# Patient Record
Sex: FEMALE | Race: WHITE | NOT HISPANIC OR LATINO | URBAN - METROPOLITAN AREA
[De-identification: names, ages, dates, MRNs, and addresses within clinical notes are randomized per-mention and may not be internally consistent; named-entity substitution may affect disease eponyms.]

---

## 2018-05-09 ENCOUNTER — EMERGENCY (EMERGENCY)
Facility: HOSPITAL | Age: 83
LOS: 1 days | Discharge: HOME HEALTH | End: 2018-05-09
Attending: EMERGENCY MEDICINE | Admitting: HOSPITALIST
Payer: MEDICARE

## 2018-05-09 VITALS
DIASTOLIC BLOOD PRESSURE: 68 MMHG | OXYGEN SATURATION: 100 % | HEART RATE: 77 BPM | TEMPERATURE: 98 F | RESPIRATION RATE: 16 BRPM | SYSTOLIC BLOOD PRESSURE: 143 MMHG

## 2018-05-09 PROCEDURE — 99285 EMERGENCY DEPT VISIT HI MDM: CPT | Mod: 25,GC

## 2018-05-09 NOTE — ED ADULT TRIAGE NOTE - CHIEF COMPLAINT QUOTE
c/o rectal bleeding, worsening over the last few hours. dark red blood. denies any dizziness, weakness, sob. also having left flank pain for a few weeks. hx myesthenia gravis. takes aspirin.

## 2018-05-10 ENCOUNTER — TRANSCRIPTION ENCOUNTER (OUTPATIENT)
Age: 83
End: 2018-05-10

## 2018-05-10 VITALS — TEMPERATURE: 98 F | RESPIRATION RATE: 18 BRPM | OXYGEN SATURATION: 100 %

## 2018-05-10 DIAGNOSIS — G70.00 MYASTHENIA GRAVIS WITHOUT (ACUTE) EXACERBATION: ICD-10-CM

## 2018-05-10 DIAGNOSIS — K21.9 GASTRO-ESOPHAGEAL REFLUX DISEASE WITHOUT ESOPHAGITIS: ICD-10-CM

## 2018-05-10 DIAGNOSIS — K92.2 GASTROINTESTINAL HEMORRHAGE, UNSPECIFIED: ICD-10-CM

## 2018-05-10 DIAGNOSIS — G93.40 ENCEPHALOPATHY, UNSPECIFIED: ICD-10-CM

## 2018-05-10 DIAGNOSIS — Z29.9 ENCOUNTER FOR PROPHYLACTIC MEASURES, UNSPECIFIED: ICD-10-CM

## 2018-05-10 PROBLEM — Z00.00 ENCOUNTER FOR PREVENTIVE HEALTH EXAMINATION: Status: ACTIVE | Noted: 2018-05-10

## 2018-05-10 LAB
ALBUMIN SERPL ELPH-MCNC: 4 G/DL — SIGNIFICANT CHANGE UP (ref 3.3–5)
ALP SERPL-CCNC: 118 U/L — SIGNIFICANT CHANGE UP (ref 40–120)
ALT FLD-CCNC: 10 U/L — SIGNIFICANT CHANGE UP (ref 4–33)
ANTIBODY ID 1_1: SIGNIFICANT CHANGE UP
APPEARANCE UR: SIGNIFICANT CHANGE UP
APTT BLD: 28.8 SEC — SIGNIFICANT CHANGE UP (ref 27.5–37.4)
AST SERPL-CCNC: 14 U/L — SIGNIFICANT CHANGE UP (ref 4–32)
BACTERIA # UR AUTO: SIGNIFICANT CHANGE UP
BASE EXCESS BLDV CALC-SCNC: 2.7 MMOL/L — SIGNIFICANT CHANGE UP
BASOPHILS # BLD AUTO: 0.05 K/UL — SIGNIFICANT CHANGE UP (ref 0–0.2)
BASOPHILS NFR BLD AUTO: 0.5 % — SIGNIFICANT CHANGE UP (ref 0–2)
BILIRUB SERPL-MCNC: 0.3 MG/DL — SIGNIFICANT CHANGE UP (ref 0.2–1.2)
BILIRUB UR-MCNC: NEGATIVE — SIGNIFICANT CHANGE UP
BLD GP AB SCN SERPL QL: POSITIVE — SIGNIFICANT CHANGE UP
BLOOD GAS VENOUS - CREATININE: 0.85 MG/DL — SIGNIFICANT CHANGE UP (ref 0.5–1.3)
BLOOD UR QL VISUAL: HIGH
BUN SERPL-MCNC: 23 MG/DL — SIGNIFICANT CHANGE UP (ref 7–23)
BUN SERPL-MCNC: 27 MG/DL — HIGH (ref 7–23)
CALCIUM SERPL-MCNC: 8.8 MG/DL — SIGNIFICANT CHANGE UP (ref 8.4–10.5)
CALCIUM SERPL-MCNC: 8.9 MG/DL — SIGNIFICANT CHANGE UP (ref 8.4–10.5)
CHLORIDE BLDV-SCNC: 110 MMOL/L — HIGH (ref 96–108)
CHLORIDE SERPL-SCNC: 105 MMOL/L — SIGNIFICANT CHANGE UP (ref 98–107)
CHLORIDE SERPL-SCNC: 107 MMOL/L — SIGNIFICANT CHANGE UP (ref 98–107)
CO2 SERPL-SCNC: 26 MMOL/L — SIGNIFICANT CHANGE UP (ref 22–31)
CO2 SERPL-SCNC: 26 MMOL/L — SIGNIFICANT CHANGE UP (ref 22–31)
COLOR SPEC: YELLOW — SIGNIFICANT CHANGE UP
CREAT SERPL-MCNC: 0.75 MG/DL — SIGNIFICANT CHANGE UP (ref 0.5–1.3)
CREAT SERPL-MCNC: 0.85 MG/DL — SIGNIFICANT CHANGE UP (ref 0.5–1.3)
DAT C3-SP REAG RBC QL: POSITIVE — SIGNIFICANT CHANGE UP
DAT POLY-SP REAG RBC QL: POSITIVE — SIGNIFICANT CHANGE UP
DIRECT COOMBS IGG: NEGATIVE — SIGNIFICANT CHANGE UP
EOSINOPHIL # BLD AUTO: 0.18 K/UL — SIGNIFICANT CHANGE UP (ref 0–0.5)
EOSINOPHIL NFR BLD AUTO: 1.8 % — SIGNIFICANT CHANGE UP (ref 0–6)
GAS PNL BLDV: 140 MMOL/L — SIGNIFICANT CHANGE UP (ref 136–146)
GLUCOSE BLDV-MCNC: 100 — HIGH (ref 70–99)
GLUCOSE SERPL-MCNC: 90 MG/DL — SIGNIFICANT CHANGE UP (ref 70–99)
GLUCOSE SERPL-MCNC: 98 MG/DL — SIGNIFICANT CHANGE UP (ref 70–99)
GLUCOSE UR-MCNC: NEGATIVE — SIGNIFICANT CHANGE UP
HCO3 BLDV-SCNC: 25 MMOL/L — SIGNIFICANT CHANGE UP (ref 20–27)
HCT VFR BLD CALC: 39.5 % — SIGNIFICANT CHANGE UP (ref 34.5–45)
HCT VFR BLD CALC: 42.2 % — SIGNIFICANT CHANGE UP (ref 34.5–45)
HCT VFR BLDV CALC: 42.4 % — SIGNIFICANT CHANGE UP (ref 34.5–45)
HGB BLD-MCNC: 13 G/DL — SIGNIFICANT CHANGE UP (ref 11.5–15.5)
HGB BLD-MCNC: 13.4 G/DL — SIGNIFICANT CHANGE UP (ref 11.5–15.5)
HGB BLDV-MCNC: 13.8 G/DL — SIGNIFICANT CHANGE UP (ref 11.5–15.5)
IMM GRANULOCYTES # BLD AUTO: 0.03 # — SIGNIFICANT CHANGE UP
IMM GRANULOCYTES NFR BLD AUTO: 0.3 % — SIGNIFICANT CHANGE UP (ref 0–1.5)
INR BLD: 0.99 — SIGNIFICANT CHANGE UP (ref 0.88–1.17)
KETONES UR-MCNC: NEGATIVE — SIGNIFICANT CHANGE UP
LACTATE BLDV-MCNC: 1 MMOL/L — SIGNIFICANT CHANGE UP (ref 0.5–2)
LEUKOCYTE ESTERASE UR-ACNC: HIGH
LYMPHOCYTES # BLD AUTO: 0.89 K/UL — LOW (ref 1–3.3)
LYMPHOCYTES # BLD AUTO: 9.1 % — LOW (ref 13–44)
MAGNESIUM SERPL-MCNC: 2.4 MG/DL — SIGNIFICANT CHANGE UP (ref 1.6–2.6)
MCHC RBC-ENTMCNC: 31.5 PG — SIGNIFICANT CHANGE UP (ref 27–34)
MCHC RBC-ENTMCNC: 31.8 % — LOW (ref 32–36)
MCHC RBC-ENTMCNC: 31.8 PG — SIGNIFICANT CHANGE UP (ref 27–34)
MCHC RBC-ENTMCNC: 32.9 % — SIGNIFICANT CHANGE UP (ref 32–36)
MCV RBC AUTO: 96.6 FL — SIGNIFICANT CHANGE UP (ref 80–100)
MCV RBC AUTO: 99.1 FL — SIGNIFICANT CHANGE UP (ref 80–100)
MONOCYTES # BLD AUTO: 0.69 K/UL — SIGNIFICANT CHANGE UP (ref 0–0.9)
MONOCYTES NFR BLD AUTO: 7 % — SIGNIFICANT CHANGE UP (ref 2–14)
MUCOUS THREADS # UR AUTO: SIGNIFICANT CHANGE UP
NEUTROPHILS # BLD AUTO: 7.96 K/UL — HIGH (ref 1.8–7.4)
NEUTROPHILS NFR BLD AUTO: 81.3 % — HIGH (ref 43–77)
NITRITE UR-MCNC: NEGATIVE — SIGNIFICANT CHANGE UP
NRBC # FLD: 0 — SIGNIFICANT CHANGE UP
NRBC # FLD: 0 — SIGNIFICANT CHANGE UP
OB PNL STL: POSITIVE — SIGNIFICANT CHANGE UP
PCO2 BLDV: 55 MMHG — HIGH (ref 41–51)
PH BLDV: 7.33 PH — SIGNIFICANT CHANGE UP (ref 7.32–7.43)
PH UR: 6.5 — SIGNIFICANT CHANGE UP (ref 4.6–8)
PHOSPHATE SERPL-MCNC: 3.2 MG/DL — SIGNIFICANT CHANGE UP (ref 2.5–4.5)
PLATELET # BLD AUTO: 160 K/UL — SIGNIFICANT CHANGE UP (ref 150–400)
PLATELET # BLD AUTO: 168 K/UL — SIGNIFICANT CHANGE UP (ref 150–400)
PMV BLD: 10 FL — SIGNIFICANT CHANGE UP (ref 7–13)
PMV BLD: 10.1 FL — SIGNIFICANT CHANGE UP (ref 7–13)
PO2 BLDV: 31 MMHG — LOW (ref 35–40)
POTASSIUM BLDV-SCNC: 3.7 MMOL/L — SIGNIFICANT CHANGE UP (ref 3.4–4.5)
POTASSIUM SERPL-MCNC: 3.9 MMOL/L — SIGNIFICANT CHANGE UP (ref 3.5–5.3)
POTASSIUM SERPL-MCNC: 4.4 MMOL/L — SIGNIFICANT CHANGE UP (ref 3.5–5.3)
POTASSIUM SERPL-SCNC: 3.9 MMOL/L — SIGNIFICANT CHANGE UP (ref 3.5–5.3)
POTASSIUM SERPL-SCNC: 4.4 MMOL/L — SIGNIFICANT CHANGE UP (ref 3.5–5.3)
PROT SERPL-MCNC: 6.4 G/DL — SIGNIFICANT CHANGE UP (ref 6–8.3)
PROT UR-MCNC: 30 MG/DL — HIGH
PROTHROM AB SERPL-ACNC: 11.4 SEC — SIGNIFICANT CHANGE UP (ref 9.8–13.1)
RBC # BLD: 4.09 M/UL — SIGNIFICANT CHANGE UP (ref 3.8–5.2)
RBC # BLD: 4.26 M/UL — SIGNIFICANT CHANGE UP (ref 3.8–5.2)
RBC # FLD: 13 % — SIGNIFICANT CHANGE UP (ref 10.3–14.5)
RBC # FLD: 13.1 % — SIGNIFICANT CHANGE UP (ref 10.3–14.5)
RBC CASTS # UR COMP ASSIST: SIGNIFICANT CHANGE UP (ref 0–?)
RH IG SCN BLD-IMP: POSITIVE — SIGNIFICANT CHANGE UP
SAO2 % BLDV: 52.8 % — LOW (ref 60–85)
SODIUM SERPL-SCNC: 141 MMOL/L — SIGNIFICANT CHANGE UP (ref 135–145)
SODIUM SERPL-SCNC: 144 MMOL/L — SIGNIFICANT CHANGE UP (ref 135–145)
SP GR SPEC: 1.03 — SIGNIFICANT CHANGE UP (ref 1–1.04)
SQUAMOUS # UR AUTO: SIGNIFICANT CHANGE UP
UROBILINOGEN FLD QL: 4 MG/DL — HIGH
WBC # BLD: 7.72 K/UL — SIGNIFICANT CHANGE UP (ref 3.8–10.5)
WBC # BLD: 9.8 K/UL — SIGNIFICANT CHANGE UP (ref 3.8–10.5)
WBC # FLD AUTO: 7.72 K/UL — SIGNIFICANT CHANGE UP (ref 3.8–10.5)
WBC # FLD AUTO: 9.8 K/UL — SIGNIFICANT CHANGE UP (ref 3.8–10.5)
WBC UR QL: HIGH (ref 0–?)

## 2018-05-10 RX ORDER — PANTOPRAZOLE SODIUM 20 MG/1
40 TABLET, DELAYED RELEASE ORAL EVERY 12 HOURS
Qty: 0 | Refills: 0 | Status: DISCONTINUED | OUTPATIENT
Start: 2018-05-10 | End: 2018-05-10

## 2018-05-10 RX ORDER — POLYETHYLENE GLYCOL 3350 17 G/17G
17 POWDER, FOR SOLUTION ORAL DAILY
Qty: 0 | Refills: 0 | Status: DISCONTINUED | OUTPATIENT
Start: 2018-05-10 | End: 2018-05-10

## 2018-05-10 RX ORDER — SENNA PLUS 8.6 MG/1
2 TABLET ORAL DAILY
Qty: 0 | Refills: 0 | Status: DISCONTINUED | OUTPATIENT
Start: 2018-05-10 | End: 2018-05-10

## 2018-05-10 RX ORDER — POLYETHYLENE GLYCOL 3350 17 G/17G
17 POWDER, FOR SOLUTION ORAL
Qty: 0 | Refills: 0 | DISCHARGE
Start: 2018-05-10

## 2018-05-10 RX ORDER — DOCUSATE SODIUM 100 MG
100 CAPSULE ORAL DAILY
Qty: 0 | Refills: 0 | Status: DISCONTINUED | OUTPATIENT
Start: 2018-05-10 | End: 2018-05-10

## 2018-05-10 RX ORDER — SENNA PLUS 8.6 MG/1
2 TABLET ORAL
Refills: 0 | DISCHARGE
Start: 2018-05-10

## 2018-05-10 RX ORDER — DOCUSATE SODIUM 100 MG
1 CAPSULE ORAL
Qty: 0 | Refills: 0 | DISCHARGE
Start: 2018-05-10

## 2018-05-10 RX ADMIN — Medication 100 MILLIGRAM(S): at 12:36

## 2018-05-10 RX ADMIN — SENNA PLUS 2 TABLET(S): 8.6 TABLET ORAL at 12:37

## 2018-05-10 NOTE — DISCHARGE NOTE ADULT - CARE PROVIDER_API CALL
Taz Watts), Internal Medicine; Pulmonary Disease  [address] [facility] [address]  [address]  [place] [address]  Phone: [phone]  Fax: [phone]    Padmini Baxter), Gastroenterology  [address] [place] [address]  [address] [place] [address]  Phone: [phone]  Fax: [phone] Taz Watts), Internal Medicine; Pulmonary Disease  [address] [facility]  [address]  [place] [address]  Phone: [phone]  Fax: [phone]    Padmini Baxter), Gastroenterology  [address] [facility] [address]  [place] [address]  Phone: [phone]  Fax: [phone]    Shilo Wahl), Neurosurgery  [facility]  [address] [facility]  [address]  [place] [address]  Phone: [phone]  Fax: [phone]

## 2018-05-10 NOTE — H&P ADULT - PROBLEM SELECTOR PLAN 2
unclear etiology, possibly related to NPH  neuro consult in AM for possible in-pt LP given CT findings c/w NPH  (+)UA however ED concerned may have been contaminated w/stool, f/u UCx, repeat UA in AM resolved at present, A&Ox3  unclear etiology, possibly related to NPH  neuro consult in AM for possible in-pt LP given CT findings c/w NPH  (+)UA however ED concerned may have been contaminated w/stool, f/u UCx, repeat UA in AM

## 2018-05-10 NOTE — H&P ADULT - NSHPLABSRESULTS_GEN_ALL_CORE
05-10    144  |  107  |  27<H>  ----------------------------<  98  3.9   |  26  |  0.85    Ca    8.9      10 May 2018 00:40  Phos  3.2     05-10  Mg     2.4     05-10    TPro  6.4  /  Alb  4.0  /  TBili  0.3  /  DBili  x   /  AST  14  /  ALT  10  /  AlkPhos  118  05-10                        13.4   9.80  )-----------( 168      ( 10 May 2018 00:40 )             42.2     Occult Blood, Feces (05.10.18 @ 01:10)    Occult Blood: POSITIVE    LIVER FUNCTIONS - ( 10 May 2018 00:40 )  Alb: 4.0 g/dL / Pro: 6.4 g/dL / ALK PHOS: 118 u/L / ALT: 10 u/L / AST: 14 u/L / GGT: x           PT/INR - ( 10 May 2018 00:40 )   PT: 11.4 SEC;   INR: 0.99     PTT - ( 10 May 2018 00:40 )  PTT:28.8 SEC    Urinalysis Basic - ( 10 May 2018 01:50 )  Color: YELLOW / Appearance: HAZY / S.026 / pH: 6.5  Gluc: NEGATIVE / Ketone: NEGATIVE  / Bili: NEGATIVE / Urobili: 4 mg/dL   Blood: LARGE / Protein: 30 mg/dL / Nitrite: NEGATIVE   Leuk Esterase: SMALL / RBC: 25-50 / WBC 5-10   Sq Epi: OCC / Non Sq Epi: x / Bacteria: FEW    00:40 - VBG - pH: 7.33  | pCO2: 55    | pO2: 31    | Lactate: 1.0      CXR personally reviewed - rotated film, clear lungs    < from: CT Head No Cont (05.10.18 @ 02:48) >  There is no CT evidence of acute intracranial hemorrhage or extra-axial collection. There is no CT evidence of an acute demarcated territorial infarct.  There is no vasogenic edema or mass effect. There is disproportionate dilatation of the ventricles with slightly decreased callosal angle. The basal cisterns are patent. The visualized paranasal sinuses are clear. The mastoid air cells and middle ear cavities are grossly clear. The soft tissues of the scalp and face are unremarkable. The bones of the calvarium, skull base, and face are unremarkable.  IMPRESSION: No CT evidence of acute intracranial hemorrhage or mass effect. No CT evidence of acute demarcated territorial infarct. Findings suggesting normal pressure hydrocephalus.  < end of copied text >

## 2018-05-10 NOTE — H&P ADULT - HISTORY OF PRESENT ILLNESS
83-year-old female with history of myasthenia gravis (not on meds), constipation, presenting from home with rectal bleeding.  Patient also noted to be more confused, anxious, forgetful and disheveled over the past few weeks.  She lives with daughter however is home alone during the day.    In the ED VS: 97.6  66-77  143-194/58-76  16  %RA 83-year-old female with history of myasthenia gravis (not on meds), constipation, GERD, presenting from home with rectal bleeding after straining to have a BM.  Patient reports no prior history of gastrointestinal bleeding, reports never having had a colonoscopy.  She denies chest pain, shortness of breath, lightheadedness, dizziness, nausea, vomiting, abdominal pain, diarrhea, or melena.  States she was straining to have a BM yesterday and "pushed too hard."  Per ED note, patient also noted to be more confused, anxious, forgetful and disheveled over the past few weeks.  She lives with daughter however is home alone during the day.  Patient is A&Ox3 during my exam, without any complaints.  No  incontinence or report of gait abnormality, states recently tripped over her cat and fell without loss of consciousness or head trauma.  Ambulates without aid. Rare alcohol use, takes Anasin PRN for pain (aspirin).    In the ED VS: 97.6  66-77  143-194/58-76  16  %RA

## 2018-05-10 NOTE — DISCHARGE NOTE ADULT - PATIENT PORTAL LINK FT
You can access the [facility] Patient Portal, offered by [facility], by registering with the following website: [email or web address]

## 2018-05-10 NOTE — ED PROVIDER NOTE - OBJECTIVE STATEMENT
83yof w/ myesthenia gravis not on any medications p/w rectal bleeding. Has had increasing constipation for several days, today had strained to pass a hard bowel movement and then the family noticed she was dripping maroon blood from the rectum. Initially it was a few small spots and then blood was dripping on the floor. Pt endorses some abdominal bloating but no pain. No fevers, chills, nausea, vomiting. No abdominal surgeries. No melenic stool. Has occasional sharp left sided flank pains but not currently. Denies any dysuria, hematuria. Of note, family at bedside also reports an acute change in behavior over the past several weeks, getting easily confused, very anxious, forgetful, disheveled. Lives alone during the day while daughter. No recent hospitalizations

## 2018-05-10 NOTE — ED PROVIDER NOTE - ATTENDING CONTRIBUTION TO CARE
pt presenting with AMS and dark stools per family.  They have noted a rapid decline over the last few weeks and then the stool change.  Pt denies any complaints at this time    Gen: Well appearing in NAD  Head: NC/AT  Neck: trachea midline  Resp:  No distress  Abd: soft NT ND  Ext: no deformities  Neuro:  A&O appears non focal  Skin:  Warm and dry as visualized  Psych:  Normal affect and mood     Pt presenting with guaiac positive dark stools.  HgB is currently stable.  CT with signs of possible NPH but on further history taking the patient has no other symptoms.  Due to the patient living alone and concerns over rapidly progressive dementia will admit to the hospital.  Family is on board and wants to know about possible home care options as well

## 2018-05-10 NOTE — DISCHARGE NOTE ADULT - CARE PROVIDERS DIRECT ADDRESSES
,DirectAddress_Unknown,[email or web address] ,DirectAddress_Unknown,[email or web address]

## 2018-05-10 NOTE — DISCHARGE NOTE ADULT - HOSPITAL COURSE
HPI:83-year-old female with history of myasthenia gravis (not on meds), constipation, GERD, presenting from home with rectal bleeding after straining to have a BM.  Patient reports no prior history of gastrointestinal bleeding, reports never having had a colonoscopy.  She denies chest pain, shortness of breath, lightheadedness, dizziness, nausea, vomiting, abdominal pain, diarrhea, or melena.  States she was straining to have a BM yesterday and "pushed too hard."  Per ED note, patient also noted to be more confused, anxious, forgetful and disheveled over the past few weeks.  She lives with daughter however is home alone during the day.  Patient is A&Ox3 during my exam, without any complaints.  No  incontinence or report of gait abnormality, states recently tripped over her cat and fell without loss of consciousness or head trauma.  Ambulates without aid. Rare alcohol use, takes Anasin PRN for pain (aspirin).    Course: Patient was noted to have maroon colored stool in her rectal vault, FOBT+, and an external hemorrhoid.  Labs were grossly unremarkable with normal Hgb.  A CT head was done in the ED which showed some evidence of NPH. HPI:83-year-old female with history of myasthenia gravis (not on meds), constipation, GERD, presenting from home with rectal bleeding after straining to have a BM.  Patient reports no prior history of gastrointestinal bleeding, reports never having had a colonoscopy.  She denies chest pain, shortness of breath, lightheadedness, dizziness, nausea, vomiting, abdominal pain, diarrhea, or melena.  States she was straining to have a BM yesterday and "pushed too hard."  Per ED note, patient also noted to be more confused, anxious, forgetful and disheveled over the past few weeks.  She lives with daughter however is home alone during the day.  Patient is A&Ox3 during my exam, without any complaints.  No  incontinence or report of gait abnormality, states recently tripped over her cat and fell without loss of consciousness or head trauma.  Ambulates without aid. Rare alcohol use, takes Anasin PRN for pain (aspirin).    Course: Patient was noted to have maroon colored stool in her rectal vault, FOBT+, and an external hemorrhoid.  Labs were grossly unremarkable with normal Hgb.  UA was positive for blood but noted to be contaminated with bloody stool.   A CT head was done in the ED which showed some evidence of NPH.  Patient's lab work and vitals did not demonstrate any acute pathology, GI or otherwise.  Daughters reported worsening forgetfulness, but on exam there were no acute mental status changes.  Given clinical stability, lack of any evidence of acute pathology on labs and physical exam, patient was deemed stable for discharge with instructions to follow up with GI as an outpatient for her hemorrhoids and Neuro for management of her NPH. HPI:83-year-old female with history of myasthenia gravis (not on meds), constipation, GERD, presenting from home with rectal bleeding after straining to have a BM.  Patient reports no prior history of gastrointestinal bleeding, reports never having had a colonoscopy.  She denies chest pain, shortness of breath, lightheadedness, dizziness, nausea, vomiting, abdominal pain, diarrhea, or melena.  States she was straining to have a BM yesterday and "pushed too hard."  Per ED note, patient also noted to be more confused, anxious, forgetful and disheveled over the past few weeks.  She lives with daughter however is home alone during the day.  Patient is A&Ox3 during my exam, without any complaints.  No  incontinence or report of gait abnormality, states recently tripped over her cat and fell without loss of consciousness or head trauma.  Ambulates without aid. Rare alcohol use, takes Anasin PRN for pain (aspirin).    Course: Patient was noted to have maroon colored stool in her rectal vault, FOBT+, and an external hemorrhoid.  Labs were grossly unremarkable with normal Hgb.  UA was positive for blood but noted to be contaminated with bloody stool.   A CT head was done in the ED which showed some evidence of NPH.  Patient's lab work and vitals did not demonstrate any concerning acute pathology that warranted an inpatient workup, GI or otherwise.  Daughters reported worsening forgetfulness, but on exam there were no acute mental status changes.  Given clinical stability, lack of any evidence of acute pathology on labs and physical exam, patient was deemed stable for discharge with instructions to follow up with GI as an outpatient for her hemorrhoids and Neuro for management of her NPH.  She was also sent home with home health care services.

## 2018-05-10 NOTE — H&P ADULT - NSHPREVIEWOFSYSTEMS_GEN_ALL_CORE
REVIEW OF SYSTEMS:    CONSTITUTIONAL: No weakness, fevers or chills  EYES/ENT: No visual changes;  No dysphagia  NECK: No pain or stiffness  RESPIRATORY: No cough, wheezing, hemoptysis; No shortness of breath  CARDIOVASCULAR: No chest pain or palpitations; No lower extremity edema  GASTROINTESTINAL: No abdominal pain; occasional dyspepsia. No nausea, vomiting, or hematemesis; No diarrhea; (+) constipation. (+) hematochezia.  GENITOURINARY: No dysuria, frequency or hematuria  NEUROLOGICAL: No numbness or weakness  SKIN: No itching, burning, rashes, or lesions   All other review of systems is negative unless indicated above.

## 2018-05-10 NOTE — ED PROVIDER NOTE - MEDICAL DECISION MAKING DETAILS
83yof w/ painless rectal bleeding, internal hemorrhoid vs diverticular bleed, also with behavioral changes for the past several weeks. Will screen for metabolic derangements, infectious source, and CT scan head to eval for mass or bleed.

## 2018-05-10 NOTE — DISCHARGE NOTE ADULT - MEDICATION SUMMARY - MEDICATIONS TO TAKE
I will START or STAY ON the medications listed below when I get home from the hospital:    Anacin  -- Indication: For Need for prophylactic measure    Pepto-Bismol  -- Indication: For GERD (gastroesophageal reflux disease)    docusate sodium 100 mg oral capsule  -- 1 cap(s) by mouth once a day  -- Indication: For constipation    polyethylene glycol 3350 oral powder for reconstitution  -- 17 gram(s) by mouth once a day, As needed, Constipation  -- Indication: For constipation    senna oral tablet  -- 2 tab(s) by mouth once a day  -- Indication: For constipation

## 2018-05-10 NOTE — DISCHARGE NOTE ADULT - CONDITIONS AT DISCHARGE
pt alert and clinically stable. patient vss and afebrile. patient w/o sxs of acute bleeding. patient seen by PT and ambulated. stool regimen given for constipation. family at bedside. safety maintained. no sxs of acute distress noted. plan of care continue.

## 2018-05-10 NOTE — H&P ADULT - NSHPSOCIALHISTORY_GEN_ALL_CORE
Lives at home with daughter, home alone during the day Lives at home with daughter, home alone during the day  Former smoker  Rare alcohol use  Ambulates without aid

## 2018-05-10 NOTE — DISCHARGE NOTE ADULT - PROVIDER TOKENS
ADAN:'[id number]',ADAN:'[id number]' TOKEN:'[id number]',TOKEN:'[id number]',TOKEN:'[id number]'

## 2018-05-10 NOTE — H&P ADULT - NSHPPHYSICALEXAM_GEN_ALL_CORE
PHYSICAL EXAM:  GENERAL: NAD, well-developed, well-nourished  HEAD:  Atraumatic, Normocephalic  EYES: EOMI, PERRLA, conjunctiva and sclera clear  NECK: Supple, No JVD  CHEST/LUNG: Clear to auscultation bilaterally; No wheezes, rales or rhonchi  HEART: Regular rate and rhythm; No murmurs, rubs, or gallops, (+)S1, S2  ABDOMEN: Soft, Nontender; Normal Bowel sounds; (+)distension/tympanic  EXTREMITIES:  2+ Peripheral Pulses, No clubbing, cyanosis, or edema  PSYCH: normal mood and affect  NEUROLOGY: AAOx3, non-focal  SKIN: No rashes or lesions PHYSICAL EXAM:  GENERAL: NAD, well-developed, well-nourished  HEAD:  Atraumatic, Normocephalic  EYES: EOMI, PERRLA, conjunctiva and sclera clear  NECK: Supple, No JVD  CHEST/LUNG: Clear to auscultation bilaterally; No wheezes, rales or rhonchi  HEART: Regular rate and rhythm; No murmurs, rubs, or gallops, (+)S1, S2  ABDOMEN: Soft, Nontender; Normal Bowel sounds; (+)distension/tympanic [rectal exam performed in ED - maroon stool in rectal vault with small external hemorrhoid]  EXTREMITIES:  2+ Peripheral Pulses, No clubbing, cyanosis, or edema  PSYCH: normal mood and affect  NEUROLOGY: AAOx3, non-focal  SKIN: No rashes or lesions

## 2018-05-10 NOTE — PROGRESS NOTE ADULT - PROBLEM SELECTOR PLAN 1
maroon blood noted in rectal vault on ED exam with noted external hemorrhoid, FOBT (+)  bleeding likely secondary to hemorrhoidal bleed in setting of recent straining  H/H wnl - trend  On IV PPI BID  clear liquid diet, will advance  inpt vs outpt GI eval pending repeat bloodwork  will check orthostatics

## 2018-05-10 NOTE — PROGRESS NOTE ADULT - SUBJECTIVE AND OBJECTIVE BOX
Patient is a 83y old  Female who presents with a chief complaint of rectal bleeding (10 May 2018 10:37)      Nicho Chance PGY1 [facility] pager [phone]      SUBJECTIVE / OVERNIGHT EVENTS: No overnight events. Only complaint is some abdominal bloating.  Denies any nausea   On tele:    MEDICATIONS  (STANDING):  pantoprazole  Injectable 40 milliGRAM(s) IV Push every 12 hours    MEDICATIONS  (PRN):      T(C): 36.9 (05-10-18 @ 05:47), Max: 36.9 (05-10-18 @ 05:47)  HR: 63 (05-10-18 @ 05:47) (63 - 77)  BP: 149/75 (05-10-18 @ 05:47) (143/68 - 194/76)  RR: 18 (05-10-18 @ 05:47) (16 - 18)  SpO2: 99% (05-10-18 @ 05:47) (97% - 100%)    PHYSICAL EXAM  GENERAL: NAD, well-developed  NEURO: AO x3, PERRLA, EOMI, motor strength in tact in 4/4 extremities, sensation in tact  HEAD:  Atraumatic, Normocephalic  EYES: conjunctiva and sclera clear  NECK: Supple, No JVD, no lymphadenopathy, no thyromegaly  CHEST/LUNG: Clear to auscultation bilaterally; No wheezes, rales or rhonchi  HEART: Regular rate and rhythm; No murmurs, rubs, or gallops  ABDOMEN: Soft, Nontender, Nondistended; Bowel sounds present, no masses.  EXTREMITIES:  2+ Peripheral Pulses, No clubbing, cyanosis, or edema  SKIN: Warm, dry, in tact, no rashes or lesions  PSYCH: affect appropriate    LABS:                        13.0   7.72  )-----------( 160      ( 10 May 2018 09:07 )             39.5     05-10    141  |  105  |  23  ----------------------------<  90  4.4   |  26  |  0.75    Ca    8.8      10 May 2018 09:07  Phos  3.2     05-10  Mg     2.4     05-10    TPro  6.4  /  Alb  4.0  /  TBili  0.3  /  DBili  x   /  AST  14  /  ALT  10  /  AlkPhos  118  05-10    PT/INR - ( 10 May 2018 00:40 )   PT: 11.4 SEC;   INR: 0.99          PTT - ( 10 May 2018 00:40 )  PTT:28.8 SEC      Urinalysis Basic - ( 10 May 2018 01:50 )    Color: YELLOW / Appearance: HAZY / S.026 / pH: 6.5  Gluc: NEGATIVE / Ketone: NEGATIVE  / Bili: NEGATIVE / Urobili: 4 mg/dL   Blood: LARGE / Protein: 30 mg/dL / Nitrite: NEGATIVE   Leuk Esterase: SMALL / RBC: 25-50 / WBC 5-10   Sq Epi: OCC / Non Sq Epi: x / Bacteria: FEW      I&O's Summary Patient is a 83y old  Female who presents with a chief complaint of rectal bleeding (10 May 2018 10:37)      Nicho Chance PGY1 J pager [phone]      SUBJECTIVE / OVERNIGHT EVENTS: No overnight events. Only complaint is some abdominal bloating.  Denies any nausea, vomiting, hematemesis, dizziness, lightheadedness chest pain, SOB, diarrhea, melena, hematemesis.  ROS otherwise negative.        MEDICATIONS  (STANDING):  pantoprazole  Injectable 40 milliGRAM(s) IV Push every 12 hours    MEDICATIONS  (PRN):      T(C): 36.9 (05-10-18 @ 05:47), Max: 36.9 (05-10-18 @ 05:47)  HR: 63 (05-10-18 @ 05:47) (63 - 77)  BP: 149/75 (05-10-18 @ 05:47) (143/68 - 194/76)  RR: 18 (05-10-18 @ 05:47) (16 - 18)  SpO2: 99% (05-10-18 @ 05:47) (97% - 100%)    PHYSICAL EXAM  GENERAL: NAD, well-developed  NEURO: AO x3, PERRLA, EOMI, motor strength in tact in 4/4 extremities, sensation in tact  HEAD:  Atraumatic, Normocephalic  EYES: conjunctiva and sclera clear  NECK: Supple, No JVD   CHEST/LUNG: Clear to auscultation bilaterally; No wheezes, rales or rhonchi  HEART: Regular rate and rhythm; No murmurs, rubs, or gallops  ABDOMEN: Soft, Nontender, +distended; Bowel sounds present, no masses.  EXTREMITIES:  2+ Peripheral Pulses, No clubbing, cyanosis, or edema  SKIN: Warm, dry, in tact, no rashes or lesions  PSYCH: affect appropriate    LABS:                        13.0   7.72  )-----------( 160      ( 10 May 2018 09:07 )             39.5     05-10    141  |  105  |  23  ----------------------------<  90  4.4   |  26  |  0.75    Ca    8.8      10 May 2018 09:07  Phos  3.2     05-10  Mg     2.4     05-10    TPro  6.4  /  Alb  4.0  /  TBili  0.3  /  DBili  x   /  AST  14  /  ALT  10  /  AlkPhos  118  05-10    PT/INR - ( 10 May 2018 00:40 )   PT: 11.4 SEC;   INR: 0.99          PTT - ( 10 May 2018 00:40 )  PTT:28.8 SEC      Urinalysis Basic - ( 10 May 2018 01:50 )    Color: YELLOW / Appearance: HAZY / S.026 / pH: 6.5  Gluc: NEGATIVE / Ketone: NEGATIVE  / Bili: NEGATIVE / Urobili: 4 mg/dL   Blood: LARGE / Protein: 30 mg/dL / Nitrite: NEGATIVE   Leuk Esterase: SMALL / RBC: 25-50 / WBC 5-10   Sq Epi: OCC / Non Sq Epi: x / Bacteria: FEW      I&O's Summary

## 2018-05-10 NOTE — ED PROVIDER NOTE - PROGRESS NOTE DETAILS
Rohit: Labs grossly unremarkable. CTH w/ possible NPH. Discussed disposition options w/ family, ie home w/ neuro/gi follow up vs inpatient admission. Daughters express concern over pt's safety because she lives alone during the day, and would lik ept admitted to continue work up and perhaps arrange for services during the day.

## 2018-05-10 NOTE — DISCHARGE NOTE ADULT - HOME CARE AGENCY
[facility] [place] [phone]. Nurse to visit on the day following discharge. Other appropriate services to be arranged thereafter.   Please contact the home care agency at the above phone number if you have not heard from them by approximately 12 noon on the day after your hospital discharge.

## 2018-05-10 NOTE — ED PROVIDER NOTE - GASTROINTESTINAL, MLM
Abdomen soft, non-tender, no guarding. Maroon stool in vault, small external hemorrhoid (Chaperone Christianne Moscoso RN)

## 2018-05-10 NOTE — ED ADULT NURSE NOTE - OBJECTIVE STATEMENT
Break Coverage RN: The patient is an 84 y/o female a&ox4 p/w a c/c of dark red rectal bleeding that began tonight after straining to have a bowel movement.  Patient also endorsing left sided mid-axillary pain.  Patient reports being intermittent constipation x3 weeks.  Patient denying hx of GI bleeds.  Denying dizziness, SOB, CP, lethargy, N/V/D, fevers/chills, dysuria, hematuria.  VSS, patient in nad MD at bedside.

## 2018-05-10 NOTE — DISCHARGE NOTE ADULT - CARE PLAN
Principal Discharge DX:	Hemorrhoids  Goal:	treatment  Assessment and plan of treatment:	You were diagnosed with hemorrhoids, which are dilated blood vessels in your anus and rectum that can sometimes bleed.  These can sometimes result from constipation and the straining that goes along with it.  Avoid spending too much time on the toilet, please east a high fiber diet, and you can take stool softeners and laxatives to prevent further episodes of constipation.  Please schedule an appointment with a gastroenterologist within 1-2 weeks of discharge in order to have these evaluated.  IF you do not have a GI doc, a number for one has been provided for you.  If you experience worsening uncontrollable bleeding, abdominal pain, black tarry stools, vomiting of blood, dizziness, lightheadedness, loss of conscioussness seek medical attention.  Secondary Diagnosis:	Normal pressure hydrocephalus  Assessment and plan of treatment:	NPH is a condition where you have enlarged ventricles in your brain.  Depending on the severity, the symptoms from this include changes in mental status, frequent urination, and unsteady gait.  Depending on the extent of the hydrocephalus, sometimes treatment is suggested in order to drain the excess fluid in your brain.  Please follow up with your neurologist within 1-2 weeks of discharge.  A number for a neurosurgeon specializing in this condition has also been provided for you.  IF you experience any neurological changes, trouble walking, altered mental status, worsening confusion, seek medical attention.

## 2018-05-10 NOTE — PROGRESS NOTE ADULT - PROBLEM SELECTOR PLAN 2
resolved at present, A&Ox3  unclear etiology, possibly related to NPH  -outpatient workup for NPH  (+)UA but likely contaminated with stool

## 2018-05-10 NOTE — H&P ADULT - PROBLEM SELECTOR PLAN 1
maroon blood noted in rectal vault on ED exam  H/H wnl - trend  will start on PPI maroon blood noted in rectal vault on ED exam, FOBT (+)  H/H wnl - trend  will start on PPI IV BID  clear liquid diet for now  inpt vs outpt GI eval pending repeat bloodwork  will check orthostatics maroon blood noted in rectal vault on ED exam with noted external hemorrhoid, FOBT (+)  bleeding likely secondary to hemorrhoidal bleed in setting of recent straining  H/H wnl - trend  will start on PPI IV BID  clear liquid diet for now  inpt vs outpt GI eval pending repeat bloodwork  will check orthostatics

## 2018-05-10 NOTE — PROGRESS NOTE ADULT - ATTENDING COMMENTS
Pt without complaints, reports she feels at her baseline, family at bedside and agree she is baseline. They expressed concern over her becoming increasingly forgetful. Pt aox4, pleasant, neuro exam without defs, denies incontinence, has normal gait. Family would like CM to help obtain HHA on discharge.  - OP GI fu for hemorrhoidal treatment  - OP neuro fu for w/u for possible early dementia vs early NPH  - discharge time 31min

## 2018-05-10 NOTE — H&P ADULT - ASSESSMENT
83-year-old female with history of myasthenia gravis (not on meds), constipation, presenting from home with rectal bleeding.

## 2018-05-11 LAB
BACTERIA UR CULT: SIGNIFICANT CHANGE UP
SPECIMEN SOURCE: SIGNIFICANT CHANGE UP

## 2020-01-02 ENCOUNTER — INPATIENT (INPATIENT)
Facility: HOSPITAL | Age: 85
LOS: 5 days | Discharge: NURSING, REHAB OR LONG-TERM CARE | DRG: 552 | End: 2020-01-08
Attending: STUDENT IN AN ORGANIZED HEALTH CARE EDUCATION/TRAINING PROGRAM | Admitting: HOSPITALIST
Payer: MEDICARE

## 2020-01-02 VITALS
SYSTOLIC BLOOD PRESSURE: 156 MMHG | TEMPERATURE: 97 F | OXYGEN SATURATION: 99 % | DIASTOLIC BLOOD PRESSURE: 81 MMHG | HEART RATE: 63 BPM | RESPIRATION RATE: 16 BRPM

## 2020-01-02 DIAGNOSIS — R53.1 WEAKNESS: ICD-10-CM

## 2020-01-02 DIAGNOSIS — Z29.9 ENCOUNTER FOR PROPHYLACTIC MEASURES, UNSPECIFIED: ICD-10-CM

## 2020-01-02 DIAGNOSIS — Z02.9 ENCOUNTER FOR ADMINISTRATIVE EXAMINATIONS, UNSPECIFIED: ICD-10-CM

## 2020-01-02 DIAGNOSIS — G70.00 MYASTHENIA GRAVIS WITHOUT (ACUTE) EXACERBATION: ICD-10-CM

## 2020-01-02 DIAGNOSIS — R60.0 LOCALIZED EDEMA: ICD-10-CM

## 2020-01-02 DIAGNOSIS — R74.8 ABNORMAL LEVELS OF OTHER SERUM ENZYMES: ICD-10-CM

## 2020-01-02 DIAGNOSIS — F03.90 UNSPECIFIED DEMENTIA WITHOUT BEHAVIORAL DISTURBANCE: ICD-10-CM

## 2020-01-02 DIAGNOSIS — D69.6 THROMBOCYTOPENIA, UNSPECIFIED: ICD-10-CM

## 2020-01-02 LAB
ALBUMIN SERPL ELPH-MCNC: 4 G/DL — SIGNIFICANT CHANGE UP (ref 3.3–5)
ALP SERPL-CCNC: 154 U/L — HIGH (ref 40–120)
ALT FLD-CCNC: 36 U/L — SIGNIFICANT CHANGE UP (ref 10–45)
ANION GAP SERPL CALC-SCNC: 14 MMOL/L — SIGNIFICANT CHANGE UP (ref 5–17)
APPEARANCE UR: ABNORMAL
AST SERPL-CCNC: 29 U/L — SIGNIFICANT CHANGE UP (ref 10–40)
BASOPHILS # BLD AUTO: 0.02 K/UL — SIGNIFICANT CHANGE UP (ref 0–0.2)
BASOPHILS NFR BLD AUTO: 0.4 % — SIGNIFICANT CHANGE UP (ref 0–2)
BILIRUB SERPL-MCNC: 0.3 MG/DL — SIGNIFICANT CHANGE UP (ref 0.2–1.2)
BILIRUB UR-MCNC: NEGATIVE — SIGNIFICANT CHANGE UP
BUN SERPL-MCNC: 30 MG/DL — HIGH (ref 7–23)
CALCIUM SERPL-MCNC: 9.3 MG/DL — SIGNIFICANT CHANGE UP (ref 8.4–10.5)
CHLORIDE SERPL-SCNC: 103 MMOL/L — SIGNIFICANT CHANGE UP (ref 96–108)
CO2 SERPL-SCNC: 26 MMOL/L — SIGNIFICANT CHANGE UP (ref 22–31)
COLOR SPEC: YELLOW — SIGNIFICANT CHANGE UP
CREAT SERPL-MCNC: 0.82 MG/DL — SIGNIFICANT CHANGE UP (ref 0.5–1.3)
DIFF PNL FLD: ABNORMAL
EOSINOPHIL # BLD AUTO: 0.08 K/UL — SIGNIFICANT CHANGE UP (ref 0–0.5)
EOSINOPHIL NFR BLD AUTO: 1.6 % — SIGNIFICANT CHANGE UP (ref 0–6)
GLUCOSE SERPL-MCNC: 79 MG/DL — SIGNIFICANT CHANGE UP (ref 70–99)
GLUCOSE UR QL: NEGATIVE — SIGNIFICANT CHANGE UP
HCT VFR BLD CALC: 39.3 % — SIGNIFICANT CHANGE UP (ref 34.5–45)
HGB BLD-MCNC: 13.1 G/DL — SIGNIFICANT CHANGE UP (ref 11.5–15.5)
IMM GRANULOCYTES NFR BLD AUTO: 0.4 % — SIGNIFICANT CHANGE UP (ref 0–1.5)
KETONES UR-MCNC: SIGNIFICANT CHANGE UP
LEUKOCYTE ESTERASE UR-ACNC: NEGATIVE — SIGNIFICANT CHANGE UP
LYMPHOCYTES # BLD AUTO: 0.63 K/UL — LOW (ref 1–3.3)
LYMPHOCYTES # BLD AUTO: 12.2 % — LOW (ref 13–44)
MAGNESIUM SERPL-MCNC: 2.1 MG/DL — SIGNIFICANT CHANGE UP (ref 1.6–2.6)
MCHC RBC-ENTMCNC: 31.8 PG — SIGNIFICANT CHANGE UP (ref 27–34)
MCHC RBC-ENTMCNC: 33.3 GM/DL — SIGNIFICANT CHANGE UP (ref 32–36)
MCV RBC AUTO: 95.4 FL — SIGNIFICANT CHANGE UP (ref 80–100)
MONOCYTES # BLD AUTO: 0.44 K/UL — SIGNIFICANT CHANGE UP (ref 0–0.9)
MONOCYTES NFR BLD AUTO: 8.5 % — SIGNIFICANT CHANGE UP (ref 2–14)
NEUTROPHILS # BLD AUTO: 3.96 K/UL — SIGNIFICANT CHANGE UP (ref 1.8–7.4)
NEUTROPHILS NFR BLD AUTO: 76.9 % — SIGNIFICANT CHANGE UP (ref 43–77)
NITRITE UR-MCNC: NEGATIVE — SIGNIFICANT CHANGE UP
NRBC # BLD: 0 /100 WBCS — SIGNIFICANT CHANGE UP (ref 0–0)
PH UR: 6 — SIGNIFICANT CHANGE UP (ref 5–8)
PHOSPHATE SERPL-MCNC: 3.1 MG/DL — SIGNIFICANT CHANGE UP (ref 2.5–4.5)
PLATELET # BLD AUTO: 102 K/UL — LOW (ref 150–400)
POTASSIUM SERPL-MCNC: 3.9 MMOL/L — SIGNIFICANT CHANGE UP (ref 3.5–5.3)
POTASSIUM SERPL-SCNC: 3.9 MMOL/L — SIGNIFICANT CHANGE UP (ref 3.5–5.3)
PROT SERPL-MCNC: 6.7 G/DL — SIGNIFICANT CHANGE UP (ref 6–8.3)
PROT UR-MCNC: SIGNIFICANT CHANGE UP
RAPID RVP RESULT: SIGNIFICANT CHANGE UP
RBC # BLD: 4.12 M/UL — SIGNIFICANT CHANGE UP (ref 3.8–5.2)
RBC # FLD: 14.4 % — SIGNIFICANT CHANGE UP (ref 10.3–14.5)
SODIUM SERPL-SCNC: 143 MMOL/L — SIGNIFICANT CHANGE UP (ref 135–145)
SP GR SPEC: 1.03 — HIGH (ref 1.01–1.02)
UROBILINOGEN FLD QL: ABNORMAL
WBC # BLD: 5.15 K/UL — SIGNIFICANT CHANGE UP (ref 3.8–10.5)
WBC # FLD AUTO: 5.15 K/UL — SIGNIFICANT CHANGE UP (ref 3.8–10.5)

## 2020-01-02 PROCEDURE — 99223 1ST HOSP IP/OBS HIGH 75: CPT | Mod: GC

## 2020-01-02 PROCEDURE — 70450 CT HEAD/BRAIN W/O DYE: CPT | Mod: 26

## 2020-01-02 PROCEDURE — 99285 EMERGENCY DEPT VISIT HI MDM: CPT

## 2020-01-02 PROCEDURE — 71045 X-RAY EXAM CHEST 1 VIEW: CPT | Mod: 26

## 2020-01-02 PROCEDURE — 93010 ELECTROCARDIOGRAM REPORT: CPT

## 2020-01-02 PROCEDURE — 93970 EXTREMITY STUDY: CPT | Mod: 26

## 2020-01-02 RX ORDER — DONEPEZIL HYDROCHLORIDE 10 MG/1
10 TABLET, FILM COATED ORAL AT BEDTIME
Refills: 0 | Status: DISCONTINUED | OUTPATIENT
Start: 2020-01-02 | End: 2020-01-08

## 2020-01-02 RX ORDER — SODIUM CHLORIDE 9 MG/ML
1000 INJECTION, SOLUTION INTRAVENOUS
Refills: 0 | Status: DISCONTINUED | OUTPATIENT
Start: 2020-01-02 | End: 2020-01-03

## 2020-01-02 RX ORDER — ENOXAPARIN SODIUM 100 MG/ML
40 INJECTION SUBCUTANEOUS DAILY
Refills: 0 | Status: DISCONTINUED | OUTPATIENT
Start: 2020-01-02 | End: 2020-01-03

## 2020-01-02 RX ADMIN — SODIUM CHLORIDE 75 MILLILITER(S): 9 INJECTION, SOLUTION INTRAVENOUS at 17:58

## 2020-01-02 RX ADMIN — ENOXAPARIN SODIUM 40 MILLIGRAM(S): 100 INJECTION SUBCUTANEOUS at 17:34

## 2020-01-02 RX ADMIN — DONEPEZIL HYDROCHLORIDE 10 MILLIGRAM(S): 10 TABLET, FILM COATED ORAL at 21:49

## 2020-01-02 NOTE — ED PROVIDER NOTE - PHYSICAL EXAMINATION
PHYSICAL EXAM:   GEN: Age appropriate, resting comfortably in bed, no acute distress, non toxic appearing, speaking in complete sentences.   HEENT: Conjunctiva and sclera normal  PULM: Lungs CTAB, no wheezes, rales, rhonchi  CV: RRR, S1S2, no MRG  MSK: no stiffness or joint effusions  Abdominal: Soft, nontender to palpation, non-distended, +BS. No suprapubic tenderness.   Extremities: No edema or cyanosis  NEURO: AAOx3  Psych: normal affect, normal behavior  Skin: No rashes, lesions    T(C): 36.4 (01-02-20 @ 06:33), Max: 36.4 (01-02-20 @ 06:33)  HR: 63 (01-02-20 @ 06:33) (63 - 63)  BP: 147/94 (01-02-20 @ 06:33) (147/94 - 156/81)  RR: 16 (01-02-20 @ 06:33) (16 - 16)  SpO2: 97% (01-02-20 @ 06:33) (97% - 99%)

## 2020-01-02 NOTE — ED ADULT NURSE NOTE - NSIMPLEMENTINTERV_GEN_ALL_ED
Implemented All Fall Risk Interventions:  [place] to call system. Call bell, personal items and telephone within reach. Instruct patient to call for assistance. Room bathroom lighting operational. Non-slip footwear when patient is off stretcher. Physically safe environment: no spills, clutter or unnecessary equipment. Stretcher in lowest position, wheels locked, appropriate side rails in place. Provide visual cue, wrist band, yellow gown, etc. Monitor gait and stability. Monitor for mental status changes and reorient to person, place, and time. Review medications for side effects contributing to fall risk. Reinforce activity limits and safety measures with patient and family.

## 2020-01-02 NOTE — ED ADULT NURSE NOTE - OBJECTIVE STATEMENT
84 year old female presents to the ED via EMS with c/o bilat leg swelling and redness x 1 week. Pt is A&Ox3 at this time, mild edema noted to BLLE and redness to right ankle/foot.  Pt endorses inability to ambulate x 1 day due to bilat LE weakness and recent falls with no LOC and no head trauma. Family at bedside, comfort and safety measures maintained.

## 2020-01-02 NOTE — H&P ADULT - NSHPREVIEWOFSYSTEMS_GEN_ALL_CORE
REVIEW OF SYSTEMS:    CONSTITUTIONAL: + general malaise. No fevers or chills  EYES/ENT: No visual changes;  No vertigo or throat pain. Chronic decreased hearing  NECK: No pain or stiffness  RESPIRATORY: No cough, wheezing, hemoptysis; No shortness of breath  CARDIOVASCULAR: No chest pain or palpitations  GASTROINTESTINAL: No abdominal or epigastric pain. No nausea, vomiting, or hematemesis; No diarrhea or constipation. No melena or hematochezia.  GENITOURINARY: No dysuria, frequency or hematuria  NEUROLOGICAL: +numbness and tingling in lower extremities, and general weakness LE greater than upper extremities.   SKIN: No itching, burning, rashes, or lesions   All other review of systems is negative unless indicated above.

## 2020-01-02 NOTE — H&P ADULT - ATTENDING COMMENTS
Dr. Ela Baez  Division of Hospital Medicine  [facility]   Pager: [phone]    Patient seen and examined today 1/2/19 with day admit resident Dr. Hernandez.     In summary, pleasant 84 year old female with PMH of distant Myasthenia Gravis, dementia (alert and oriented x3, mild cognitive deficits), and chronic low back pain 2/2 to lumbar spinal stenosis who presents with worsening lower extremity weakness over several months with acute worsening in the last several days. Patient independent and ambulatory without any assisted devices at baseline with progressive generalized weakness with episode of acute worsening of her weakness the other night when she felt "overall weak" and had to lower herself to the floor (no fall, no trauma) and was too weak to lift herself up having to crawl the couch and call her daughter for assistance. CT head unremarkable. NIF performed in ED per ED note negative. On physical exam 5/5 strength in lower extremities b/l, +mild point tenderness in lumbar spine, 2+ pitting edema b/l R>L. Appreciate neurology consult, MG crisis/exacerbation less likely. Low suspicion for acute worsening of her known lumbar stenosis nor compression fracture based on physical exam. Will hold off on further imaging at this time. PT consult for placement.    Rest as detailed in note above.

## 2020-01-02 NOTE — CONSULT NOTE ADULT - ASSESSMENT
85 YO F with PMHx of Myasthenia Gravis (not on medication for past 9 years)  and MHx of dementia and chronic low back pain who presents with complaint of worsening LE weakness over the past 6 months.  Patient lives alone, walks without a walker, but increasing lower extremity weakness for six months, and acutely worse over past two days, and now cannot stand without assistance.   Dx previously with MG but not on any medication and now no sxs.     Impression:  Six months progressive LE weakness, acutely worse over past two days w/o hx trauma or infection.  Possible lumbar stenosis exacerbation.    - MG serum ab ach abs for binding, blocking and modulation, MUSK abs  - CT chest w/o contrast for thymus eval  - NIFS and VC  - MRI T, L spine with contrast  - Ortho or NSx spine consult  - PT / OT 85 YO F with PMHx of Myasthenia Gravis (not on medication for past 9 years)  and MHx of dementia and chronic low back pain who presents with complaint of worsening LE weakness over the past 6 months.  Patient lives alone, walks without a walker, but increasing lower extremity weakness for six months, and acutely worse over past two days, and now cannot stand without assistance.   Dx previously with MG but not on any medication and now no sxs.     Impression:  Six months progressive LE weakness, acutely worse over past two days w/o hx trauma or infection.  Possible lumbar stenosis exacerbation.    - MG serum ab ach abs for binding, blocking and modulation, MUSK abs  - CT chest w/o contrast for thymus eval  - NIFS and VC  - MRI T, L spine with contrast  - Ortho or NSx spine consult  - PT / OT    01/04/19 ADDENDUM***Spoke with Neurology attending and likely multi-factorial chronic weakness.  CT chest for thymoma evaluation as well as MRI T and L spine can be done as outpatient if necessary and PT should follow up with her neurologist.

## 2020-01-02 NOTE — H&P ADULT - NSHPPHYSICALEXAM_GEN_ALL_CORE
.  VITAL SIGNS:  T(C): 36.7 (01-02-20 @ 11:33), Max: 36.7 (01-02-20 @ 07:20)  T(F): 98 (01-02-20 @ 11:33), Max: 98 (01-02-20 @ 07:20)  HR: 65 (01-02-20 @ 11:33) (60 - 65)  BP: 147/74 (01-02-20 @ 11:33) (147/74 - 156/81)  BP(mean): --  RR: 18 (01-02-20 @ 11:33) (16 - 18)  SpO2: 98% (01-02-20 @ 11:33) (97% - 99%)  Wt(kg): --    PHYSICAL EXAM:    Constitutional: WDWN resting comfortably in bed; NAD  Head: NC/AT  Eyes: PERRL, EOMI, anicteric sclera  ENT: no nasal discharge, MMM  Neck: supple; no JVD appreciated  Respiratory: CTA B/L; no W/R/R, no increased work of breathing  Cardiac: +S1/S2; RRR; no M/R/G  Gastrointestinal: soft, NT/ND; no rebound or guarding; +BSx4  Extremities: 2+ LE edema with dry skin and long unlempt toe nails  Musculoskeletal: NROM x4; no joint swelling, tenderness or erythema  Vascular: 2+ radial, DP pulses B/L  Dermatologic: skin warm, dry and intact  Neurologic: Alert and oriented, no focal deficits appreciated. CNII-XII grossly intact.  Psychiatric: affect and characteristics of appearance, verbalizations, behaviors are appropriate .  VITAL SIGNS:  T(C): 36.7 (01-02-20 @ 11:33), Max: 36.7 (01-02-20 @ 07:20)  T(F): 98 (01-02-20 @ 11:33), Max: 98 (01-02-20 @ 07:20)  HR: 65 (01-02-20 @ 11:33) (60 - 65)  BP: 147/74 (01-02-20 @ 11:33) (147/74 - 156/81)  BP(mean): --  RR: 18 (01-02-20 @ 11:33) (16 - 18)  SpO2: 98% (01-02-20 @ 11:33) (97% - 99%)  Wt(kg): --    PHYSICAL EXAM:    Constitutional: WDWN resting comfortably in bed; NAD  Head: NC/AT  Eyes: PERRL, EOMI, anicteric sclera  ENT: no nasal discharge, MMM  Neck: supple; no JVD appreciated  Respiratory: CTA B/L; no W/R/R, no increased work of breathing  Cardiac: +S1/S2; RRR; no M/R/G  Gastrointestinal: soft, NT/ND; no rebound or guarding; +BSx4  Extremities: 2+ LE edema (R>L) with dry skin and long unkempt toe nails  Musculoskeletal: NROM x4; no joint swelling, tenderness or erythema, +kyphosis  Vascular: 2+ radial, DP pulses B/L  Dermatologic: skin warm, dry and intact  Neurologic: Alert and oriented, no focal deficits appreciated. CNII-XII grossly intact.  Psychiatric: affect and characteristics of appearance, verbalizations, behaviors are appropriate

## 2020-01-02 NOTE — ED PROVIDER NOTE - CLINICAL SUMMARY MEDICAL DECISION MAKING FREE TEXT BOX
The patient is an 84 year old female with a hx of dementia (on donepezil), Myasthenia Gravis (not on meds), chronic lower extremity swelling who presents to ED with several days of weakness. Will get UA, CXR, CTH, labs, EKG, NIF/VC. Likely admit.

## 2020-01-02 NOTE — H&P ADULT - PROBLEM SELECTOR PLAN 7
Transitions of Care Status:  1.  Name of PCP:Ricki Jaimes  2.  PCP Contacted on Admission: [ ] Y    [x ] N  Office closed  3.  PCP contacted at Discharge: [ ] Y    [ ] N    [ ] N/A  4.  Post-Discharge Appointment Date and Location:  5.  Summary of Handoff given to PCP: Transitions of Care Status:  1.  Name of PCP: Ricki Jaimes  2.  PCP Contacted on Admission: [ ] Y    [x ] N  Office closed  3.  PCP contacted at Discharge: [ ] Y    [ ] N    [ ] N/A  4.  Post-Discharge Appointment Date and Location:  5.  Summary of Handoff given to PCP:

## 2020-01-02 NOTE — H&P ADULT - PROBLEM SELECTOR PLAN 3
-unknown baseline, no evidence of bleeding and  not anemia  -f/u repeat  -may need further workup. -unknown baseline, no evidence of bleeding and  not anemia  -f/u repeat

## 2020-01-02 NOTE — ED PROVIDER NOTE - PROGRESS NOTE DETAILS
Milton Recio PGY2  NIF negative. labs, UA neg. patient's family states she can barely stand at home, cannot take care of her. admitted for medicine for placement

## 2020-01-02 NOTE — ED ADULT NURSE REASSESSMENT NOTE - NS ED NURSE REASSESS COMMENT FT1
Pt resting comfortably in bed  VSS.  NAD.  Understands care plan.  A/oX 3.  Perrl wnl, reynolds with equal strength.  No chest pain or sob.  C/o new onset of urinary incontinence.  Abdomen NT ND.  Denies fevers, chills or N/V

## 2020-01-02 NOTE — ED PROVIDER NOTE - OBJECTIVE STATEMENT
The patient is an 84 year old female with a hx of dementia (on donepezil), Myasthenia Gravis (not on meds), chronic lower extremity swelling who presents to ED with several days of weakness. The patient ambulates without a walker and lives on her own. She reports some increased urinary incontinence however denies other urinary symptoms. Rest of ROS negative as per below. She denies any recent falls. She has not been on meds for MG many years. She denies sick contacts.

## 2020-01-02 NOTE — H&P ADULT - NSHPLABSRESULTS_GEN_ALL_CORE
13.1   5.15  )-----------( 102      ( 2020 07:30 )             39.3       01-02    143  |  103  |  30<H>  ----------------------------<  79  3.9   |  26  |  0.82    Ca    9.3      2020 07:30  Phos  3.1     01-02  Mg     2.1     01-02    TPro  6.7  /  Alb  4.0  /  TBili  0.3  /  DBili  x   /  AST  29  /  ALT  36  /  AlkPhos  154<H>  01-02              Urinalysis Basic - ( 2020 08:28 )    Color: Yellow / Appearance: Slightly Turbid / S.030 / pH: x  Gluc: x / Ketone: Trace  / Bili: Negative / Urobili: 2 mg/dL   Blood: x / Protein: Trace / Nitrite: Negative   Leuk Esterase: Negative / RBC: 20 /hpf / WBC 2 /HPF   Sq Epi: x / Non Sq Epi: 9 /hpf / Bacteria: Negative    EXAM:  CT BRAIN                            PROCEDURE DATE:  2020            INTERPRETATION:  History: Generalized weakness over several days. Unable to walk.    Description: A noncontrast head CT was performed. Axial images were performed from the skull base to the vertex with coronal/sagittal reconstructions.    There is no evidence for acute infarct, acute hemorrhage, mass effect, calvarial fracture, or subdural hematoma.    Moderate dilatation of the lateral ventricles is noted. Differential considerations include central greater than cortical atrophy or normal pressure hydrocephalus in appropriate clinical context.    Mild patchy hypodensity without mass effect is noted in the periventricular white matter which most likely represents chronic microvascular ischemic changes given the patient's age.    Cerebral volume loss is present with secondary proportional prominence of the sulci and ventricles.    No lytic or blastic calvarial lesions are noted. The visualized portions of the paranasal sinuses and mastoid air cells are clear.    IMPRESSION:    No acute intracranial pathology is noted. If the patient has new and persistent symptoms, consider short interval follow-up head CT or brain MRI follow-up if there are no MRI contraindications.

## 2020-01-02 NOTE — H&P ADULT - PROBLEM SELECTOR PLAN 6
-chronic worsening LE Edema 2/2 venous insufficiency Vs DVT Vs less likely HF (no other HF sxs)  -f/u LE doppler -chronic worsening LE Edema 2/2 venous insufficiency Vs DVT Vs less likely HF (no other HF sxs). per patient, used to be on two water pills but stopped taking them because she was traveling?  -f/u LE doppler

## 2020-01-02 NOTE — CONSULT NOTE ADULT - SUBJECTIVE AND OBJECTIVE BOX
HPI:  83 YO F with PMHx of Myasthenia Gravis (not on medication for past 9 years)  and MHx of dementia and chronic low back pain who presents with complaint of worsening LE weakness over the past 6 months.  Patient lives alone, walks without a walker, but increasing lower extremity weakness for six months, and acutely worse over past two days, and now cannot stand without assistance.   Dx previously with MG but not on any medication and now no sxs.     REVIEW OF SYSTEMS  General:	  Skin/Breast:	  Ophthalmologic:  ENMT:	  Respiratory and Thorax:	  Cardiovascular:	  Gastrointestinal:	  Genitourinary:	  Musculoskeletal:	  Neurological:	  Psychiatric:	  Hematology/Lymphatics:	  Endocrine:	  Allergic/Immunologic:	    A 10-system ROS was performed and is negative except for those items noted above and/or in the HPI.    PAST MEDICAL & SURGICAL HISTORY:  Myasthenia gravis  Dementia  No significant past surgical history    FAMILY HISTORY:  No pertinent family history in first degree relatives    SOCIAL HISTORY:   T/E/D:   Occupation:   Lives with:     MEDICATIONS (HOME):  Home Medications:  Advil: as needed (2020 13:10)  Anacin: as needed (2020 13:10)  Aricept 10 mg oral tablet: 1 tab(s) orally once a day (at bedtime) (2020 13:10)    MEDICATIONS  (STANDING):  donepezil 10 milliGRAM(s) Oral at bedtime    MEDICATIONS  (PRN):    ALLERGIES/INTOLERANCES:  Allergies  sulfamethoxazole (Vomiting; Nausea)    Intolerances    VITALS & EXAMINATION:  Vital Signs Last 24 Hrs  T(C): 36.4 (2020 13:25), Max: 36.7 (2020 07:20)  T(F): 97.6 (2020 13:25), Max: 98 (2020 07:20)  HR: 56 (2020 13:25) (56 - 65)  BP: 154/82 (2020 13:25) (147/74 - 156/81)  BP(mean): --  RR: 18 (2020 13:25) (16 - 18)  SpO2: 99% (2020 13:25) (97% - 99%)    Neurological Exam    Mental Status:  alert and oriented x3, fluent speech, following commands    Cranial Nerves: PERRL, EOMI without nystagmus, visual fields intact, V 1-3 intact, no facial droop, pallate and uvula midline, no dysarthria, head turn strength normal, shoulder shrug strength normal.    Motor:   Tone:   normal                 Strength:  Upper Extremity 5/5   Lower extremity 5/5   Pronator drift: none            Sensation: intact grossly to light touch    Tremor: none appreciated at rest or in action    Deep Tendon Reflexes: UE: 2+ throughout, LE: achilles 1+ b/l, patella 0 b/l     Dysmetria: none FTN      LABORATORY:  CBC                       13.1   5.15  )-----------( 102      ( 2020 07:30 )             39.3     Chem -    143  |  103  |  30<H>  ----------------------------<  79  3.9   |  26  |  0.82    Ca    9.3      2020 07:30  Phos  3.1     -  Mg     2.1     -02    TPro  6.7  /  Alb  4.0  /  TBili  0.3  /  DBili  x   /  AST  29  /  ALT  36  /  AlkPhos  154<H>  01-02    LFTs LIVER FUNCTIONS - ( 2020 07:30 )  Alb: 4.0 g/dL / Pro: 6.7 g/dL / ALK PHOS: 154 U/L / ALT: 36 U/L / AST: 29 U/L / GGT: x           Coagulopathy   Lipid Panel   A1c   Cardiac enzymes     U/A Urinalysis Basic - ( 2020 08:28 )    Color: Yellow / Appearance: Slightly Turbid / S.030 / pH: x  Gluc: x / Ketone: Trace  / Bili: Negative / Urobili: 2 mg/dL   Blood: x / Protein: Trace / Nitrite: Negative   Leuk Esterase: Negative / RBC: 20 /hpf / WBC 2 /HPF   Sq Epi: x / Non Sq Epi: 9 /hpf / Bacteria: Negative      CSF  Immunological  Other    STUDIES & IMAGING:  Studies (EKG, EEG, EMG, etc):     Radiology (XR, CT, MR, U/S, TTE/LUIS):

## 2020-01-02 NOTE — H&P ADULT - ASSESSMENT
83 YO F with PMHx of Myasthenia Gravis (not on medication for past 9 years)  and MHx of dementia and chronic low back pain who presents with complaint of worsening LE weakness over the past 6 months.

## 2020-01-02 NOTE — CONSULT NOTE ADULT - ATTENDING COMMENTS
Pt seen and examined on rounds. As per primary team she had main complaint if dizziness. She is unable to give a good description and denies any dizziness at this time. She complains mainly of pain in her lower back when she is walking and pain currently also manageable.    On exam she is full strength, normal reflexes, no dysmetria. She has Kyphosis with a stooped posture.     Pt walked with physical therapy without issue.    Likely Pt seen and examined on rounds. As per primary team she had main complaint if dizziness. She is unable to give a good description and denies any dizziness at this time. She complains mainly of pain in her lower back when she is walking and pain currently also manageable.    On exam she is full strength, normal reflexes, no dysmetria. She has Kyphosis with a stooped posture.     Pt walked with physical therapy without issue.    Likely dizziness and weakness 2/2 multiple factors (stenosis, peripheral neuropathy, presbyastasis, MG ).   CT chest and MRI L spine can be done as outpatient.  If pt doesn't have a neurologist she can follow up at [address] [place] [address]. [phone]

## 2020-01-02 NOTE — ED PROVIDER NOTE - ATTENDING CONTRIBUTION TO CARE
MD Wood:  patient seen and evaluated personally.   I agree with the History & Physical,  Impression & Plan other than what was detailed in my note.  MD Ruthannpriya ALMODOVAR lives at home alone, genearlized weakness over the past several days, difficulty ambulating around the house. hx of mg, no difficulty breathing. no f/c, n/v, d, no ha, bv. reports slight drooling r side of mouth, fam members do not notice any facial asymmetry, no unilateral weakness, no cough, rash. afebrile vtials stable, well appearing, power 5/5x 4, no cn deficis, cerebellar function intact, abd soft nt, lungs cta b/l, chronic skin changes to lower extrem, no cellulitis. plan for labs, cxr, urine, ct head. likely admit for social issues

## 2020-01-02 NOTE — H&P ADULT - PROBLEM SELECTOR PLAN 5
c/w donepezil  -Currently A&Ox3.   -Family states pt lives at [facility] alone without help c/w donepezil  -Currently A&Ox3.   -Family states pt lives at home alone without help

## 2020-01-02 NOTE — H&P ADULT - PROBLEM SELECTOR PLAN 1
-can be 2/2 decondition Vs worsening Lumbar stenosis Vs less likely MG exacerbation  -Neuro consult  -IVF hydration with NS at 75cc/hour  -encourage PO intake  -Nutrition consult  -PT consult  -Can consider MRI -can be 2/2 decondition Vs worsening Lumbar stenosis Vs less likely MG exacerbation.  -No focal deficits on exam  -Neuro consult  -IVF hydration with LR at 75cc/hour (Dh=190)  -encourage PO intake  -Nutrition consult  -PT consult  -Can consider MRI -can be 2/2 decondition Vs worsening Lumbar stenosis Vs less likely MG exacerbation.  -No focal deficits on exam  -Neuro consult appreciated  -IVF hydration with LR at 75cc/hour (As=287)  -encourage PO intake  -Nutrition consult  -PT consult

## 2020-01-02 NOTE — ED PROVIDER NOTE - NS ED ROS FT
Constitutional: denies fevers, chills, night sweats, weight loss  HEENT: denies visual changes, cough  Cardiovascular: denies palpitations, chest pain, edema  Respiratory: denies SOB, wheezing  Gastrointestinal: denies N/V/D, abdominal pain, hematochezia, melena  : denies dysuria, urinary urgency, increased frequency. +urinary incontinence  MSK: + muscle weakness, joint pain  Skin: denies new rashes or masses  Heme: denies bleeding, bruising  Neuro: denies headache, + weakness

## 2020-01-02 NOTE — H&P ADULT - PROBLEM SELECTOR PLAN 2
-Pt has  not been on medication in 9 years, and her sxs were arm weakness and double vision.   -This does not appear to be exacerbation.  -Neuro consulted -Pt has not been on medication in 9 years, and her sxs were arm weakness and double vision.  NIF in ED negative.  -This does not appear to be exacerbation  -Neuro consulted

## 2020-01-03 PROBLEM — G70.00 MYASTHENIA GRAVIS WITHOUT (ACUTE) EXACERBATION: Chronic | Status: ACTIVE | Noted: 2018-05-10

## 2020-01-03 LAB
ALBUMIN SERPL ELPH-MCNC: 3.7 G/DL — SIGNIFICANT CHANGE UP (ref 3.3–5)
ALP SERPL-CCNC: 141 U/L — HIGH (ref 40–120)
ALT FLD-CCNC: 34 U/L — SIGNIFICANT CHANGE UP (ref 10–45)
ANION GAP SERPL CALC-SCNC: 11 MMOL/L — SIGNIFICANT CHANGE UP (ref 5–17)
AST SERPL-CCNC: 32 U/L — SIGNIFICANT CHANGE UP (ref 10–40)
BASOPHILS # BLD AUTO: 0.03 K/UL — SIGNIFICANT CHANGE UP (ref 0–0.2)
BASOPHILS NFR BLD AUTO: 0.7 % — SIGNIFICANT CHANGE UP (ref 0–2)
BILIRUB SERPL-MCNC: 0.5 MG/DL — SIGNIFICANT CHANGE UP (ref 0.2–1.2)
BUN SERPL-MCNC: 26 MG/DL — HIGH (ref 7–23)
CALCIUM SERPL-MCNC: 10 MG/DL — SIGNIFICANT CHANGE UP (ref 8.4–10.5)
CHLORIDE SERPL-SCNC: 105 MMOL/L — SIGNIFICANT CHANGE UP (ref 96–108)
CO2 SERPL-SCNC: 28 MMOL/L — SIGNIFICANT CHANGE UP (ref 22–31)
CREAT SERPL-MCNC: 0.78 MG/DL — SIGNIFICANT CHANGE UP (ref 0.5–1.3)
EOSINOPHIL # BLD AUTO: 0.08 K/UL — SIGNIFICANT CHANGE UP (ref 0–0.5)
EOSINOPHIL NFR BLD AUTO: 1.9 % — SIGNIFICANT CHANGE UP (ref 0–6)
GGT SERPL-CCNC: 11 U/L — SIGNIFICANT CHANGE UP (ref 8–40)
GLUCOSE SERPL-MCNC: 71 MG/DL — SIGNIFICANT CHANGE UP (ref 70–99)
HCT VFR BLD CALC: 38.7 % — SIGNIFICANT CHANGE UP (ref 34.5–45)
HGB BLD-MCNC: 12.5 G/DL — SIGNIFICANT CHANGE UP (ref 11.5–15.5)
IMM GRANULOCYTES NFR BLD AUTO: 0.2 % — SIGNIFICANT CHANGE UP (ref 0–1.5)
LYMPHOCYTES # BLD AUTO: 0.66 K/UL — LOW (ref 1–3.3)
LYMPHOCYTES # BLD AUTO: 15.5 % — SIGNIFICANT CHANGE UP (ref 13–44)
MAGNESIUM SERPL-MCNC: 1.9 MG/DL — SIGNIFICANT CHANGE UP (ref 1.6–2.6)
MCHC RBC-ENTMCNC: 31 PG — SIGNIFICANT CHANGE UP (ref 27–34)
MCHC RBC-ENTMCNC: 32.3 GM/DL — SIGNIFICANT CHANGE UP (ref 32–36)
MCV RBC AUTO: 96 FL — SIGNIFICANT CHANGE UP (ref 80–100)
MONOCYTES # BLD AUTO: 0.41 K/UL — SIGNIFICANT CHANGE UP (ref 0–0.9)
MONOCYTES NFR BLD AUTO: 9.6 % — SIGNIFICANT CHANGE UP (ref 2–14)
NEUTROPHILS # BLD AUTO: 3.06 K/UL — SIGNIFICANT CHANGE UP (ref 1.8–7.4)
NEUTROPHILS NFR BLD AUTO: 72.1 % — SIGNIFICANT CHANGE UP (ref 43–77)
NRBC # BLD: 0 /100 WBCS — SIGNIFICANT CHANGE UP (ref 0–0)
PHOSPHATE SERPL-MCNC: 2.9 MG/DL — SIGNIFICANT CHANGE UP (ref 2.5–4.5)
PLATELET # BLD AUTO: 96 K/UL — LOW (ref 150–400)
POTASSIUM SERPL-MCNC: 3.9 MMOL/L — SIGNIFICANT CHANGE UP (ref 3.5–5.3)
POTASSIUM SERPL-SCNC: 3.9 MMOL/L — SIGNIFICANT CHANGE UP (ref 3.5–5.3)
PROT SERPL-MCNC: 6.2 G/DL — SIGNIFICANT CHANGE UP (ref 6–8.3)
RBC # BLD: 4.03 M/UL — SIGNIFICANT CHANGE UP (ref 3.8–5.2)
RBC # FLD: 14.6 % — HIGH (ref 10.3–14.5)
SODIUM SERPL-SCNC: 144 MMOL/L — SIGNIFICANT CHANGE UP (ref 135–145)
VIT B12 SERPL-MCNC: >2000 PG/ML — HIGH (ref 232–1245)
WBC # BLD: 4.25 K/UL — SIGNIFICANT CHANGE UP (ref 3.8–10.5)
WBC # FLD AUTO: 4.25 K/UL — SIGNIFICANT CHANGE UP (ref 3.8–10.5)

## 2020-01-03 PROCEDURE — 99222 1ST HOSP IP/OBS MODERATE 55: CPT | Mod: GC

## 2020-01-03 PROCEDURE — 99232 SBSQ HOSP IP/OBS MODERATE 35: CPT | Mod: GC

## 2020-01-03 RX ORDER — HEPARIN SODIUM 5000 [USP'U]/ML
5000 INJECTION INTRAVENOUS; SUBCUTANEOUS EVERY 12 HOURS
Refills: 0 | Status: DISCONTINUED | OUTPATIENT
Start: 2020-01-03 | End: 2020-01-08

## 2020-01-03 RX ADMIN — DONEPEZIL HYDROCHLORIDE 10 MILLIGRAM(S): 10 TABLET, FILM COATED ORAL at 21:18

## 2020-01-03 RX ADMIN — HEPARIN SODIUM 5000 UNIT(S): 5000 INJECTION INTRAVENOUS; SUBCUTANEOUS at 17:24

## 2020-01-03 NOTE — DIETITIAN INITIAL EVALUATION ADULT. - FACTORS AFF FOOD INTAKE
Denies nausea/vomiting, diarrhea/constipation or other acute GI distress at this time. Reports last BM is PTA; could not further specify./difficulty swallowing/difficulty with food procurement/preparation

## 2020-01-03 NOTE — DIETITIAN INITIAL EVALUATION ADULT. - PROBLEM SELECTOR PLAN 2
-Pt has not been on medication in 9 years, and her sxs were arm weakness and double vision.  NIF in ED negative.  -This does not appear to be exacerbation  -Neuro consulted

## 2020-01-03 NOTE — PROGRESS NOTE ADULT - PROBLEM SELECTOR PLAN 7
Transitions of Care Status:  1.  Name of PCP: Silvio Jaimes  2.  PCP Contacted on Admission: [ ] Y    [x ] N  Office closed  3.  PCP contacted at Discharge: [ ] Y    [ ] N    [ ] N/A  4.  Post-Discharge Appointment Date and Location:  5.  Summary of Handoff given to PCP:

## 2020-01-03 NOTE — DIETITIAN INITIAL EVALUATION ADULT. - REASON INDICATOR FOR ASSESSMENT
Nutrition consult received for "Assessment". Information obtained from patient, EMR.     Per chart, pt is a 84 year old female with PMH of myasthenia gravis, dementia, chronic low back pain, presents with worsening lower extremity weakness for 6 months. Weakness can be 2/2 decondition Vs worsening Lumbar stenosis Vs less likely MG exacerbation.

## 2020-01-03 NOTE — PROGRESS NOTE ADULT - PROBLEM SELECTOR PLAN 6
-chronic worsening LE edema 2/2 venous insufficiency vs DVT vs less likely HF (no other HF sxs). Per patient, used to be on two water pills but stopped taking them  -euvolemic otherwise on exam with no evidence of hemodynamically significant volume overload  -LE doppler wnl

## 2020-01-03 NOTE — PROGRESS NOTE ADULT - PROBLEM SELECTOR PLAN 1
-most likely 2/2 decondition and poor PO intake vs worsening lumbar stenosis vs less likely MG exacerbation.  -No focal deficits on exam  -Neuro consult appreciated, have ordered MG labs but will hold off on imaging at this time.  -IVF hydration with LR at 75cc/hour for 24 hours  -encourage PO intake  -Nutrition consult  -PT consult -most likely 2/2 decondition and poor PO intake vs worsening lumbar stenosis vs less likely MG exacerbation.  -No focal deficits on exam  -Neuro consult appreciated, have ordered MG labs but will hold off on imaging at this time.  -s/p IVF hydration with LR ~2L  -encourage PO intake  -Nutrition consult  -PT consult

## 2020-01-03 NOTE — PROGRESS NOTE ADULT - PROBLEM SELECTOR PLAN 2
-Pt has not been on medication in 9 years, and her sxs were arm weakness and double vision.  NIF in ED negative.  -This does not appear to be exacerbation or crisis  -Neuro consulted -> MG labs sent. Will trend NIFs

## 2020-01-03 NOTE — DIETITIAN INITIAL EVALUATION ADULT. - ADD RECOMMEND
Recommend continue current diet with no therapeutic restrictions; defer texture/consistency to medical team. Recommend formal swallowing evaluation to assess appropriate texture/consistency for pt in setting of reported swallowing difficulty/medical dx of Myasthenia gravis which can result in swallowing difficulties. Provide feeding/menu ordering assistance PRN. Monitor tolerance to diet prescription, nutritional intake, weight trends, labs and skin integrity. Recommend continue current diet with no therapeutic restrictions; defer texture/consistency to medical team. RD to follow up results of swallowing evaluation. Provide feeding/menu ordering assistance PRN. Monitor tolerance to diet prescription, nutritional intake, weight trends, labs and skin integrity.

## 2020-01-03 NOTE — PROGRESS NOTE ADULT - SUBJECTIVE AND OBJECTIVE BOX
Patient is a 84y old  Female who presents with a chief complaint of weakness (2020 14:11)    SUBJECTIVE / OVERNIGHT EVENTS: Patient seen and examined at bedside. Complaining of "floaters" in her eyes bilaterally which are improving from yesterday with food and water intake, but not yet resolved. She states she it "urinating a lot" she thinks because of the fluids now with prima fit in place. Otherwise, pain in back better, no other events.    MEDICATIONS  (STANDING):  donepezil 10 milliGRAM(s) Oral at bedtime    Vital Signs Last 24 Hrs  T(C): 36.7 (2020 05:09), Max: 36.7 (2020 05:09)  T(F): 98 (2020 05:09), Max: 98 (2020 05:09)  HR: 72 (2020 05:09) (54 - 72)  BP: 163/73 (2020 05:09) (144/76 - 163/73)  RR: 18 (2020 05:09) (18 - 18)  SpO2: 95% (2020 05:09) (95% - 99%)    I&O's Summary  2020 07:01  -  2020 07:00  --------------------------------------------------------  IN: 930 mL / OUT: 0 mL / NET: 930 mL    PHYSICAL EXAM:  GENERAL: NAD, lying in bed comfortably  HEAD:  Atraumatic, Normocephalic  EYES: conjunctiva and sclera clear  ENT: Moist mucous membranes  NECK: Supple, No JVD  CHEST/LUNG: Clear to auscultation bilaterally; No rales, rhonchi, wheezing, or rubs. Unlabored respirations  HEART: Regular rate and rhythm; No murmurs, rubs, or gallops  ABDOMEN: Bowel sounds present; Soft, Nontender, Nondistended.  EXTREMITIES:  2+ Peripheral Pulses, brisk capillary refill. No clubbing, cyanosis, or edema  NERVOUS SYSTEM:  Alert & Oriented X2, speech clear. No deficits. PERRL, EOMI, no nystagmus, visual field intact, facial sensation intact, no droop, uvula midline, shoulder strength normal, normal upper and lower extremity strength in all muscle groups, sensation intact to gross touch bilaterally, no dysdiadochokinesia, heel-to-shin normal.   MSK: FROM all 4 extremities, full and equal strength, no point tenderness on back or paraspinal tenderness. Some muscular soreness in neck to palpation, no neck stiffness. Toe nails anf fignernails unkempt.  SKIN: No rashes or lesions  Mental Status:  alert and oriented x3, fluent speech, following commands    LABS:                        12.5   4.25  )-----------( 96       ( 2020 07:19 )             38.7     -    144  |  105  |  26<H>  ----------------------------<  71  3.9   |  28  |  0.78    Ca    10.0      2020 06:57  Phos  2.9     -  Mg     1.9         TPro  6.2  /  Alb  3.7  /  TBili  0.5  /  DBili  x   /  AST  32  /  ALT  34  /  AlkPhos  141<H>  -    Urinalysis Basic - ( 2020 08:28 )    Color: Yellow / Appearance: Slightly Turbid / S.030 / pH: x  Gluc: x / Ketone: Trace  / Bili: Negative / Urobili: 2 mg/dL   Blood: x / Protein: Trace / Nitrite: Negative   Leuk Esterase: Negative / RBC: 20 /hpf / WBC 2 /HPF   Sq Epi: x / Non Sq Epi: 9 /hpf / Bacteria: Negative    RADIOLOGY & ADDITIONAL TESTS:    Imaging Personally Reviewed: yes    Consultant(s) Notes Reviewed: yes    Care Discussed with Consultants/Other Providers: yes    Houston Kincaid MD, Internal Medicine Resident  [facility] Pager: [phone]; [facility] Pager: [phone]

## 2020-01-03 NOTE — CONSULT NOTE ADULT - SUBJECTIVE AND OBJECTIVE BOX
Patient is a 84y Female presenting with bilateral lower extremity. Pt fell 3 weeks ago, but reports no injuries at that time and has been ambulatory since.  Pt denies numbness, tingling, or paresthesias in saddle distribution or extremities aside from chronic numbness in toes. Denies bowel incontinence, but endorses a few episodes of bladder incontinence over the past few months. Pt is a community ambulatory at baseline.Denies fevers, dizziness, CP, SOB, N/V, weakness, calf pain. Patient and family at bedside endorses that they have no interest in spinal surgery and do not wish to pursue advanced work-up.    PMHx:  Myasthenia gravis  Dementia  Myasthenia gravis  Weakness  Alkaline phosphatase elevation  Thrombocytopenia    PSHx:  Denies    Allergies:  sulfamethoxazole (Vomiting; Nausea)    Social: Denies tobacco, EtOH, or illicit drug use.    Vitals:  T(C): 36.3 (01-03-20 @ 20:41), Max: 36.7 (01-03-20 @ 05:09)  HR: 69 (01-03-20 @ 20:41) (63 - 72)  BP: 124/71 (01-03-20 @ 20:41) (124/71 - 163/73)  RR: 18 (01-03-20 @ 20:41) (18 - 18)  SpO2: 95% (01-03-20 @ 20:41) (95% - 96%)    Physical Exam:  Gen: NAD    Spine Exam:  Skin intact  No gross deformity  No midline TTP C/T/L/S spine  No bony step offs  No paraspinal muscle TTP/Hypertonicity   No Radicular Symptoms with SLR  Negative clonus  Negative babinski  Negative jarvis  Negative ulnar drift  + rectal tone  No saddle anesthesia  No calf TTP    Motor:               C5           C6            C7               C8           T1   R         5/5          5/5            5/5             5/5          5/5  L          5/5          5/5            5/5             5/5          5/5                L2             L3             L4               L5            S1  R         5/5           5/5          5/5             5/5           5/5  L          5/5          5/5           5/5             5/5           5/5    Sensory:            C5         C6         C7      C8       T1        (0=absent, 1=impaired, 2=normal, NT=not testable)  R         2            2           2        2         2  L          2            2           2        2         2               L2          L3         L4      L5       S1         (0=absent, 1=impaired, 2=normal, NT=not testable)  R         2            2            2        2        2  L          2            2           2        2         2

## 2020-01-03 NOTE — PROGRESS NOTE ADULT - PROBLEM SELECTOR PLAN 4
Bone Vs GB. All other LFTs. wnl GGT wnl. The normal serum alkaline phosphatase level gradually increases from age 40 to 65 years, particularly in women. The normal alkaline phosphatase level for an otherwise healthy 65-year-old woman is more than 50 percent higher than that for a healthy 30-year-old woman. Will continue to trend LFTs, low suspicion for pathology at this time.

## 2020-01-03 NOTE — DIETITIAN INITIAL EVALUATION ADULT. - PROBLEM SELECTOR PLAN 1
-can be 2/2 decondition Vs worsening Lumbar stenosis Vs less likely MG exacerbation.  -No focal deficits on exam  -Neuro consult appreciated  -IVF hydration with LR at 75cc/hour (Hr=290)  -encourage PO intake  -Nutrition consult  -PT consult

## 2020-01-03 NOTE — DIETITIAN INITIAL EVALUATION ADULT. - OTHER INFO
Pt reports good appetite and intake PTA. Endorses swallowing difficulty, denies chewing difficulty. Reports typically consumes softer diet (pt notes NOT puree); usual foods include oatmeal, eggs, cottage cheese, turkey, juice. States she does not cough with thin liquids and is able to tolerate them well. Does not consume vegetables per preference. Noted reports of "hypersalivation" in H&P; pt denies this during RD assessment. Reports lives alone and her daughter brings her prepared foods that last her "a few days". NKFA. Reports PTA micronutrient supplementation includes vitamin B12.     Pt reports UBW of 105 pounds less than one year ago and endorses recent significant weight gain in setting of decreased mobility and increased snacking. Reports more recent weight of about 120 pounds. Current dosing weight noted as 149.9 pounds (1/2); which suggests a 45 pound weight gain in 1 year. Pt states "I used to be very thin". Question accuracy of reported weights, as well as in-house weights, as pt noted with history of dementia and weight changes may be related to scale discrepancy of bed weight. Pt visually appears well-nourished.     Skin per chart: dryness, scratches  Edema per chart: 1+ bilateral ankles    Ht: 63 inches, Wt: 68 kg (1/2), BMI: 26.6 kg/m2, IBW: 115 pounds (+/-10%), %IBW: 129% Pt reports good appetite and intake PTA. Endorses swallowing difficulty, denies chewing difficulty. Reports typically consumes softer diet (pt notes NOT puree); usual foods include oatmeal, eggs, cottage cheese, turkey, juice. States she does not cough with thin liquids and is able to tolerate them well. Does not consume vegetables per preference. Noted reports of "hypersalivation" in H&P; pt denies this during RD assessment. Noted pending speech/swallow evaluation at this time. Reports lives alone and her daughter brings her prepared foods that last her "a few days". NKFA. Reports PTA micronutrient supplementation includes vitamin B12.     Pt reports UBW of 105 pounds less than one year ago and endorses recent significant weight gain in setting of decreased mobility and increased snacking. Reports more recent weight of about 120 pounds. Current dosing weight noted as 149.9 pounds (1/2); which suggests a 45 pound weight gain in 1 year. Pt states "I used to be very thin". Question accuracy of reported weights, as well as in-house weights, as pt noted with history of dementia and weight changes may be related to scale discrepancy of bed weight. Pt visually appears well-nourished.     Skin per chart: dryness, scratches  Edema per chart: 1+ bilateral ankles    Ht: 63 inches, Wt: 68 kg (1/2), BMI: 26.6 kg/m2, IBW: 115 pounds (+/-10%), %IBW: 129%

## 2020-01-03 NOTE — DIETITIAN INITIAL EVALUATION ADULT. - PROBLEM SELECTOR PLAN 6
-chronic worsening LE Edema 2/2 venous insufficiency Vs DVT Vs less likely HF (no other HF sxs). per patient, used to be on two water pills but stopped taking them because she was traveling?  -f/u LE doppler

## 2020-01-03 NOTE — PHYSICAL THERAPY INITIAL EVALUATION ADULT - CRITERIA FOR SKILLED THERAPEUTIC INTERVENTIONS
predicted duration of therapy intervention/risk reduction/prevention/therapy frequency/rehab potential/functional limitations in following categories/anticipated discharge recommendation/impairments found/anticipated equipment needs at discharge

## 2020-01-03 NOTE — PHYSICAL THERAPY INITIAL EVALUATION ADULT - ADDITIONAL COMMENTS
as per pt and family PTA pt was living in a apartment ground level and was independent in all functional mobility and ADL's. no AD for gait. pt mainly house hold ambulation, daughters come by and assist with IADL's as needed

## 2020-01-03 NOTE — CONSULT NOTE ADULT - ASSESSMENT
Pt is a 84y Female with subjective BLLE weakness.  -Neuro intact  -Care per primary team  -WBAT  -PT/OT  -Pt and family do not want any spine surgery intervention and do not want advanced imaging.  -No further orthopedic intervention  -Follow up with Dr. Felix if needed,  -Orthopaedically stable for discharge  -Discussed with Dr. Isidro Fuentes M.D.  PGY-2 Orthopaedic Surgery

## 2020-01-03 NOTE — PROGRESS NOTE ADULT - ASSESSMENT
84F with a history of myasthenia gravis (not on medication for past 9 years, in remission) and mild dementia and chronic low back pain 2/2 stenosis who presents with complaint of weakness and dizziness after standing which has been ongoing for some months likely 2/2 deconditioning and a component of orthostasis 2/2 poor PO intake.

## 2020-01-04 ENCOUNTER — TRANSCRIPTION ENCOUNTER (OUTPATIENT)
Age: 85
End: 2020-01-04

## 2020-01-04 LAB
ALBUMIN SERPL ELPH-MCNC: 3.8 G/DL — SIGNIFICANT CHANGE UP (ref 3.3–5)
ALP SERPL-CCNC: 138 U/L — HIGH (ref 40–120)
ALT FLD-CCNC: 39 U/L — SIGNIFICANT CHANGE UP (ref 10–45)
ANION GAP SERPL CALC-SCNC: 13 MMOL/L — SIGNIFICANT CHANGE UP (ref 5–17)
AST SERPL-CCNC: 31 U/L — SIGNIFICANT CHANGE UP (ref 10–40)
BASOPHILS # BLD AUTO: 0.03 K/UL — SIGNIFICANT CHANGE UP (ref 0–0.2)
BASOPHILS NFR BLD AUTO: 0.4 % — SIGNIFICANT CHANGE UP (ref 0–2)
BILIRUB SERPL-MCNC: 0.5 MG/DL — SIGNIFICANT CHANGE UP (ref 0.2–1.2)
BUN SERPL-MCNC: 26 MG/DL — HIGH (ref 7–23)
CALCIUM SERPL-MCNC: 9.3 MG/DL — SIGNIFICANT CHANGE UP (ref 8.4–10.5)
CHLORIDE SERPL-SCNC: 104 MMOL/L — SIGNIFICANT CHANGE UP (ref 96–108)
CO2 SERPL-SCNC: 25 MMOL/L — SIGNIFICANT CHANGE UP (ref 22–31)
CORTIS AM PEAK SERPL-MCNC: 14.6 UG/DL — SIGNIFICANT CHANGE UP (ref 6–18.4)
CREAT SERPL-MCNC: 0.93 MG/DL — SIGNIFICANT CHANGE UP (ref 0.5–1.3)
EOSINOPHIL # BLD AUTO: 0.06 K/UL — SIGNIFICANT CHANGE UP (ref 0–0.5)
EOSINOPHIL NFR BLD AUTO: 0.9 % — SIGNIFICANT CHANGE UP (ref 0–6)
GLUCOSE SERPL-MCNC: 68 MG/DL — LOW (ref 70–99)
HCT VFR BLD CALC: 38.4 % — SIGNIFICANT CHANGE UP (ref 34.5–45)
HGB BLD-MCNC: 12.7 G/DL — SIGNIFICANT CHANGE UP (ref 11.5–15.5)
IMM GRANULOCYTES NFR BLD AUTO: 0.4 % — SIGNIFICANT CHANGE UP (ref 0–1.5)
LYMPHOCYTES # BLD AUTO: 0.65 K/UL — LOW (ref 1–3.3)
LYMPHOCYTES # BLD AUTO: 9.2 % — LOW (ref 13–44)
MAGNESIUM SERPL-MCNC: 1.9 MG/DL — SIGNIFICANT CHANGE UP (ref 1.6–2.6)
MCHC RBC-ENTMCNC: 31.9 PG — SIGNIFICANT CHANGE UP (ref 27–34)
MCHC RBC-ENTMCNC: 33.1 GM/DL — SIGNIFICANT CHANGE UP (ref 32–36)
MCV RBC AUTO: 96.5 FL — SIGNIFICANT CHANGE UP (ref 80–100)
MONOCYTES # BLD AUTO: 0.55 K/UL — SIGNIFICANT CHANGE UP (ref 0–0.9)
MONOCYTES NFR BLD AUTO: 7.8 % — SIGNIFICANT CHANGE UP (ref 2–14)
NEUTROPHILS # BLD AUTO: 5.72 K/UL — SIGNIFICANT CHANGE UP (ref 1.8–7.4)
NEUTROPHILS NFR BLD AUTO: 81.3 % — HIGH (ref 43–77)
NRBC # BLD: 0 /100 WBCS — SIGNIFICANT CHANGE UP (ref 0–0)
PHOSPHATE SERPL-MCNC: 2.9 MG/DL — SIGNIFICANT CHANGE UP (ref 2.5–4.5)
PLATELET # BLD AUTO: 104 K/UL — LOW (ref 150–400)
POTASSIUM SERPL-MCNC: 3.9 MMOL/L — SIGNIFICANT CHANGE UP (ref 3.5–5.3)
POTASSIUM SERPL-SCNC: 3.9 MMOL/L — SIGNIFICANT CHANGE UP (ref 3.5–5.3)
PROT SERPL-MCNC: 6.1 G/DL — SIGNIFICANT CHANGE UP (ref 6–8.3)
RBC # BLD: 3.98 M/UL — SIGNIFICANT CHANGE UP (ref 3.8–5.2)
RBC # FLD: 14.5 % — SIGNIFICANT CHANGE UP (ref 10.3–14.5)
SODIUM SERPL-SCNC: 142 MMOL/L — SIGNIFICANT CHANGE UP (ref 135–145)
T4 FREE SERPL-MCNC: 1.2 NG/DL — SIGNIFICANT CHANGE UP (ref 0.9–1.8)
TSH SERPL-MCNC: 2.81 UIU/ML — SIGNIFICANT CHANGE UP (ref 0.27–4.2)
WBC # BLD: 7.04 K/UL — SIGNIFICANT CHANGE UP (ref 3.8–10.5)
WBC # FLD AUTO: 7.04 K/UL — SIGNIFICANT CHANGE UP (ref 3.8–10.5)

## 2020-01-04 PROCEDURE — 72100 X-RAY EXAM L-S SPINE 2/3 VWS: CPT | Mod: 26

## 2020-01-04 PROCEDURE — 99233 SBSQ HOSP IP/OBS HIGH 50: CPT | Mod: GC

## 2020-01-04 RX ADMIN — HEPARIN SODIUM 5000 UNIT(S): 5000 INJECTION INTRAVENOUS; SUBCUTANEOUS at 18:09

## 2020-01-04 RX ADMIN — HEPARIN SODIUM 5000 UNIT(S): 5000 INJECTION INTRAVENOUS; SUBCUTANEOUS at 05:26

## 2020-01-04 RX ADMIN — DONEPEZIL HYDROCHLORIDE 10 MILLIGRAM(S): 10 TABLET, FILM COATED ORAL at 22:05

## 2020-01-04 NOTE — DISCHARGE NOTE PROVIDER - NSDCCPCAREPLAN_GEN_ALL_CORE_FT
PRINCIPAL DISCHARGE DIAGNOSIS  Diagnosis: Weakness  Assessment and Plan of Treatment: You were admitted to the hospital for weakness. We believe this may be because you were not eating much food at home and became weak. We checked a scan of your brain but we did not see any abnormalities that would explain the weakness. Orthopedics saw you and decided with you and your family that there should be no surgical intervention. You were seen by Physical Therapy and it was recommended that you go to rehab to develop your strength. If you suddenly feel weak again, or experience symptoms such as new onset numbness/weakness/muscle drooping/tingling, or any other concerning symptoms, please return to the Emergency Department. PRINCIPAL DISCHARGE DIAGNOSIS  Diagnosis: Weakness  Assessment and Plan of Treatment: You were admitted to the hospital for weakness. We believe this may be because you were not eating much food at home and became weak. We checked a scan of your brain but we did not see any abnormalities that would explain the weakness. Orthopedics saw you and decided with you and your family that there should be no surgical intervention. You were seen by Neurology and they recommended that you get a scan of your chest to see if there was a growth that could be a sign of a mass that could be contributing to your symptoms. You also had a scan of your back which showed _______. You were seen by Physical Therapy and it was recommended that you go to rehab to develop your strength. If you suddenly feel weak again, or experience symptoms such as new onset numbness/weakness/muscle drooping/tingling, or any other concerning symptoms, please return to the Emergency Department. PRINCIPAL DISCHARGE DIAGNOSIS  Diagnosis: Weakness  Assessment and Plan of Treatment: You were admitted to the hospital for weakness. We believe this may be because you were not eating much food at home and became weak. We checked a scan of your brain but we did not see any abnormalities that would explain the weakness. Orthopedics saw you and decided with you and your family that there should be no surgical intervention. You were seen by Neurology and they recommended that you get a scan of your chest to see if there was a growth that could be a sign of a mass that could be contributing to your symptoms. The CT chest was negative for thymoma. You also had a scan of your back which showed arthritis as well as severe spinal stenosis. You were seen by Physical Therapy and it was recommended that you go to rehab to develop your strength. If you suddenly feel weak again, or experience symptoms such as new onset numbness/weakness/muscle drooping/tingling, or any other concerning symptoms, please return to the Emergency Department.

## 2020-01-04 NOTE — DISCHARGE NOTE PROVIDER - NSDCMRMEDTOKEN_GEN_ALL_CORE_FT
Advil: as needed  Anacin: as needed  Anacin:   Aricept 10 mg oral tablet: 1 tab(s) orally once a day (at bedtime)  docusate sodium 100 mg oral capsule: 1 cap(s) orally once a day  Pepto-Bismol:   polyethylene glycol 3350 oral powder for reconstitution: 17 gram(s) orally once a day, As needed, Constipation  senna oral tablet: 2 tab(s) orally once a day Advil 200 mg oral tablet: 1 tab(s) orally every 6 hours, As Needed  Anacin 400 mg-32 mg oral tablet: 2 tab(s) orally every 6 hours, As Needed  Aricept 10 mg oral tablet: 1 tab(s) orally once a day (at bedtime)  docusate sodium 100 mg oral capsule: 1 cap(s) orally once a day  Pepto-Bismol 262 mg oral tablet, chewable: 2 tab(s) orally 4 times a day, As Needed  polyethylene glycol 3350 oral powder for reconstitution: 17 gram(s) orally once a day, As needed, Constipation  senna oral tablet: 2 tab(s) orally once a day

## 2020-01-04 NOTE — PROGRESS NOTE ADULT - PROBLEM SELECTOR PLAN 1
-most likely 2/2 decondition and poor PO intake vs worsening lumbar stenosis vs less likely MG exacerbation.  -No focal deficits on exam  -Neuro consult appreciated, have ordered MG labs but will hold off on imaging at this time.  -s/p IVF hydration with LR ~2L  -encourage PO intake  -Nutrition consult  -PT consult -most likely 2/2 decondition and poor PO intake vs worsening lumbar stenosis vs less likely MG exacerbation.  -No focal deficits on exam  -Neuro consult appreciated, have ordered MG labs but will hold off on imaging at this time.  -s/p IVF hydration with LR ~2L  -encourage PO intake  -Nutrition consult  -evaluated by PT

## 2020-01-04 NOTE — SWALLOW BEDSIDE ASSESSMENT ADULT - SWALLOW EVAL: RECOMMENDED FEEDING/EATING TECHNIQUES
small sips/bites/maintain upright posture during/after eating for 30 mins/crush medication (when feasible)/position upright (90 degrees)/oral hygiene

## 2020-01-04 NOTE — PROGRESS NOTE ADULT - PROBLEM SELECTOR PLAN 4
Bone Vs GB. All other LFTs. wnl GGT wnl. The normal serum alkaline phosphatase level gradually increases from age 40 to 65 years, particularly in women. The normal alkaline phosphatase level for an otherwise healthy 65-year-old woman is more than 50 percent higher than that for a healthy 30-year-old woman. Will continue to trend LFTs, low suspicion for pathology at this time. Bone Vs GB. All other LFTs. wnl GGT wnl. Will continue to trend LFTs, low suspicion for pathology at this time.

## 2020-01-04 NOTE — SWALLOW BEDSIDE ASSESSMENT ADULT - SPECIFY REASON(S)
to subjectively assess the swallow mechanism r/o dysphagia
to subjectively assess the swallow mechanism r/o dysphagia

## 2020-01-04 NOTE — SWALLOW BEDSIDE ASSESSMENT ADULT - SWALLOW EVAL: PATIENT/FAMILY GOALS STATEMENT
Per patient "sometimes I feel like it comes back up" and "It takes awhile for the food to go down". Pt reports s/s of ? esophageal dysphagia occur "once in awhile"

## 2020-01-04 NOTE — DISCHARGE NOTE PROVIDER - CARE PROVIDER_API CALL
Silvio Jaimes (DO)  Family Medicine  [address], [address]  [place] [address]  Phone: [phone]  Fax: [phone]  Follow Up Time: Silvio Jaimes (DO)  Family Medicine  [address], [address]  [place] [address]  Phone: [phone]  Fax: [phone]  Follow Up Time:     Mary Patton  Phone: [phone]  Fax: (   )    -  Follow Up Time:

## 2020-01-04 NOTE — SWALLOW BEDSIDE ASSESSMENT ADULT - COMMENTS
Continued H&P and hospital course: In the ED: BP:156/81, HR=63, Tmax=98F. Resp=12, sat =98% on RA. Labs: UA negative for UTI, but with hyaline casts. RVP negative. CTH negative for acute pathology. Medicine: weakness and dizziness after standing likely 2/2 deconditioning and a component of orthostasis 2/2 poor PO intake less likely MG exacerbation.   Neuro consult re: LE weakness: Possible lumbar stenosis exacerbation.  Ortho consult: Pt and family do not want any spine surgery intervention and do not want advanced imaging. No further orthopedic intervention  PT eval: recommend subacute rehab.   CXRAY :Impression: clear lungs
Continued H&P and hospital course: In the ED: BP:156/81, HR=63, Tmax=98F. Resp=12, sat =98% on RA. Labs: UA negative for UTI, but with hyaline casts. RVP negative. CTH negative for acute pathology. Medicine: weakness and dizziness after standing likely 2/2 deconditioning and a component of orthostasis 2/2 poor PO intake less likely MG exacerbation.   Neuro consult re: LE weakness: Possible lumbar stenosis exacerbation.  Ortho consult: Pt and family do not want any spine surgery intervention and do not want advanced imaging. No further orthopedic intervention  PT eval: recommend subacute rehab.   CXRAY :Impression: clear lungs

## 2020-01-04 NOTE — PROGRESS NOTE ADULT - PROBLEM SELECTOR PLAN 3
-unknown baseline, no evidence of bleeding and  not anemia  -f/u repeat, will use SCDs for now. -unknown baseline, no evidence of bleeding and  not anemia  -will use SCDs for now.

## 2020-01-04 NOTE — PROGRESS NOTE ADULT - SUBJECTIVE AND OBJECTIVE BOX
Whitney Lyn, PGY-1  Internal Medicine  Pager: [phone], I [phone]    PROGRESS NOTE:     Patient is a 84y old  Female who presents with a chief complaint of weakness (03 Jan 2020 19:36)      SUBJECTIVE / OVERNIGHT EVENTS:    ADDITIONAL REVIEW OF SYSTEMS:    MEDICATIONS  (STANDING):  donepezil 10 milliGRAM(s) Oral at bedtime  heparin  Injectable 5000 Unit(s) SubCutaneous every 12 hours    MEDICATIONS  (PRN):      CAPILLARY BLOOD GLUCOSE        I&O's Summary    03 Jan 2020 07:01  -  04 Jan 2020 07:00  --------------------------------------------------------  IN: 590 mL / OUT: 800 mL / NET: -210 mL        PHYSICAL EXAM:  Vital Signs Last 24 Hrs  T(C): 36.3 (04 Jan 2020 08:50), Max: 36.3 (03 Jan 2020 20:41)  T(F): 97.3 (04 Jan 2020 08:50), Max: 97.4 (04 Jan 2020 01:16)  HR: 86 (04 Jan 2020 08:50) (63 - 86)  BP: 130/66 (04 Jan 2020 08:50) (124/71 - 154/83)  BP(mean): --  RR: 18 (04 Jan 2020 08:50) (18 - 18)  SpO2: 95% (04 Jan 2020 08:50) (94% - 96%)    CONSTITUTIONAL: NAD, well-developed  RESPIRATORY: Normal respiratory effort; lungs are clear to auscultation bilaterally  CARDIOVASCULAR: Regular rate and rhythm, normal S1 and S2, no murmur/rub/gallop; No lower extremity edema; Peripheral pulses are 2+ bilaterally  ABDOMEN: Nontender to palpation, normoactive bowel sounds, no rebound/guarding; No hepatosplenomegaly  MUSCLOSKELETAL: no clubbing or cyanosis of digits; no joint swelling or tenderness to palpation  PSYCH: A+O to person, place, and time; affect appropriate    LABS:                        12.7   7.04  )-----------( 104      ( 04 Jan 2020 07:22 )             38.4     01-04    142  |  104  |  26<H>  ----------------------------<  68<L>  3.9   |  25  |  0.93    Ca    9.3      04 Jan 2020 07:20  Phos  2.9     01-04  Mg     1.9     01-04    TPro  6.1  /  Alb  3.8  /  TBili  0.5  /  DBili  x   /  AST  31  /  ALT  39  /  AlkPhos  138<H>  01-04                RADIOLOGY & ADDITIONAL TESTS:  Results Reviewed:   Imaging Personally Reviewed:  Electrocardiogram Personally Reviewed:    COORDINATION OF CARE:  Care Discussed with Consultants/Other Providers [Y/N]:  Prior or Outpatient Records Reviewed [Y/N]: Whitney Lyn, PGY-1  Internal Medicine  Pager: [phone], O [phone]    PROGRESS NOTE:     Patient is a 84y old  Female who presents with a chief complaint of weakness (03 Jan 2020 19:36)      SUBJECTIVE / OVERNIGHT EVENTS: No acute issues overnight. Patient seen and examined this AM. Patient has no concerns, however, does not remember discussion with family yesterday about her dispo. Became agitated when told she was going to LTC facility, will need family to discuss plan with patient. Otherwise denies pain, did not answer question about her weakness.     ADDITIONAL REVIEW OF SYSTEMS: neg    MEDICATIONS  (STANDING):  donepezil 10 milliGRAM(s) Oral at bedtime  heparin  Injectable 5000 Unit(s) SubCutaneous every 12 hours    MEDICATIONS  (PRN):      CAPILLARY BLOOD GLUCOSE        I&O's Summary    03 Jan 2020 07:01  -  04 Jan 2020 07:00  --------------------------------------------------------  IN: 590 mL / OUT: 800 mL / NET: -210 mL        PHYSICAL EXAM:  Vital Signs Last 24 Hrs  T(C): 36.3 (04 Jan 2020 08:50), Max: 36.3 (03 Jan 2020 20:41)  T(F): 97.3 (04 Jan 2020 08:50), Max: 97.4 (04 Jan 2020 01:16)  HR: 86 (04 Jan 2020 08:50) (63 - 86)  BP: 130/66 (04 Jan 2020 08:50) (124/71 - 154/83)  BP(mean): --  RR: 18 (04 Jan 2020 08:50) (18 - 18)  SpO2: 95% (04 Jan 2020 08:50) (94% - 96%)    GENERAL: NAD, lying in bed comfortably  EYES: conjunctiva and sclera clear  ENT: Moist mucous membranes  LUNG: Clear to auscultation bilaterally; No rales, rhonchi, wheezing, or rubs. Normal respiratory effort  HEART: Regular rate and rhythm; No murmurs, rubs, or gallops  ABDOMEN: Bowel sounds present; Soft, Nontender, Nondistended.  EXTREMITIES:  2+ Peripheral Pulses, mild b/l edema (most likely 2/2 venous stasis)  NERVOUS SYSTEM: speech clear. No deficits. PERRL, EOMI, no droop  Mental Status:  alert and oriented x3, fluent speech, following commands    LABS:                        12.7   7.04  )-----------( 104      ( 04 Jan 2020 07:22 )             38.4     01-04    142  |  104  |  26<H>  ----------------------------<  68<L>  3.9   |  25  |  0.93    Ca    9.3      04 Jan 2020 07:20  Phos  2.9     01-04  Mg     1.9     01-04    TPro  6.1  /  Alb  3.8  /  TBili  0.5  /  DBili  x   /  AST  31  /  ALT  39  /  AlkPhos  138<H>  01-04                RADIOLOGY & ADDITIONAL TESTS:  Results Reviewed:   Imaging Personally Reviewed:  Electrocardiogram Personally Reviewed:    COORDINATION OF CARE:  Care Discussed with Consultants/Other Providers [Y/N]:  Prior or Outpatient Records Reviewed [Y/N]:

## 2020-01-04 NOTE — PROGRESS NOTE ADULT - PROBLEM SELECTOR PLAN 6
-chronic worsening LE edema 2/2 venous insufficiency vs DVT vs less likely HF (no other HF sxs). Per patient, used to be on two water pills but stopped taking them  -euvolemic otherwise on exam with no evidence of hemodynamically significant volume overload  -LE doppler wnl -chronic worsening LE edema 2/2 venous insufficiency vs DVT vs less likely HF (no other HF sxs). Per patient, used to be on two water pills but stopped taking them  -euvolemic otherwise on exam with no evidence of hemodynamically significant volume overload  -LE doppler wnl  -encouraged pt to keep legs elevated

## 2020-01-04 NOTE — SWALLOW BEDSIDE ASSESSMENT ADULT - SWALLOW EVAL: DIAGNOSIS
Pt with pmhx of myasthenia gravis admitted with worsening lower extremity weakness now presents with an oral dysphagia, c/o globus sensation and s/s indicative of possible GERD. Reduced mastication of hard solid food 2/2 inadequate dentition noted. No overt, clinical s/s of laryngeal penetration/aspiration on exam.
Chart reviewed. Bedside swallow evaluation attempted. Pt refusing to participate in exam at this time. Pt appears agitated and clinician is unable to direct patient to task. D/W RN and MD Kincaid. Will reattempt this afternoon.

## 2020-01-04 NOTE — SWALLOW BEDSIDE ASSESSMENT ADULT - SLP PRECAUTIONS/LIMITATIONS: VISION
Partially impaired: cannot see medication labels or newsprint, but can see obstacles in path, and the surrounding layout; can count fingers at arm's length
impaired

## 2020-01-04 NOTE — DISCHARGE NOTE PROVIDER - HOSPITAL COURSE
83 YO F with PMHx of Myasthenia Gravis (not on medication for past 9 years)  and MHx of dementia and chronic low back pain who presents with complaint of worsening LE weakness over the past 6 months. CT Head was negative, NIF was normal, neuro exam was normal with mild point tenderness in the lumbar spine. Neurology was consulted, myasthenia gravis labs were sent. Ortho was consulted who stated that family did not wish for any surgical intervention at this time. PT evaluated patient and recommended subacute rehab. At this time, patient is hemodynamically stable and ready for discharge to [facility]. 85 YO F with PMHx of Myasthenia Gravis (not on medication for past 9 years)  and MHx of dementia and chronic low back pain who presents with complaint of worsening LE weakness over the past 6 months. CT Head was negative, NIF was normal, neuro exam was normal with mild point tenderness in the lumbar spine. Neurology was consulted, myasthenia gravis labs were sent that showed ________. Ortho was consulted who stated that family did not wish for any surgical intervention at this time. Per discussion with family and Neurology, patient had CT Chest to evaluate for thymoma which showed _______. Additionally patient had MRI of T and L spine that showed __________. PT evaluated patient and recommended subacute rehab. At this time, patient is hemodynamically stable and ready for discharge to [facility]. 83 YO F with PMHx of Myasthenia Gravis (not on medication for past 9 years)  and MHx of dementia and chronic low back pain who presents with complaint of worsening LE weakness over the past 6 months. CT Head was negative, NIF was normal, neuro exam was normal with mild point tenderness in the lumbar spine. Neurology was consulted, myasthenia gravis labs were sent. Ortho was consulted who stated that family did not wish for any surgical intervention at this time. Per discussion with family and Neurology, patient had CT Chest to evaluate for thymoma which showed no thymoma. Additionally patient had MRI of T and L spine that showed severe spinal stenosis without cord compression, also multilevel arthritis. PT evaluated patient and recommended subacute rehab. At this time, patient is hemodynamically stable and ready for discharge to [facility].

## 2020-01-04 NOTE — DISCHARGE NOTE PROVIDER - PROVIDER TOKENS
PROVIDER:[TOKEN:[[id number]]] PROVIDER:[TOKEN:[[id number]]],FREE:[LAST:[Pooja],FIRST:[Annk],PHONE:[phone]],FAX:[(   )    -]]

## 2020-01-04 NOTE — SWALLOW BEDSIDE ASSESSMENT ADULT - SLP PERTINENT HISTORY OF CURRENT PROBLEM
H&P: 83 YO F with PMHx of Myasthenia Gravis (not on medication for past 9 years)  and MHx of dementia and chronic low back pain who presents with complaint of worsening LE weakness over the past 6 months. Pt with daughter's at bedside state that pt lives alone, but for the past 6 months she has had decreased mobility and lower extremity weakness. She states the she has been feeling generally weak but now cannot even stand up without assistance. She denies any trauma or event that could have caused the progression of her weakness. Additionally she complains of bilat numbness and tingling in both feet. She denies any recent illness symptoms such as runny nose, sore throat , cough, dysuria. hematuria, diarrhea, nausea, vomiting, or abd pain. She does complain of 6 months of decreased hearing and "hypersalivation" but denies any change in vision, change in bowel habits, saddle anesthesia,  or focal deficit.
H&P: 83 YO F with PMHx of Myasthenia Gravis (not on medication for past 9 years)  and MHx of dementia and chronic low back pain who presents with complaint of worsening LE weakness over the past 6 months. Pt with daughter's at bedside state that pt lives alone, but for the past 6 months she has had decreased mobility and lower extremity weakness. She states the she has been feeling generally weak but now cannot even stand up without assistance. She denies any trauma or event that could have caused the progression of her weakness. Additionally she complains of bilat numbness and tingling in both feet. She denies any recent illness symptoms such as runny nose, sore throat , cough, dysuria. hematuria, diarrhea, nausea, vomiting, or abd pain. She does complain of 6 months of decreased hearing and "hypersalivation" but denies any change in vision, change in bowel habits, saddle anesthesia,  or focal deficit.

## 2020-01-04 NOTE — SWALLOW BEDSIDE ASSESSMENT ADULT - ORAL PHASE
Within functional limits x2 swallows to clear oral cavity/Decreased anterior-posterior movement of the bolus/Delayed oral transit time x3 swallows to clear oral cavity/Delayed oral transit time

## 2020-01-04 NOTE — DISCHARGE NOTE PROVIDER - NSDCFUADDAPPT_GEN_ALL_CORE_FT
Please follow up with your Neurologist 1-3 weeks after being discharged from the hospital. They were considering doing a CT of your chest and possibly an MRI outpatient so it is important that you follow up with your Rheumatologist. Please follow up with your Neurologist 1-3 weeks after being discharged from the hospital. They were considering doing a CT of your chest and possibly an MRI outpatient so it is important that you follow up with your Neurologist. Please follow up with your Neurologist 1-3 weeks after being discharged from the hospital.    Please follow up with your primary care provider within 1-3 days of being discharged from rehab.

## 2020-01-04 NOTE — SWALLOW BEDSIDE ASSESSMENT ADULT - ORAL PREPARATORY PHASE
Within functional limits Decreased mastication ability/per patient "I break it up between my gums in my mouth"

## 2020-01-04 NOTE — SWALLOW BEDSIDE ASSESSMENT ADULT - SLP GENERAL OBSERVATIONS
Pt found in bed, awake and alert. +Kyphosis. Pt cooperative for evaluation, following directions for evaluation purposes. Pt A&O to name, place and year.  Pt on room air.

## 2020-01-04 NOTE — SWALLOW BEDSIDE ASSESSMENT ADULT - ASR SWALLOW ASPIRATION MONITOR
change of breathing pattern/cough/fever/throat clearing/Monitor for s/s aspiration/laryngeal penetration. If noted:  D/C p.o. intake, provide non-oral nutrition/hydration/meds, and contact this service @ [phone]/patrick voice/pneumonia/upper respiratory infection

## 2020-01-05 LAB
ALBUMIN SERPL ELPH-MCNC: 3.7 G/DL — SIGNIFICANT CHANGE UP (ref 3.3–5)
ALP SERPL-CCNC: 130 U/L — HIGH (ref 40–120)
ALT FLD-CCNC: 35 U/L — SIGNIFICANT CHANGE UP (ref 10–45)
ANION GAP SERPL CALC-SCNC: 16 MMOL/L — SIGNIFICANT CHANGE UP (ref 5–17)
AST SERPL-CCNC: 27 U/L — SIGNIFICANT CHANGE UP (ref 10–40)
BILIRUB SERPL-MCNC: 0.5 MG/DL — SIGNIFICANT CHANGE UP (ref 0.2–1.2)
BUN SERPL-MCNC: 28 MG/DL — HIGH (ref 7–23)
CALCIUM SERPL-MCNC: 9.5 MG/DL — SIGNIFICANT CHANGE UP (ref 8.4–10.5)
CHLORIDE SERPL-SCNC: 107 MMOL/L — SIGNIFICANT CHANGE UP (ref 96–108)
CO2 SERPL-SCNC: 24 MMOL/L — SIGNIFICANT CHANGE UP (ref 22–31)
CREAT SERPL-MCNC: 0.88 MG/DL — SIGNIFICANT CHANGE UP (ref 0.5–1.3)
GLUCOSE SERPL-MCNC: 81 MG/DL — SIGNIFICANT CHANGE UP (ref 70–99)
HCT VFR BLD CALC: 37.9 % — SIGNIFICANT CHANGE UP (ref 34.5–45)
HGB BLD-MCNC: 12.6 G/DL — SIGNIFICANT CHANGE UP (ref 11.5–15.5)
MAGNESIUM SERPL-MCNC: 2.1 MG/DL — SIGNIFICANT CHANGE UP (ref 1.6–2.6)
MCHC RBC-ENTMCNC: 32.1 PG — SIGNIFICANT CHANGE UP (ref 27–34)
MCHC RBC-ENTMCNC: 33.2 GM/DL — SIGNIFICANT CHANGE UP (ref 32–36)
MCV RBC AUTO: 96.4 FL — SIGNIFICANT CHANGE UP (ref 80–100)
NRBC # BLD: 0 /100 WBCS — SIGNIFICANT CHANGE UP (ref 0–0)
PHOSPHATE SERPL-MCNC: 3.1 MG/DL — SIGNIFICANT CHANGE UP (ref 2.5–4.5)
PLATELET # BLD AUTO: 101 K/UL — LOW (ref 150–400)
POTASSIUM SERPL-MCNC: 3.9 MMOL/L — SIGNIFICANT CHANGE UP (ref 3.5–5.3)
POTASSIUM SERPL-SCNC: 3.9 MMOL/L — SIGNIFICANT CHANGE UP (ref 3.5–5.3)
PROT SERPL-MCNC: 6.2 G/DL — SIGNIFICANT CHANGE UP (ref 6–8.3)
RBC # BLD: 3.93 M/UL — SIGNIFICANT CHANGE UP (ref 3.8–5.2)
RBC # FLD: 14.4 % — SIGNIFICANT CHANGE UP (ref 10.3–14.5)
SODIUM SERPL-SCNC: 147 MMOL/L — HIGH (ref 135–145)
WBC # BLD: 6.46 K/UL — SIGNIFICANT CHANGE UP (ref 3.8–10.5)
WBC # FLD AUTO: 6.46 K/UL — SIGNIFICANT CHANGE UP (ref 3.8–10.5)

## 2020-01-05 PROCEDURE — 99233 SBSQ HOSP IP/OBS HIGH 50: CPT | Mod: GC

## 2020-01-05 RX ORDER — ACETAMINOPHEN 500 MG
650 TABLET ORAL ONCE
Refills: 0 | Status: COMPLETED | OUTPATIENT
Start: 2020-01-05 | End: 2020-01-05

## 2020-01-05 RX ADMIN — HEPARIN SODIUM 5000 UNIT(S): 5000 INJECTION INTRAVENOUS; SUBCUTANEOUS at 17:48

## 2020-01-05 RX ADMIN — DONEPEZIL HYDROCHLORIDE 10 MILLIGRAM(S): 10 TABLET, FILM COATED ORAL at 22:21

## 2020-01-05 RX ADMIN — HEPARIN SODIUM 5000 UNIT(S): 5000 INJECTION INTRAVENOUS; SUBCUTANEOUS at 06:05

## 2020-01-05 NOTE — PROGRESS NOTE ADULT - PROBLEM SELECTOR PLAN 1
-most likely 2/2 decondition and poor PO intake vs worsening lumbar stenosis vs less likely MG exacerbation.  -No focal deficits on exam  -Neuro consult appreciated, have ordered MG labs but will hold off on imaging at this time.  -s/p IVF hydration with LR ~2L  -encourage PO intake  -Nutrition consult  -evaluated by PT -most likely 2/2 decondition and poor PO intake vs worsening lumbar stenosis vs less likely MG exacerbation.  -No focal deficits on exam  -Neuro consult appreciated, have ordered MG labs but will hold off on imaging at this time until discussion with pt's neurologist and family tomorrow  -s/p IVF hydration with LR ~2L  -encourage PO intake  -Nutrition consult  -evaluated by PT -most likely 2/2 decondition and poor PO intake vs worsening lumbar stenosis vs less likely MG exacerbation.  -No focal deficits on exam  -Neuro consult appreciated, have ordered MG labs, ordered MRI and CT Chest per neuro recs, will need to discuss w/ family and neurologist tomorrow   -s/p IVF hydration with LR ~2L  -encourage PO intake  -Nutrition consult  -evaluated by PT

## 2020-01-05 NOTE — PROGRESS NOTE ADULT - PROBLEM SELECTOR PLAN 4
Bone Vs GB. All other LFTs. wnl GGT wnl. Will continue to trend LFTs, low suspicion for pathology at this time.

## 2020-01-05 NOTE — PROGRESS NOTE ADULT - PROBLEM SELECTOR PLAN 8
-DVT PPx: SCDs given thrombocytopenia -DVT PPx: SCDs given thrombocytopenia  Diet: Soft  Dispo: [facility] (family prefers a facility where there is [facility] and LTC both), pending placement and discussion with family regarding future testing etc

## 2020-01-05 NOTE — PROGRESS NOTE ADULT - SUBJECTIVE AND OBJECTIVE BOX
Whitney Lyn, PGY-1  Internal Medicine  Pager: [phone], J [phone]    PROGRESS NOTE:     Patient is a 84y old  Female who presents with a chief complaint of weakness (04 Jan 2020 19:30)      SUBJECTIVE / OVERNIGHT EVENTS:    ADDITIONAL REVIEW OF SYSTEMS:    MEDICATIONS  (STANDING):  donepezil 10 milliGRAM(s) Oral at bedtime  heparin  Injectable 5000 Unit(s) SubCutaneous every 12 hours    MEDICATIONS  (PRN):      CAPILLARY BLOOD GLUCOSE        I&O's Summary    04 Jan 2020 07:01  -  05 Jan 2020 07:00  --------------------------------------------------------  IN: 580 mL / OUT: 425 mL / NET: 155 mL        PHYSICAL EXAM:  Vital Signs Last 24 Hrs  T(C): 36.3 (05 Jan 2020 04:53), Max: 36.8 (04 Jan 2020 16:46)  T(F): 97.4 (05 Jan 2020 04:53), Max: 98.2 (04 Jan 2020 16:46)  HR: 61 (05 Jan 2020 04:53) (61 - 88)  BP: 124/57 (05 Jan 2020 04:53) (107/72 - 180/100)  BP(mean): --  RR: 18 (05 Jan 2020 04:53) (18 - 18)  SpO2: 94% (05 Jan 2020 04:53) (94% - 96%)    CONSTITUTIONAL: NAD, well-developed  RESPIRATORY: Normal respiratory effort; lungs are clear to auscultation bilaterally  CARDIOVASCULAR: Regular rate and rhythm, normal S1 and S2, no murmur/rub/gallop; No lower extremity edema; Peripheral pulses are 2+ bilaterally  ABDOMEN: Nontender to palpation, normoactive bowel sounds, no rebound/guarding; No hepatosplenomegaly  MUSCLOSKELETAL: no clubbing or cyanosis of digits; no joint swelling or tenderness to palpation  PSYCH: A+O to person, place, and time; affect appropriate    LABS:                        12.6   6.46  )-----------( 101      ( 05 Jan 2020 07:11 )             37.9     01-05    147<H>  |  107  |  28<H>  ----------------------------<  81  3.9   |  24  |  0.88    Ca    9.5      05 Jan 2020 07:11  Phos  3.1     01-05  Mg     2.1     01-05    TPro  6.2  /  Alb  3.7  /  TBili  0.5  /  DBili  x   /  AST  27  /  ALT  35  /  AlkPhos  130<H>  01-05                RADIOLOGY & ADDITIONAL TESTS:  Results Reviewed:   Imaging Personally Reviewed:  Electrocardiogram Personally Reviewed:    COORDINATION OF CARE:  Care Discussed with Consultants/Other Providers [Y/N]:  Prior or Outpatient Records Reviewed [Y/N]: Whitney Lyn, PGY-1  Internal Medicine  Pager: [phone], [facility] [phone]    PROGRESS NOTE:     Patient is a 84y old  Female who presents with a chief complaint of weakness (04 Jan 2020 19:30)      SUBJECTIVE / OVERNIGHT EVENTS: No acute events overnight, patient seen and examined this AM. Patient stated that she had no pain at time of exam, was OOB to chair yesterday however not today yet. No complaints. Had discussion with family, at this time they wish to get all imaging done while inpatient. Patient's neurology office called, discussed with a provider, plan is to call back tomorrow to have joint conversation between pt's neurologist, pt's daughters, and primary team regarding plan.     ADDITIONAL REVIEW OF SYSTEMS: neg    MEDICATIONS  (STANDING):  donepezil 10 milliGRAM(s) Oral at bedtime  heparin  Injectable 5000 Unit(s) SubCutaneous every 12 hours    MEDICATIONS  (PRN):      CAPILLARY BLOOD GLUCOSE        I&O's Summary    04 Jan 2020 07:01  -  05 Jan 2020 07:00  --------------------------------------------------------  IN: 580 mL / OUT: 425 mL / NET: 155 mL        PHYSICAL EXAM:  Vital Signs Last 24 Hrs  T(C): 36.3 (05 Jan 2020 04:53), Max: 36.8 (04 Jan 2020 16:46)  T(F): 97.4 (05 Jan 2020 04:53), Max: 98.2 (04 Jan 2020 16:46)  HR: 61 (05 Jan 2020 04:53) (61 - 88)  BP: 124/57 (05 Jan 2020 04:53) (107/72 - 180/100)  BP(mean): --  RR: 18 (05 Jan 2020 04:53) (18 - 18)  SpO2: 94% (05 Jan 2020 04:53) (94% - 96%)    CONSTITUTIONAL: NAD, elderly kyphotic female  RESPIRATORY: Normal respiratory effort; lungs are clear to auscultation bilaterally  CARDIOVASCULAR: Regular rate and rhythm, normal S1 and S2, no murmur/rub/gallop; chronic LE edema; Peripheral pulses are 2+ bilaterally  ABDOMEN: Nontender to palpation, normoactive bowel sounds, no rebound/guarding; No hepatosplenomegaly  MUSCLOSKELETAL: no clubbing or cyanosis of digits; no joint swelling or tenderness to palpation  PSYCH: A+O to person, place, and time; affect appropriate    LABS:                        12.6   6.46  )-----------( 101      ( 05 Jan 2020 07:11 )             37.9     01-05    147<H>  |  107  |  28<H>  ----------------------------<  81  3.9   |  24  |  0.88    Ca    9.5      05 Jan 2020 07:11  Phos  3.1     01-05  Mg     2.1     01-05    TPro  6.2  /  Alb  3.7  /  TBili  0.5  /  DBili  x   /  AST  27  /  ALT  35  /  AlkPhos  130<H>  01-05                RADIOLOGY & ADDITIONAL TESTS:  Results Reviewed:   Imaging Personally Reviewed:  Electrocardiogram Personally Reviewed:    COORDINATION OF CARE:  Care Discussed with Consultants/Other Providers [Y/N]:  Prior or Outpatient Records Reviewed [Y/N]:

## 2020-01-05 NOTE — PROGRESS NOTE ADULT - PROBLEM SELECTOR PLAN 6
-chronic worsening LE edema 2/2 venous insufficiency vs DVT vs less likely HF (no other HF sxs). Per patient, used to be on two water pills but stopped taking them  -euvolemic otherwise on exam with no evidence of hemodynamically significant volume overload  -LE doppler wnl  -encouraged pt to keep legs elevated

## 2020-01-06 LAB
CREAT SERPL-MCNC: 0.9 MG/DL — SIGNIFICANT CHANGE UP (ref 0.5–1.3)
SODIUM SERPL-SCNC: 141 MMOL/L — SIGNIFICANT CHANGE UP (ref 135–145)

## 2020-01-06 PROCEDURE — 99233 SBSQ HOSP IP/OBS HIGH 50: CPT | Mod: GC

## 2020-01-06 PROCEDURE — 72147 MRI CHEST SPINE W/DYE: CPT | Mod: 26

## 2020-01-06 PROCEDURE — 72149 MRI LUMBAR SPINE W/DYE: CPT | Mod: 26

## 2020-01-06 PROCEDURE — 71250 CT THORAX DX C-: CPT | Mod: 26

## 2020-01-06 RX ADMIN — HEPARIN SODIUM 5000 UNIT(S): 5000 INJECTION INTRAVENOUS; SUBCUTANEOUS at 05:39

## 2020-01-06 RX ADMIN — HEPARIN SODIUM 5000 UNIT(S): 5000 INJECTION INTRAVENOUS; SUBCUTANEOUS at 20:00

## 2020-01-06 RX ADMIN — DONEPEZIL HYDROCHLORIDE 10 MILLIGRAM(S): 10 TABLET, FILM COATED ORAL at 21:49

## 2020-01-06 NOTE — PROGRESS NOTE ADULT - SUBJECTIVE AND OBJECTIVE BOX
PROGRESS NOTE:   Authored by Nikki Kimura, MD, Pager [phone] [facility], [address] [facility]     Patient is a 84y old  Female who presents with a chief complaint of weakness (05 Jan 2020 09:29)      SUBJECTIVE / OVERNIGHT EVENTS:    ADDITIONAL REVIEW OF SYSTEMS:    MEDICATIONS  (STANDING):  donepezil 10 milliGRAM(s) Oral at bedtime  heparin  Injectable 5000 Unit(s) SubCutaneous every 12 hours    MEDICATIONS  (PRN):      CAPILLARY BLOOD GLUCOSE        I&O's Summary    04 Jan 2020 07:01  -  05 Jan 2020 07:00  --------------------------------------------------------  IN: 580 mL / OUT: 425 mL / NET: 155 mL        PHYSICAL EXAM:  Vital Signs Last 24 Hrs  T(C): 36.4 (06 Jan 2020 05:43), Max: 38 (05 Jan 2020 15:00)  T(F): 97.5 (06 Jan 2020 05:43), Max: 100.4 (05 Jan 2020 15:00)  HR: 66 (06 Jan 2020 05:43) (66 - 77)  BP: 139/66 (06 Jan 2020 05:43) (129/77 - 165/81)  BP(mean): --  RR: 18 (06 Jan 2020 05:43) (18 - 18)  SpO2: 96% (06 Jan 2020 05:43) (95% - 97%)    CONSTITUTIONAL: NAD, elderly kyphotic female  RESPIRATORY: Normal respiratory effort; lungs are clear to auscultation bilaterally  CARDIOVASCULAR: Regular rate and rhythm, normal S1 and S2, no murmur/rub/gallop; chronic LE edema; Peripheral pulses are 2+ bilaterally  ABDOMEN: Nontender to palpation, normoactive bowel sounds, no rebound/guarding; No hepatosplenomegaly  MUSCULOSKELETAL: no clubbing or cyanosis of digits; no joint swelling or tenderness to palpation  PSYCH: A+O to person, place, and time; affect appropriate    LABS:                        12.6   6.46  )-----------( 101      ( 05 Jan 2020 07:11 )             37.9     01-05    147<H>  |  107  |  28<H>  ----------------------------<  81  3.9   |  24  |  0.88    Ca    9.5      05 Jan 2020 07:11  Phos  3.1     01-05  Mg     2.1     01-05    TPro  6.2  /  Alb  3.7  /  TBili  0.5  /  DBili  x   /  AST  27  /  ALT  35  /  AlkPhos  130<H>  01-05                RADIOLOGY & ADDITIONAL TESTS:  Results Reviewed:   Imaging Personally Reviewed:  Electrocardiogram Personally Reviewed:    COORDINATION OF CARE:  Care Discussed with Consultants/Other Providers [Y/N]:  Prior or Outpatient Records Reviewed [Y/N]: PROGRESS NOTE:   Authored by Nikki Kimura, MD, Pager [phone] [facility], [address] [facility]     Patient is a 84y old  Female who presents with a chief complaint of weakness (05 Jan 2020 09:29)      SUBJECTIVE / OVERNIGHT EVENTS: No acute events overnight. Pt seen and examined at bedside. She is eating breakfast comfortably and conversant. States she feels well. Denies trouble breathing. Denies worsening weakness. Denies fever, chills, nausea, or vomiting.     ADDITIONAL REVIEW OF SYSTEMS:    MEDICATIONS  (STANDING):  donepezil 10 milliGRAM(s) Oral at bedtime  heparin  Injectable 5000 Unit(s) SubCutaneous every 12 hours    MEDICATIONS  (PRN):      CAPILLARY BLOOD GLUCOSE        I&O's Summary    04 Jan 2020 07:01  -  05 Jan 2020 07:00  --------------------------------------------------------  IN: 580 mL / OUT: 425 mL / NET: 155 mL        PHYSICAL EXAM:  Vital Signs Last 24 Hrs  T(C): 36.4 (06 Jan 2020 05:43), Max: 38 (05 Jan 2020 15:00)  T(F): 97.5 (06 Jan 2020 05:43), Max: 100.4 (05 Jan 2020 15:00)  HR: 66 (06 Jan 2020 05:43) (66 - 77)  BP: 139/66 (06 Jan 2020 05:43) (129/77 - 165/81)  BP(mean): --  RR: 18 (06 Jan 2020 05:43) (18 - 18)  SpO2: 96% (06 Jan 2020 05:43) (95% - 97%)    CONSTITUTIONAL: NAD, elderly kyphotic female  RESPIRATORY: Normal respiratory effort; lungs are clear to auscultation bilaterally  CARDIOVASCULAR: Regular rate and rhythm, normal S1 and S2, no murmur/rub/gallop; chronic LE edema; Peripheral pulses are 2+ bilaterally  ABDOMEN: Nontender to palpation, normoactive bowel sounds, no rebound/guarding; No hepatosplenomegaly  MUSCULOSKELETAL: no clubbing or cyanosis of digits; no joint swelling or tenderness to palpation  PSYCH: A+O to person, place, and time; affect appropriate    LABS:                        12.6   6.46  )-----------( 101      ( 05 Jan 2020 07:11 )             37.9     01-05    147<H>  |  107  |  28<H>  ----------------------------<  81  3.9   |  24  |  0.88    Ca    9.5      05 Jan 2020 07:11  Phos  3.1     01-05  Mg     2.1     01-05    TPro  6.2  /  Alb  3.7  /  TBili  0.5  /  DBili  x   /  AST  27  /  ALT  35  /  AlkPhos  130<H>  01-05                RADIOLOGY & ADDITIONAL TESTS:  Results Reviewed:   Imaging Personally Reviewed:  Electrocardiogram Personally Reviewed:    COORDINATION OF CARE:  Care Discussed with Consultants/Other Providers [Y/N]:  Prior or Outpatient Records Reviewed [Y/N]:

## 2020-01-06 NOTE — PROGRESS NOTE ADULT - PROBLEM SELECTOR PLAN 1
-most likely 2/2 decondition and poor PO intake vs worsening lumbar stenosis vs less likely MG exacerbation.  -No focal deficits on exam  -Neuro consult appreciated, have ordered MG labs, ordered MRI and CT Chest per neuro recs, will need to discuss w/ family and neurologist tomorrow   -s/p IVF hydration with LR ~2L  -encourage PO intake  -Nutrition consult  -evaluated by PT -most likely 2/2 decondition and poor PO intake vs worsening lumbar stenosis vs less likely MG exacerbation.  -No focal deficits on exam  -Neuro consult appreciated, have ordered MG labs, ordered MRI and CT Chest per neuro recs, will need to discuss w/ family and neurologist Dr. Patton.   -s/p IVF hydration with LR ~2L  -encourage PO intake  -Nutrition consulted-- on soft diet, tolerating well   -evaluated by PT-- recommended [facility]

## 2020-01-06 NOTE — PROGRESS NOTE ADULT - PROBLEM SELECTOR PLAN 8
-DVT PPx: SCDs given thrombocytopenia  Diet: Soft  Dispo: [facility] (family prefers a facility where there is [facility] and LTC both), pending placement and discussion with family regarding future testing etc

## 2020-01-06 NOTE — PROVIDER CONTACT NOTE (OTHER) - ASSESSMENT
pt had no complaints
Pt looks comfortable denies headache or blurred vision.
pt states she feels fine and is always cold at home

## 2020-01-06 NOTE — PROVIDER CONTACT NOTE (OTHER) - ACTION/TREATMENT ORDERED:
no change in treatment
hypothermia blanket ordered and vital signs changed to every 4 hours. will continue to monitor. no other changes at this time.

## 2020-01-06 NOTE — PROVIDER CONTACT NOTE (OTHER) - SITUATION
pts temp at 1410 93.4 degrees, repeat 95 degree, hyperthermia blanket placed, repeat temp 97.2
Pt with blood pressure 180 systolic
pt with temperature of 93 F., all other vital signs are stable.

## 2020-01-07 LAB — ACHR BLOCK AB SER-ACNC: <15 — SIGNIFICANT CHANGE UP

## 2020-01-07 PROCEDURE — 99233 SBSQ HOSP IP/OBS HIGH 50: CPT | Mod: GC

## 2020-01-07 RX ADMIN — HEPARIN SODIUM 5000 UNIT(S): 5000 INJECTION INTRAVENOUS; SUBCUTANEOUS at 05:09

## 2020-01-07 RX ADMIN — HEPARIN SODIUM 5000 UNIT(S): 5000 INJECTION INTRAVENOUS; SUBCUTANEOUS at 18:18

## 2020-01-07 RX ADMIN — DONEPEZIL HYDROCHLORIDE 10 MILLIGRAM(S): 10 TABLET, FILM COATED ORAL at 21:40

## 2020-01-07 NOTE — PROGRESS NOTE ADULT - SUBJECTIVE AND OBJECTIVE BOX
PROGRESS NOTE:   Authored by Nikki Kimura, MD, Pager [phone] [facility], [address] [facility]     Patient is a 84y old  Female who presents with a chief complaint of weakness (06 Jan 2020 06:30)      SUBJECTIVE / OVERNIGHT EVENTS:    ADDITIONAL REVIEW OF SYSTEMS:    MEDICATIONS  (STANDING):  donepezil 10 milliGRAM(s) Oral at bedtime  heparin  Injectable 5000 Unit(s) SubCutaneous every 12 hours    MEDICATIONS  (PRN):      CAPILLARY BLOOD GLUCOSE        I&O's Summary    06 Jan 2020 07:01  -  07 Jan 2020 06:33  --------------------------------------------------------  IN: 360 mL / OUT: 900 mL / NET: -540 mL        PHYSICAL EXAM:  Vital Signs Last 24 Hrs  T(C): 36.3 (07 Jan 2020 04:51), Max: 36.3 (07 Jan 2020 04:51)  T(F): 97.4 (07 Jan 2020 04:51), Max: 97.4 (07 Jan 2020 04:51)  HR: 76 (07 Jan 2020 04:51) (54 - 76)  BP: 158/71 (07 Jan 2020 04:51) (142/64 - 165/83)  BP(mean): --  RR: 18 (07 Jan 2020 04:51) (18 - 18)  SpO2: 94% (07 Jan 2020 04:51) (93% - 98%)    CONSTITUTIONAL: NAD, elderly kyphotic female  RESPIRATORY: Normal respiratory effort; lungs are clear to auscultation bilaterally  CARDIOVASCULAR: Regular rate and rhythm, normal S1 and S2, no murmur/rub/gallop; chronic LE edema; Peripheral pulses are 2+ bilaterally  ABDOMEN: Nontender to palpation, normoactive bowel sounds, no rebound/guarding; No hepatosplenomegaly  MUSCULOSKELETAL: no clubbing or cyanosis of digits; no joint swelling or tenderness to palpation  PSYCH: A+O to person, place, and time; affect appropriate    LABS:                        12.6   6.46  )-----------( 101      ( 05 Jan 2020 07:11 )             37.9     01-06    141  |  x   |  x   ----------------------------<  x   x    |  x   |  0.90    Ca    9.5      05 Jan 2020 07:11  Phos  3.1     01-05  Mg     2.1     01-05    TPro  6.2  /  Alb  3.7  /  TBili  0.5  /  DBili  x   /  AST  27  /  ALT  35  /  AlkPhos  130<H>  01-05                RADIOLOGY & ADDITIONAL TESTS:  Results Reviewed:   Imaging Personally Reviewed:  Electrocardiogram Personally Reviewed:    COORDINATION OF CARE:  Care Discussed with Consultants/Other Providers [Y/N]:  Prior or Outpatient Records Reviewed [Y/N]: PROGRESS NOTE:   Authored by Nikki Kimura, MD, Pager [phone] [facility], [address] [facility]     Patient is a 84y old  Female who presents with a chief complaint of weakness (06 Jan 2020 06:30)      SUBJECTIVE / OVERNIGHT EVENTS: No acute events overnight. Pt seen and examined at bedside. She is pleasant and conversant, states she still feels lightheaded and not able to walk around. Denies fever, chills, nausea, vomiting. Denies trouble breathing or swallowing.     ADDITIONAL REVIEW OF SYSTEMS:    MEDICATIONS  (STANDING):  donepezil 10 milliGRAM(s) Oral at bedtime  heparin  Injectable 5000 Unit(s) SubCutaneous every 12 hours    MEDICATIONS  (PRN):      CAPILLARY BLOOD GLUCOSE        I&O's Summary    06 Jan 2020 07:01  -  07 Jan 2020 06:33  --------------------------------------------------------  IN: 360 mL / OUT: 900 mL / NET: -540 mL        PHYSICAL EXAM:  Vital Signs Last 24 Hrs  T(C): 36.3 (07 Jan 2020 04:51), Max: 36.3 (07 Jan 2020 04:51)  T(F): 97.4 (07 Jan 2020 04:51), Max: 97.4 (07 Jan 2020 04:51)  HR: 76 (07 Jan 2020 04:51) (54 - 76)  BP: 158/71 (07 Jan 2020 04:51) (142/64 - 165/83)  BP(mean): --  RR: 18 (07 Jan 2020 04:51) (18 - 18)  SpO2: 94% (07 Jan 2020 04:51) (93% - 98%)    CONSTITUTIONAL: NAD, elderly kyphotic female  RESPIRATORY: Normal respiratory effort; lungs are clear to auscultation bilaterally  CARDIOVASCULAR: Regular rate and rhythm, normal S1 and S2, no murmur/rub/gallop; chronic LE edema; Peripheral pulses are 2+ bilaterally  ABDOMEN: Nontender to palpation, normoactive bowel sounds, no rebound/guarding; No hepatosplenomegaly  MUSCULOSKELETAL: no clubbing or cyanosis of digits; no joint swelling or tenderness to palpation  PSYCH: A+O to person, place, and time; affect appropriate    LABS:                        12.6   6.46  )-----------( 101      ( 05 Jan 2020 07:11 )             37.9     01-06    141  |  x   |  x   ----------------------------<  x   x    |  x   |  0.90    Ca    9.5      05 Jan 2020 07:11  Phos  3.1     01-05  Mg     2.1     01-05    TPro  6.2  /  Alb  3.7  /  TBili  0.5  /  DBili  x   /  AST  27  /  ALT  35  /  AlkPhos  130<H>  01-05                RADIOLOGY & ADDITIONAL TESTS:  Results Reviewed:   Imaging Personally Reviewed:  Electrocardiogram Personally Reviewed:    COORDINATION OF CARE:  Care Discussed with Consultants/Other Providers [Y/N]:  Prior or Outpatient Records Reviewed [Y/N]:

## 2020-01-07 NOTE — PROGRESS NOTE ADULT - PROBLEM SELECTOR PLAN 4
Bone Vs GB. All other LFTs. wnl GGT wnl. Will continue to trend LFTs, low suspicion for pathology at this time. Bone Vs GB. All other LFTs. wnl GGT wnl. low suspicion for pathology at this time.

## 2020-01-07 NOTE — PROGRESS NOTE ADULT - PROBLEM SELECTOR PLAN 1
-most likely 2/2 decondition and poor PO intake vs worsening lumbar stenosis vs less likely MG exacerbation.  -No focal deficits on exam  -Neuro consult appreciated, have ordered MG labs, ordered MRI and CT Chest per neuro recs, will need to discuss w/ family and neurologist Dr. Patton.   -s/p IVF hydration with LR ~2L  -encourage PO intake  -Nutrition consulted-- on soft diet, tolerating well   -evaluated by PT-- recommended [facility] -most likely 2/2 deconditioning and poor PO intake vs worsening lumbar stenosis vs less likely MG exacerbation.  -No focal deficits on exam  -Neuro consult appreciated, have ordered MG labs, ordered MRI and CT Chest per neuro recs, will need to discuss w/ family and neurologist Dr. Patton.   -s/p IVF hydration with LR ~2L  -encourage PO intake  -Nutrition consulted-- on soft diet, tolerating well   -evaluated by PT-- recommended [facility]  -CT Chest negative for thymoma. MRI 1/6 -most likely 2/2 deconditioning and poor PO intake vs worsening lumbar stenosis vs less likely MG exacerbation.  -No focal deficits on exam  -Neuro consult appreciated, have ordered MG labs, ordered MRI and CT Chest per neuro recs, will need to discuss w/ family and neurologist Dr. Patton.   -s/p IVF hydration with LR ~2L  -encourage PO intake  -Nutrition consulted-- on soft diet, tolerating well   -evaluated by PT-- recommended [facility]  -CT Chest negative for thymoma. MRI 1/6 showing severe stenosis at L4-L5 and disc herniation, no cord compression or SC abnormality.

## 2020-01-07 NOTE — PROGRESS NOTE ADULT - PROBLEM SELECTOR PLAN 2
-Pt has not been on medication in 9 years, and her sxs were arm weakness and double vision.  NIF in ED negative.  -This does not appear to be exacerbation or crisis  -Neuro consulted -> MG labs sent. Will trend NIFs -Pt has not been on medication in 9 years, and her sxs were arm weakness and double vision. NIF in ED negative.  -This does not appear to be exacerbation or crisis  -Neuro consulted -> MG labs sent. Will trend NIFs

## 2020-01-07 NOTE — PROGRESS NOTE ADULT - ASSESSMENT
84F with a history of myasthenia gravis (not on medication for past 9 years, in remission) and mild dementia and chronic low back pain 2/2 stenosis who presents with complaint of weakness and dizziness after standing which has been ongoing for some months likely 2/2 deconditioning and a component of orthostasis 2/2 poor PO intake. 84F with a history of myasthenia gravis (not on medication for past 9 years, in remission) and mild dementia and chronic low back pain 2/2 stenosis who presents with complaint of weakness and dizziness after standing which has been ongoing for some months likely 2/2 deconditioning and a component of orthostasis 2/2 poor PO intake. CT chest without thymoma, MRI showing severe lumbar stenosis with disc herniation, no cord compression.

## 2020-01-08 ENCOUNTER — TRANSCRIPTION ENCOUNTER (OUTPATIENT)
Age: 85
End: 2020-01-08

## 2020-01-08 VITALS
TEMPERATURE: 98 F | RESPIRATION RATE: 18 BRPM | HEART RATE: 61 BPM | SYSTOLIC BLOOD PRESSURE: 149 MMHG | OXYGEN SATURATION: 95 % | DIASTOLIC BLOOD PRESSURE: 68 MMHG

## 2020-01-08 LAB — ACHR BIND AB SER-ACNC: 1.48 NMOL/L — HIGH

## 2020-01-08 PROCEDURE — 80053 COMPREHEN METABOLIC PANEL: CPT

## 2020-01-08 PROCEDURE — 84443 ASSAY THYROID STIM HORMONE: CPT

## 2020-01-08 PROCEDURE — 82533 TOTAL CORTISOL: CPT

## 2020-01-08 PROCEDURE — 84100 ASSAY OF PHOSPHORUS: CPT

## 2020-01-08 PROCEDURE — 70450 CT HEAD/BRAIN W/O DYE: CPT

## 2020-01-08 PROCEDURE — 97110 THERAPEUTIC EXERCISES: CPT

## 2020-01-08 PROCEDURE — 86366 MUSCLE-SPECIFIC KINASE ANTB: CPT

## 2020-01-08 PROCEDURE — 99285 EMERGENCY DEPT VISIT HI MDM: CPT

## 2020-01-08 PROCEDURE — 84295 ASSAY OF SERUM SODIUM: CPT

## 2020-01-08 PROCEDURE — 87581 M.PNEUMON DNA AMP PROBE: CPT

## 2020-01-08 PROCEDURE — A9585: CPT

## 2020-01-08 PROCEDURE — 86041 ACETYLCHOLN RCPTR BNDNG ANTB: CPT

## 2020-01-08 PROCEDURE — 82977 ASSAY OF GGT: CPT

## 2020-01-08 PROCEDURE — 97530 THERAPEUTIC ACTIVITIES: CPT

## 2020-01-08 PROCEDURE — 83735 ASSAY OF MAGNESIUM: CPT

## 2020-01-08 PROCEDURE — 93970 EXTREMITY STUDY: CPT

## 2020-01-08 PROCEDURE — 86042 ACETYLCHOLN RCPTR BLCKG ANTB: CPT

## 2020-01-08 PROCEDURE — 71045 X-RAY EXAM CHEST 1 VIEW: CPT

## 2020-01-08 PROCEDURE — 84439 ASSAY OF FREE THYROXINE: CPT

## 2020-01-08 PROCEDURE — 84238 ASSAY NONENDOCRINE RECEPTOR: CPT

## 2020-01-08 PROCEDURE — 87633 RESP VIRUS 12-25 TARGETS: CPT

## 2020-01-08 PROCEDURE — 85027 COMPLETE CBC AUTOMATED: CPT

## 2020-01-08 PROCEDURE — 82565 ASSAY OF CREATININE: CPT

## 2020-01-08 PROCEDURE — 72147 MRI CHEST SPINE W/DYE: CPT

## 2020-01-08 PROCEDURE — 97161 PT EVAL LOW COMPLEX 20 MIN: CPT

## 2020-01-08 PROCEDURE — 94150 VITAL CAPACITY TEST: CPT

## 2020-01-08 PROCEDURE — 87486 CHLMYD PNEUM DNA AMP PROBE: CPT

## 2020-01-08 PROCEDURE — 92610 EVALUATE SWALLOWING FUNCTION: CPT

## 2020-01-08 PROCEDURE — 82607 VITAMIN B-12: CPT

## 2020-01-08 PROCEDURE — 72100 X-RAY EXAM L-S SPINE 2/3 VWS: CPT

## 2020-01-08 PROCEDURE — 86043 ACETYLCHOLN RCPTR MODLG ANTB: CPT

## 2020-01-08 PROCEDURE — 99239 HOSP IP/OBS DSCHRG MGMT >30: CPT | Mod: GC

## 2020-01-08 PROCEDURE — 81001 URINALYSIS AUTO W/SCOPE: CPT

## 2020-01-08 PROCEDURE — 71250 CT THORAX DX C-: CPT

## 2020-01-08 PROCEDURE — 87798 DETECT AGENT NOS DNA AMP: CPT

## 2020-01-08 PROCEDURE — 72149 MRI LUMBAR SPINE W/DYE: CPT

## 2020-01-08 PROCEDURE — 93005 ELECTROCARDIOGRAM TRACING: CPT

## 2020-01-08 RX ORDER — ASPIRIN/CAFFEINE 400MG-32MG
0 TABLET ORAL
Qty: 0 | Refills: 0 | DISCHARGE

## 2020-01-08 RX ORDER — IBUPROFEN 200 MG
0 TABLET ORAL
Qty: 0 | Refills: 0 | DISCHARGE

## 2020-01-08 RX ADMIN — HEPARIN SODIUM 5000 UNIT(S): 5000 INJECTION INTRAVENOUS; SUBCUTANEOUS at 05:46

## 2020-01-08 NOTE — PROGRESS NOTE ADULT - ATTENDING COMMENTS
Pt seen and examined. Agree with above    84F with MG pw worsening weakness likely 2/2 deconditioning. MRI spines negative for signs of MG flare. CT chest negative for thymoma. Findings discussed with Dr. Medrano (neurology) who pt will follow up as outpatient. Pt is stable for discharge to [facility]    42 minutes spent on discharge process    Michelle Aguirre MD  Division of Hospital Medicine  Cell: [phone]  Pager: [phone]  Office: [phone] .
unclear to me what is acute or flared  5/5 LE strength  no sensory loss  chronic severe spinal deformity and DJD  PT eval  doubt she is a surgical candidate in absence of acute neurological findings or myelopathy  will d/w consultants whether further spine images warranted    Guilherme Harris MD, MHA, FACP, [facility]  Pager: [phone]  If no response or off-hours, page [phone]
Pt seen and examined. Agree with above  84F with weakness likely 2/2 deconditioning, low suspicion for MG flare.   CT chest without thymoma. pending MRI brain  appreciate neurology recs  PT recommends [facility] and family in agreement  dc planning    Michelle Aguirre MD  Division of Hospital Medicine  Cell: [phone]  Pager: [phone]  Office: [phone]
f/up MRI spine and CT chest  supportive care  hypothermia protocol
await [facility] bed  no inpatient procedures planned  stable clinical status
Pt seen and examined. Agree with above    84F with MG pw worsening weakness likely 2/2 deconditioning. MRI spines negative for signs of MG flare. CT chest negative for thymoma. Findings discussed with Dr. Medrano (neurology) who pt will follow up as outpatient. Pt is stable for discharge to [facility] awaiting authorization    Michelle Aguirre MD  Division of Hospital Medicine  Cell: [phone]  Pager: [phone]  Office: [phone]

## 2020-01-08 NOTE — PROGRESS NOTE ADULT - PROBLEM SELECTOR PLAN 8
-DVT PPx: SCDs given thrombocytopenia  Diet: Soft  Dispo: [facility] (family prefers a facility where there is [facility] and LTC both).

## 2020-01-08 NOTE — PROGRESS NOTE ADULT - SUBJECTIVE AND OBJECTIVE BOX
Podiatry pager #: [phone]    Patient is a 84y old  Female who presents with a chief complaint of weakness (08 Jan 2020 06:43) podiatry consulted for painful toenails      HPI:  85 YO F with PMHx of Myasthenia Gravis (not on medication for past 9 years)  and MHx of dementia and chronic low back pain who presents with complaint of worsening LE weakness over the past 6 months. Pt with daughter's at bedside state that pt lives alone, but for the past 6 months she has had decreased mobility and lower extremity weakness. She states the she has been feeling generally weak but now cannot even stand up without assistance. She denies any trauma or event that could have caused the progression of her weakness. Additionally she complains of bilat numbness and tingling in both feet. She denies any recent illness symptoms such as runny nose, sore throat , cough, dysuria. hematuria, diarrhea, nausea, vomiting, or abd pain. She does complain of 6 months of decreased hearing and "hypersalivation" but denies any change in vision, change in bowel habits, saddle anesthesia,  or focal deficit.   Of note, pt states her Myasthenia Gravis sxs in the past were Upper extremity weakness and double vision.    In the ED: BP:156/81, HR=63, Tmax=98F. Resp=12, sat =98% on RA. Labs: UA negative for UTI, but with hyaline casts. RVP negative. CTH negative for acute pathology. (02 Jan 2020 12:50)      PAST MEDICAL & SURGICAL HISTORY:  Myasthenia gravis  Dementia  Myasthenia gravis  No significant past surgical history  No significant past surgical history      MEDICATIONS  (STANDING):    MEDICATIONS  (PRN):      Allergies    sulfamethoxazole (Vomiting; Nausea)    Intolerances        VITALS:    Vital Signs Last 24 Hrs  T(C): 36.4 (08 Jan 2020 13:41), Max: 36.4 (08 Jan 2020 13:41)  T(F): 97.5 (08 Jan 2020 13:41), Max: 97.5 (08 Jan 2020 13:41)  HR: 61 (08 Jan 2020 13:41) (61 - 61)  BP: 149/68 (08 Jan 2020 13:41) (149/68 - 149/68)  BP(mean): --  RR: 18 (08 Jan 2020 13:41) (18 - 18)  SpO2: 95% (08 Jan 2020 13:41) (95% - 95%)    LABS:                CAPILLARY BLOOD GLUCOSE              LOWER EXTREMITY PHYSICAL EXAM:    Vasular: DP/PT 1/4, B/L, CFT <2_ seconds B/L, Temperature gradient wnl, B/L.   Neuro: Epicritic sensation intact to the level of toes, B/L.  Skin: Positive dystrophic, hypertrophic, brittle toenails with subungual debris x10. No ulcerations or clinical signs of infection

## 2020-01-08 NOTE — DISCHARGE NOTE NURSING/CASE MANAGEMENT/SOCIAL WORK - PATIENT PORTAL LINK FT
You can access the FollowMyHealth Patient Portal offered by [facility] by registering at the following website: [email or web address]. By joining [facility]’s FollowMyHealth portal, you will also be able to view your health information using other applications (apps) compatible with our system.

## 2020-01-08 NOTE — DISCHARGE NOTE NURSING/CASE MANAGEMENT/SOCIAL WORK - NSDCFUADDAPPT_GEN_ALL_CORE_FT
Please follow up with your Neurologist 1-3 weeks after being discharged from the hospital.    Please follow up with your primary care provider within 1-3 days of being discharged from rehab.

## 2020-01-08 NOTE — PROGRESS NOTE ADULT - PROBLEM SELECTOR PLAN 2
-Pt has not been on medication in 9 years, and her sxs were arm weakness and double vision. NIF in ED negative. This does not appear to be exacerbation or crisis  -Neuro consulted -> MG labs sent. trending NIFs  -spoke with outpatient neurologist Dr. Patton and he is in agreement with discharge to [facility]

## 2020-01-08 NOTE — PROGRESS NOTE ADULT - ASSESSMENT
84F with a history of myasthenia gravis (not on medication for past 9 years, in remission) and mild dementia and chronic low back pain 2/2 stenosis who presents with complaint of weakness and dizziness after standing which has been ongoing for some months likely 2/2 deconditioning and a component of orthostasis 2/2 poor PO intake. CT chest without thymoma, MRI showing severe lumbar stenosis with disc herniation, no cord compression.

## 2020-01-08 NOTE — PROGRESS NOTE ADULT - PROBLEM SELECTOR PLAN 1
-most likely 2/2 deconditioning and poor PO intake vs worsening lumbar stenosis vs less likely MG exacerbation.  -No focal deficits on exam  -s/p IVF hydration with LR ~2L, encouraging PO intake  -Nutrition consulted- on soft diet, tolerating well   -evaluated by PT-- recommended [facility]  -CT Chest negative for thymoma. MRI 1/6 showing severe stenosis at L4-L5 and disc herniation, no cord compression or SC abnormality.  -Neuro recs appreciated. MG labs showing positive acetylcholine binding antibodies, negative acetylcholine blocking antibody.

## 2020-01-08 NOTE — PROGRESS NOTE ADULT - ASSESSMENT
Assessment/Plan:    --Onychomycosis secondary to dermatophytes    --aseptic debridemont of mycotic nails with betadine applied x10   --recommend continued mycotic nail care as outpatient  --thank you for consultation

## 2020-01-08 NOTE — PROGRESS NOTE ADULT - SUBJECTIVE AND OBJECTIVE BOX
PROGRESS NOTE:   Authored by Nikki Kimura, MD, Pager [phone] [facility], [address] [facility]     Patient is a 84y old  Female who presents with a chief complaint of weakness (07 Jan 2020 06:33)      SUBJECTIVE / OVERNIGHT EVENTS:    ADDITIONAL REVIEW OF SYSTEMS:    MEDICATIONS  (STANDING):  donepezil 10 milliGRAM(s) Oral at bedtime  heparin  Injectable 5000 Unit(s) SubCutaneous every 12 hours    MEDICATIONS  (PRN):      CAPILLARY BLOOD GLUCOSE        I&O's Summary    06 Jan 2020 07:01  -  07 Jan 2020 07:00  --------------------------------------------------------  IN: 360 mL / OUT: 900 mL / NET: -540 mL    07 Jan 2020 07:01  -  08 Jan 2020 06:43  --------------------------------------------------------  IN: 30 mL / OUT: 700 mL / NET: -670 mL        PHYSICAL EXAM:  Vital Signs Last 24 Hrs  T(C): 36.6 (08 Jan 2020 04:36), Max: 36.6 (08 Jan 2020 04:36)  T(F): 97.9 (08 Jan 2020 04:36), Max: 97.9 (08 Jan 2020 04:36)  HR: 64 (08 Jan 2020 04:36) (64 - 67)  BP: 158/76 (08 Jan 2020 04:36) (133/69 - 158/76)  BP(mean): --  RR: 18 (08 Jan 2020 04:36) (18 - 18)  SpO2: 96% (08 Jan 2020 04:36) (96% - 97%)    CONSTITUTIONAL: NAD, elderly kyphotic female  RESPIRATORY: Normal respiratory effort; lungs are clear to auscultation bilaterally  CARDIOVASCULAR: Regular rate and rhythm, normal S1 and S2, no murmur/rub/gallop; chronic LE edema; Peripheral pulses are 2+ bilaterally  ABDOMEN: Nontender to palpation, normoactive bowel sounds, no rebound/guarding; No hepatosplenomegaly  MUSCULOSKELETAL: no clubbing or cyanosis of digits; no joint swelling or tenderness to palpation  PSYCH: A+O to person, place, and time; affect appropriate    LABS:    01-06    141  |  x   |  x   ----------------------------<  x   x    |  x   |  0.90                    RADIOLOGY & ADDITIONAL TESTS:  Results Reviewed:   Imaging Personally Reviewed:  Electrocardiogram Personally Reviewed:    COORDINATION OF CARE:  Care Discussed with Consultants/Other Providers [Y/N]:  Prior or Outpatient Records Reviewed [Y/N]: PROGRESS NOTE:   Authored by Nikki Kimura, MD, Pager [phone] [facility], [address] [facility]     Patient is a 84y old  Female who presents with a chief complaint of weakness (07 Jan 2020 06:33)      SUBJECTIVE / OVERNIGHT EVENTS: No acute events overnight. Pt seen and examined at bedside. She states she feels a little tired. Denies fever, chills, nausea, vomiting. Denies weakness, trouble swallowing or breathing.     ADDITIONAL REVIEW OF SYSTEMS:    MEDICATIONS  (STANDING):  donepezil 10 milliGRAM(s) Oral at bedtime  heparin  Injectable 5000 Unit(s) SubCutaneous every 12 hours    MEDICATIONS  (PRN):      CAPILLARY BLOOD GLUCOSE        I&O's Summary    06 Jan 2020 07:01  -  07 Jan 2020 07:00  --------------------------------------------------------  IN: 360 mL / OUT: 900 mL / NET: -540 mL    07 Jan 2020 07:01  -  08 Jan 2020 06:43  --------------------------------------------------------  IN: 30 mL / OUT: 700 mL / NET: -670 mL        PHYSICAL EXAM:  Vital Signs Last 24 Hrs  T(C): 36.6 (08 Jan 2020 04:36), Max: 36.6 (08 Jan 2020 04:36)  T(F): 97.9 (08 Jan 2020 04:36), Max: 97.9 (08 Jan 2020 04:36)  HR: 64 (08 Jan 2020 04:36) (64 - 67)  BP: 158/76 (08 Jan 2020 04:36) (133/69 - 158/76)  BP(mean): --  RR: 18 (08 Jan 2020 04:36) (18 - 18)  SpO2: 96% (08 Jan 2020 04:36) (96% - 97%)    CONSTITUTIONAL: NAD, elderly kyphotic female  RESPIRATORY: Normal respiratory effort; lungs are clear to auscultation bilaterally  CARDIOVASCULAR: Regular rate and rhythm, normal S1 and S2, no murmur/rub/gallop; chronic LE edema; Peripheral pulses are 2+ bilaterally  ABDOMEN: Nontender to palpation, normoactive bowel sounds, no rebound/guarding; No hepatosplenomegaly  MUSCULOSKELETAL: no clubbing or cyanosis of digits; no joint swelling or tenderness to palpation  PSYCH: A+O to person, place, and time; affect appropriate    LABS:    01-06    141  |  x   |  x   ----------------------------<  x   x    |  x   |  0.90                    RADIOLOGY & ADDITIONAL TESTS:  Results Reviewed:   Imaging Personally Reviewed:  Electrocardiogram Personally Reviewed:    COORDINATION OF CARE:  Care Discussed with Consultants/Other Providers [Y/N]:  Prior or Outpatient Records Reviewed [Y/N]:

## 2020-01-09 LAB — ACHR MOD AB SER-ACNC: 39 — HIGH

## 2020-01-13 LAB
ACRM MODULATING ANTIBODY: 1.48 NMOL/L — HIGH
MUSK IGG SER IA-MCNC: 0 NMOL/L — SIGNIFICANT CHANGE UP (ref 0–0.02)

## 2020-02-24 NOTE — PATIENT PROFILE ADULT. - PAIN SCALE PREFERRED, PROFILE
Pt sent to CDU for stroke eval given headache ?ataxic gait, symptoms currently resolved, CTA head/neck and MRI w/o stroke or other acute findings. Pt cleared by neuro and will f/u with her neurologist as outpt.
numerical 0-10

## 2020-10-13 NOTE — PROGRESS NOTE ADULT - I HAVE PERSONALLY SEEN, EXAMINED, AND PARTICIPATED IN THE CARE OF THIS PATIENT.  I HAVE REVIEWED ALL PERTINENT CLINICAL INFORMATION, INCLUDING HISTORY, PHYSICAL EXAM, PLAN AND THE MEDICAL/PA/NP STUDENT’S NOTE AND AGREE EXCEPT AS NOTED.
Patient discharged by Josefa Beach MD. Patient provided with discharge instructions Rx and instructions on follow up care. Patient out of ED via wheelchair accompanied by daughter.
Statement Selected

## 2022-07-18 NOTE — ED PROVIDER NOTE - NO PERTINENT FAMILY HISTORY
[facility] at [facility] [address]  [facility], [address]  Ph [phone]  Fx [phone]    Physical Therapy Progress Note  Patient name: Patrick Gillespie Start of Care: 2022   Referral source: Kian Whalen NP : [id number]               Medical Diagnosis: Other abnormalities of gait and mobility [R26.89]    Onset Date:22               Treatment Diagnosis: gait instability                                             ICD-10: Other abnormalities of gait and mobility [R26.89]   Prior Hospitalization: see medical history Provider#: [id number]   Medications: Verified on Patient summary List    Comorbidities: s/p bilateral knee replacements (), s/p right ankle fusion 2021, OA multiple joints needing bilateral hip replacements and left ankle fusion , chronic kidney disease though no dialysis, chronic iron-deficiency anemia (her lifetime per patient), heart cardioversion secondary to A-fib (2022), , RA, cataracts, hypertension, gout, \"sensitive stomach\", eczema, morbid obesity  Substance Use: []?? tobacco use, []?? alcohol use, []?? other:   Prior Level of Function:  Prior to past 6 months: no AD, could ambulate shorter distances, step-to pattern on steps for years, decreased balance, pain of multiple joints limiting function  []? ? Unrestricted with functional activities and ADLs  []? ? No assistive device  []? ? active lifestyle, []?? moderately active lifestyle, [x]? ? sedentary lifestyle  Patients Goals:  \"to get my balance back without having to use a cane or walker\"      Visits from Start of Care: 7    Missed Visits: 0    ASSESSMENT/Changes in Function:  Patient responded well to today's treatment without any adverse reactions. Patient has participated in 7 visits of skilled PT over the past 30 days and is making slow, but gradual progress towards goals. She has met her FOTO goal, suggesting improving function and patient satisfaction.   She has also made some mild improvements in the strength of bilateral LE's, though does remain significantly weak overall which continues to contribute towards increaseing her risk of falls and decreasing functional capabilities. Patient would continue to benefit from skilled PT services to further improve bilateral LE strength, core stabilization, gait, balance, and improve overall function, while reducing her risk of falls. Patient will continue to benefit from skilled PT services to further modify and progress therapeutic interventions, address functional mobility deficits, address ROM deficits, address strength deficits, analyze and address soft tissue restrictions, analyze and cue movement patterns, analyze and modify body mechanics/ergonomics, assess and modify postural abnormalities, and instruct in home and community integration to attain remaining goals. Progress Towards Goals/Updated Goals:    Short Term Goals:    to be accomplished within 4 weeks:     Patient will report compliance with prescribed HEP(s) at least 1x/day in order to assist in maximizing therapeutic gains, reduce symptoms, and to progress towards overall prior function. Eval: HEP to be issued and reviewed with patient within 1-2 visits  Current: Patient reports daily compliance with her HEP.   7/15/22, met     Patient will be able to perform TUG test in less than or equal to 12 seconds with WBQC in order to demonstrate improved stability during ambulation, thus reducing the patient's risk of falls. Eval: TUG Test: 17 secs with rollator, 17 secs with [facility] AND [place]  Current: TUG Test: 17 secs without AD   7/18/22, in progress     Patient will be able to perform at least 10 reps of sit <> stands with minimal use of UE's to push from chair during 30\" STS test to demonstrate improved LE strength and stability during this functional activity, thus reducing the patients risk of falls.   Eval: 30\" STS: 7 reps with use of moderate use of UE's to push from table  Current: 30\" STS at chair height of 18\" 4 reps with moderate use of UE's to push from chair  7/6/22    Current: 30\" STS at chair height of 20\":   9 reps without use of UE's   7/18/22, in progress     Long Term Goals:     to be accomplished within 8 weeks:     Patient will score at least 54 points on FOTO in order to maximize function and promote patient satisfaction with overall outcome. ([place] is an established functional score where 100 = no disability)  Eval: 46  Current: 64   7/13/22, met     Patient will report at least a 25% reduction in pain/symptoms when performing ADLs/functional activities in order to improve overall tolerance to functional movements. Eval: pain ranges from 6-8/10 - bilateral hips, bilateral knees, and bilateral ankles  Current: pain continues to range from 5-8/10 in bilateral hips/knees/ankles    7/18/22, in progress     Patient will demonstrate at least 4/5 strength of bilateral LE's in order to be able to safely perform functional activities and have a decreased risk of falls. Eval: Bilateral LE's grossly 4-/5 except for right knee extension/flexion 4/5, left hip abd 2-/5, right hip abduction 3-/5  Current: Bilateral LE's grossly 4/5 except for right knee extension 4+/5, right knee flexion 4/5, bilateral hip abduction 3-/5    7/18/22, in progress     Patient will be able to perform TUG test in less than or equal to 12 seconds without AD in order to demonstrate improved stability during ambulation, thus reducing the patient's risk of falls. Eval: TUG Test: 17 secs with rollator, 17 secs with [facility] AND [place]  Current: TUG Test: 17 secs without AD   7/18/22, in progress     Patient will be able to perform at least 10 reps of sit <> stands without use of UE's during 30\" STS test to demonstrate improved LE strength and stability during this functional activity, thus reducing the patients risk of falls.   Eval: 30\" STS: 7 reps with use of moderate use of UE's to push from table  Current: 30\" STS at chair height of 18\" 4 reps with moderate use of UE's to push from chair  7/6/22   Current: 30\" STS at chair height of 20\":   9 reps without use of UE's   7/18/22, in progress      ASSESSMENT/RECOMMENDATIONS:  [x]Continue therapy per initial plan/protocol at a frequency of  3 x per week for 4 weeks  []Continue therapy with the following recommended changes:_____________________ _____________________________ ________________________________________  []Discontinue therapy progressing towards or have reached established goals  []Discontinue therapy due to lack of appreciable progress towards goals  []Discontinue therapy due to lack of attendance or compliance  []Await Physician's recommendations/decisions regarding therapy  []Other:________________________________________________________________    Thank you for this referral.   Kelsie Chavis, PT 7/18/2022 2:40 PM <<----- Click to add NO pertinent Family History

## 2023-09-07 NOTE — ED PROVIDER NOTE - CPE EDP GASTRO NORM
[Dear  ___] : Dear  [unfilled], [Courtesy Letter:] : I had the pleasure of seeing your patient, [unfilled], in my office today. [Please see my note below.] : Please see my note below. [Consult Closing:] : Thank you very much for allowing me to participate in the care of this patient.  If you have any questions, please do not hesitate to contact me. [Sincerely,] : Sincerely, [FreeTextEntry2] : Dr Aman Perea  [FreeTextEntry3] : \par  Laurent Busby MD, FACS\par  \par  Otolaryngology-Head and Neck Surgery\par  [employer] [facility]\par   normal...

## 2024-02-16 RX ORDER — CIPROFLOXACIN LACTATE 400MG/40ML
1 VIAL (ML) INTRAVENOUS
Refills: 0 | DISCHARGE
Start: 2024-02-16

## 2024-02-19 ENCOUNTER — INPATIENT (INPATIENT)
Facility: HOSPITAL | Age: 89
LOS: 17 days | Discharge: NURSING, REHAB OR LONG-TERM CARE | DRG: 871 | End: 2024-03-08
Attending: HOSPITALIST | Admitting: HOSPITALIST
Payer: MEDICARE

## 2024-02-19 VITALS
DIASTOLIC BLOOD PRESSURE: 69 MMHG | RESPIRATION RATE: 18 BRPM | HEIGHT: 65 IN | WEIGHT: 119.93 LBS | SYSTOLIC BLOOD PRESSURE: 145 MMHG | HEART RATE: 47 BPM | OXYGEN SATURATION: 95 %

## 2024-02-19 DIAGNOSIS — R41.82 ALTERED MENTAL STATUS, UNSPECIFIED: ICD-10-CM

## 2024-02-19 PROBLEM — G70.00 MYASTHENIA GRAVIS WITHOUT (ACUTE) EXACERBATION: Chronic | Status: ACTIVE | Noted: 2020-01-02

## 2024-02-19 PROBLEM — F03.90 UNSPECIFIED DEMENTIA WITHOUT BEHAVIORAL DISTURBANCE: Chronic | Status: ACTIVE | Noted: 2020-01-02

## 2024-02-19 LAB
ALBUMIN SERPL ELPH-MCNC: 3.9 G/DL — SIGNIFICANT CHANGE UP (ref 3.3–5)
ALP SERPL-CCNC: 220 U/L — HIGH (ref 40–120)
ALT FLD-CCNC: 271 U/L — HIGH (ref 10–45)
ANION GAP SERPL CALC-SCNC: 8 MMOL/L — SIGNIFICANT CHANGE UP (ref 5–17)
APPEARANCE UR: CLEAR — SIGNIFICANT CHANGE UP
APTT BLD: 53.9 SEC — HIGH (ref 24.5–35.6)
AST SERPL-CCNC: 141 U/L — HIGH (ref 10–40)
BASE EXCESS BLDV CALC-SCNC: 0.7 MMOL/L — SIGNIFICANT CHANGE UP (ref -2–3)
BASOPHILS # BLD AUTO: 0.01 K/UL — SIGNIFICANT CHANGE UP (ref 0–0.2)
BASOPHILS NFR BLD AUTO: 0.3 % — SIGNIFICANT CHANGE UP (ref 0–2)
BILIRUB SERPL-MCNC: 0.6 MG/DL — SIGNIFICANT CHANGE UP (ref 0.2–1.2)
BILIRUB UR-MCNC: NEGATIVE — SIGNIFICANT CHANGE UP
BUN SERPL-MCNC: 50 MG/DL — HIGH (ref 7–23)
CA-I SERPL-SCNC: 1.35 MMOL/L — HIGH (ref 1.15–1.33)
CALCIUM SERPL-MCNC: 10.3 MG/DL — SIGNIFICANT CHANGE UP (ref 8.4–10.5)
CHLORIDE BLDV-SCNC: 111 MMOL/L — HIGH (ref 96–108)
CHLORIDE SERPL-SCNC: 111 MMOL/L — HIGH (ref 96–108)
CO2 BLDV-SCNC: 31 MMOL/L — HIGH (ref 22–26)
CO2 SERPL-SCNC: 26 MMOL/L — SIGNIFICANT CHANGE UP (ref 22–31)
COLOR SPEC: YELLOW — SIGNIFICANT CHANGE UP
CREAT SERPL-MCNC: 0.96 MG/DL — SIGNIFICANT CHANGE UP (ref 0.5–1.3)
DIFF PNL FLD: NEGATIVE — SIGNIFICANT CHANGE UP
EGFR: 57 ML/MIN/1.73M2 — LOW
EOSINOPHIL # BLD AUTO: 0.05 K/UL — SIGNIFICANT CHANGE UP (ref 0–0.5)
EOSINOPHIL NFR BLD AUTO: 1.3 % — SIGNIFICANT CHANGE UP (ref 0–6)
FLUAV AG NPH QL: SIGNIFICANT CHANGE UP
FLUBV AG NPH QL: SIGNIFICANT CHANGE UP
GAS PNL BLDV: 143 MMOL/L — SIGNIFICANT CHANGE UP (ref 136–145)
GAS PNL BLDV: SIGNIFICANT CHANGE UP
GAS PNL BLDV: SIGNIFICANT CHANGE UP
GLUCOSE BLDV-MCNC: 110 MG/DL — HIGH (ref 70–99)
GLUCOSE SERPL-MCNC: 117 MG/DL — HIGH (ref 70–99)
GLUCOSE UR QL: NEGATIVE MG/DL — SIGNIFICANT CHANGE UP
HCO3 BLDV-SCNC: 29 MMOL/L — SIGNIFICANT CHANGE UP (ref 22–29)
HCT VFR BLD CALC: 44.8 % — SIGNIFICANT CHANGE UP (ref 34.5–45)
HCT VFR BLDA CALC: 43 % — SIGNIFICANT CHANGE UP (ref 34.5–46.5)
HGB BLD CALC-MCNC: 14.4 G/DL — SIGNIFICANT CHANGE UP (ref 11.7–16.1)
HGB BLD-MCNC: 14.3 G/DL — SIGNIFICANT CHANGE UP (ref 11.5–15.5)
IMM GRANULOCYTES NFR BLD AUTO: 0.8 % — SIGNIFICANT CHANGE UP (ref 0–0.9)
INR BLD: 1.06 RATIO — SIGNIFICANT CHANGE UP (ref 0.85–1.18)
KETONES UR-MCNC: ABNORMAL MG/DL
LACTATE BLDV-MCNC: 1.5 MMOL/L — SIGNIFICANT CHANGE UP (ref 0.5–2)
LEUKOCYTE ESTERASE UR-ACNC: NEGATIVE — SIGNIFICANT CHANGE UP
LYMPHOCYTES # BLD AUTO: 0.63 K/UL — LOW (ref 1–3.3)
LYMPHOCYTES # BLD AUTO: 16.2 % — SIGNIFICANT CHANGE UP (ref 13–44)
MCHC RBC-ENTMCNC: 30.6 PG — SIGNIFICANT CHANGE UP (ref 27–34)
MCHC RBC-ENTMCNC: 31.9 GM/DL — LOW (ref 32–36)
MCV RBC AUTO: 95.7 FL — SIGNIFICANT CHANGE UP (ref 80–100)
MONOCYTES # BLD AUTO: 0.23 K/UL — SIGNIFICANT CHANGE UP (ref 0–0.9)
MONOCYTES NFR BLD AUTO: 5.9 % — SIGNIFICANT CHANGE UP (ref 2–14)
NEUTROPHILS # BLD AUTO: 2.93 K/UL — SIGNIFICANT CHANGE UP (ref 1.8–7.4)
NEUTROPHILS NFR BLD AUTO: 75.5 % — SIGNIFICANT CHANGE UP (ref 43–77)
NITRITE UR-MCNC: NEGATIVE — SIGNIFICANT CHANGE UP
NRBC # BLD: 0 /100 WBCS — SIGNIFICANT CHANGE UP (ref 0–0)
PCO2 BLDV: 62 MMHG — HIGH (ref 39–42)
PH BLDV: 7.28 — LOW (ref 7.32–7.43)
PH UR: 5 — SIGNIFICANT CHANGE UP (ref 5–8)
PLATELET # BLD AUTO: 56 K/UL — LOW (ref 150–400)
PO2 BLDV: 59 MMHG — HIGH (ref 25–45)
POTASSIUM BLDV-SCNC: 5.1 MMOL/L — SIGNIFICANT CHANGE UP (ref 3.5–5.1)
POTASSIUM SERPL-MCNC: 5 MMOL/L — SIGNIFICANT CHANGE UP (ref 3.5–5.3)
POTASSIUM SERPL-SCNC: 5 MMOL/L — SIGNIFICANT CHANGE UP (ref 3.5–5.3)
PROT SERPL-MCNC: 6.8 G/DL — SIGNIFICANT CHANGE UP (ref 6–8.3)
PROT UR-MCNC: SIGNIFICANT CHANGE UP MG/DL
PROTHROM AB SERPL-ACNC: 11.6 SEC — SIGNIFICANT CHANGE UP (ref 9.5–13)
RBC # BLD: 4.68 M/UL — SIGNIFICANT CHANGE UP (ref 3.8–5.2)
RBC # FLD: 14.4 % — SIGNIFICANT CHANGE UP (ref 10.3–14.5)
RSV RNA NPH QL NAA+NON-PROBE: SIGNIFICANT CHANGE UP
SAO2 % BLDV: 87.3 % — SIGNIFICANT CHANGE UP (ref 67–88)
SARS-COV-2 RNA SPEC QL NAA+PROBE: SIGNIFICANT CHANGE UP
SODIUM SERPL-SCNC: 145 MMOL/L — SIGNIFICANT CHANGE UP (ref 135–145)
SP GR SPEC: 1.03 — SIGNIFICANT CHANGE UP (ref 1–1.03)
TROPONIN T, HIGH SENSITIVITY RESULT: 29 NG/L — SIGNIFICANT CHANGE UP (ref 0–51)
TROPONIN T, HIGH SENSITIVITY RESULT: 29 NG/L — SIGNIFICANT CHANGE UP (ref 0–51)
UROBILINOGEN FLD QL: 1 MG/DL — SIGNIFICANT CHANGE UP (ref 0.2–1)
WBC # BLD: 3.88 K/UL — SIGNIFICANT CHANGE UP (ref 3.8–10.5)
WBC # FLD AUTO: 3.88 K/UL — SIGNIFICANT CHANGE UP (ref 3.8–10.5)

## 2024-02-19 PROCEDURE — 70450 CT HEAD/BRAIN W/O DYE: CPT | Mod: 26,MA,XU

## 2024-02-19 PROCEDURE — 71045 X-RAY EXAM CHEST 1 VIEW: CPT | Mod: 26

## 2024-02-19 PROCEDURE — 70496 CT ANGIOGRAPHY HEAD: CPT | Mod: 26,MA

## 2024-02-19 PROCEDURE — 99285 EMERGENCY DEPT VISIT HI MDM: CPT

## 2024-02-19 PROCEDURE — 70498 CT ANGIOGRAPHY NECK: CPT | Mod: 26,MA

## 2024-02-19 RX ORDER — ENOXAPARIN SODIUM 100 MG/ML
40 INJECTION SUBCUTANEOUS EVERY 24 HOURS
Refills: 0 | Status: DISCONTINUED | OUTPATIENT
Start: 2024-02-19 | End: 2024-02-21

## 2024-02-19 RX ORDER — CEFTRIAXONE 500 MG/1
1000 INJECTION, POWDER, FOR SOLUTION INTRAMUSCULAR; INTRAVENOUS EVERY 24 HOURS
Refills: 0 | Status: DISCONTINUED | OUTPATIENT
Start: 2024-02-19 | End: 2024-02-20

## 2024-02-19 RX ORDER — ASPIRIN/CAFFEINE 400MG-32MG
2 TABLET ORAL
Qty: 0 | Refills: 0 | DISCHARGE

## 2024-02-19 RX ORDER — CEFTRIAXONE 500 MG/1
1000 INJECTION, POWDER, FOR SOLUTION INTRAMUSCULAR; INTRAVENOUS ONCE
Refills: 0 | Status: COMPLETED | OUTPATIENT
Start: 2024-02-19 | End: 2024-02-19

## 2024-02-19 RX ORDER — IBUPROFEN 200 MG
1 TABLET ORAL
Qty: 0 | Refills: 0 | DISCHARGE

## 2024-02-19 RX ORDER — SODIUM CHLORIDE 9 MG/ML
1000 INJECTION INTRAMUSCULAR; INTRAVENOUS; SUBCUTANEOUS ONCE
Refills: 0 | Status: COMPLETED | OUTPATIENT
Start: 2024-02-19 | End: 2024-02-19

## 2024-02-19 RX ORDER — DONEPEZIL HYDROCHLORIDE 10 MG/1
10 TABLET, FILM COATED ORAL AT BEDTIME
Refills: 0 | Status: DISCONTINUED | OUTPATIENT
Start: 2024-02-19 | End: 2024-02-20

## 2024-02-19 RX ADMIN — CEFTRIAXONE 100 MILLIGRAM(S): 500 INJECTION, POWDER, FOR SOLUTION INTRAMUSCULAR; INTRAVENOUS at 14:15

## 2024-02-19 RX ADMIN — SODIUM CHLORIDE 1000 MILLILITER(S): 9 INJECTION INTRAMUSCULAR; INTRAVENOUS; SUBCUTANEOUS at 15:38

## 2024-02-19 NOTE — ED ADULT NURSE NOTE - GASTROINTESTINAL ASSESSMENT
Contusión en adultos   LO QUE NECESITA SABER:   Linda Ora contusión es un moretón que aparece en la piel después de rosemary Lynne Kipper  Un moretón se forma cuando se rompen los vasos sanguíneos pequeños, silvestre no se rompe la piel  Cuando los vasos sanguíneos se desgarran, la haider se filtra a los tejidos cercanos, jacquie los tejidos blandos o los músculos  INSTRUCCIONES SOBRE [facility]ITAL[facility]:   Regrese a la velvet de emergencias si:   · Le surge dificultad para  el área lesionada  · Usted tiene hormigueo o entumecimiento en el área lesionada o cerca de [place]  · El área de dixon mano o pie que se encuentra debajo del moretón se pone fría o pálida  Pregúntele a dixon Alexi Obey vitaminas y minerales son adecuados para usted  · Usted encuentra un bulto nuevo en el área lesionada  · Trina síntomas no mejoran después de 4 ó 5 días de [place]  · Usted tiene preguntas o inquietudes acerca de dixon condición o cuidado  Medicamentos:  Es posible que usted necesite alguno de los siguientes:  · AINEs (Analgésicos antiinflamatorios no esteroides)  pueden disminuir la inflamación y el dolor o la fiebre  Arlyn medicamento esta disponible con o sin rosemary receta médica  Los AINEs pueden causar sangrado estomacal o problemas renales en ciertas personas  Si usted tony un medicamento anticoagulante, siempre pregúntele a dixon médico si los ULYSSES son seguros para usted  Siempre jamie la etiqueta de arlyn medicamento y [place] instrucciones  · Un medicamento con receta para el dolor  podrían ser Mark Self  No espere a que el dolor sea muy intenso para bruno el medicamento  · [place] trina medicamentos jacquie se le haya indicado  Consulte con dixon médico si usted zofia que dixon medicamento no le está ayudando o si presenta efectos secundarios  Infórmele si es alérgico a algún medicamento  Mantenga rosemary lista actualizada de los [place], las vitaminas y los productos herbales que tony   Incluya los siguientes datos de los medicamentos: cantidad, frecuencia y motivo de administración  Traiga con usted la lista o los envases de la píldoras a francisco citas de seguimiento  Lleve la lista de los medicamentos con usted en blaise de rosemary emergencia  Acuda a francisco consultas de control con dixon médico según le indicaron  Es posible que deba regresar dentro de rosemary semana para que le vuelvan a revisar la lesión  Anote francisco preguntas para que se acuerde de hacerlas shanelle francisco visitas  Ayude a que dixon contusión sane:   · [address] [place] [address] menos que de [facility]  Si a usted le salieron moretones en dixon pierna o pie, es posible que deba usar muletas o un bastón para caminar  [place] le ayudará a no apoyarse en la parte lesionada del cuerpo  · Aplique hielo  para bajar la inflamación y calmar el dolor  El hielo también puede contribuir a evitar el daño de los tejidos  Use un paquete de hielo o ponga hielo molido dentro de [employer]  Cubra el hielo con rosemary toalla y colóquelo sobre el moretón shanelle 15 a 20 minutos cada hora o según le indiquen  · Use compresión  para apoyar [employer] Francisco Javier y disminuir la hinchazón  Coloque un vendaje elástico alrededor de la chandana sobre el músculo lesionado  Asegúrese de que el vendaje no quede demasiado apretado  Se debería poder meter 1 dedo entre el vendaje y la piel  · Eleve la parte lesionada de dixon cuerpo  por encima del nivel de dixon corazón para ayudar a aliviar el dolor y la inflamación  Use almohadas, cobijas o toallas enrolladas para elevar el área tan a menudo jacquie sea posible  · No consuma alcohol  según las indicaciones  El alcohol puede retardar la curación  · No estire los músculos lesionados  inmediatamente después de la lesión  Pregunte a dixon médico cuándo y cómo se puede estirar con precaución después de dixon lesión  Los estiramientos suaves pueden ayudar a aumentar la flexibilidad  · No masajee el área ni utilice almohadillas térmicas  en la contusión inmediatamente después de la lesión   El calor y los masajes podrían retrasar la sanación  Encinas médico podría indicarle que aplique calor después de varios días  En lana momento, el calor comenzará a servir para sanar la lesión  Evite otra contusión:   · Estírese y [place] en calor antes de practicar deportes o hacer ejercicio  · Use equipo de protección cuando practique deportes  Karlyjose g Jansen son Kasey Cheatham y las prendas [address] [facility]  · Si comienza a hacer rosemary nueva actividad física, empiece poco a poco para darle tiempo al cuerpo de acostumbrarse  © 2017 [address] Information is for End User's use only and may not be sold, redistributed or otherwise used for commercial purposes  All illustrations and images included in CareNotes® are the copyrighted property of A D A M , Inc  or Juan Segura  Esta información es sólo para uso en educación  Encnias intención no es darle un consejo médico sobre enfermedades o tratamientos  Colsulte con encinas Flakita Poncho farmacéutico antes de seguir cualquier régimen médico para saber si es seguro y efectivo para usted  - - -

## 2024-02-19 NOTE — H&P ADULT - NSHPPHYSICALEXAM_GEN_ALL_CORE
PHYSICAL EXAM    Constitutional: NAD, well-groomed, well-developed  HEENT: PERRLA, EOMI, Normal Hearing, MMM  Neck: No LAD, No JVD  Back: Normal spine flexure, No CVA tenderness  Respiratory: CTAB/L   Cardiovascular: S1 and S2, RRR, no M/G/R  Gastrointestinal: BS+, soft, NT/ND  Extremities: No peripheral edema  Vascular: 2+ peripheral pulses  Neurological: A/O x 3, no focal deficits  Skin: No rashes PHYSICAL EXAM    Constitutional: NAD, well-groomed, well-developed  HEENT: PERRLA, EOMI, Normal Hearing, MMM  Neck: No LAD, No JVD  Back: Normal spine flexure, No CVA tenderness  Respiratory: CTAB/L   Cardiovascular: S1 and S2, RRR, no M/G/R  Gastrointestinal: BS+, soft, NT/ND  Extremities: No peripheral edema  Vascular: 2+ peripheral pulses  Neurological: A/O x 0 confused and hallucinating  Skin: No rashes

## 2024-02-19 NOTE — ED ADULT NURSE REASSESSMENT NOTE - NS ED NURSE REASSESS COMMENT FT1
straight cath performed with 2 RN's present while performing sterile technique as per MD order, urine drained 200cc clear yellow urine.

## 2024-02-19 NOTE — H&P ADULT - TIME BILLING
Discussed with ER attending regarding AMS , Discussed with family regarding advanced care planning  for at least 30 minutes. Reviewed MOLST form and decided to keep it as  full code.

## 2024-02-19 NOTE — H&P ADULT - HISTORY OF PRESENT ILLNESS
88 YO F with PMHx of Myasthenia Gravis (not on medication for past 9 years) and MHx of dementia and chronic low back pain who presents with complaint of worsening LE weakness over the past 6 months. CT Head was negative, NIF was normal, neuro exam was normal with mild point tenderness in the lumbar spine. Neurology was consulted, myasthenia gravis labs were sent. Ortho was consulted who stated that family did not wish for any surgical intervention at this time. Per discussion with family and Neurology, patient had CT Chest to evaluate for thymoma which showed no thymoma. Additionally patient had MRI of T and L spine that showed severe spinal stenosis without cord compression, also multilevel arthritis. PT evaluated patient and recommended subacute rehab. 88 YO F with PMHx of Myasthenia Gravis (not on medication for past 9 years) and Hx of dementia .     Patient was confused for a few days . Urine culture was checked  and showed positive with Klebsiella and E.coli both sensitive to Cipro. Treated with Cipro for 3 days but confusion got worse. She has slurred  speech today so transferred to hospital for neurological evaluation and CT head.

## 2024-02-19 NOTE — CONSULT NOTE ADULT - SUBJECTIVE AND OBJECTIVE BOX
Neurology - Consult Note    -  Spectra: [phone] ([facility]), [phone] ([facility])  -    HPI: Patient DEISI UGALDE is a 89y (1935) with PMH of dementia on Aricept 10 mg daily presenting for altered mental status in the morning of the day of admission. Patient was noted to have a UTI and was started on ciprofloxacin for the past 3 days. She did not significantly improve and today in the morning, she was noted to have altered mental status. It was difficult to clarify timeline of history and clear description if symptoms were episodic or not but patient was sent in from the nursing home. Her baseline is AAOx 1 due to dementia.    Review of Systems:    NEUROLOGICAL: +As stated in HPI above  SKIN: No itching, burning, rashes, or lesions   All other review of systems is negative unless indicated above.    Allergies:  sulfamethoxazole (Vomiting; Nausea)    PMHx/PSHx/Family Hx: As above, otherwise see below   Myasthenia gravis    Dementia    Myasthenia gravis    Social Hx:  No current use of tobacco, alcohol, or illicit drugs  Medications:  MEDICATIONS  (STANDING):    MEDICATIONS  (PRN):      Vitals:  T(C): 37 (02-19-24 @ 12:56), Max: 37 (02-19-24 @ 12:56)  HR: 50 (02-19-24 @ 12:56) (47 - 50)  BP: 137/89 (02-19-24 @ 12:56) (137/89 - 145/69)  RR: 20 (02-19-24 @ 12:56) (18 - 20)  SpO2: 100% (02-19-24 @ 12:56) (95% - 100%)    Physical Examination:   General - NAD    Neurologic Exam:  patient not well cooperative on examination   Mental status - Awake, Alert, but disoriented to person, place, and time. Answers questions with yes or no. Does not follow all commands of examiner.    Cranial nerves - VFF to threat, EOMI, face sensation (V1-V3) intact b/l, facial strength intact without asymmetry b/l  Motor - Normal bulk and tone throughout. No pronator drift.  Strength testing  able to move all 4 limbs against gravity and can deliver 5/5 in on elbow flexion in bilateral upper extremities.    Sensation - Light touch intact throughout    DTR's -             Biceps      Triceps     Brachioradialis          Patellar    Ankle    Toes/plantar response  R             1+             1+                  1+                       0+           0+                 Down  L              1+             1+                 1+                        0+           0+                 Down    Coordination - PRIYANKA    Gait and station - PRIYANKA    Labs:                        14.3   3.88  )-----------( 56       ( 19 Feb 2024 12:55 )             44.8     02-19    145  |  111<H>  |  50<H>  ----------------------------<  117<H>  5.0   |  26  |  0.96    Ca    10.3      19 Feb 2024 12:55    TPro  6.8  /  Alb  3.9  /  TBili  0.6  /  DBili  x   /  AST  141<H>  /  ALT  271<H>  /  AlkPhos  220<H>  02-19    CAPILLARY BLOOD GLUCOSE      POCT Blood Glucose.: 103 mg/dL (19 Feb 2024 13:00)    LIVER FUNCTIONS - ( 19 Feb 2024 12:55 )  Alb: 3.9 g/dL / Pro: 6.8 g/dL / ALK PHOS: 220 U/L / ALT: 271 U/L / AST: 141 U/L / GGT: x             PT/INR - ( 19 Feb 2024 12:55 )   PT: 11.6 sec;   INR: 1.06 ratio         PTT - ( 19 Feb 2024 12:55 )  PTT:53.9 sec  CSF:                  Radiology:    imaging still pending but largely normal CT brain and no LVO.     Neurology - Consult Note    -  Spectra: [phone] ([facility]), [phone] ([facility])  -    HPI: Patient DEISI UGALDE is a 89y (1935) with PMH of dementia, myasthenia gravis? untreated on Aricept 10 mg daily for dementia presenting for altered mental status in the morning of the day of admission. Patient was noted to have a UTI and was started on ciprofloxacin for the past 3 days. She did not significantly improve and today in the morning, she was noted to have altered mental status. It was difficult to clarify timeline of history and clear description if symptoms were episodic or not but patient was sent in from the nursing home. Her baseline is AAOx 1 due to dementia.    Review of Systems:    NEUROLOGICAL: +As stated in HPI above  SKIN: No itching, burning, rashes, or lesions   All other review of systems is negative unless indicated above.    Allergies:  sulfamethoxazole (Vomiting; Nausea)    PMHx/PSHx/Family Hx: As above, otherwise see below   Myasthenia gravis    Dementia    Myasthenia gravis    Social Hx:  No current use of tobacco, alcohol, or illicit drugs  Medications:  MEDICATIONS  (STANDING):    MEDICATIONS  (PRN):      Vitals:  T(C): 37 (02-19-24 @ 12:56), Max: 37 (02-19-24 @ 12:56)  HR: 50 (02-19-24 @ 12:56) (47 - 50)  BP: 137/89 (02-19-24 @ 12:56) (137/89 - 145/69)  RR: 20 (02-19-24 @ 12:56) (18 - 20)  SpO2: 100% (02-19-24 @ 12:56) (95% - 100%)    Physical Examination:   General - NAD    Neurologic Exam:  patient not well cooperative on examination   Mental status - Awake, Alert, but disoriented to person, place, and time. Answers questions with yes or no. Does not follow all commands of examiner.    Cranial nerves - VFF to threat, EOMI, face sensation (V1-V3) intact b/l, facial strength intact without asymmetry b/l  Motor - Normal bulk and tone throughout. No pronator drift.  Strength testing  able to move all 4 limbs against gravity and can deliver 5/5 in on elbow flexion in bilateral upper extremities.    Sensation - Light touch intact throughout    DTR's -             Biceps      Triceps     Brachioradialis          Patellar    Ankle    Toes/plantar response  R             1+             1+                  1+                       0+           0+                 Down  L              1+             1+                 1+                        0+           0+                 Down    Coordination - PRIYANKA    Gait and station - PRIYANKA    Labs:                        14.3   3.88  )-----------( 56       ( 19 Feb 2024 12:55 )             44.8     02-19    145  |  111<H>  |  50<H>  ----------------------------<  117<H>  5.0   |  26  |  0.96    Ca    10.3      19 Feb 2024 12:55    TPro  6.8  /  Alb  3.9  /  TBili  0.6  /  DBili  x   /  AST  141<H>  /  ALT  271<H>  /  AlkPhos  220<H>  02-19    CAPILLARY BLOOD GLUCOSE      POCT Blood Glucose.: 103 mg/dL (19 Feb 2024 13:00)    LIVER FUNCTIONS - ( 19 Feb 2024 12:55 )  Alb: 3.9 g/dL / Pro: 6.8 g/dL / ALK PHOS: 220 U/L / ALT: 271 U/L / AST: 141 U/L / GGT: x             PT/INR - ( 19 Feb 2024 12:55 )   PT: 11.6 sec;   INR: 1.06 ratio         PTT - ( 19 Feb 2024 12:55 )  PTT:53.9 sec  CSF:                  Radiology:    < from: CT Angio Neck w/ IV Cont (02.19.24 @ 16:39) >  CT HEAD:  Moderate ventriculomegaly, with findings suggestive of normal pressure   hydrocephalus.  No intracranial hemorrhage, mass effect or midline shift.    CTA HEAD:  Patent intracranial circulation without flow limiting stenosis.  Mild atherosclerotic calcification of bilateral petrous, cavernous, and   supraclinoid internal carotid arteries.    CTA NECK:  No evidence of significant stenosis or occlusion.    --- End of Report ---      < end of copied text >

## 2024-02-19 NOTE — ED PROVIDER NOTE - PHYSICAL EXAMINATION
General: Alert and Orientated x 3. No apparent distress.  Head: Normocephalic and atraumatic.  Eyes: PERRLA with EOMI.   ENT: MMM, Oropharynx clear  Neck: Supple. Trachea midline.   Cardiac: Normal S1 and S2 w/ RRR. No murmurs appreciated.   Pulmonary: CTA bilaterally. No increased WOB.   Abdominal: Soft, non-tender, non-distended  Neurologic: Patient with cranial nerves intact, equal strength bilaterally, sensation equal bilaterally, no facial droop, clear and coherent speech, intact finger to nose, negative pronator drift.  Musculoskeletal: No limited ROM.  Skin: Color appropriate for race. Intact, warm, and well-perfused.  Psychiatric: Appropriate mood and affect. No apparent risk to self or others. General: AOx1, lethargic   Head: Normocephalic and atraumatic.  Eyes: PERRLA with EOMI.   ENT: MMM  Cardiac: No murmurs appreciated.   Pulmonary: CTA bilaterally. No increased WOB.   Abdominal: Soft, non-tender, non-distended  Neurologic: AOx1, opens eyes to voice, moves extremities spontaneously   Musculoskeletal: No limited ROM.  Skin: Color appropriate for race. Intact, warm, and well-perfused.

## 2024-02-19 NOTE — CONSULT NOTE ADULT - ASSESSMENT
89y (1935) with PMH of dementia on Aricept 10 mg daily presenting for altered mental status in the morning of the day of admission. Patient was noted to have a UTI and was started on ciprofloxacin for the past 3 days. She did not significantly improve and today in the morning, she was noted to have altered mental status. It was difficult to clarify timeline of history and clear description if symptoms were episodic or not but patient was sent in from the nursing home. Her baseline is AAOx 1 due to dementia.    impression: AMS in the setting of dementia and UTI? likely in the setting of toxic metabolic encephalopathy    Plan:  [] manage primary metabolic etiology  [] check TSH, T3/T4, vitamin B1, B6, B12, folate, homocysteine, methylmalonic acid, lactate, creatnine kinase, ammonia, ESR, CRP, vitamin D and E  [] if symptoms were transient can obtain routine EEG  [] rest of care per primary team    case to be discussed with general consult attending Dr. Hernandez 89y (1935) with PMH of dementia,  myasthenia gravis? untreated on Aricept 10 mg daily for dementia presenting for altered mental status in the morning of the day of admission. Patient was noted to have a UTI and was started on ciprofloxacin for the past 3 days. She did not significantly improve and today in the morning, she was noted to have altered mental status. It was difficult to clarify timeline of history and clear description if symptoms were episodic or not but patient was sent in from the nursing home. Her baseline is AAOx 1 due to dementia.    impression: AMS in the setting of dementia and UTI? likely in the setting of toxic metabolic encephalopathy    Plan:  [] manage primary metabolic etiology  [] check TSH, T3/T4, vitamin B1, B6, B12, folate, homocysteine, methylmalonic acid, lactate, creatnine kinase, ammonia, ESR, CRP, vitamin D and E  [] if symptoms were transient can obtain routine EEG  [] rest of care per primary team    case to be discussed with general consult attending Dr. Hernandez 89y (1935) with PMH of dementia,  myasthenia gravis? untreated on Aricept 10 mg daily for dementia presenting for altered mental status in the morning of the day of admission. Patient was noted to have a UTI and was started on ciprofloxacin for the past 3 days. She did not significantly improve and today in the morning, she was noted to have altered mental status. It was difficult to clarify timeline of history and clear description if symptoms were episodic or not but patient was sent in from the nursing home. Her baseline is AAOx 1 due to dementia.    impression: AMS in the setting of dementia and UTI? likely in the setting of toxic metabolic encephalopathy, ventriculomegaly reported on CT brain can be addressed after metabolic etiology treated as NPH will less likely present with acute deterioration.    Plan:  [] manage primary metabolic etiology  [] check TSH, T3/T4, vitamin B1, B6, B12, folate, homocysteine, methylmalonic acid, lactate, creatnine kinase, ammonia, ESR, CRP, vitamin D and E  [] if symptoms were transient can obtain routine EEG  [] rest of care per primary team    case to be discussed with general consult attending Dr. Hernandez

## 2024-02-19 NOTE — ED PROVIDER NOTE - CLINICAL SUMMARY MEDICAL DECISION MAKING FREE TEXT BOX
89 y/o female hx myasthenia gravis, dementia (AOx1 baseline) presents from nursing home due to being less interactive than normal since this AM. She is currently being treated for UTI. She is hemodynamically stable, afebrile, on exam she is AOx1, does not follow commands. She is able to hold all extremities up against gravity. Lungs are clear, abdomen soft, no murmurs noted. Will get labs, CT head, reassess.

## 2024-02-19 NOTE — ED ADULT NURSE NOTE - NSFALLHARMRISKINTERV_ED_ALL_ED
Assistance OOB with selected safe patient handling equipment if applicable/Assistance with ambulation/Communicate risk of Fall with Harm to all staff, patient, and family/Monitor gait and stability/Monitor for mental status changes and reorient to person, place, and time, as needed/Provide visual cue: red socks, yellow wristband, yellow gown, etc/Reinforce activity limits and safety measures with patient and family/Toileting schedule using arm’s reach rule for commode and bathroom/Use of alarms - bed, stretcher, chair and/or video monitoring/Bed in lowest position, wheels locked, appropriate side rails in place/Call bell, personal items and telephone in reach/Instruct patient to call for assistance before getting out of bed/chair/stretcher/Non-slip footwear applied when patient is off stretcher/[place] to call system/Physically safe environment - no spills, clutter or unnecessary equipment/Purposeful Proactive Rounding/Room/bathroom lighting operational, light cord in reach

## 2024-02-19 NOTE — ED ADULT NURSE NOTE - OBJECTIVE STATEMENT
89 year old female presented to ED via [facility] from [facility]isted Living with c/o of increased AMS as per facility requesting neuro exam and CT. hx dementia and frequent UTI's, recent diagnosis of UTI on Friday currently on Cipro. pt shows no sign of CP, SOB, nausea/vomiting, numbness/tingling, fever, cough, chills, dizziness, headache, blurred vision, neuro intact. pt a&ox1 (baseline), lung sounds clear, heart rate lorena ~50, on cardiac monitor, EKG performed handed to MD, abdomen soft nontender nondistended to palp. skin intact. IV in RAC 20G and patent. Call bell within reach, bed in lowest position, side rails up for safety. Will continue to monitor and assess while offering support and reassurance.

## 2024-02-19 NOTE — CONSULT NOTE ADULT - ATTENDING COMMENTS
I agree with the findings and plan as documented in the Resident's note except below.  Ms. Da Silva is a 89 year unknown handed woman with PMH of dementia on Aricept 10 mg daily who was brought to hospital due to the altered mental status. Neurology consulted because of altered mental status. Etiology of altered mental status is uncertain but most likely the metabolic encephalopathy clinically less suspicious for vascular versus seizure. Patient requires further workup to rule out treatable etiologies.  Patient would benefit from EEG  Will hold of AEDs for now.   if pt mental status does not resolve to baseline after continued support treatment, will consider MRI brain to rule out treatable etiologies.   Continue medical management, neuro- check and fall precaution.  GI and DVT prophylaxis.    Patient is at high risk for complications and morbidity or mortality. There is high probability of imminent or life threatening deterioration in the patient's condition.  Patient is unable or incompetent to participate in giving a history and/or making decisions and discussion is necessary for determining treatment decisions.  My cumulative time taking care of this critically ill patient is 80 minutes  If you have any further questions, please do not hesitate to contact our team.  Thank you for allowing us to participate in this patient care.

## 2024-02-19 NOTE — H&P ADULT - ASSESSMENT
88 YO F with PMHx of Myasthenia Gravis (not on medication for past 9 years) and MHx of dementia and chronic low back pain who presents with complaint of worsening LE weakness over the past 6 months. CT Head was negative, NIF was normal, neuro exam was normal with mild point tenderness in the lumbar spine. Neurology was consulted, myasthenia gravis labs were sent. Ortho was consulted who stated that family did not wish for any surgical intervention at this time. Per discussion with family and Neurology, patient had CT Chest to evaluate for thymoma which showed no thymoma. Additionally patient had MRI of T and L spine that showed severe spinal stenosis without cord compression, also multilevel arthritis. PT evaluated patient and recommended subacute rehab.      Assessment/Plan:  UTI with E.coli and Klebsiella- treat with Cipro Q 12 for 7 days.  weakness-completed PT/OT. Continue FAP.  leg arthritis- Acetaminophen PRN.  eye conjunctivitis - apply Alcaftadin eyedrop and artificial tears  b/l cataract- F/u ophthalmology.  S/ p mechanical fall - xrays negative for fracture or dislocation. Bruise on R lower back and chest wall pain, resolved.  vitamin D deficiency- continue Vitamin D supplement.  risk of malnutrition -stable, continue supplement diet. F/u dietitian. Monitor weight.  constipation- senna at bedtime.  COVID 19 vaccination- Patient received [employer] COVID vaccine 0.3 ml IM .  LBP 2/2 severe spinal stenosis with out cord pressure - no surgical intervention recommended neither accepted by family, continue PT OT as needed.  Dementia - Stable. Continue Aricept .  Dysphagia - Mechanical soft diet, aspiration precautions.  Myasthenia Gravis - stable , off of medication. Monitor CBC and BMP every 2 months. 88 YO F with PMHx of Myasthenia Gravis (not on medication for past 9 years) and MHx of dementia     Assessment/Plan:  AMS- most likely due to metabolic/toxic encephalopathy  due to UTI. Continue IV abx. Consult neurology and f/u CT results  elevated liver  fn test- hold Tylenol and Statin. Will  check RUQ ultrasound and Hepatitis panel.   NPH- incidental finding. F/u neurologist   UTI with E.coli and Klebsiella as  outpatient culture. Treated with Cipro for 3 days. Continue IV Ceftriaxone for 7 days.   DVT ppx- Lovenox 40 mg daily SCD  weakness-completed PT/OT. Continue FAP.  leg arthritis- Acetaminophen PRN.  eye conjunctivitis - apply Alcaftadin eyedrop and artificial tears  b/l cataract- F/u ophthalmology.  S/ p mechanical fall - xrays negative for fracture or dislocation. Bruise on R lower back and chest wall pain, resolved.  vitamin D deficiency- continue Vitamin D supplement.  risk of malnutrition -stable, continue supplement diet. F/u dietitian. Monitor weight.  constipation- senna at bedtime.  COVID 19 vaccination- Patient received [employer] COVID vaccine 0.3 ml IM .  LBP 2/2 severe spinal stenosis with out cord pressure - no surgical intervention recommended neither accepted by family, continue PT OT as needed.  Dementia - Stable. Continue Aricept .  Dysphagia - Mechanical soft diet, aspiration precautions.  Myasthenia Gravis - stable , off of medication. Monitor CBC and BMP every 2 months. 88 YO F with PMHx of Myasthenia Gravis (not on medication for past 9 years) and MHx of dementia     Assessment/Plan:  AMS- most likely due to metabolic/toxic encephalopathy  due to UTI. Continue IV abx. Consult neurology and f/u CT results  elevated LFT- Will  check RUQ ultrasound and Hepatitis panel.   NPH- incidental finding. F/u neurologist   UTI with E.coli and Klebsiella as  outpatient culture. Treated with Cipro for 3 days. Continue IV Ceftriaxone for 7 days.   DVT ppx- Lovenox 40 mg daily SCD  leg arthritis- Acetaminophen PRN.  b/l cataract- F/u ophthalmolog  vitamin D deficiency- continue Vitamin D supplement.  risk of malnutrition -stable, continue supplement diet. F/u dietitian. Monitor weight.  constipation- senna at bedtime.  LBP 2/2 severe spinal stenosis with out cord pressure - no surgical intervention recommended neither accepted by family, continue PT OT as needed.  Dementia - Stable. Continue Aricept .  Dysphagia - Mechanical soft diet, aspiration precautions.  Myasthenia Gravis - stable , off of medication. Monitor CBC and BMP every 2 months.

## 2024-02-19 NOTE — ED PROVIDER NOTE - OBJECTIVE STATEMENT
89 y/o female hx 89 y/o female hx myasthenia gravis, dementia (AOx1 baseline) presents with AMS. Per nursing home, she was less interactive than normal upon waking up this AM. She was dx with UTI on 2/16, is on ciprofloxacin 500 BID. Patient AOx1, unable to obtain history due to AMS.

## 2024-02-20 LAB
ALBUMIN SERPL ELPH-MCNC: 3 G/DL — LOW (ref 3.3–5)
ALBUMIN SERPL ELPH-MCNC: 3.5 G/DL — SIGNIFICANT CHANGE UP (ref 3.3–5)
ALP SERPL-CCNC: 182 U/L — HIGH (ref 40–120)
ALP SERPL-CCNC: 197 U/L — HIGH (ref 40–120)
ALT FLD-CCNC: 204 U/L — HIGH (ref 10–45)
ALT FLD-CCNC: 231 U/L — HIGH (ref 10–45)
AMMONIA BLD-MCNC: 24 UMOL/L — SIGNIFICANT CHANGE UP (ref 11–55)
ANION GAP SERPL CALC-SCNC: 10 MMOL/L — SIGNIFICANT CHANGE UP (ref 5–17)
ANION GAP SERPL CALC-SCNC: 11 MMOL/L — SIGNIFICANT CHANGE UP (ref 5–17)
APTT BLD: 55.5 SEC — HIGH (ref 24.5–35.6)
AST SERPL-CCNC: 101 U/L — HIGH (ref 10–40)
AST SERPL-CCNC: 116 U/L — HIGH (ref 10–40)
BASE EXCESS BLDA CALC-SCNC: 0.3 MMOL/L — SIGNIFICANT CHANGE UP (ref -2–3)
BASOPHILS # BLD AUTO: 0.01 K/UL — SIGNIFICANT CHANGE UP (ref 0–0.2)
BASOPHILS NFR BLD AUTO: 0.3 % — SIGNIFICANT CHANGE UP (ref 0–2)
BILIRUB SERPL-MCNC: 0.4 MG/DL — SIGNIFICANT CHANGE UP (ref 0.2–1.2)
BILIRUB SERPL-MCNC: 0.4 MG/DL — SIGNIFICANT CHANGE UP (ref 0.2–1.2)
BUN SERPL-MCNC: 44 MG/DL — HIGH (ref 7–23)
BUN SERPL-MCNC: 44 MG/DL — HIGH (ref 7–23)
CALCIUM SERPL-MCNC: 9.1 MG/DL — SIGNIFICANT CHANGE UP (ref 8.4–10.5)
CALCIUM SERPL-MCNC: 9.6 MG/DL — SIGNIFICANT CHANGE UP (ref 8.4–10.5)
CHLORIDE SERPL-SCNC: 111 MMOL/L — HIGH (ref 96–108)
CHLORIDE SERPL-SCNC: 112 MMOL/L — HIGH (ref 96–108)
CO2 BLDA-SCNC: 28 MMOL/L — HIGH (ref 19–24)
CO2 SERPL-SCNC: 24 MMOL/L — SIGNIFICANT CHANGE UP (ref 22–31)
CO2 SERPL-SCNC: 25 MMOL/L — SIGNIFICANT CHANGE UP (ref 22–31)
CREAT SERPL-MCNC: 0.96 MG/DL — SIGNIFICANT CHANGE UP (ref 0.5–1.3)
CREAT SERPL-MCNC: 1.13 MG/DL — SIGNIFICANT CHANGE UP (ref 0.5–1.3)
CULTURE RESULTS: SIGNIFICANT CHANGE UP
EGFR: 47 ML/MIN/1.73M2 — LOW
EGFR: 57 ML/MIN/1.73M2 — LOW
EOSINOPHIL # BLD AUTO: 0.02 K/UL — SIGNIFICANT CHANGE UP (ref 0–0.5)
EOSINOPHIL NFR BLD AUTO: 0.5 % — SIGNIFICANT CHANGE UP (ref 0–6)
GAS PNL BLDA: SIGNIFICANT CHANGE UP
GLUCOSE BLDC GLUCOMTR-MCNC: 100 MG/DL — HIGH (ref 70–99)
GLUCOSE BLDC GLUCOMTR-MCNC: 113 MG/DL — HIGH (ref 70–99)
GLUCOSE BLDC GLUCOMTR-MCNC: 114 MG/DL — HIGH (ref 70–99)
GLUCOSE BLDC GLUCOMTR-MCNC: 219 MG/DL — HIGH (ref 70–99)
GLUCOSE BLDC GLUCOMTR-MCNC: 63 MG/DL — LOW (ref 70–99)
GLUCOSE BLDC GLUCOMTR-MCNC: 67 MG/DL — LOW (ref 70–99)
GLUCOSE SERPL-MCNC: 280 MG/DL — HIGH (ref 70–99)
GLUCOSE SERPL-MCNC: 68 MG/DL — LOW (ref 70–99)
HCO3 BLDA-SCNC: 26 MMOL/L — SIGNIFICANT CHANGE UP (ref 21–28)
HCT VFR BLD CALC: 40.4 % — SIGNIFICANT CHANGE UP (ref 34.5–45)
HGB BLD-MCNC: 13.3 G/DL — SIGNIFICANT CHANGE UP (ref 11.5–15.5)
IMM GRANULOCYTES NFR BLD AUTO: 0.5 % — SIGNIFICANT CHANGE UP (ref 0–0.9)
INR BLD: 1.2 RATIO — HIGH (ref 0.85–1.18)
LACTATE SERPL-SCNC: 0.9 MMOL/L — SIGNIFICANT CHANGE UP (ref 0.5–2)
LACTATE SERPL-SCNC: 1.1 MMOL/L — SIGNIFICANT CHANGE UP (ref 0.5–2)
LYMPHOCYTES # BLD AUTO: 0.37 K/UL — LOW (ref 1–3.3)
LYMPHOCYTES # BLD AUTO: 9.3 % — LOW (ref 13–44)
MAGNESIUM SERPL-MCNC: 2.2 MG/DL — SIGNIFICANT CHANGE UP (ref 1.6–2.6)
MCHC RBC-ENTMCNC: 31.7 PG — SIGNIFICANT CHANGE UP (ref 27–34)
MCHC RBC-ENTMCNC: 32.9 GM/DL — SIGNIFICANT CHANGE UP (ref 32–36)
MCV RBC AUTO: 96.2 FL — SIGNIFICANT CHANGE UP (ref 80–100)
MONOCYTES # BLD AUTO: 0.11 K/UL — SIGNIFICANT CHANGE UP (ref 0–0.9)
MONOCYTES NFR BLD AUTO: 2.8 % — SIGNIFICANT CHANGE UP (ref 2–14)
NEUTROPHILS # BLD AUTO: 3.45 K/UL — SIGNIFICANT CHANGE UP (ref 1.8–7.4)
NEUTROPHILS NFR BLD AUTO: 86.6 % — HIGH (ref 43–77)
NRBC # BLD: 0 /100 WBCS — SIGNIFICANT CHANGE UP (ref 0–0)
PCO2 BLDA: 48 MMHG — HIGH (ref 32–45)
PH BLDA: 7.35 — SIGNIFICANT CHANGE UP (ref 7.35–7.45)
PHOSPHATE SERPL-MCNC: 3.3 MG/DL — SIGNIFICANT CHANGE UP (ref 2.5–4.5)
PLATELET # BLD AUTO: 58 K/UL — LOW (ref 150–400)
PO2 BLDA: 115 MMHG — HIGH (ref 83–108)
POTASSIUM SERPL-MCNC: 5 MMOL/L — SIGNIFICANT CHANGE UP (ref 3.5–5.3)
POTASSIUM SERPL-MCNC: 5.1 MMOL/L — SIGNIFICANT CHANGE UP (ref 3.5–5.3)
POTASSIUM SERPL-SCNC: 5 MMOL/L — SIGNIFICANT CHANGE UP (ref 3.5–5.3)
POTASSIUM SERPL-SCNC: 5.1 MMOL/L — SIGNIFICANT CHANGE UP (ref 3.5–5.3)
PROT SERPL-MCNC: 5.5 G/DL — LOW (ref 6–8.3)
PROT SERPL-MCNC: 6.1 G/DL — SIGNIFICANT CHANGE UP (ref 6–8.3)
PROTHROM AB SERPL-ACNC: 12.5 SEC — SIGNIFICANT CHANGE UP (ref 9.5–13)
RBC # BLD: 4.2 M/UL — SIGNIFICANT CHANGE UP (ref 3.8–5.2)
RBC # FLD: 14.7 % — HIGH (ref 10.3–14.5)
SAO2 % BLDA: 97.9 % — SIGNIFICANT CHANGE UP (ref 94–98)
SODIUM SERPL-SCNC: 146 MMOL/L — HIGH (ref 135–145)
SODIUM SERPL-SCNC: 147 MMOL/L — HIGH (ref 135–145)
SPECIMEN SOURCE: SIGNIFICANT CHANGE UP
T PALLIDUM AB TITR SER: NEGATIVE — SIGNIFICANT CHANGE UP
TSH SERPL-MCNC: 2.45 UIU/ML — SIGNIFICANT CHANGE UP (ref 0.27–4.2)
VIT B12 SERPL-MCNC: 1749 PG/ML — HIGH (ref 232–1245)
WBC # BLD: 3.98 K/UL — SIGNIFICANT CHANGE UP (ref 3.8–10.5)
WBC # FLD AUTO: 3.98 K/UL — SIGNIFICANT CHANGE UP (ref 3.8–10.5)

## 2024-02-20 PROCEDURE — 95720 EEG PHY/QHP EA INCR W/VEEG: CPT

## 2024-02-20 PROCEDURE — 99223 1ST HOSP IP/OBS HIGH 75: CPT

## 2024-02-20 PROCEDURE — 99291 CRITICAL CARE FIRST HOUR: CPT | Mod: GC

## 2024-02-20 RX ORDER — PIPERACILLIN AND TAZOBACTAM 4; .5 G/20ML; G/20ML
3.38 INJECTION, POWDER, LYOPHILIZED, FOR SOLUTION INTRAVENOUS ONCE
Refills: 0 | Status: COMPLETED | OUTPATIENT
Start: 2024-02-20 | End: 2024-02-20

## 2024-02-20 RX ORDER — SODIUM CHLORIDE 9 MG/ML
500 INJECTION INTRAMUSCULAR; INTRAVENOUS; SUBCUTANEOUS ONCE
Refills: 0 | Status: DISCONTINUED | OUTPATIENT
Start: 2024-02-20 | End: 2024-02-20

## 2024-02-20 RX ORDER — LEVETIRACETAM 250 MG/1
1000 TABLET, FILM COATED ORAL ONCE
Refills: 0 | Status: DISCONTINUED | OUTPATIENT
Start: 2024-02-20 | End: 2024-02-20

## 2024-02-20 RX ORDER — SODIUM CHLORIDE 9 MG/ML
1000 INJECTION, SOLUTION INTRAVENOUS
Refills: 0 | Status: DISCONTINUED | OUTPATIENT
Start: 2024-02-20 | End: 2024-02-26

## 2024-02-20 RX ORDER — PIPERACILLIN AND TAZOBACTAM 4; .5 G/20ML; G/20ML
3.38 INJECTION, POWDER, LYOPHILIZED, FOR SOLUTION INTRAVENOUS EVERY 8 HOURS
Refills: 0 | Status: DISCONTINUED | OUTPATIENT
Start: 2024-02-20 | End: 2024-02-21

## 2024-02-20 RX ORDER — LEVETIRACETAM 250 MG/1
1000 TABLET, FILM COATED ORAL ONCE
Refills: 0 | Status: COMPLETED | OUTPATIENT
Start: 2024-02-20 | End: 2024-02-20

## 2024-02-20 RX ORDER — VANCOMYCIN HCL 1 G
500 VIAL (EA) INTRAVENOUS ONCE
Refills: 0 | Status: COMPLETED | OUTPATIENT
Start: 2024-02-20 | End: 2024-02-20

## 2024-02-20 RX ADMIN — PIPERACILLIN AND TAZOBACTAM 25 GRAM(S): 4; .5 INJECTION, POWDER, LYOPHILIZED, FOR SOLUTION INTRAVENOUS at 21:23

## 2024-02-20 RX ADMIN — SODIUM CHLORIDE 75 MILLILITER(S): 9 INJECTION, SOLUTION INTRAVENOUS at 17:27

## 2024-02-20 RX ADMIN — Medication 100 MILLIGRAM(S): at 09:10

## 2024-02-20 RX ADMIN — PIPERACILLIN AND TAZOBACTAM 200 GRAM(S): 4; .5 INJECTION, POWDER, LYOPHILIZED, FOR SOLUTION INTRAVENOUS at 09:10

## 2024-02-20 RX ADMIN — LEVETIRACETAM 400 MILLIGRAM(S): 250 TABLET, FILM COATED ORAL at 09:09

## 2024-02-20 RX ADMIN — ENOXAPARIN SODIUM 40 MILLIGRAM(S): 100 INJECTION SUBCUTANEOUS at 07:02

## 2024-02-20 RX ADMIN — PIPERACILLIN AND TAZOBACTAM 25 GRAM(S): 4; .5 INJECTION, POWDER, LYOPHILIZED, FOR SOLUTION INTRAVENOUS at 13:23

## 2024-02-20 NOTE — CONSULT NOTE ADULT - SUBJECTIVE AND OBJECTIVE BOX
CHIEF COMPLAINT:    HPI:    PAST MEDICAL & SURGICAL HISTORY:  Myasthenia gravis      Dementia      Myasthenia gravis      No significant past surgical history      No significant past surgical history          FAMILY HISTORY:  No pertinent family history in first degree relatives    No pertinent family history in first degree relatives        SOCIAL HISTORY:  Smoking: __ packs x ___ years  EtOH Use:  Marital Status:  Occupation:  Recent Travel:  Country of Birth:  Advance Directives:    Allergies    sulfamethoxazole (Vomiting; Nausea)    Intolerances        HOME MEDICATIONS:    REVIEW OF SYSTEMS:  Constitutional:   Eyes:  ENT:  CV:  Resp:  GI:  :  MSK:  Integumentary:  Neurological:  Psychiatric:  Endocrine:  Hematologic/Lymphatic:  Allergic/Immunologic:  [ ] All other systems negative  [ ] Unable to assess ROS because ________    OBJECTIVE:  ICU Vital Signs Last 24 Hrs  T(C): 33.6 (20 Feb 2024 06:19), Max: 33.6 (20 Feb 2024 06:19)  T(F): 92.4 (20 Feb 2024 06:19), Max: 92.4 (20 Feb 2024 06:19)  HR: 62 (20 Feb 2024 06:19) (53 - 63)  BP: 108/66 (20 Feb 2024 06:19) (108/66 - 163/78)  BP(mean): --  ABP: --  ABP(mean): --  RR: 16 (20 Feb 2024 06:19) (16 - 20)  SpO2: 95% (20 Feb 2024 06:19) (94% - 99%)    O2 Parameters below as of 20 Feb 2024 06:19  Patient On (Oxygen Delivery Method): room air              CAPILLARY BLOOD GLUCOSE      POCT Blood Glucose.: 114 mg/dL (20 Feb 2024 12:19)      PHYSICAL EXAM:  General:   HEENT:   Lymph Nodes:  Neck:   Respiratory:   Cardiovascular:   Abdomen:   Extremities:   Skin:   Neurological:  Psychiatry:    HOSPITAL MEDICATIONS:  MEDICATIONS  (STANDING):  dextrose 5% + sodium chloride 0.45%. 1000 milliLiter(s) (75 mL/Hr) IV Continuous <Continuous>  donepezil 10 milliGRAM(s) Oral at bedtime  enoxaparin Injectable 40 milliGRAM(s) SubCutaneous every 24 hours  piperacillin/tazobactam IVPB.. 3.375 Gram(s) IV Intermittent every 8 hours    MEDICATIONS  (PRN):      LABS:                        13.3   3.98  )-----------( 58       ( 20 Feb 2024 11:30 )             40.4     02-20    146<H>  |  111<H>  |  44<H>  ----------------------------<  280<H>  5.1   |  24  |  1.13    Ca    9.1      20 Feb 2024 11:53  Phos  3.3     02-20  Mg     2.2     02-20    TPro  5.5<L>  /  Alb  3.0<L>  /  TBili  0.4  /  DBili  x   /  AST  101<H>  /  ALT  204<H>  /  AlkPhos  182<H>  02-20    PT/INR - ( 20 Feb 2024 11:30 )   PT: 12.5 sec;   INR: 1.20 ratio         PTT - ( 20 Feb 2024 11:30 )  PTT:55.5 sec  Urinalysis Basic - ( 20 Feb 2024 11:53 )    Color: x / Appearance: x / SG: x / pH: x  Gluc: 280 mg/dL / Ketone: x  / Bili: x / Urobili: x   Blood: x / Protein: x / Nitrite: x   Leuk Esterase: x / RBC: x / WBC x   Sq Epi: x / Non Sq Epi: x / Bacteria: x      Arterial Blood Gas:  02-20 @ 08:20  7.35/48/115/26/97.9/0.3  ABG lactate: --    Venous Blood Gas:  02-19 @ 12:55  7.28/62/59/29/87.3  VBG Lactate: 1.5      MICROBIOLOGY:     RADIOLOGY:  [ ] Reviewed and interpreted by me    EKG: CHIEF COMPLAINT: AMS    HPI:  89 y/o M with PMHx of Myasthenia Gravis (not on medication) and dementia (A&Ox1, conversive at baseline) who presents from her nursing home with worsening lethargy and confusion. UA done as outpatient grossly positive and urine culture growing E. coli and Klebsiella. Patient was started on ciprofloxacin 3 days prior to presentation but has not improved.     In the ED, patient found to be hypothermic (89 F). On admission, labs significant for     PAST MEDICAL & SURGICAL HISTORY:  Myasthenia gravis      Dementia      Myasthenia gravis      No significant past surgical history      No significant past surgical history          FAMILY HISTORY:  No pertinent family history in first degree relatives    No pertinent family history in first degree relatives        SOCIAL HISTORY:  Smoking: __ packs x ___ years  EtOH Use:  Marital Status:  Occupation:  Recent Travel:  Country of Birth:  Advance Directives:    Allergies    sulfamethoxazole (Vomiting; Nausea)    Intolerances        HOME MEDICATIONS:    REVIEW OF SYSTEMS:  Constitutional:   Eyes:  ENT:  CV:  Resp:  GI:  :  MSK:  Integumentary:  Neurological:  Psychiatric:  Endocrine:  Hematologic/Lymphatic:  Allergic/Immunologic:  [ ] All other systems negative  [ ] Unable to assess ROS because ________    OBJECTIVE:  ICU Vital Signs Last 24 Hrs  T(C): 33.6 (20 Feb 2024 06:19), Max: 33.6 (20 Feb 2024 06:19)  T(F): 92.4 (20 Feb 2024 06:19), Max: 92.4 (20 Feb 2024 06:19)  HR: 62 (20 Feb 2024 06:19) (53 - 63)  BP: 108/66 (20 Feb 2024 06:19) (108/66 - 163/78)  BP(mean): --  ABP: --  ABP(mean): --  RR: 16 (20 Feb 2024 06:19) (16 - 20)  SpO2: 95% (20 Feb 2024 06:19) (94% - 99%)    O2 Parameters below as of 20 Feb 2024 06:19  Patient On (Oxygen Delivery Method): room air              CAPILLARY BLOOD GLUCOSE      POCT Blood Glucose.: 114 mg/dL (20 Feb 2024 12:19)      PHYSICAL EXAM:  General:   HEENT:   Lymph Nodes:  Neck:   Respiratory:   Cardiovascular:   Abdomen:   Extremities:   Skin:   Neurological:  Psychiatry:    HOSPITAL MEDICATIONS:  MEDICATIONS  (STANDING):  dextrose 5% + sodium chloride 0.45%. 1000 milliLiter(s) (75 mL/Hr) IV Continuous <Continuous>  donepezil 10 milliGRAM(s) Oral at bedtime  enoxaparin Injectable 40 milliGRAM(s) SubCutaneous every 24 hours  piperacillin/tazobactam IVPB.. 3.375 Gram(s) IV Intermittent every 8 hours    MEDICATIONS  (PRN):      LABS:                        13.3   3.98  )-----------( 58       ( 20 Feb 2024 11:30 )             40.4     02-20    146<H>  |  111<H>  |  44<H>  ----------------------------<  280<H>  5.1   |  24  |  1.13    Ca    9.1      20 Feb 2024 11:53  Phos  3.3     02-20  Mg     2.2     02-20    TPro  5.5<L>  /  Alb  3.0<L>  /  TBili  0.4  /  DBili  x   /  AST  101<H>  /  ALT  204<H>  /  AlkPhos  182<H>  02-20    PT/INR - ( 20 Feb 2024 11:30 )   PT: 12.5 sec;   INR: 1.20 ratio         PTT - ( 20 Feb 2024 11:30 )  PTT:55.5 sec  Urinalysis Basic - ( 20 Feb 2024 11:53 )    Color: x / Appearance: x / SG: x / pH: x  Gluc: 280 mg/dL / Ketone: x  / Bili: x / Urobili: x   Blood: x / Protein: x / Nitrite: x   Leuk Esterase: x / RBC: x / WBC x   Sq Epi: x / Non Sq Epi: x / Bacteria: x      Arterial Blood Gas:  02-20 @ 08:20  7.35/48/115/26/97.9/0.3  ABG lactate: --    Venous Blood Gas:  02-19 @ 12:55  7.28/62/59/29/87.3  VBG Lactate: 1.5      MICROBIOLOGY:     RADIOLOGY:  [ ] Reviewed and interpreted by me    EKG: CHIEF COMPLAINT: AMS    HPI:  88 y/o M with PMHx of Myasthenia Gravis (not on medication) and dementia (A&Ox1, conversive at baseline) who presents from her nursing home with worsening lethargy and confusion. UA done as outpatient grossly positive and urine culture growing E. coli and Klebsiella. Patient was started on ciprofloxacin 3 days prior to presentation but has not improved.     In the ED, patient found to be hypothermic (89 F). On admission, labs significant for WBC 3.98, Plt 58, Alk Phos 220, , , pH 7.28, CO2 , lactate 1.5    PAST MEDICAL & SURGICAL HISTORY:  Myasthenia gravis      Dementia      Myasthenia gravis      No significant past surgical history      No significant past surgical history          FAMILY HISTORY:  No pertinent family history in first degree relatives    No pertinent family history in first degree relatives        SOCIAL HISTORY:  Smoking: __ packs x ___ years  EtOH Use:  Marital Status:  Occupation:  Recent Travel:  Country of Birth:  Advance Directives:    Allergies    sulfamethoxazole (Vomiting; Nausea)    Intolerances        HOME MEDICATIONS:    REVIEW OF SYSTEMS:  Constitutional:   Eyes:  ENT:  CV:  Resp:  GI:  :  MSK:  Integumentary:  Neurological:  Psychiatric:  Endocrine:  Hematologic/Lymphatic:  Allergic/Immunologic:  [ ] All other systems negative  [ ] Unable to assess ROS because ________    OBJECTIVE:  ICU Vital Signs Last 24 Hrs  T(C): 33.6 (20 Feb 2024 06:19), Max: 33.6 (20 Feb 2024 06:19)  T(F): 92.4 (20 Feb 2024 06:19), Max: 92.4 (20 Feb 2024 06:19)  HR: 62 (20 Feb 2024 06:19) (53 - 63)  BP: 108/66 (20 Feb 2024 06:19) (108/66 - 163/78)  BP(mean): --  ABP: --  ABP(mean): --  RR: 16 (20 Feb 2024 06:19) (16 - 20)  SpO2: 95% (20 Feb 2024 06:19) (94% - 99%)    O2 Parameters below as of 20 Feb 2024 06:19  Patient On (Oxygen Delivery Method): room air              CAPILLARY BLOOD GLUCOSE      POCT Blood Glucose.: 114 mg/dL (20 Feb 2024 12:19)      PHYSICAL EXAM:  General:   HEENT:   Lymph Nodes:  Neck:   Respiratory:   Cardiovascular:   Abdomen:   Extremities:   Skin:   Neurological:  Psychiatry:    HOSPITAL MEDICATIONS:  MEDICATIONS  (STANDING):  dextrose 5% + sodium chloride 0.45%. 1000 milliLiter(s) (75 mL/Hr) IV Continuous <Continuous>  donepezil 10 milliGRAM(s) Oral at bedtime  enoxaparin Injectable 40 milliGRAM(s) SubCutaneous every 24 hours  piperacillin/tazobactam IVPB.. 3.375 Gram(s) IV Intermittent every 8 hours    MEDICATIONS  (PRN):      LABS:                        13.3   3.98  )-----------( 58       ( 20 Feb 2024 11:30 )             40.4     02-20    146<H>  |  111<H>  |  44<H>  ----------------------------<  280<H>  5.1   |  24  |  1.13    Ca    9.1      20 Feb 2024 11:53  Phos  3.3     02-20  Mg     2.2     02-20    TPro  5.5<L>  /  Alb  3.0<L>  /  TBili  0.4  /  DBili  x   /  AST  101<H>  /  ALT  204<H>  /  AlkPhos  182<H>  02-20    PT/INR - ( 20 Feb 2024 11:30 )   PT: 12.5 sec;   INR: 1.20 ratio         PTT - ( 20 Feb 2024 11:30 )  PTT:55.5 sec  Urinalysis Basic - ( 20 Feb 2024 11:53 )    Color: x / Appearance: x / SG: x / pH: x  Gluc: 280 mg/dL / Ketone: x  / Bili: x / Urobili: x   Blood: x / Protein: x / Nitrite: x   Leuk Esterase: x / RBC: x / WBC x   Sq Epi: x / Non Sq Epi: x / Bacteria: x      Arterial Blood Gas:  02-20 @ 08:20  7.35/48/115/26/97.9/0.3  ABG lactate: --    Venous Blood Gas:  02-19 @ 12:55  7.28/62/59/29/87.3  VBG Lactate: 1.5      MICROBIOLOGY:     RADIOLOGY:  [ ] Reviewed and interpreted by me    EKG: CHIEF COMPLAINT: AMS    HPI:  89 y/o M with PMHx of Myasthenia Gravis (not on medication) and dementia (A&Ox1, conversive at baseline) who presents from her nursing home with worsening lethargy and confusion. UA done as outpatient grossly positive and urine culture growing E. coli and Klebsiella. Patient was started on ciprofloxacin 3 days prior to presentation but has not improved.     In the ED, patient found to be hypothermic (89 F). On admission, labs significant for WBC 3.98, Plt 58, Alk Phos 220, , , pH 7.28, CO2 , lactate 1.5, TSH, B12 normal. RPR negative. CTH with moderate ventriculomegaly with findings suggestive of normal pressure hydrocephalus. CTA head and neck unremarkable. CXR clear. UA negative for bacteria, LE, and nitrite, WBC 2.  Patient given CTX for UTI. Outpatient UA from 2/13 with small LE, moderate bacteria, and WBC 84. Urine culture 2/13 positive for E. coli and Klebsiella,     PAST MEDICAL & SURGICAL HISTORY:  Myasthenia gravis      Dementia      Myasthenia gravis      No significant past surgical history      No significant past surgical history          FAMILY HISTORY:  No pertinent family history in first degree relatives          SOCIAL HISTORY:  Smoking: __ packs x ___ years  EtOH Use:  Marital Status:  Occupation:  Recent Travel:  Country of Birth:  Advance Directives:    Allergies    sulfamethoxazole (Vomiting; Nausea)    Intolerances        HOME MEDICATIONS:    REVIEW OF SYSTEMS:  Constitutional:   Eyes:  ENT:  CV:  Resp:  GI:  :  MSK:  Integumentary:  Neurological:  Psychiatric:  Endocrine:  Hematologic/Lymphatic:  Allergic/Immunologic:  [ ] All other systems negative  [x] Unable to assess ROS because A&Ox0    OBJECTIVE:  ICU Vital Signs Last 24 Hrs  T(C): 33.6 (20 Feb 2024 06:19), Max: 33.6 (20 Feb 2024 06:19)  T(F): 92.4 (20 Feb 2024 06:19), Max: 92.4 (20 Feb 2024 06:19)  HR: 62 (20 Feb 2024 06:19) (53 - 63)  BP: 108/66 (20 Feb 2024 06:19) (108/66 - 163/78)  BP(mean): --  ABP: --  ABP(mean): --  RR: 16 (20 Feb 2024 06:19) (16 - 20)  SpO2: 95% (20 Feb 2024 06:19) (94% - 99%)    O2 Parameters below as of 20 Feb 2024 06:19  Patient On (Oxygen Delivery Method): room air              CAPILLARY BLOOD GLUCOSE      POCT Blood Glucose.: 114 mg/dL (20 Feb 2024 12:19)      PHYSICAL EXAM:  Constitutional: NAD  HEENT: NCAT  Chest: normal WOB at rest, CTAB  Heart: RRR, physiologic S1 and S2, no murmurs, no rubs, no gallops, 2+ radial pulses  Abd: nondistended, normoactive bowel sounds, soft, nontender, no rebound, no involuntary guarding  Extremities: no clubbing, warm hands and feet, no edema  Neuro: lethargic, withdraws to pain, A&Ox0  MSK: spontaneous movement of all 4 extremities, full and equal strength, no joint swelling  Skin: no rashes    HOSPITAL MEDICATIONS:  MEDICATIONS  (STANDING):  dextrose 5% + sodium chloride 0.45%. 1000 milliLiter(s) (75 mL/Hr) IV Continuous <Continuous>  donepezil 10 milliGRAM(s) Oral at bedtime  enoxaparin Injectable 40 milliGRAM(s) SubCutaneous every 24 hours  piperacillin/tazobactam IVPB.. 3.375 Gram(s) IV Intermittent every 8 hours    MEDICATIONS  (PRN):      LABS:                        13.3   3.98  )-----------( 58       ( 20 Feb 2024 11:30 )             40.4     02-20    146<H>  |  111<H>  |  44<H>  ----------------------------<  280<H>  5.1   |  24  |  1.13    Ca    9.1      20 Feb 2024 11:53  Phos  3.3     02-20  Mg     2.2     02-20    TPro  5.5<L>  /  Alb  3.0<L>  /  TBili  0.4  /  DBili  x   /  AST  101<H>  /  ALT  204<H>  /  AlkPhos  182<H>  02-20    PT/INR - ( 20 Feb 2024 11:30 )   PT: 12.5 sec;   INR: 1.20 ratio         PTT - ( 20 Feb 2024 11:30 )  PTT:55.5 sec  Urinalysis Basic - ( 20 Feb 2024 11:53 )    Color: x / Appearance: x / SG: x / pH: x  Gluc: 280 mg/dL / Ketone: x  / Bili: x / Urobili: x   Blood: x / Protein: x / Nitrite: x   Leuk Esterase: x / RBC: x / WBC x   Sq Epi: x / Non Sq Epi: x / Bacteria: x      Arterial Blood Gas:  02-20 @ 08:20  7.35/48/115/26/97.9/0.3  ABG lactate: --    Venous Blood Gas:  02-19 @ 12:55  7.28/62/59/29/87.3  VBG Lactate: 1.5      MICROBIOLOGY:     RADIOLOGY:  [ ] Reviewed and interpreted by me    EXAM:  CT ANGIO BRAIN (W)AW IC   ORDERED BY:  ADIN PÉREZ     ACC: [id number] EXAM:  CT ANGIO NECK (W)AW IC   ORDERED BY:  ADIN PÉREZ     ACC: [id number] EXAM:  CT BRAIN   ORDERED BY:  ADIN PÉREZ     PROCEDURE DATE:  02/19/2024          INTERPRETATION:  CLINICAL INFORMATION: Altered mental status.    COMPARISON: CT head 1/2/2020    CONTRAST:  IV Contrast: NONE (accession [id number]), IV contrast documented in   unlinked concurrent exam (accession [id number]), Omnipaque 300 (accession   [id number])  70 cc administered  30 cc discarded  Complications: None reported at time of study completion    TECHNIQUE:  CT BRAIN: Serial axial images were obtained from the skull base to the   vertex without the use of contrast.    CTA NECK/HEAD: After the intravenous power injection of non-ionic   contrast material, serial thin sections were obtained through the   cervical and intracranial circulation on a multislice CT scanner. Axial,   Coronal and Sagittal MIP reformatted images were obtained. 3D   reconstruction was also performed.    FINDINGS:    CT BRAIN:    VENTRICLES AND SULCI: Moderate ventriculomegaly, most prominent in the   occipital horn. The callosal angle measures approximately 60 degrees   (601-40).  INTRA-AXIAL: No intracranial mass, acute hemorrhage, or midline shift.    Mild are periventricular and bihemispheric white matter hypodensities,   consistent with microvascular type changes.  EXTRA-AXIAL: No mass or fluid collection. Basal cisterns are normal in   appearance.    VISUALIZED SINUSES:  Clear.  TYMPANOMASTOID CAVITIES:  Clear.  VISUALIZED ORBITS: Normal.  CALVARIUM: Intact.    MISCELLANEOUS: None.      CTA NECK:    AORTIC ARCH AND VISUALIZED GREAT VESSELS: Normal three-vessel arch. Mild   atherosclerotic calcification of the aortic arch.    RIGHT:  COMMON CAROTID ARTERY: Trace atherosclerotic calcification at the carotid   bifurcation. Normal in caliber to the carotid bifurcation. The common   carotid artery courses leftward and returns to the right the level of the   arytenoid cartilage.  INTERNAL CAROTID ARTERY: No significant stenosis based on NASCET   criteria. Normal course and caliber to the intracranial circulation.  VERTEBRAL ARTERY: Originates from the right subclavian artery. Normal in   course and caliber to the intracranial circulation.    LEFT:  COMMON CAROTID ARTERY: Trace atherosclerotic calcification at the carotid   bifurcation. Normal in course and caliber to the carotid bifurcation.  INTERNAL CAROTID ARTERY: No significant stenosis based on NASCET   criteria. Normal course and caliber to the intracranial circulation.  VERTEBRAL ARTERY: Originates from the left subclavian artery. Normal in   course and caliber to the intracranial circulation.    VISUALIZED LUNGS: Mild biapical lung scarring.    MISCELLANEOUS: None.    CAROTID STENOSIS REFERENCE: (NASCET = 100x1-(N/D)). N=greatest narrowing.   D=normal distal diameter - MILD = <50% stenosis. - MODERATE = 50-69%   stenosis. - SEVERE = 70-89% stenosis. - HAIRLINE/CRITICAL = 90-99%   stenosis. - OCCLUDED = 100% stenosis.      CTA HEAD:    INTERNAL CAROTID ARTERIES: Mild atherosclerotic calcification of the   bilateral petrous, cavernous, and supraclinoid segments. Otherwise,   patent without stenosis.    [place]: No aneurysm identified. Tiny aneurysms can be beyond   the resolution of CTA technique.    ANTERIOR CEREBRAL ARTERIES: No significant stenosis or occlusion.  MIDDLE CEREBRAL ARTERIES: No significant stenosis or occlusion.  POSTERIOR CEREBRAL ARTERIES: Fetal origin of left PCA. No significant   stenosis or occlusion.    DISTAL VERTEBRAL / BASILAR ARTERIES: No significant stenosis or occlusion.    VENOUS STRUCTURES: Visualized superficial and deep venous structures   appear patent.    MISCELLANEOUS: No other vascular abnormality is seen.      IMPRESSION:    CT HEAD:  Moderate ventriculomegaly, with findings suggestive of normal pressure   hydrocephalus.  No intracranial hemorrhage, mass effect or midline shift.    CTA HEAD:  Patent intracranial circulation without flow limiting stenosis.  Mild atherosclerotic calcification of bilateral petrous, cavernous, and   supraclinoid internal carotid arteries.    CTA NECK:  No evidence of significant stenosis or occlusion.    --- End of Report ---      EXAM:  XR CHEST PORTABLE URGENT 1V   ORDERED BY:  ADIN PÉREZ     PROCEDURE DATE:  02/19/2024          INTERPRETATION:  Sepsis.    AP chest. Prior 1/2/2020    Cardiac silhouette exaggerated by AP film shallow inspiration, unchanged.   Grossly clear lungs. No consolidation or effusion. Biapical pleural   thickening.    IMPRESSION: Grossly clear lungs       CHIEF COMPLAINT: AMS    HPI:  89 y/o M with PMHx of Myasthenia Gravis (not on medication) and dementia (A&Ox1, conversive at baseline) who presents from her nursing home with worsening lethargy and confusion. UA done as outpatient grossly positive and urine culture growing E. coli and Klebsiella. Patient was started on ciprofloxacin 3 days prior to presentation but has not improved.     In the ED, patient found to be hypothermic (89 F). On admission, labs significant for WBC 3.98, Plt 58, Alk Phos 220, , , pH 7.28, CO2 , lactate 1.5, TSH, B12 normal. RPR negative. CTH with moderate ventriculomegaly with findings suggestive of normal pressure hydrocephalus. CTA head and neck unremarkable. CXR clear. UA negative for bacteria, LE, and nitrite, WBC 2.  Patient given CTX for UTI. Outpatient UA from 2/13 with small LE, moderate bacteria, and WBC 84. Urine culture 2/13 positive for E. coli and Klebsiella,     PAST MEDICAL & SURGICAL HISTORY:  Myasthenia gravis      Dementia      Myasthenia gravis      No significant past surgical history      No significant past surgical history          FAMILY HISTORY:  No pertinent family history in first degree relatives          SOCIAL HISTORY:  Smoking: __ packs x ___ years  EtOH Use:  Marital Status:  Occupation:  Recent Travel:  Country of Birth:  Advance Directives:    Allergies    sulfamethoxazole (Vomiting; Nausea)    Intolerances        HOME MEDICATIONS:    REVIEW OF SYSTEMS:  Constitutional:   Eyes:  ENT:  CV:  Resp:  GI:  :  MSK:  Integumentary:  Neurological:  Psychiatric:  Endocrine:  Hematologic/Lymphatic:  Allergic/Immunologic:  [ ] All other systems negative  [x] Unable to assess ROS because A&Ox0    OBJECTIVE:  ICU Vital Signs Last 24 Hrs  T(C): 33.6 (20 Feb 2024 06:19), Max: 33.6 (20 Feb 2024 06:19)  T(F): 92.4 (20 Feb 2024 06:19), Max: 92.4 (20 Feb 2024 06:19)  HR: 62 (20 Feb 2024 06:19) (53 - 63)  BP: 108/66 (20 Feb 2024 06:19) (108/66 - 163/78)  BP(mean): --  ABP: --  ABP(mean): --  RR: 16 (20 Feb 2024 06:19) (16 - 20)  SpO2: 95% (20 Feb 2024 06:19) (94% - 99%)    O2 Parameters below as of 20 Feb 2024 06:19  Patient On (Oxygen Delivery Method): room air              CAPILLARY BLOOD GLUCOSE      POCT Blood Glucose.: 114 mg/dL (20 Feb 2024 12:19)      PHYSICAL EXAM:  Constitutional: NAD  HEENT: NCAT, no ptosis, resists eye opening  Chest: normal WOB at rest, CTAB  Heart: RRR, physiologic S1 and S2, no murmurs, no rubs, no gallops, 2+ radial pulses  Abd: nondistended, normoactive bowel sounds, soft, nontender, no rebound, no involuntary guarding  Extremities: no clubbing, warm hands and feet, no edema  Neuro: lethargic, withdraws to pain, A&Ox0  MSK: spontaneous movement of all 4 extremities, full and equal strength, no joint swelling  Skin: no rashes    HOSPITAL MEDICATIONS:  MEDICATIONS  (STANDING):  dextrose 5% + sodium chloride 0.45%. 1000 milliLiter(s) (75 mL/Hr) IV Continuous <Continuous>  donepezil 10 milliGRAM(s) Oral at bedtime  enoxaparin Injectable 40 milliGRAM(s) SubCutaneous every 24 hours  piperacillin/tazobactam IVPB.. 3.375 Gram(s) IV Intermittent every 8 hours    MEDICATIONS  (PRN):      LABS:                        13.3   3.98  )-----------( 58       ( 20 Feb 2024 11:30 )             40.4     02-20    146<H>  |  111<H>  |  44<H>  ----------------------------<  280<H>  5.1   |  24  |  1.13    Ca    9.1      20 Feb 2024 11:53  Phos  3.3     02-20  Mg     2.2     02-20    TPro  5.5<L>  /  Alb  3.0<L>  /  TBili  0.4  /  DBili  x   /  AST  101<H>  /  ALT  204<H>  /  AlkPhos  182<H>  02-20    PT/INR - ( 20 Feb 2024 11:30 )   PT: 12.5 sec;   INR: 1.20 ratio         PTT - ( 20 Feb 2024 11:30 )  PTT:55.5 sec  Urinalysis Basic - ( 20 Feb 2024 11:53 )    Color: x / Appearance: x / SG: x / pH: x  Gluc: 280 mg/dL / Ketone: x  / Bili: x / Urobili: x   Blood: x / Protein: x / Nitrite: x   Leuk Esterase: x / RBC: x / WBC x   Sq Epi: x / Non Sq Epi: x / Bacteria: x      Arterial Blood Gas:  02-20 @ 08:20  7.35/48/115/26/97.9/0.3  ABG lactate: --    Venous Blood Gas:  02-19 @ 12:55  7.28/62/59/29/87.3  VBG Lactate: 1.5      MICROBIOLOGY:     RADIOLOGY:  [ ] Reviewed and interpreted by me    EXAM:  CT ANGIO BRAIN (W)AW IC   ORDERED BY:  ADIN PÉREZ     ACC: [id number] EXAM:  CT ANGIO NECK (W)AW IC   ORDERED BY:  ADIN PÉREZ     ACC: [id number] EXAM:  CT BRAIN   ORDERED BY:  ADIN PÉREZ     PROCEDURE DATE:  02/19/2024          INTERPRETATION:  CLINICAL INFORMATION: Altered mental status.    COMPARISON: CT head 1/2/2020    CONTRAST:  IV Contrast: NONE (accession [id number]), IV contrast documented in   unlinked concurrent exam (accession [id number]), Omnipaque 300 (accession   [id number])  70 cc administered  30 cc discarded  Complications: None reported at time of study completion    TECHNIQUE:  CT BRAIN: Serial axial images were obtained from the skull base to the   vertex without the use of contrast.    CTA NECK/HEAD: After the intravenous power injection of non-ionic   contrast material, serial thin sections were obtained through the   cervical and intracranial circulation on a multislice CT scanner. Axial,   Coronal and Sagittal MIP reformatted images were obtained. 3D   reconstruction was also performed.    FINDINGS:    CT BRAIN:    VENTRICLES AND SULCI: Moderate ventriculomegaly, most prominent in the   occipital horn. The callosal angle measures approximately 60 degrees   (601-40).  INTRA-AXIAL: No intracranial mass, acute hemorrhage, or midline shift.    Mild are periventricular and bihemispheric white matter hypodensities,   consistent with microvascular type changes.  EXTRA-AXIAL: No mass or fluid collection. Basal cisterns are normal in   appearance.    VISUALIZED SINUSES:  Clear.  TYMPANOMASTOID CAVITIES:  Clear.  VISUALIZED ORBITS: Normal.  CALVARIUM: Intact.    MISCELLANEOUS: None.      CTA NECK:    AORTIC ARCH AND VISUALIZED GREAT VESSELS: Normal three-vessel arch. Mild   atherosclerotic calcification of the aortic arch.    RIGHT:  COMMON CAROTID ARTERY: Trace atherosclerotic calcification at the carotid   bifurcation. Normal in caliber to the carotid bifurcation. The common   carotid artery courses leftward and returns to the right the level of the   arytenoid cartilage.  INTERNAL CAROTID ARTERY: No significant stenosis based on NASCET   criteria. Normal course and caliber to the intracranial circulation.  VERTEBRAL ARTERY: Originates from the right subclavian artery. Normal in   course and caliber to the intracranial circulation.    LEFT:  COMMON CAROTID ARTERY: Trace atherosclerotic calcification at the carotid   bifurcation. Normal in course and caliber to the carotid bifurcation.  INTERNAL CAROTID ARTERY: No significant stenosis based on NASCET   criteria. Normal course and caliber to the intracranial circulation.  VERTEBRAL ARTERY: Originates from the left subclavian artery. Normal in   course and caliber to the intracranial circulation.    VISUALIZED LUNGS: Mild biapical lung scarring.    MISCELLANEOUS: None.    CAROTID STENOSIS REFERENCE: (NASCET = 100x1-(N/D)). N=greatest narrowing.   D=normal distal diameter - MILD = <50% stenosis. - MODERATE = 50-69%   stenosis. - SEVERE = 70-89% stenosis. - HAIRLINE/CRITICAL = 90-99%   stenosis. - OCCLUDED = 100% stenosis.      CTA HEAD:    INTERNAL CAROTID ARTERIES: Mild atherosclerotic calcification of the   bilateral petrous, cavernous, and supraclinoid segments. Otherwise,   patent without stenosis.    [place]: No aneurysm identified. Tiny aneurysms can be beyond   the resolution of CTA technique.    ANTERIOR CEREBRAL ARTERIES: No significant stenosis or occlusion.  MIDDLE CEREBRAL ARTERIES: No significant stenosis or occlusion.  POSTERIOR CEREBRAL ARTERIES: Fetal origin of left PCA. No significant   stenosis or occlusion.    DISTAL VERTEBRAL / BASILAR ARTERIES: No significant stenosis or occlusion.    VENOUS STRUCTURES: Visualized superficial and deep venous structures   appear patent.    MISCELLANEOUS: No other vascular abnormality is seen.      IMPRESSION:    CT HEAD:  Moderate ventriculomegaly, with findings suggestive of normal pressure   hydrocephalus.  No intracranial hemorrhage, mass effect or midline shift.    CTA HEAD:  Patent intracranial circulation without flow limiting stenosis.  Mild atherosclerotic calcification of bilateral petrous, cavernous, and   supraclinoid internal carotid arteries.    CTA NECK:  No evidence of significant stenosis or occlusion.    --- End of Report ---      EXAM:  XR CHEST PORTABLE URGENT 1V   ORDERED BY:  ADIN PÉREZ     PROCEDURE DATE:  02/19/2024          INTERPRETATION:  Sepsis.    AP chest. Prior 1/2/2020    Cardiac silhouette exaggerated by AP film shallow inspiration, unchanged.   Grossly clear lungs. No consolidation or effusion. Biapical pleural   thickening.    IMPRESSION: Grossly clear lungs       CHIEF COMPLAINT: AMS    HPI:  89 y/o F with PMHx of Myasthenia Gravis (not on medication) and dementia (A&Ox1, conversive at baseline) who presents from her nursing home with worsening lethargy and confusion. UA done as outpatient grossly positive and urine culture growing E. coli and Klebsiella. Patient was started on ciprofloxacin 3 days prior to presentation but has not improved.     In the ED, patient found to be hypothermic (89 F). On admission, labs significant for WBC 3.98, Plt 58, Alk Phos 220, , , pH 7.28, CO2 , lactate 1.5, TSH, B12 normal. RPR negative. CTH with moderate ventriculomegaly with findings suggestive of normal pressure hydrocephalus. CTA head and neck unremarkable. CXR clear. UA negative for bacteria, LE, and nitrite, WBC 2.  Patient given CTX for UTI. Outpatient UA from 2/13 with small LE, moderate bacteria, and WBC 84. Urine culture 2/13 positive for E. coli and Klebsiella,     PAST MEDICAL & SURGICAL HISTORY:  Myasthenia gravis      Dementia      Myasthenia gravis      No significant past surgical history      No significant past surgical history          FAMILY HISTORY:  No pertinent family history in first degree relatives          SOCIAL HISTORY:  Smoking: __ packs x ___ years  EtOH Use:  Marital Status:  Occupation:  Recent Travel:  Country of Birth:  Advance Directives:    Allergies    sulfamethoxazole (Vomiting; Nausea)    Intolerances        HOME MEDICATIONS:    REVIEW OF SYSTEMS:  Constitutional:   Eyes:  ENT:  CV:  Resp:  GI:  :  MSK:  Integumentary:  Neurological:  Psychiatric:  Endocrine:  Hematologic/Lymphatic:  Allergic/Immunologic:  [ ] All other systems negative  [x] Unable to assess ROS because A&Ox0    OBJECTIVE:  ICU Vital Signs Last 24 Hrs  T(C): 33.6 (20 Feb 2024 06:19), Max: 33.6 (20 Feb 2024 06:19)  T(F): 92.4 (20 Feb 2024 06:19), Max: 92.4 (20 Feb 2024 06:19)  HR: 62 (20 Feb 2024 06:19) (53 - 63)  BP: 108/66 (20 Feb 2024 06:19) (108/66 - 163/78)  BP(mean): --  ABP: --  ABP(mean): --  RR: 16 (20 Feb 2024 06:19) (16 - 20)  SpO2: 95% (20 Feb 2024 06:19) (94% - 99%)    O2 Parameters below as of 20 Feb 2024 06:19  Patient On (Oxygen Delivery Method): room air              CAPILLARY BLOOD GLUCOSE      POCT Blood Glucose.: 114 mg/dL (20 Feb 2024 12:19)      PHYSICAL EXAM:  Constitutional: NAD  HEENT: NCAT, no ptosis, resists eye opening  Chest: normal WOB at rest, CTAB  Heart: RRR, physiologic S1 and S2, no murmurs, no rubs, no gallops, 2+ radial pulses  Abd: nondistended, normoactive bowel sounds, soft, nontender, no rebound, no involuntary guarding  Extremities: no clubbing, warm hands and feet, no edema  Neuro: lethargic, withdraws to pain, A&Ox0  MSK: spontaneous movement of all 4 extremities, full and equal strength, no joint swelling  Skin: no rashes    HOSPITAL MEDICATIONS:  MEDICATIONS  (STANDING):  dextrose 5% + sodium chloride 0.45%. 1000 milliLiter(s) (75 mL/Hr) IV Continuous <Continuous>  donepezil 10 milliGRAM(s) Oral at bedtime  enoxaparin Injectable 40 milliGRAM(s) SubCutaneous every 24 hours  piperacillin/tazobactam IVPB.. 3.375 Gram(s) IV Intermittent every 8 hours    MEDICATIONS  (PRN):      LABS:                        13.3   3.98  )-----------( 58       ( 20 Feb 2024 11:30 )             40.4     02-20    146<H>  |  111<H>  |  44<H>  ----------------------------<  280<H>  5.1   |  24  |  1.13    Ca    9.1      20 Feb 2024 11:53  Phos  3.3     02-20  Mg     2.2     02-20    TPro  5.5<L>  /  Alb  3.0<L>  /  TBili  0.4  /  DBili  x   /  AST  101<H>  /  ALT  204<H>  /  AlkPhos  182<H>  02-20    PT/INR - ( 20 Feb 2024 11:30 )   PT: 12.5 sec;   INR: 1.20 ratio         PTT - ( 20 Feb 2024 11:30 )  PTT:55.5 sec  Urinalysis Basic - ( 20 Feb 2024 11:53 )    Color: x / Appearance: x / SG: x / pH: x  Gluc: 280 mg/dL / Ketone: x  / Bili: x / Urobili: x   Blood: x / Protein: x / Nitrite: x   Leuk Esterase: x / RBC: x / WBC x   Sq Epi: x / Non Sq Epi: x / Bacteria: x      Arterial Blood Gas:  02-20 @ 08:20  7.35/48/115/26/97.9/0.3  ABG lactate: --    Venous Blood Gas:  02-19 @ 12:55  7.28/62/59/29/87.3  VBG Lactate: 1.5      MICROBIOLOGY:     RADIOLOGY:  [ ] Reviewed and interpreted by me    EXAM:  CT ANGIO BRAIN (W)AW IC   ORDERED BY:  ADIN PÉREZ     ACC: [id number] EXAM:  CT ANGIO NECK (W)AW IC   ORDERED BY:  ADIN PÉREZ     ACC: [id number] EXAM:  CT BRAIN   ORDERED BY:  ADIN PÉREZ     PROCEDURE DATE:  02/19/2024          INTERPRETATION:  CLINICAL INFORMATION: Altered mental status.    COMPARISON: CT head 1/2/2020    CONTRAST:  IV Contrast: NONE (accession [id number]), IV contrast documented in   unlinked concurrent exam (accession [id number]), Omnipaque 300 (accession   [id number])  70 cc administered  30 cc discarded  Complications: None reported at time of study completion    TECHNIQUE:  CT BRAIN: Serial axial images were obtained from the skull base to the   vertex without the use of contrast.    CTA NECK/HEAD: After the intravenous power injection of non-ionic   contrast material, serial thin sections were obtained through the   cervical and intracranial circulation on a multislice CT scanner. Axial,   Coronal and Sagittal MIP reformatted images were obtained. 3D   reconstruction was also performed.    FINDINGS:    CT BRAIN:    VENTRICLES AND SULCI: Moderate ventriculomegaly, most prominent in the   occipital horn. The callosal angle measures approximately 60 degrees   (601-40).  INTRA-AXIAL: No intracranial mass, acute hemorrhage, or midline shift.    Mild are periventricular and bihemispheric white matter hypodensities,   consistent with microvascular type changes.  EXTRA-AXIAL: No mass or fluid collection. Basal cisterns are normal in   appearance.    VISUALIZED SINUSES:  Clear.  TYMPANOMASTOID CAVITIES:  Clear.  VISUALIZED ORBITS: Normal.  CALVARIUM: Intact.    MISCELLANEOUS: None.      CTA NECK:    AORTIC ARCH AND VISUALIZED GREAT VESSELS: Normal three-vessel arch. Mild   atherosclerotic calcification of the aortic arch.    RIGHT:  COMMON CAROTID ARTERY: Trace atherosclerotic calcification at the carotid   bifurcation. Normal in caliber to the carotid bifurcation. The common   carotid artery courses leftward and returns to the right the level of the   arytenoid cartilage.  INTERNAL CAROTID ARTERY: No significant stenosis based on NASCET   criteria. Normal course and caliber to the intracranial circulation.  VERTEBRAL ARTERY: Originates from the right subclavian artery. Normal in   course and caliber to the intracranial circulation.    LEFT:  COMMON CAROTID ARTERY: Trace atherosclerotic calcification at the carotid   bifurcation. Normal in course and caliber to the carotid bifurcation.  INTERNAL CAROTID ARTERY: No significant stenosis based on NASCET   criteria. Normal course and caliber to the intracranial circulation.  VERTEBRAL ARTERY: Originates from the left subclavian artery. Normal in   course and caliber to the intracranial circulation.    VISUALIZED LUNGS: Mild biapical lung scarring.    MISCELLANEOUS: None.    CAROTID STENOSIS REFERENCE: (NASCET = 100x1-(N/D)). N=greatest narrowing.   D=normal distal diameter - MILD = <50% stenosis. - MODERATE = 50-69%   stenosis. - SEVERE = 70-89% stenosis. - HAIRLINE/CRITICAL = 90-99%   stenosis. - OCCLUDED = 100% stenosis.      CTA HEAD:    INTERNAL CAROTID ARTERIES: Mild atherosclerotic calcification of the   bilateral petrous, cavernous, and supraclinoid segments. Otherwise,   patent without stenosis.    [place]: No aneurysm identified. Tiny aneurysms can be beyond   the resolution of CTA technique.    ANTERIOR CEREBRAL ARTERIES: No significant stenosis or occlusion.  MIDDLE CEREBRAL ARTERIES: No significant stenosis or occlusion.  POSTERIOR CEREBRAL ARTERIES: Fetal origin of left PCA. No significant   stenosis or occlusion.    DISTAL VERTEBRAL / BASILAR ARTERIES: No significant stenosis or occlusion.    VENOUS STRUCTURES: Visualized superficial and deep venous structures   appear patent.    MISCELLANEOUS: No other vascular abnormality is seen.      IMPRESSION:    CT HEAD:  Moderate ventriculomegaly, with findings suggestive of normal pressure   hydrocephalus.  No intracranial hemorrhage, mass effect or midline shift.    CTA HEAD:  Patent intracranial circulation without flow limiting stenosis.  Mild atherosclerotic calcification of bilateral petrous, cavernous, and   supraclinoid internal carotid arteries.    CTA NECK:  No evidence of significant stenosis or occlusion.    --- End of Report ---      EXAM:  XR CHEST PORTABLE URGENT 1V   ORDERED BY:  ADIN PÉREZ     PROCEDURE DATE:  02/19/2024          INTERPRETATION:  Sepsis.    AP chest. Prior 1/2/2020    Cardiac silhouette exaggerated by AP film shallow inspiration, unchanged.   Grossly clear lungs. No consolidation or effusion. Biapical pleural   thickening.    IMPRESSION: Grossly clear lungs

## 2024-02-20 NOTE — PATIENT PROFILE ADULT - FUNCTIONAL ASSESSMENT - BASIC MOBILITY 6.
2-calculated by average/Not able to assess (calculate score using [facility] averaging method) 1 = Total assistance

## 2024-02-20 NOTE — PROGRESS NOTE ADULT - SUBJECTIVE AND OBJECTIVE BOX
Patient is a 89y old  Female who presents with a chief complaint of AMS (20 Feb 2024 16:09)      INTERVAL HPI/OVERNIGHT EVENTS: Patient is very obtunded, had 2 rapid response today due to being unresponsive and seizure activity. Evaluated by MICU, not candidate to be intubated and sent to ICU yet. Patient started on Keppra after possible seizure. Given dose of Keppra. Seen by neurologist, we are sending her for MRI brain. Continue IV abx with Vanco and Meropenem. Patient is completely obtunded. Consult ID.     Pain Location & Control:     MEDICATIONS  (STANDING):  dextrose 5% + sodium chloride 0.45%. 1000 milliLiter(s) (75 mL/Hr) IV Continuous <Continuous>  donepezil 10 milliGRAM(s) Oral at bedtime  enoxaparin Injectable 40 milliGRAM(s) SubCutaneous every 24 hours  piperacillin/tazobactam IVPB.. 3.375 Gram(s) IV Intermittent every 8 hours    MEDICATIONS  (PRN):      Allergies    sulfamethoxazole (Vomiting; Nausea)    Intolerances        REVIEW OF SYSTEMS:  UTO obtunded     Vital Signs Last 24 Hrs  T(C): 36.3 (20 Feb 2024 16:25), Max: 36.3 (20 Feb 2024 16:25)  T(F): 97.4 (20 Feb 2024 16:25), Max: 97.4 (20 Feb 2024 16:25)  HR: 85 (20 Feb 2024 16:25) (53 - 85)  BP: 90/47 (20 Feb 2024 16:25) (90/47 - 163/78)  BP(mean): --  RR: 18 (20 Feb 2024 16:25) (16 - 20)  SpO2: 93% (20 Feb 2024 16:25) (93% - 99%)    Parameters below as of 20 Feb 2024 16:25  Patient On (Oxygen Delivery Method): room air        PHYSICAL EXAM:  GENERAL: NAD, well-groomed, well-developed  HEAD:  Atraumatic, Normocephalic  EYES: EOMI, PERRLA, conjunctiva and sclera clear  ENMT: No tonsillar erythema, exudates, or enlargement; Moist mucous membranes, Good dentition, No lesions  NECK: Supple, No JVD, Normal thyroid  NERVOUS SYSTEM:  Alert & Oriented X obtunded, not following commands   CHEST/LUNG: Clear to auscultation bilaterally; No rales, rhonchi, wheezing, or rubs  HEART: Regular rate and rhythm; No murmurs, rubs, or gallops  ABDOMEN: Soft, Nontender, Nondistended; Bowel sounds present  EXTREMITIES:  2+ Peripheral Pulses, No clubbing or cyanosis  LYMPH: No lymphadenopathy noted  SKIN: No rashes or lesions      LABS:                        13.3   3.98  )-----------( 58       ( 20 Feb 2024 11:30 )             40.4     20 Feb 2024 11:53    146    |  111    |  44     ----------------------------<  280    5.1     |  24     |  1.13     Ca    9.1        20 Feb 2024 11:53  Phos  3.3       20 Feb 2024 11:53  Mg     2.2       20 Feb 2024 11:53    TPro  5.5    /  Alb  3.0    /  TBili  0.4    /  DBili  x      /  AST  101    /  ALT  204    /  AlkPhos  182    20 Feb 2024 11:53    PT/INR - ( 20 Feb 2024 11:30 )   PT: 12.5 sec;   INR: 1.20 ratio         PTT - ( 20 Feb 2024 11:30 )  PTT:55.5 sec  Urinalysis Basic - ( 20 Feb 2024 11:53 )    Color: x / Appearance: x / SG: x / pH: x  Gluc: 280 mg/dL / Ketone: x  / Bili: x / Urobili: x   Blood: x / Protein: x / Nitrite: x   Leuk Esterase: x / RBC: x / WBC x   Sq Epi: x / Non Sq Epi: x / Bacteria: x      CAPILLARY BLOOD GLUCOSE      POCT Blood Glucose.: 114 mg/dL (20 Feb 2024 12:19)  POCT Blood Glucose.: 67 mg/dL (20 Feb 2024 11:31)  POCT Blood Glucose.: 219 mg/dL (20 Feb 2024 08:24)  POCT Blood Glucose.: 63 mg/dL (20 Feb 2024 08:02)        Cultures  Culture Results:   No growth at 24 hours (02-19-24 @ 12:56)  Culture Results:   <10,000 CFU/mL Normal Urogenital Vidya (02-19-24 @ 12:56)  Culture Results:   No growth at 24 hours (02-19-24 @ 12:55)    Lactate, Blood: 1.1 mmol/L (02-20-24 @ 11:30)  Lactate, Blood: 0.9 mmol/L (02-20-24 @ 07:27)    RADIOLOGY & ADDITIONAL TESTS:    Imaging Personally Reviewed:  [ X] YES  [ ] NO    Consultant(s) Notes Reviewed:  [ X] YES  [ ] NO    Care Discussed with Consultants/Other Providers [X ] YES  [ ] NO

## 2024-02-20 NOTE — CONSULT NOTE ADULT - ATTENDING COMMENTS
89 y.o. female with Myasthenia gravis and dementia admitted for encephalopathy and severe sepsis due to UTI. MICU consulted for RRT with hypotension and worsening mental status. Hypotension now resolved after fluid bolus but patient's remains encephalopathic. The patient's AMS could be due to sepsis vs worsening of her dementia vs seizure.     Plan   - c/w Broad spectrum abx for UTI  - Per [facility] UCx from 2/13 growing E. Coli and K. pneumonia   - Agree with EEG per neurology   - Appreciate Neurology recs  - Given the patient's advance dementia and overall poor functional state aggressive measures such as CPR, intubation and vasopressors would cause more harm than benefit.   - Palliative consult to address GOC with the patient's family 89 y.o. female with Myasthenia gravis and dementia admitted for encephalopathy and severe sepsis due to UTI. MICU consulted for RRT with hypotension and worsening mental status. Hypotension now resolved after fluid bolus but patient's remains encephalopathic. The patient's AMS could be due to sepsis vs worsening of her dementia vs seizure.     Plan   - c/w Broad spectrum abx for UTI  - Per [facility] UCx from 2/13 growing E. Coli and K. pneumonia   - Agree with EEG per neurology   - Appreciate Neurology recs  - Given the patient's advance dementia and overall poor functional state aggressive measures such as CPR, intubation and vasopressors would cause more harm than benefit.   - Palliative consult to address GOC with the patient's family    Patient is Not a MICU candidate at this time

## 2024-02-20 NOTE — CHART NOTE - NSCHARTNOTEFT_GEN_A_CORE
89F with PMH of Myasthenia Gravis (not on medication for past 9 years) and dementia (AOx1, conversive at baseline) admitted for AMS (AAOx0) in the setting of UTI. RRT was called earlier this AMx2 for seizure like activity s/p neuro c/s, keppra, zosyn/vanc, and now on vEEG. Initially RRT called for hypoxia and Night admit resident ran the rapid. Hypoxia resolved w/ changing probe w/o other intervention and rapid ended. Afterwards, MAR examined pt w/ primary team and found that pt had new tongue protrusion and swelling that was not seen in the AM. Unsure if pt bite her tongue during seizure-like activity. Able to elicit gag reflex. Advised ENT consult for scope and examine the necessity for intubation if there are airway concerns.    Alban Dia MD PhD  PGY3 Internal Medicine

## 2024-02-20 NOTE — PROVIDER CONTACT NOTE (OTHER) - ACTION/TREATMENT ORDERED:
Provider notified, bear hugger to be placed on pt. Assess temperature q1hr. Provider notified, provider at bedside. Octavio nicker to be placed on pt. Assess temperature q1hr.

## 2024-02-20 NOTE — CONSULT NOTE ADULT - ASSESSMENT
88 y/o F with PMHx of Myasthenia Gravis (not on medication) and dementia (A&Ox1, conversive at baseline) who presents from her nursing home with worsening lethargy and confusion. UA done as outpatient on 2/13 grossly positive and urine culture growing E. coli and Klebsiella, pansensitive. Found to be hypothermic in ED. Admitted for sepsis and metabolic encephalopathy in setting of UTI. Multiple RRTs called today. First RRT called for seizure-like activity. Patient loaded with Keppra and placed on EEG. Second RRT called for hypotension. Patient was given 4 L IVF with resolution of hypotension.      A/P    #R/O sepsis  unclear what caused acute decompensation. Pt was treated for UTI but urine negative here. Pt with remote h/o Myasthenia gravis and mild dementia.  Pt with possible seizure today  pt started on empiric zosyn  If no improvement, switch to meropenem and consider LP. note that pt is fully flexing neck  would also suggest MRI of head to r/o stroke  would avoid certain abs- such as cipro with the myasthenia graavis- suggest neuro import to address   pt with clear cxr and no diarrhea and neg cxr  can consider ct of chest / abd /pelvis if no improvement      #elevate LFTs  check RUQ ultrasound  check hepatitis screen  avoid hepatotoxci meds       Yeni Gabriel M.D. ,   please reach via teams   If no answer, or after 5PM/ weekends,  then please call  [phone]    Assessment and plan discussed with the primary team .

## 2024-02-20 NOTE — CHART NOTE - NSCHARTNOTEFT_GEN_A_CORE
HPI:  89 YO F with PMHx of Myasthenia Gravis (not on medication for past 9 years) and Hx of dementia .     Patient was confused for a few days . Urine culture was checked  and showed positive with Klebsiella and E.coli both sensitive to Cipro. Treated with Cipro for 3 days but confusion got worse. She has slurred  speech today so transferred to hospital for neurological evaluation and CT head.  (19 Feb 2024 18:09)    S: RRT called for involuntary body movements around face and arms, patient nonverbal and eyes closed  Vital Signs Last 24 Hrs  T(C): 33.6 (20 Feb 2024 06:19), Max: 37 (19 Feb 2024 12:56)  T(F): 92.4 (20 Feb 2024 06:19), Max: 98.6 (19 Feb 2024 12:56)  HR: 62 (20 Feb 2024 06:19) (47 - 63)  BP: 108/66 (20 Feb 2024 06:19) (108/66 - 163/78)  BP(mean): --  RR: 16 (20 Feb 2024 06:19) (16 - 20)  SpO2: 95% (20 Feb 2024 06:19) (94% - 100%)    Parameters below as of 20 Feb 2024 06:19  Patient On (Oxygen Delivery Method): room air    At the time of RRT , ptn's fingerstick was 60, temp 94 rectally . Patient noted with twitching mainly in upper body /  Spoke with daughter Katrin Herrera and stated her mother has no history of seizures.   Throughout rapid response continuous twitching noted , Labs obtained and neurology was called ; see RRT note for details  a/p 89 yrs old female with history of dementia p/w ams presently treated for uti now RRT called for unresponsiveness and abnormal jerky body movements ? seizure  Follow up blood cxs  Check TSH and cortisol level   s/p D50 for hypoglycemia   S/P ct head show NPH , neuro follow up   EEG ordered  start iv fluids, and c/w hypothermic blanket  Dr Woo was called and left message .   Dtr Katrin aware of above events  place patient on tele

## 2024-02-20 NOTE — CONSULT NOTE ADULT - SUBJECTIVE AND OBJECTIVE BOX
Patient is a 89y old  Female who presents with a chief complaint of AMS (20 Feb 2024 13:54)      HPI:  89 YO F with PMHx of Myasthenia Gravis (not on medication for past 9 years) and Hx of dementia .     Patient was confused for a few days . Urine culture was checked  and showed positive with Klebsiella and E.coli both sensitive to Cipro. Treated with Cipro for 3 days but confusion got worse. She has slurred  speech today so transferred to hospital for neurological evaluation and CT head.  (19 Feb 2024 18:09)      PAST MEDICAL & SURGICAL HISTORY:  Myasthenia gravis      Dementia      Myasthenia gravis      No significant past surgical history      No significant past surgical history          Social history:   Social History:    Marital Status:   Occupation:   Lives with:     Substance Use :  Tobacco Usage:  (   ) never smoked   (   ) former smoker   (   ) current smoker  (     ) pack year  (        ) last tobacco use date  Alcohol Usage:  Travel:  Pets:          FAMILY HISTORY:  No pertinent family history in first degree relatives    No pertinent family history in first degree relatives        REVIEW OF SYSTEMS  General:	Denies any malaise fatigue or chills. Fevers absent    Skin:No rash  	  Ophthalmologic:Denies any visual complaints,discharge redness or photophobia  	  ENMT:No nasal discharge,headache,sinus congestion or throat pain.No dental complaints    Respiratory and Thorax:No cough,sputum or chest pain.Denies shortness of breath  	  Cardiovascular:	No chest pain,palpitaions or dizziness    Gastrointestinal:	NO nausea,abdominal pain or diarrhea.    Genitourinary:	No dysuria,frequency. No flank pain    Musculoskeletal:	No joint swelling or pain.No weakness    Neurological:No confusion,diziness.No extremity weakness.No bladder or bowel incontinence	    Psychiatric:No delusions or hallucinations	    Hematology/Lymphatics:	No LN swelling.No gum bleeding     Endocrine:	No recent weight gain or loss.No abnormal heat/cold intolerance    Allergic/Immunologic:	No hives or rash     Allergies    sulfamethoxazole (Vomiting; Nausea)    Intolerances        Antimicrobials:       MEDICATIONS  (prior antimicrobials ):    cefTRIAXone   IVPB   100 mL/Hr IV Intermittent (02-19-24 @ 14:15)    piperacillin/tazobactam IVPB.   200 mL/Hr IV Intermittent (02-20-24 @ 09:10)    piperacillin/tazobactam IVPB.-   25 mL/Hr IV Intermittent (02-20-24 @ 13:23)    vancomycin  IVPB   100 mL/Hr IV Intermittent (02-20-24 @ 09:10)             piperacillin/tazobactam IVPB.. 3.375 Gram(s) IV Intermittent every 8 hours      MEDICATIONS  (STANDING):  dextrose 5% + sodium chloride 0.45%. 1000 milliLiter(s) (75 mL/Hr) IV Continuous <Continuous>  donepezil 10 milliGRAM(s) Oral at bedtime  enoxaparin Injectable 40 milliGRAM(s) SubCutaneous every 24 hours  piperacillin/tazobactam IVPB.. 3.375 Gram(s) IV Intermittent every 8 hours    MEDICATIONS  (PRN):        Vital Signs Last 24 Hrs  T(C): 33.6 (20 Feb 2024 06:19), Max: 33.6 (20 Feb 2024 06:19)  T(F): 92.4 (20 Feb 2024 06:19), Max: 92.4 (20 Feb 2024 06:19)  HR: 62 (20 Feb 2024 06:19) (53 - 63)  BP: 108/66 (20 Feb 2024 06:19) (108/66 - 163/78)  BP(mean): --  RR: 16 (20 Feb 2024 06:19) (16 - 20)  SpO2: 95% (20 Feb 2024 06:19) (94% - 99%)    Parameters below as of 20 Feb 2024 06:19  Patient On (Oxygen Delivery Method): room air        PHYSICAL EXAM:Pleasant patient in no acute distress.      Constitutional:Comfortable.Awake and alert  No cachexia     Eyes:PERRL EOMI.NO discharge or conjunctival injection    ENMT:No sinus tenderness.No thrush.No pharyngeal exudate or erythema.Fair dental hygiene    Neck:Supple,No LN,no JVD      Respiratory:Good air entry bilaterally,CTA    Cardiovascular:S1 S2 wnl, No murmurs,rub or gallops    Gastrointestinal:Soft BS(+) no tenderness no masses ,No rebound or guarding    Genitourinary:No CVA tendereness     Rectal:    Extremities:No cyanosis,clubbing or edema.    Vascular:peripheral pulses felt    Neurological:AAO X 3,No grossly focal deficits    Skin:No rash     Lymph Nodes:No palpable LNs    Musculoskeletal:No joint swelling or LOM    Psychiatric:Affect normal.                                13.3   3.98  )-----------( 58       ( 20 Feb 2024 11:30 )             40.4     LIVER FUNCTIONS - ( 20 Feb 2024 11:53 )  Alb: 3.0 g/dL / Pro: 5.5 g/dL / ALK PHOS: 182 U/L / ALT: 204 U/L / AST: 101 U/L / GGT: x             02-20    146<H>  |  111<H>  |  44<H>  ----------------------------<  280<H>  5.1   |  24  |  1.13    Ca    9.1      20 Feb 2024 11:53  Phos  3.3     02-20  Mg     2.2     02-20    TPro  5.5<L>  /  Alb  3.0<L>  /  TBili  0.4  /  DBili  x   /  AST  101<H>  /  ALT  204<H>  /  AlkPhos  182<H>  02-20      Urinalysis (02.19.24 @ 12:56)    Blood, Urine: Negative   pH Urine: 5.0   Glucose Qualitative, Urine: Negative mg/dL   Color: Yellow   Urine Appearance: Clear   Bilirubin: Negative   Ketone - Urine: Trace mg/dL   Specific Gravity: 1.026   Protein, Urine: Trace mg/dL   Urobilinogen: 1.0 mg/dL   Nitrite: Negative   Leukocyte Esterase Concentration: Negative        Culture - Urine (02.19.24 @ 12:56)    Specimen Source: Clean Catch Clean Catch (Midstream)   Culture Results:   <10,000 CFU/mL Normal Urogenital Vidya            Radiology:    < from: CT Angio Neck w/ IV Cont (02.19.24 @ 16:39) >  MPRESSION:    CT HEAD:  Moderate ventriculomegaly, with findings suggestive of normal pressure   hydrocephalus.  No intracranial hemorrhage, mass effect or midline shift.    CTA HEAD:  Patent intracranial circulation without flow limiting stenosis.  Mild atherosclerotic calcification of bilateral petrous, cavernous, and   supraclinoid internal carotid arteries.    CTA NECK:  No evidence of significant stenosis or occlusion.    --- End of Report ---          NATASHA SOLORIO MD; Resident Radiologist  This document has been electronically signed.  ANDI SELLERS MD; Attending Radiologist  This document has been electronically signed. Feb 19 2024  5:22PM    < end of copied text >      < from: MR Lumbar Spine w/ IV Cont (01.06.20 @ 21:29) >  IMPRESSION:    No spinal cord abnormality or thecal sac compression within the thoracic spine.    Multilevel spondylosis of the lumbar spine with L4-L5 severe spinal stenosis, mild anterolisthesis as well as large right parasagittal extruded disc herniation      < end of copied text >       Patient is a 89y old  Female who presents with a chief complaint of AMS (2024 13:54)      HPI:  89 YO F with PMHx of Myasthenia Gravis (not on medication for past 9 years) and Hx of dementia .     Patient was confused for a few days . Urine culture was checked  and showed positive with Klebsiella and E.coli both sensitive to Cipro. Treated with Cipro for 3 days but confusion got worse. She has slurred  speech today so transferred to hospital for neurological evaluation and CT head.  (2024 18:09)      PAST MEDICAL & SURGICAL HISTORY:  Myasthenia gravis      Dementia      Myasthenia gravis      No significant past surgical history      No significant past surgical history          Social history:   Marital Status:   Lives with: in a facilty    Substance Use : denies  Tobacco Usage:  (   ) never smoked   (  x ) former smoker- in her youth   (   ) current smoker  (     ) pack year  (        ) last tobacco use date  Alcohol Usage:denies  Travel: none            FAMILY HISTORY:  mother lived to 109  father  at  63 of an aneurysm         REVIEW OF SYSTEMS  pt unable to provide history  baseline could walk with walker and mild dementia  acute worsening over the last few days  pt was hypothermic earlier today        Allergies    sulfamethoxazole (Vomiting; Nausea)    Intolerances        Antimicrobials:       MEDICATIONS  (prior antimicrobials ):    cefTRIAXone   IVPB   100 mL/Hr IV Intermittent (24 @ 14:15)    piperacillin/tazobactam IVPB.   200 mL/Hr IV Intermittent (24 @ 09:10)    piperacillin/tazobactam IVPB.-   25 mL/Hr IV Intermittent (24 @ 13:23)    vancomycin  IVPB   100 mL/Hr IV Intermittent (24 @ 09:10)             piperacillin/tazobactam IVPB.. 3.375 Gram(s) IV Intermittent every 8 hours      MEDICATIONS  (STANDING):  dextrose 5% + sodium chloride 0.45%. 1000 milliLiter(s) (75 mL/Hr) IV Continuous <Continuous>  donepezil 10 milliGRAM(s) Oral at bedtime  enoxaparin Injectable 40 milliGRAM(s) SubCutaneous every 24 hours  piperacillin/tazobactam IVPB.. 3.375 Gram(s) IV Intermittent every 8 hours    MEDICATIONS  (PRN):        Vital Signs Last 24 Hrs  T(C): 33.6 (2024 06:19), Max: 33.6 (2024 06:19)  T(F): 92.4 (2024 06:19), Max: 92.4 (2024 06:19)  HR: 62 (2024 06:19) (53 - 63)  BP: 108/66 (2024 06:19) (108/66 - 163/78)  BP(mean): --  RR: 16 (2024 06:19) (16 - 20)  SpO2: 95% (2024 06:19) (94% - 99%)    Parameters below as of 2024 06:19  Patient On (Oxygen Delivery Method): room air        PHYSICAL EXAM: nonverbale      Constitutional: pt noncommunicative  neck flexed   face tent    Respiratory:bilateral BS    Cardiovascular:S1 S2     Gastrointestinal:Soft BS(+) no tenderness       Extremities:No cyanosis,clubbing or edema.    Skin:No rash     Lymph Nodes:No palpable LNs    Musculoskeletal:No joint swelling or LOM                                  13.3   3.98  )-----------( 58       ( 2024 11:30 )             40.4     LIVER FUNCTIONS - ( 2024 11:53 )  Alb: 3.0 g/dL / Pro: 5.5 g/dL / ALK PHOS: 182 U/L / ALT: 204 U/L / AST: 101 U/L / GGT: x                 146<H>  |  111<H>  |  44<H>  ----------------------------<  280<H>  5.1   |  24  |  1.13    Ca    9.1      2024 11:53  Phos  3.3     02-20  Mg     2.2     -20    TPro  5.5<L>  /  Alb  3.0<L>  /  TBili  0.4  /  DBili  x   /  AST  101<H>  /  ALT  204<H>  /  AlkPhos  182<H>  -20      Urinalysis (24 @ 12:56)    Blood, Urine: Negative   pH Urine: 5.0   Glucose Qualitative, Urine: Negative mg/dL   Color: Yellow   Urine Appearance: Clear   Bilirubin: Negative   Ketone - Urine: Trace mg/dL   Specific Gravity: 1.026   Protein, Urine: Trace mg/dL   Urobilinogen: 1.0 mg/dL   Nitrite: Negative   Leukocyte Esterase Concentration: Negative        Culture - Urine (24 @ 12:56)    Specimen Source: Clean Catch Clean Catch (Midstream)   Culture Results:   <10,000 CFU/mL Normal Urogenital Vidya            Radiology:    < from: CT Angio Neck w/ IV Cont (24 @ 16:39) >  MPRESSION:    CT HEAD:  Moderate ventriculomegaly, with findings suggestive of normal pressure   hydrocephalus.  No intracranial hemorrhage, mass effect or midline shift.    CTA HEAD:  Patent intracranial circulation without flow limiting stenosis.  Mild atherosclerotic calcification of bilateral petrous, cavernous, and   supraclinoid internal carotid arteries.    CTA NECK:  No evidence of significant stenosis or occlusion.    --- End of Report ---          NATASHA SOLORIO MD; Resident Radiologist  This document has been electronically signed.  ANDI SELLERS MD; Attending Radiologist  This document has been electronically signed. 2024  5:22PM    < end of copied text >      < from: MR Lumbar Spine w/ IV Cont (20 @ 21:29) >  IMPRESSION:    No spinal cord abnormality or thecal sac compression within the thoracic spine.    Multilevel spondylosis of the lumbar spine with L4-L5 severe spinal stenosis, mild anterolisthesis as well as large right parasagittal extruded disc herniation      < end of copied text >      < from: Xray Chest 1 View- PORTABLE-Urgent (24 @ 13:47) >    ACC: [id number] EXAM:  XR CHEST PORTABLE URGENT 1V   ORDERED BY:  ADIN PÉREZ     PROCEDURE DATE:  2024          INTERPRETATION:  Sepsis.    AP chest. Prior 2020    Cardiac silhouette exaggerated by AP film shallow inspiration, unchanged.   Grossly clear lungs. No consolidation or effusion. Biapical pleural   thickening.    IMPRESSION: Grossly clear lungs    --- End of Report ---            TOOTIE GANDHI MD; Attending Radiologist  This document has been electronically signed. 2024  1:52PM    < end of copied text >

## 2024-02-20 NOTE — PROGRESS NOTE ADULT - SUBJECTIVE AND OBJECTIVE BOX
CHIEF COMPLAINT:    HPI:    PAST MEDICAL & SURGICAL HISTORY:  Myasthenia gravis      Dementia      Myasthenia gravis      No significant past surgical history      No significant past surgical history          FAMILY HISTORY:  No pertinent family history in first degree relatives    No pertinent family history in first degree relatives        SOCIAL HISTORY:  Smoking: __ packs x ___ years  EtOH Use:  Marital Status:  Occupation:  Recent Travel:  Country of Birth:  Advance Directives:    Allergies    sulfamethoxazole (Vomiting; Nausea)    Intolerances        HOME MEDICATIONS:    REVIEW OF SYSTEMS:  Constitutional:   Eyes:  ENT:  CV:  Resp:  GI:  :  MSK:  Integumentary:  Neurological:  Psychiatric:  Endocrine:  Hematologic/Lymphatic:  Allergic/Immunologic:  [ ] All other systems negative  [ ] Unable to assess ROS because ________    OBJECTIVE:  ICU Vital Signs Last 24 Hrs  T(C): 33.6 (20 Feb 2024 06:19), Max: 33.6 (20 Feb 2024 06:19)  T(F): 92.4 (20 Feb 2024 06:19), Max: 92.4 (20 Feb 2024 06:19)  HR: 62 (20 Feb 2024 06:19) (53 - 63)  BP: 108/66 (20 Feb 2024 06:19) (108/66 - 163/78)  BP(mean): --  ABP: --  ABP(mean): --  RR: 16 (20 Feb 2024 06:19) (16 - 20)  SpO2: 95% (20 Feb 2024 06:19) (94% - 99%)    O2 Parameters below as of 20 Feb 2024 06:19  Patient On (Oxygen Delivery Method): room air              CAPILLARY BLOOD GLUCOSE      POCT Blood Glucose.: 114 mg/dL (20 Feb 2024 12:19)      PHYSICAL EXAM:  General:   HEENT:   Lymph Nodes:  Neck:   Respiratory:   Cardiovascular:   Abdomen:   Extremities:   Skin:   Neurological:  Psychiatry:    HOSPITAL MEDICATIONS:  MEDICATIONS  (STANDING):  dextrose 5% + sodium chloride 0.45%. 1000 milliLiter(s) (75 mL/Hr) IV Continuous <Continuous>  donepezil 10 milliGRAM(s) Oral at bedtime  enoxaparin Injectable 40 milliGRAM(s) SubCutaneous every 24 hours  piperacillin/tazobactam IVPB.. 3.375 Gram(s) IV Intermittent every 8 hours    MEDICATIONS  (PRN):      LABS:                        13.3   3.98  )-----------( 58       ( 20 Feb 2024 11:30 )             40.4     02-20    146<H>  |  111<H>  |  44<H>  ----------------------------<  280<H>  5.1   |  24  |  1.13    Ca    9.1      20 Feb 2024 11:53  Phos  3.3     02-20  Mg     2.2     02-20    TPro  5.5<L>  /  Alb  3.0<L>  /  TBili  0.4  /  DBili  x   /  AST  101<H>  /  ALT  204<H>  /  AlkPhos  182<H>  02-20    PT/INR - ( 20 Feb 2024 11:30 )   PT: 12.5 sec;   INR: 1.20 ratio         PTT - ( 20 Feb 2024 11:30 )  PTT:55.5 sec  Urinalysis Basic - ( 20 Feb 2024 11:53 )    Color: x / Appearance: x / SG: x / pH: x  Gluc: 280 mg/dL / Ketone: x  / Bili: x / Urobili: x   Blood: x / Protein: x / Nitrite: x   Leuk Esterase: x / RBC: x / WBC x   Sq Epi: x / Non Sq Epi: x / Bacteria: x      Arterial Blood Gas:  02-20 @ 08:20  7.35/48/115/26/97.9/0.3  ABG lactate: --    Venous Blood Gas:  02-19 @ 12:55  7.28/62/59/29/87.3  VBG Lactate: 1.5      MICROBIOLOGY:     RADIOLOGY:  [ ] Reviewed and interpreted by me    EKG:       PTT - ( 20 Feb 2024 11:30 )  PTT:55.5 sec  Urinalysis Basic - ( 20 Feb 2024 11:53 )    Color: x / Appearance: x / SG: x / pH: x  Gluc: 280 mg/dL / Ketone: x  / Bili: x / Urobili: x   Blood: x / Protein: x / Nitrite: x   Leuk Esterase: x / RBC: x / WBC x   Sq Epi: x / Non Sq Epi: x / Bacteria: x        RADIOLOGY & ADDITIONAL TESTS: Reviewed.

## 2024-02-20 NOTE — CONSULT NOTE ADULT - ASSESSMENT
88 y/o M with PMHx of Myasthenia Gravis (not on medication) and dementia (A&Ox1, conversive at baseline) who presents from her nursing home with worsening lethargy and confusion. UA done as outpatient on 2/13 grossly positive and urine culture growing E. coli and Klebsiella, pansensitive. Found to be hypothermic in ED. Admitted for sepsis and metabolic encephalopathy in setting of UTI. Multiple RRTs called today. First RRT called for seizure-like activity. Patient loaded with Keppra and placed on EEG. Second RRT called for hypotension. Patient was given 4 L IVF with resolution of hypotension.  MICU consulted for hypotension      Recommendations:  -hypotension likely in setting of sepsis   -continue Zosyn  -f/u blood cultures  -avoid fluoroquinolones due to history myasthenia gravis  -palliative care consult for GOC. Patient is a poor candidate for intubation given age and comorbidiites  -if no improvement in mental status with abx, consider LP    Plan discussed with attending. Patient is not a MICU candidate at this time. Feel free to call with any questions    Katie Schulz MD  Internal Medicine PGY3 89 y/o M with PMHx of Myasthenia Gravis (not on medication) and dementia (A&Ox1, conversive at baseline) who presents from her nursing home with worsening lethargy and confusion. UA done as outpatient on 2/13 grossly positive and urine culture growing E. coli and Klebsiella, pansensitive. Found to be hypothermic in ED. Admitted for sepsis and metabolic encephalopathy in setting of UTI. Multiple RRTs called today. First RRT called for seizure-like activity. Patient loaded with Keppra and placed on EEG. Second RRT called for hypotension. Patient was given 4 L IVF with resolution of hypotension.  MICU consulted for hypotension      Recommendations:  -hypotension likely in setting of sepsis  -continue Zosyn  -f/u blood cultures  -avoid fluoroquinolones due to history myasthenia gravis  -palliative care consult for GOC. Patient is a poor candidate for intubation given age and comorbidiites  -if no improvement in mental status with abx, consider LP    Plan discussed with attending. Patient is not a MICU candidate at this time. Feel free to call with any questions    Katie Schulz MD  Internal Medicine PGY3 88 y/o F with PMHx of Myasthenia Gravis (not on medication) and dementia (A&Ox1, conversive at baseline) who presents from her nursing home with worsening lethargy and confusion. UA done as outpatient on 2/13 grossly positive and urine culture growing E. coli and Klebsiella, pansensitive. Found to be hypothermic in ED. Admitted for sepsis and metabolic encephalopathy in setting of UTI. Multiple RRTs called today. First RRT called for seizure-like activity. Patient loaded with Keppra and placed on EEG. Second RRT called for hypotension. Patient was given 4 L IVF with resolution of hypotension.  MICU consulted for hypotension      Recommendations:  -hypotension likely in setting of sepsis  -continue Zosyn  -f/u blood cultures  -avoid fluoroquinolones due to history myasthenia gravis  -palliative care consult for GOC. Patient is a poor candidate for intubation given age and comorbidiites  -if no improvement in mental status with abx, consider LP    Plan discussed with attending. Patient is not a MICU candidate at this time. Feel free to call with any questions    Katie Schulz MD  Internal Medicine PGY3

## 2024-02-20 NOTE — CHART NOTE - NSCHARTNOTEFT_GEN_A_CORE
Alerted by RN that patient temp not reading. Patient seen and asessed at beside, A&Ox0, mentating at baseline, resting comfortably and in NAD. Pt able to open eyes and track. Patient responsive to verbal and tactile stimuli.     Vital Signs Last 24 Hrs  T(C): 31.7 (20 Feb 2024 02:56), Max: 37 (19 Feb 2024 12:56) axillary  T(F): 89 (20 Feb 2024 02:56), Max: 98.6 (19 Feb 2024 12:56) Axillary  HR: 60 (20 Feb 2024 00:38) (47 - 60)  BP: 139/94 (20 Feb 2024 00:38) (137/89 - 163/78)  BP(mean): --  RR: 18 (20 Feb 2024 00:38) (18 - 20)  SpO2: 94% (20 Feb 2024 00:38) (94% - 100%)    Physical Exam:  General: WN/WD NAD  Skin: Skin is dry, cool to touch.   Neurology: A&Ox0 per baseline  Head:  Normocephalic, atraumatic  Respiratory: CTA B/L  CV: RRR, S1S2, no murmur               14.3   3.88  )-----------( 56       ( 19 Feb 2024 12:55 )             44.8     02-19    145  |  111<H>  |  50<H>  ----------------------------<  117<H>  5.0   |  26  |  0.96    Ca    10.3      19 Feb 2024 12:55    TPro  6.8  /  Alb  3.9  /  TBili  0.6  /  DBili  x   /  AST  141<H>  /  ALT  271<H>  /  AlkPhos  220<H>  02-19    Radiology:    HPI:  88 YO F with PMHx of Myasthenia Gravis (not on medication for past 9 years) and Hx of dementia .     Patient was confused for a few days . Urine culture was checked  and showed positive with Klebsiella and E.coli both sensitive to Cipro. Treated with Cipro for 3 days but confusion got worse. She has slurred  speech today so transferred to hospital for neurological evaluation and CT head.  (19 Feb 2024 18:09)    Patient now with low temperature upon checking vital signs    Assessment & Plan:  Suspected hypothermia 2/2 UTI   > Octavio neal ordered. C/w warming blankets, hot packs  >Temperature reading as 89F orally and axillary however, questionable as all other VSS and patient awake, alert. Will recheck rectal temperature and will recheck temperature every hour.  >C/w Ceftriaxone  >Will endorse primary team     Meagan Lake PA-C  Department of Medicine

## 2024-02-20 NOTE — PROGRESS NOTE ADULT - ASSESSMENT
88 YO F with PMHx of Myasthenia Gravis (not on medication for past 9 years) and MHx of dementia     Assessment/Plan:  AMS(most likely due to metabolic/toxic encephalopathy  due to UTI) - worse. Patient is completely unresponsive with ? seizure activity. F/u neurologist  who will do MRI and doing EEG now.  Continue IV abx.   elevated LFT- Will  check RUQ ultrasound and Hepatitis panel.   NPH- incidental finding. F/u neurologist   UTI with E.coli and Klebsiella as  outpatient culture. - IV abx upgraded to Vanco and Meropenem  DVT ppx- Lovenox 40 mg daily SCD  leg arthritis- Acetaminophen PRN.  b/l cataract- F/u ophthalmolog  vitamin D deficiency- continue Vitamin D supplement.  risk of malnutrition -stable, continue supplement diet. F/u dietitian. Monitor weight.  constipation- senna at bedtime.  LBP 2/2 severe spinal stenosis with out cord pressure - no surgical intervention recommended neither accepted by family, continue PT OT as needed.  Dementia - Stable. Continue Aricept .  Dysphagia - Mechanical soft diet, aspiration precautions.  Myasthenia Gravis - stable , off of medication. Monitor CBC and BMP every 2 months.

## 2024-02-20 NOTE — PATIENT PROFILE ADULT - FALL HARM RISK - HARM RISK INTERVENTIONS
Assistance with ambulation/Assistance OOB with selected safe patient handling equipment/Communicate Risk of Fall with Harm to all staff/Discuss with provider need for PT consult/Monitor gait and stability/Reinforce activity limits and safety measures with patient and family/Tailored Fall Risk Interventions/Visual Cue: Yellow wristband and red socks/Bed in lowest position, wheels locked, appropriate side rails in place/Call bell, personal items and telephone in reach/Instruct patient to call for assistance before getting out of bed or chair/Non-slip footwear when patient is out of bed/[place] to call system/Physically safe environment - no spills, clutter or unnecessary equipment/Purposeful Proactive Rounding/Room/bathroom lighting operational, light cord in reach

## 2024-02-21 DIAGNOSIS — R41.82 ALTERED MENTAL STATUS, UNSPECIFIED: ICD-10-CM

## 2024-02-21 DIAGNOSIS — R79.89 OTHER SPECIFIED ABNORMAL FINDINGS OF BLOOD CHEMISTRY: ICD-10-CM

## 2024-02-21 DIAGNOSIS — J38.01 PARALYSIS OF VOCAL CORDS AND LARYNX, UNILATERAL: ICD-10-CM

## 2024-02-21 DIAGNOSIS — R22.0 LOCALIZED SWELLING, MASS AND LUMP, HEAD: ICD-10-CM

## 2024-02-21 DIAGNOSIS — D68.9 COAGULATION DEFECT, UNSPECIFIED: ICD-10-CM

## 2024-02-21 DIAGNOSIS — A41.9 SEPSIS, UNSPECIFIED ORGANISM: ICD-10-CM

## 2024-02-21 DIAGNOSIS — D69.6 THROMBOCYTOPENIA, UNSPECIFIED: ICD-10-CM

## 2024-02-21 DIAGNOSIS — K21.9 GASTRO-ESOPHAGEAL REFLUX DISEASE WITHOUT ESOPHAGITIS: ICD-10-CM

## 2024-02-21 DIAGNOSIS — F03.90 UNSPECIFIED DEMENTIA WITHOUT BEHAVIORAL DISTURBANCE: ICD-10-CM

## 2024-02-21 LAB
ALBUMIN SERPL ELPH-MCNC: 0.8 G/DL — LOW (ref 3.3–5)
ALP SERPL-CCNC: 44 U/L — SIGNIFICANT CHANGE UP (ref 40–120)
ALT FLD-CCNC: 44 U/L — SIGNIFICANT CHANGE UP (ref 10–45)
ANION GAP SERPL CALC-SCNC: 20 MMOL/L — HIGH (ref 5–17)
ANION GAP SERPL CALC-SCNC: 7 MMOL/L — SIGNIFICANT CHANGE UP (ref 5–17)
APTT BLD: 52.5 SEC — HIGH (ref 24.5–35.6)
AST SERPL-CCNC: 22 U/L — SIGNIFICANT CHANGE UP (ref 10–40)
BASE EXCESS BLDA CALC-SCNC: -3.1 MMOL/L — LOW (ref -2–3)
BASE EXCESS BLDV CALC-SCNC: -1.7 MMOL/L — SIGNIFICANT CHANGE UP (ref -2–3)
BASE EXCESS BLDV CALC-SCNC: -18.4 MMOL/L — LOW (ref -2–3)
BASOPHILS # BLD AUTO: 0 K/UL — SIGNIFICANT CHANGE UP (ref 0–0.2)
BASOPHILS NFR BLD AUTO: 0 % — SIGNIFICANT CHANGE UP (ref 0–2)
BILIRUB SERPL-MCNC: 0.2 MG/DL — SIGNIFICANT CHANGE UP (ref 0.2–1.2)
BLD GP AB SCN SERPL QL: POSITIVE — SIGNIFICANT CHANGE UP
BUN SERPL-MCNC: 13 MG/DL — SIGNIFICANT CHANGE UP (ref 7–23)
BUN SERPL-MCNC: 34 MG/DL — HIGH (ref 7–23)
CA-I SERPL-SCNC: 1.07 MMOL/L — LOW (ref 1.15–1.33)
CA-I SERPL-SCNC: 1.2 MMOL/L — SIGNIFICANT CHANGE UP (ref 1.15–1.33)
CALCIUM SERPL-MCNC: 6.3 MG/DL — CRITICAL LOW (ref 8.4–10.5)
CALCIUM SERPL-MCNC: 8.1 MG/DL — LOW (ref 8.4–10.5)
CHLORIDE BLDV-SCNC: 103 MMOL/L — SIGNIFICANT CHANGE UP (ref 96–108)
CHLORIDE BLDV-SCNC: 118 MMOL/L — HIGH (ref 96–108)
CHLORIDE SERPL-SCNC: 106 MMOL/L — SIGNIFICANT CHANGE UP (ref 96–108)
CHLORIDE SERPL-SCNC: 119 MMOL/L — HIGH (ref 96–108)
CO2 BLDA-SCNC: 25 MMOL/L — HIGH (ref 19–24)
CO2 BLDV-SCNC: 24 MMOL/L — SIGNIFICANT CHANGE UP (ref 22–26)
CO2 BLDV-SCNC: 8 MMOL/L — LOW (ref 22–26)
CO2 SERPL-SCNC: 23 MMOL/L — SIGNIFICANT CHANGE UP (ref 22–31)
CO2 SERPL-SCNC: <10 MMOL/L — CRITICAL LOW (ref 22–31)
CREAT SERPL-MCNC: 0.38 MG/DL — LOW (ref 0.5–1.3)
CREAT SERPL-MCNC: 1.14 MG/DL — SIGNIFICANT CHANGE UP (ref 0.5–1.3)
D DIMER BLD IA.RAPID-MCNC: 527 NG/ML DDU — HIGH
DAT C3-SP REAG RBC QL: POSITIVE — SIGNIFICANT CHANGE UP
EGFR: 46 ML/MIN/1.73M2 — LOW
EGFR: 96 ML/MIN/1.73M2 — SIGNIFICANT CHANGE UP
EOSINOPHIL # BLD AUTO: 0.03 K/UL — SIGNIFICANT CHANGE UP (ref 0–0.5)
EOSINOPHIL NFR BLD AUTO: 0.9 % — SIGNIFICANT CHANGE UP (ref 0–6)
GAS PNL BLDV: 129 MMOL/L — LOW (ref 136–145)
GAS PNL BLDV: 144 MMOL/L — SIGNIFICANT CHANGE UP (ref 136–145)
GAS PNL BLDV: SIGNIFICANT CHANGE UP
GAS PNL BLDV: SIGNIFICANT CHANGE UP
GLUCOSE BLDC GLUCOMTR-MCNC: 106 MG/DL — HIGH (ref 70–99)
GLUCOSE BLDC GLUCOMTR-MCNC: 111 MG/DL — HIGH (ref 70–99)
GLUCOSE BLDC GLUCOMTR-MCNC: 113 MG/DL — HIGH (ref 70–99)
GLUCOSE BLDC GLUCOMTR-MCNC: 124 MG/DL — HIGH (ref 70–99)
GLUCOSE BLDC GLUCOMTR-MCNC: 84 MG/DL — SIGNIFICANT CHANGE UP (ref 70–99)
GLUCOSE BLDV-MCNC: 103 MG/DL — HIGH (ref 70–99)
GLUCOSE BLDV-MCNC: 24 MG/DL — CRITICAL LOW (ref 70–99)
GLUCOSE SERPL-MCNC: 40 MG/DL — CRITICAL LOW (ref 70–99)
GLUCOSE SERPL-MCNC: 88 MG/DL — SIGNIFICANT CHANGE UP (ref 70–99)
HAV IGM SER-ACNC: SIGNIFICANT CHANGE UP
HBV CORE IGM SER-ACNC: SIGNIFICANT CHANGE UP
HBV SURFACE AG SER-ACNC: SIGNIFICANT CHANGE UP
HCO3 BLDA-SCNC: 23 MMOL/L — SIGNIFICANT CHANGE UP (ref 21–28)
HCO3 BLDV-SCNC: 23 MMOL/L — SIGNIFICANT CHANGE UP (ref 22–29)
HCO3 BLDV-SCNC: 7 MMOL/L — CRITICAL LOW (ref 22–29)
HCT VFR BLD CALC: 18.2 % — CRITICAL LOW (ref 34.5–45)
HCT VFR BLD CALC: 35.2 % — SIGNIFICANT CHANGE UP (ref 34.5–45)
HCT VFR BLDA CALC: 11 % — CRITICAL LOW (ref 34.5–46.5)
HCT VFR BLDA CALC: 34 % — LOW (ref 34.5–46.5)
HCV AB S/CO SERPL IA: 0.04 S/CO — SIGNIFICANT CHANGE UP (ref 0–0.99)
HCV AB SERPL-IMP: SIGNIFICANT CHANGE UP
HEPARIN-PF4 AB RESULT: <0.6 U/ML — SIGNIFICANT CHANGE UP (ref 0–0.9)
HGB BLD CALC-MCNC: 11.2 G/DL — LOW (ref 11.7–16.1)
HGB BLD CALC-MCNC: <4 G/DL — CRITICAL LOW (ref 11.7–16.1)
HGB BLD-MCNC: 11.1 G/DL — LOW (ref 11.5–15.5)
HGB BLD-MCNC: 5.5 G/DL — CRITICAL LOW (ref 11.5–15.5)
IMM GRANULOCYTES NFR BLD AUTO: 0.3 % — SIGNIFICANT CHANGE UP (ref 0–0.9)
INR BLD: 2.28 RATIO — HIGH (ref 0.85–1.18)
LACTATE BLDV-MCNC: 0.9 MMOL/L — SIGNIFICANT CHANGE UP (ref 0.5–2)
LACTATE BLDV-MCNC: 1.1 MMOL/L — SIGNIFICANT CHANGE UP (ref 0.5–2)
LACTATE BLDV-MCNC: >16 MMOL/L — CRITICAL HIGH (ref 0.5–2)
LACTATE SERPL-SCNC: >17.6 MMOL/L — CRITICAL HIGH (ref 0.5–2)
LYMPHOCYTES # BLD AUTO: 0.18 K/UL — LOW (ref 1–3.3)
LYMPHOCYTES # BLD AUTO: 5.6 % — LOW (ref 13–44)
MAGNESIUM SERPL-MCNC: <.6 MG/DL — CRITICAL LOW (ref 1.6–2.6)
MCHC RBC-ENTMCNC: 30.2 GM/DL — LOW (ref 32–36)
MCHC RBC-ENTMCNC: 30.7 PG — SIGNIFICANT CHANGE UP (ref 27–34)
MCHC RBC-ENTMCNC: 31.5 GM/DL — LOW (ref 32–36)
MCHC RBC-ENTMCNC: 31.6 PG — SIGNIFICANT CHANGE UP (ref 27–34)
MCV RBC AUTO: 104.6 FL — HIGH (ref 80–100)
MCV RBC AUTO: 97.2 FL — SIGNIFICANT CHANGE UP (ref 80–100)
MONOCYTES # BLD AUTO: 0.25 K/UL — SIGNIFICANT CHANGE UP (ref 0–0.9)
MONOCYTES NFR BLD AUTO: 7.8 % — SIGNIFICANT CHANGE UP (ref 2–14)
NEUTROPHILS # BLD AUTO: 2.72 K/UL — SIGNIFICANT CHANGE UP (ref 1.8–7.4)
NEUTROPHILS NFR BLD AUTO: 85.4 % — HIGH (ref 43–77)
NRBC # BLD: 0 /100 WBCS — SIGNIFICANT CHANGE UP (ref 0–0)
NRBC # BLD: 0 /100 WBCS — SIGNIFICANT CHANGE UP (ref 0–0)
PCO2 BLDA: 45 MMHG — SIGNIFICANT CHANGE UP (ref 32–45)
PCO2 BLDV: 38 MMHG — LOW (ref 39–42)
PCO2 BLDV: <19 MMHG — LOW (ref 39–42)
PF4 HEPARIN CMPLX AB SER-ACNC: NEGATIVE — SIGNIFICANT CHANGE UP
PH BLDA: 7.32 — LOW (ref 7.35–7.45)
PH BLDV: 7.26 — LOW (ref 7.32–7.43)
PH BLDV: 7.39 — SIGNIFICANT CHANGE UP (ref 7.32–7.43)
PHOSPHATE SERPL-MCNC: 0.7 MG/DL — CRITICAL LOW (ref 2.5–4.5)
PLATELET # BLD AUTO: 25 K/UL — LOW (ref 150–400)
PLATELET # BLD AUTO: 50 K/UL — LOW (ref 150–400)
PO2 BLDA: 106 MMHG — SIGNIFICANT CHANGE UP (ref 83–108)
PO2 BLDV: 126 MMHG — HIGH (ref 25–45)
PO2 BLDV: 32 MMHG — SIGNIFICANT CHANGE UP (ref 25–45)
POTASSIUM BLDV-SCNC: 3.9 MMOL/L — SIGNIFICANT CHANGE UP (ref 3.5–5.1)
POTASSIUM BLDV-SCNC: 4.2 MMOL/L — SIGNIFICANT CHANGE UP (ref 3.5–5.1)
POTASSIUM SERPL-MCNC: 4.1 MMOL/L — SIGNIFICANT CHANGE UP (ref 3.5–5.3)
POTASSIUM SERPL-MCNC: 4.1 MMOL/L — SIGNIFICANT CHANGE UP (ref 3.5–5.3)
POTASSIUM SERPL-SCNC: 4.1 MMOL/L — SIGNIFICANT CHANGE UP (ref 3.5–5.3)
POTASSIUM SERPL-SCNC: 4.1 MMOL/L — SIGNIFICANT CHANGE UP (ref 3.5–5.3)
PROLACTIN SERPL-MCNC: 10.8 NG/ML — SIGNIFICANT CHANGE UP (ref 3.4–24.1)
PROT SERPL-MCNC: 1.5 G/DL — LOW (ref 6–8.3)
PROTHROM AB SERPL-ACNC: 23.4 SEC — HIGH (ref 9.5–13)
RBC # BLD: 1.74 M/UL — LOW (ref 3.8–5.2)
RBC # BLD: 3.62 M/UL — LOW (ref 3.8–5.2)
RBC # FLD: 14.9 % — HIGH (ref 10.3–14.5)
RBC # FLD: 15.2 % — HIGH (ref 10.3–14.5)
RH IG SCN BLD-IMP: POSITIVE — SIGNIFICANT CHANGE UP
SAO2 % BLDA: 97.4 % — SIGNIFICANT CHANGE UP (ref 94–98)
SAO2 % BLDV: 96.9 % — HIGH (ref 67–88)
SAO2 % BLDV: 98.2 % — HIGH (ref 67–88)
SODIUM SERPL-SCNC: 135 MMOL/L — SIGNIFICANT CHANGE UP (ref 135–145)
SODIUM SERPL-SCNC: 149 MMOL/L — HIGH (ref 135–145)
T4 AB SER-ACNC: 7.3 UG/DL — SIGNIFICANT CHANGE UP (ref 4.6–12)
TSH SERPL-MCNC: 2.97 UIU/ML — SIGNIFICANT CHANGE UP (ref 0.27–4.2)
WBC # BLD: 3.19 K/UL — LOW (ref 3.8–10.5)
WBC # BLD: 6.18 K/UL — SIGNIFICANT CHANGE UP (ref 3.8–10.5)
WBC # FLD AUTO: 3.19 K/UL — LOW (ref 3.8–10.5)
WBC # FLD AUTO: 6.18 K/UL — SIGNIFICANT CHANGE UP (ref 3.8–10.5)

## 2024-02-21 PROCEDURE — 31505 DIAGNOSTIC LARYNGOSCOPY: CPT

## 2024-02-21 PROCEDURE — 99233 SBSQ HOSP IP/OBS HIGH 50: CPT | Mod: GC

## 2024-02-21 PROCEDURE — 99222 1ST HOSP IP/OBS MODERATE 55: CPT | Mod: GC

## 2024-02-21 PROCEDURE — 99232 SBSQ HOSP IP/OBS MODERATE 35: CPT

## 2024-02-21 PROCEDURE — 86077 PHYS BLOOD BANK SERV XMATCH: CPT

## 2024-02-21 PROCEDURE — 95720 EEG PHY/QHP EA INCR W/VEEG: CPT

## 2024-02-21 RX ORDER — MEROPENEM 1 G/30ML
1000 INJECTION INTRAVENOUS EVERY 12 HOURS
Refills: 0 | Status: COMPLETED | OUTPATIENT
Start: 2024-02-21 | End: 2024-02-27

## 2024-02-21 RX ORDER — LEVETIRACETAM 250 MG/1
1000 TABLET, FILM COATED ORAL ONCE
Refills: 0 | Status: DISCONTINUED | OUTPATIENT
Start: 2024-02-21 | End: 2024-02-21

## 2024-02-21 RX ORDER — SODIUM CHLORIDE 0.65 %
1 AEROSOL, SPRAY (ML) NASAL
Refills: 0 | Status: COMPLETED | OUTPATIENT
Start: 2024-02-21 | End: 2024-02-27

## 2024-02-21 RX ORDER — SODIUM CHLORIDE 9 MG/ML
500 INJECTION INTRAMUSCULAR; INTRAVENOUS; SUBCUTANEOUS ONCE
Refills: 0 | Status: DISCONTINUED | OUTPATIENT
Start: 2024-02-21 | End: 2024-02-26

## 2024-02-21 RX ORDER — CHLORHEXIDINE GLUCONATE 213 G/1000ML
1 SOLUTION TOPICAL DAILY
Refills: 0 | Status: DISCONTINUED | OUTPATIENT
Start: 2024-02-21 | End: 2024-03-08

## 2024-02-21 RX ORDER — AMPICILLIN TRIHYDRATE 250 MG
CAPSULE ORAL
Refills: 0 | Status: DISCONTINUED | OUTPATIENT
Start: 2024-02-21 | End: 2024-02-21

## 2024-02-21 RX ORDER — LEVETIRACETAM 250 MG/1
1000 TABLET, FILM COATED ORAL ONCE
Refills: 0 | Status: COMPLETED | OUTPATIENT
Start: 2024-02-21 | End: 2024-02-21

## 2024-02-21 RX ORDER — AMPICILLIN TRIHYDRATE 250 MG
2 CAPSULE ORAL ONCE
Refills: 0 | Status: COMPLETED | OUTPATIENT
Start: 2024-02-21 | End: 2024-02-21

## 2024-02-21 RX ORDER — LEVETIRACETAM 250 MG/1
500 TABLET, FILM COATED ORAL EVERY 12 HOURS
Refills: 0 | Status: DISCONTINUED | OUTPATIENT
Start: 2024-02-21 | End: 2024-02-21

## 2024-02-21 RX ORDER — AMPICILLIN TRIHYDRATE 250 MG
250 CAPSULE ORAL ONCE
Refills: 0 | Status: DISCONTINUED | OUTPATIENT
Start: 2024-02-21 | End: 2024-02-21

## 2024-02-21 RX ORDER — DEXAMETHASONE 0.5 MG/5ML
6 ELIXIR ORAL EVERY 8 HOURS
Refills: 0 | Status: COMPLETED | OUTPATIENT
Start: 2024-02-21 | End: 2024-02-21

## 2024-02-21 RX ORDER — AMPICILLIN TRIHYDRATE 250 MG
2 CAPSULE ORAL EVERY 8 HOURS
Refills: 0 | Status: DISCONTINUED | OUTPATIENT
Start: 2024-02-21 | End: 2024-02-21

## 2024-02-21 RX ORDER — AMPICILLIN TRIHYDRATE 250 MG
250 CAPSULE ORAL EVERY 6 HOURS
Refills: 0 | Status: DISCONTINUED | OUTPATIENT
Start: 2024-02-21 | End: 2024-02-21

## 2024-02-21 RX ORDER — VANCOMYCIN HCL 1 G
1000 VIAL (EA) INTRAVENOUS ONCE
Refills: 0 | Status: COMPLETED | OUTPATIENT
Start: 2024-02-21 | End: 2024-02-21

## 2024-02-21 RX ADMIN — ENOXAPARIN SODIUM 40 MILLIGRAM(S): 100 INJECTION SUBCUTANEOUS at 07:46

## 2024-02-21 RX ADMIN — Medication 1 SPRAY(S): at 17:01

## 2024-02-21 RX ADMIN — MEROPENEM 100 MILLIGRAM(S): 1 INJECTION INTRAVENOUS at 16:05

## 2024-02-21 RX ADMIN — Medication 200 GRAM(S): at 05:50

## 2024-02-21 RX ADMIN — MEROPENEM 100 MILLIGRAM(S): 1 INJECTION INTRAVENOUS at 04:44

## 2024-02-21 RX ADMIN — LEVETIRACETAM 400 MILLIGRAM(S): 250 TABLET, FILM COATED ORAL at 04:38

## 2024-02-21 RX ADMIN — Medication 6 MILLIGRAM(S): at 21:53

## 2024-02-21 RX ADMIN — CHLORHEXIDINE GLUCONATE 1 APPLICATION(S): 213 SOLUTION TOPICAL at 11:24

## 2024-02-21 RX ADMIN — Medication 200 GRAM(S): at 13:17

## 2024-02-21 RX ADMIN — Medication 250 MILLIGRAM(S): at 07:08

## 2024-02-21 RX ADMIN — Medication 6 MILLIGRAM(S): at 13:13

## 2024-02-21 RX ADMIN — Medication 1 SPRAY(S): at 05:48

## 2024-02-21 RX ADMIN — Medication 6 MILLIGRAM(S): at 05:49

## 2024-02-21 NOTE — CONSULT NOTE ADULT - SUBJECTIVE AND OBJECTIVE BOX
CHIEF COMPLAINT:    HPI:    88 y/o F with PMHx of Myasthenia Gravis (not on medication) and dementia (A&Ox1, conversive at baseline) who presents from her nursing home with worsening lethargy and confusion. UA done as outpatient grossly positive and urine culture growing E. coli and Klebsiella. Patient was started on ciprofloxacin 3 days prior to presentation but has not improved.     In the ED, patient found to be hypothermic (89 F). On admission, labs significant for WBC 3.98, Plt 58, Alk Phos 220, , , pH 7.28, CO2 , lactate 1.5, TSH, B12 normal. RPR negative. CTH with moderate ventriculomegaly with findings suggestive of normal pressure hydrocephalus. CTA head and neck unremarkable. CXR clear. UA negative for bacteria, LE, and nitrite, WBC 2.  Patient given CTX for UTI. Outpatient UA from 2/13 with small LE, moderate bacteria, and WBC 84. Urine culture 2/13 positive for E. coli and Klebsiella, started on zosyn on 2/20 and broadened to meropenem on 2/21. Vanc and cefepime added on 2/21. Pt started on EEG on 2/20 for concern for seizure like activity.     PAST MEDICAL & SURGICAL HISTORY:  Myasthenia gravis      Dementia      Myasthenia gravis      No significant past surgical history      No significant past surgical history          FAMILY HISTORY:  No pertinent family history in first degree relatives    No pertinent family history in first degree relatives        SOCIAL HISTORY:  Smoking: [ ] Never Smoked [ ] Former Smoker (__ packs x ___ years) [ ] Current Smoker  (__ packs x ___ years)  Substance Use: [ ] Never Used [ ] Used ____  EtOH Use:  Marital Status: [ ] Single [ ]  [ ]  [ ]   Sexual History:   Occupation:  Recent Travel:  Country of Birth:  Advance Directives:    Allergies    sulfamethoxazole (Vomiting; Nausea)    Intolerances        HOME MEDICATIONS:  Home Medications:  acetaminophen 325 mg oral tablet: 2 tab(s) orally every 6 hours as needed (19 Feb 2024 19:12)  Aricept 10 mg oral tablet: 1 tab(s) orally once a day (at bedtime) (19 Feb 2024 19:06)  Artificial Tears ophthalmic solution: 1 drop(s) in each eye 2 times a day as needed for  dry eyes (19 Feb 2024 19:12)  cholecalciferol 1250 mcg (50,000 intl units) oral capsule: 1 cap(s) orally once a week Monday (19 Feb 2024 19:12)  ciprofloxacin 250 mg oral tablet: 1 tab(s) orally every 12 hours for 5 days (19 Feb 2024 19:12)  Multiple Vitamins oral tablet: 1 tab(s) orally once a day (19 Feb 2024 19:12)  PreserVision AREDS 2 oral capsule: 2 cap(s) orally once a day (19 Feb 2024 19:12)  senna oral tablet: 2 tab(s) orally once a day (at bedtime) (19 Feb 2024 19:06)      REVIEW OF SYSTEMS:  Constitutional: [ ] negative [ ] fevers [ ] chills [ ] weight loss [ ] weight gain  HEENT: [ ] negative [ ] dry eyes [ ] eye irritation [ ] postnasal drip [ ] nasal congestion  CV: [ ] negative  [ ] chest pain [ ] orthopnea [ ] palpitations [ ] murmur  Resp: [ ] negative [ ] cough [ ] shortness of breath [ ] dyspnea [ ] wheezing [ ] sputum [ ] hemoptysis  GI: [ ] negative [ ] nausea [ ] vomiting [ ] diarrhea [ ] constipation [ ] abd pain [ ] dysphagia   : [ ] negative [ ] dysuria [ ] nocturia [ ] hematuria [ ] increased urinary frequency  Musculoskeletal: [ ] negative [ ] back pain [ ] myalgias [ ] arthralgias [ ] fracture  Skin: [ ] negative [ ] rash [ ] itch  Neurological: [ ] negative [ ] headache [ ] dizziness [ ] syncope [ ] weakness [ ] numbness  Psychiatric: [ ] negative [ ] anxiety [ ] depression  Endocrine: [ ] negative [ ] diabetes [ ] thyroid problem  Hematologic/Lymphatic: [ ] negative [ ] anemia [ ] bleeding problem  Allergic/Immunologic: [ ] negative [ ] itchy eyes [ ] nasal discharge [ ] hives [ ] angioedema  [ ] All other systems negative  [ ] Unable to assess ROS because ________    OBJECTIVE:  ICU Vital Signs Last 24 Hrs  T(C): 35.8 (21 Feb 2024 03:44), Max: 36.3 (20 Feb 2024 16:25)  T(F): 96.4 (21 Feb 2024 03:44), Max: 97.4 (20 Feb 2024 16:25)  HR: 62 (21 Feb 2024 03:44) (54 - 85)  BP: 87/54 (21 Feb 2024 03:44) (87/54 - 109/63)  BP(mean): --  ABP: --  ABP(mean): --  RR: 19 (21 Feb 2024 03:44) (16 - 20)  SpO2: 96% (21 Feb 2024 03:44) (93% - 99%)    O2 Parameters below as of 21 Feb 2024 03:44  Patient On (Oxygen Delivery Method): nasal cannula  O2 Flow (L/min): 4            02-20 @ 07:01  -  02-21 @ 05:52  --------------------------------------------------------  IN: 0 mL / OUT: 250 mL / NET: -250 mL      CAPILLARY BLOOD GLUCOSE      POCT Blood Glucose.: 84 mg/dL (21 Feb 2024 05:12)      PHYSICAL EXAM:  General:   HEENT:   Lymph Nodes:  Neck:   Respiratory:   Cardiovascular:   Abdomen:   Extremities:   Skin:   Neurological:  Psychiatry:    LINES:     HOSPITAL MEDICATIONS:  Standing Meds:  ampicillin  IVPB      ampicillin  IVPB 2 Gram(s) IV Intermittent every 8 hours  dexAMETHasone  Injectable 6 milliGRAM(s) IV Push every 8 hours  dextrose 5% + sodium chloride 0.45%. 1000 milliLiter(s) IV Continuous <Continuous>  enoxaparin Injectable 40 milliGRAM(s) SubCutaneous every 24 hours  meropenem  IVPB 1000 milliGRAM(s) IV Intermittent every 12 hours  sodium chloride 0.65% Nasal 1 Spray(s) Both Nostrils two times a day  sodium chloride 0.9% Bolus 500 milliLiter(s) IV Bolus once  vancomycin  IVPB 1000 milliGRAM(s) IV Intermittent once      PRN Meds:      LABS:                        5.5    3.19  )-----------( 25       ( 21 Feb 2024 04:45 )             18.2     Hgb Trend: 5.5<--, 13.3<--, 14.3<--  02-21    135  |  106  |  13  ----------------------------<  40<LL>  4.1   |  <10<LL>  |  0.38<L>    Ca    6.3<LL>      21 Feb 2024 04:36  Phos  0.7     02-21  Mg     <.6     02-21    TPro  1.5<L>  /  Alb  0.8<L>  /  TBili  0.2  /  DBili  x   /  AST  22  /  ALT  44  /  AlkPhos  44  02-21    Creatinine Trend: 0.38<--, 1.13<--, 0.96<--, 0.96<--  PT/INR - ( 21 Feb 2024 04:36 )   PT: 23.4 sec;   INR: 2.28 ratio         PTT - ( 21 Feb 2024 04:36 )  PTT:52.5 sec  Urinalysis Basic - ( 21 Feb 2024 04:36 )    Color: x / Appearance: x / SG: x / pH: x  Gluc: 40 mg/dL / Ketone: x  / Bili: x / Urobili: x   Blood: x / Protein: x / Nitrite: x   Leuk Esterase: x / RBC: x / WBC x   Sq Epi: x / Non Sq Epi: x / Bacteria: x      Arterial Blood Gas:  02-21 @ 01:44  7.32/45/106/23/97.4/-3.1  ABG lactate: --  Arterial Blood Gas:  02-20 @ 08:20  7.35/48/115/26/97.9/0.3  ABG lactate: --    Venous Blood Gas:  02-21 @ 04:28  7.26/<19/32/7/96.9  VBG Lactate: >16.0  Venous Blood Gas:  02-19 @ 12:55  7.28/62/59/29/87.3  VBG Lactate: 1.5      MICROBIOLOGY:     Culture - Urine (collected 19 Feb 2024 12:56)  Source: Clean Catch Clean Catch (Midstream)  Final Report (20 Feb 2024 15:00):    <10,000 CFU/mL Normal Urogenital Vidya    Culture - Blood (collected 19 Feb 2024 12:56)  Source: .Blood Blood-Peripheral  Preliminary Report (20 Feb 2024 18:02):    No growth at 24 hours    Culture - Blood (collected 19 Feb 2024 12:55)  Source: .Blood Blood-Peripheral  Preliminary Report (20 Feb 2024 18:02):    No growth at 24 hours        RADIOLOGY:  [ ] Reviewed and interpreted by me    EKG: CHIEF COMPLAINT: AMS    HPI:    89 y/o F with PMHx of Myasthenia Gravis (not on medication) and dementia (A&Ox1, conversive at baseline) who presents from her nursing home with worsening lethargy and confusion. UA done as outpatient grossly positive and urine culture growing E. coli and Klebsiella. Patient was started on ciprofloxacin 3 days prior to presentation but has not improved.     In the ED, patient found to be hypothermic (89 F). On admission, labs significant for WBC 3.98, Plt 58, Alk Phos 220, , , pH 7.28, CO2 , lactate 1.5, TSH, B12 normal. RPR negative. CTH with moderate ventriculomegaly with findings suggestive of normal pressure hydrocephalus. CTA head and neck unremarkable. CXR clear. UA negative for bacteria, LE, and nitrite, WBC 2.  Patient given CTX for UTI. Outpatient UA from 2/13 with small LE, moderate bacteria, and WBC 84. Urine culture 2/13 positive for E. coli and Klebsiella, started on zosyn on 2/20 and broadened to meropenem on 2/21. Vanc and cefepime added on 2/21. Pt started on EEG on 2/20 for concern for seizure like activity.     PAST MEDICAL & SURGICAL HISTORY:  Myasthenia gravis      Dementia      Myasthenia gravis      No significant past surgical history      No significant past surgical history          FAMILY HISTORY:  No pertinent family history in first degree relatives    No pertinent family history in first degree relatives        SOCIAL HISTORY:  Smoking: [ ] Never Smoked [ ] Former Smoker (__ packs x ___ years) [ ] Current Smoker  (__ packs x ___ years)  Substance Use: [ ] Never Used [ ] Used ____  EtOH Use:  Marital Status: [ ] Single [ ]  [ ]  [ ]   Sexual History:   Occupation:  Recent Travel:  Country of Birth:  Advance Directives:    Allergies    sulfamethoxazole (Vomiting; Nausea)    Intolerances        HOME MEDICATIONS:  Home Medications:  acetaminophen 325 mg oral tablet: 2 tab(s) orally every 6 hours as needed (19 Feb 2024 19:12)  Aricept 10 mg oral tablet: 1 tab(s) orally once a day (at bedtime) (19 Feb 2024 19:06)  Artificial Tears ophthalmic solution: 1 drop(s) in each eye 2 times a day as needed for  dry eyes (19 Feb 2024 19:12)  cholecalciferol 1250 mcg (50,000 intl units) oral capsule: 1 cap(s) orally once a week Monday (19 Feb 2024 19:12)  ciprofloxacin 250 mg oral tablet: 1 tab(s) orally every 12 hours for 5 days (19 Feb 2024 19:12)  Multiple Vitamins oral tablet: 1 tab(s) orally once a day (19 Feb 2024 19:12)  PreserVision AREDS 2 oral capsule: 2 cap(s) orally once a day (19 Feb 2024 19:12)  senna oral tablet: 2 tab(s) orally once a day (at bedtime) (19 Feb 2024 19:06)      REVIEW OF SYSTEMS:    [x ] Unable to assess ROS because ________    OBJECTIVE:  ICU Vital Signs Last 24 Hrs  T(C): 35.8 (21 Feb 2024 03:44), Max: 36.3 (20 Feb 2024 16:25)  T(F): 96.4 (21 Feb 2024 03:44), Max: 97.4 (20 Feb 2024 16:25)  HR: 62 (21 Feb 2024 03:44) (54 - 85)  BP: 87/54 (21 Feb 2024 03:44) (87/54 - 109/63)  BP(mean): --  ABP: --  ABP(mean): --  RR: 19 (21 Feb 2024 03:44) (16 - 20)  SpO2: 96% (21 Feb 2024 03:44) (93% - 99%)    O2 Parameters below as of 21 Feb 2024 03:44  Patient On (Oxygen Delivery Method): nasal cannula  O2 Flow (L/min): 4            02-20 @ 07:01  -  02-21 @ 05:52  --------------------------------------------------------  IN: 0 mL / OUT: 250 mL / NET: -250 mL      CAPILLARY BLOOD GLUCOSE      POCT Blood Glucose.: 84 mg/dL (21 Feb 2024 05:12)      PHYSICAL EXAM:  Constitutional: NAD  HEENT: NCAT, no ptosis, resists eye opening  Chest: normal WOB at rest, CTAB  Heart: RRR, physiologic S1 and S2, no murmurs, no rubs, no gallops, 2+ radial pulses  Abd: nondistended, normoactive bowel sounds, soft, nontender, no rebound, no involuntary guarding  Extremities: no clubbing, warm hands and feet, no edema  Neuro: lethargic, withdraws to pain, A&Ox0  MSK: spontaneous movement of all 4 extremities, full and equal strength, no joint swelling  Skin: no rashes    LINES:     HOSPITAL MEDICATIONS:  Standing Meds:  ampicillin  IVPB      ampicillin  IVPB 2 Gram(s) IV Intermittent every 8 hours  dexAMETHasone  Injectable 6 milliGRAM(s) IV Push every 8 hours  dextrose 5% + sodium chloride 0.45%. 1000 milliLiter(s) IV Continuous <Continuous>  enoxaparin Injectable 40 milliGRAM(s) SubCutaneous every 24 hours  meropenem  IVPB 1000 milliGRAM(s) IV Intermittent every 12 hours  sodium chloride 0.65% Nasal 1 Spray(s) Both Nostrils two times a day  sodium chloride 0.9% Bolus 500 milliLiter(s) IV Bolus once  vancomycin  IVPB 1000 milliGRAM(s) IV Intermittent once      PRN Meds:      LABS:                        5.5    3.19  )-----------( 25       ( 21 Feb 2024 04:45 )             18.2     Hgb Trend: 5.5<--, 13.3<--, 14.3<--  02-21    135  |  106  |  13  ----------------------------<  40<LL>  4.1   |  <10<LL>  |  0.38<L>    Ca    6.3<LL>      21 Feb 2024 04:36  Phos  0.7     02-21  Mg     <.6     02-21    TPro  1.5<L>  /  Alb  0.8<L>  /  TBili  0.2  /  DBili  x   /  AST  22  /  ALT  44  /  AlkPhos  44  02-21    Creatinine Trend: 0.38<--, 1.13<--, 0.96<--, 0.96<--  PT/INR - ( 21 Feb 2024 04:36 )   PT: 23.4 sec;   INR: 2.28 ratio         PTT - ( 21 Feb 2024 04:36 )  PTT:52.5 sec  Urinalysis Basic - ( 21 Feb 2024 04:36 )    Color: x / Appearance: x / SG: x / pH: x  Gluc: 40 mg/dL / Ketone: x  / Bili: x / Urobili: x   Blood: x / Protein: x / Nitrite: x   Leuk Esterase: x / RBC: x / WBC x   Sq Epi: x / Non Sq Epi: x / Bacteria: x      Arterial Blood Gas:  02-21 @ 01:44  7.32/45/106/23/97.4/-3.1  ABG lactate: --  Arterial Blood Gas:  02-20 @ 08:20  7.35/48/115/26/97.9/0.3  ABG lactate: --    Venous Blood Gas:  02-21 @ 04:28  7.26/<19/32/7/96.9  VBG Lactate: >16.0  Venous Blood Gas:  02-19 @ 12:55  7.28/62/59/29/87.3  VBG Lactate: 1.5      MICROBIOLOGY:     Culture - Urine (collected 19 Feb 2024 12:56)  Source: Clean Catch Clean Catch (Midstream)  Final Report (20 Feb 2024 15:00):    <10,000 CFU/mL Normal Urogenital Vidya    Culture - Blood (collected 19 Feb 2024 12:56)  Source: .Blood Blood-Peripheral  Preliminary Report (20 Feb 2024 18:02):    No growth at 24 hours    Culture - Blood (collected 19 Feb 2024 12:55)  Source: .Blood Blood-Peripheral  Preliminary Report (20 Feb 2024 18:02):    No growth at 24 hours        RADIOLOGY:  [ ] Reviewed and interpreted by me    EKG:

## 2024-02-21 NOTE — PROGRESS NOTE ADULT - ATTENDING COMMENTS
Patient seen and examined at bedside. During my evaluation, patient was lethargic and non verbal.     Detailed neuro exam:  Please note, the patient did not cooperate for detailed neuro exams in the setting of altered mental status.  General: Patient is comfortably lying on the bed without acute distress.  Mental and Psychological Status: The patient is lethargic but easily arousable and nonverbal. Unable to follow simple commands or any complex commands.   Cranial Nerve Exam: Unable to assess visual threat and extraocular movements . Pupils are round, equal, and reactive.  Grossly, there is no facial weakness or asymmetry. Further cranial nerve exams are limited.  Motor Exam: On noxious stimuli strength she withdrew at all 4 extremities symmetrically. There are no resting tremors. The tone is increased throughout.   Sensory Examination: Sensation is intact to pinprick throughout.  Posture, Station and Gait: Patient did not participate in the setting of lethargy.    Coordination: Patient did not participate in the setting of lethargy.      I agree with the findings and plan as documented in the Resident's note except below.  A/P:  Ms. Da Silva is a 89 year unknown handed woman with PMH of dementia on Aricept 10 mg daily who was brought to hospital due to the altered mental status. Neurology consulted because of altered mental status. Etiology of altered mental status is uncertain but most likely the metabolic encephalopathy clinically less suspicious for vascular versus seizure. EEG did not showed any seizure but consistent with triphasic wave.     D.C VEEG.   No Antiepileptic medication.   MRI brain to rule out treatable etiologies.   Continue medical management, neuro- check and fall precaution.  GI and DVT prophylaxis.    Patient is at high risk for complications and morbidity or mortality. There is high probability of imminent or life threatening deterioration in the patient's condition.  Patient is unable or incompetent to participate in giving a history and/or making decisions and discussion is necessary for determining treatment decisions.  My cumulative time taking care of this critically ill patient is 55 minutes  If you have any further questions, please do not hesitate to contact our team.  Thank you for allowing us to participate in this patient care.

## 2024-02-21 NOTE — PROGRESS NOTE ADULT - SUBJECTIVE AND OBJECTIVE BOX
Patient is a 89y old  Female who presents with a chief complaint of AMS (21 Feb 2024 11:25)    Being followed by ID for        Interval history:  No other acute events      ROS:  No cough,SOB,CP  No N/V/D  No abd pain  No urinary complaints  No HA  No joint or limb pain  No other complaints    PAST MEDICAL & SURGICAL HISTORY:  Myasthenia gravis      Dementia      Myasthenia gravis      No significant past surgical history      No significant past surgical history        Allergies    sulfamethoxazole (Vomiting; Nausea)    Intolerances      Antimicrobials:    ampicillin  IVPB      ampicillin  IVPB 2 Gram(s) IV Intermittent every 8 hours  meropenem  IVPB 1000 milliGRAM(s) IV Intermittent every 12 hours    MEDICATIONS  (STANDING):  ampicillin  IVPB      ampicillin  IVPB 2 Gram(s) IV Intermittent every 8 hours  chlorhexidine 2% Cloths 1 Application(s) Topical daily  dexAMETHasone  Injectable 6 milliGRAM(s) IV Push every 8 hours  dextrose 5% + sodium chloride 0.45%. 1000 milliLiter(s) (75 mL/Hr) IV Continuous <Continuous>  meropenem  IVPB 1000 milliGRAM(s) IV Intermittent every 12 hours  sodium chloride 0.65% Nasal 1 Spray(s) Both Nostrils two times a day  sodium chloride 0.9% Bolus 500 milliLiter(s) IV Bolus once      Vital Signs Last 24 Hrs  T(C): 36.5 (02-21-24 @ 12:07), Max: 37.5 (02-21-24 @ 06:58)  T(F): 97.7 (02-21-24 @ 12:07), Max: 99.5 (02-21-24 @ 06:58)  HR: 99 (02-21-24 @ 12:07) (54 - 99)  BP: 131/81 (02-21-24 @ 12:07) (87/54 - 131/81)  BP(mean): --  RR: 18 (02-21-24 @ 12:07) (18 - 20)  SpO2: 94% (02-21-24 @ 12:07) (93% - 99%)    Physical Exam:    Constitutional well preserved,comfortable,pleasant    HEENT PERRLA EOMI,No pallor or icterus    No oral exudate or erythema    Neck supple no JVD or LN    Chest Good AE,CTA    CVS RRR S1 S2 WNl No murmur or rub or gallop    Abd soft BS normal No tenderness no masses    Ext No cyanosis clubbing or edema    IV site no erythema tenderness or discharge    Joints no swelling or LOM    CNS AAO X 3 no focal    Lab Data:                          11.1   6.18  )-----------( 50       ( 21 Feb 2024 05:51 )             35.2       02-21    149<H>  |  119<H>  |  34<H>  ----------------------------<  88  4.1   |  23  |  1.14    Ca    8.1<L>      21 Feb 2024 05:54  Phos  0.7     02-21  Mg     <.6     02-21    TPro  1.5<L>  /  Alb  0.8<L>  /  TBili  0.2  /  DBili  x   /  AST  22  /  ALT  44  /  AlkPhos  44  02-21      Urinalysis Basic - ( 21 Feb 2024 05:54 )    Color: x / Appearance: x / SG: x / pH: x  Gluc: 88 mg/dL / Ketone: x  / Bili: x / Urobili: x   Blood: x / Protein: x / Nitrite: x   Leuk Esterase: x / RBC: x / WBC x   Sq Epi: x / Non Sq Epi: x / Bacteria: x        .Blood Blood-Peripheral  02-20-24   No growth at 24 hours  --  --      .Blood Blood-Peripheral  02-19-24   No growth at 24 hours  --  --      .Blood Blood-Peripheral  02-19-24   No growth at 24 hours  --  --                    WBC Count: 6.18 (02-21-24 @ 05:51)  WBC Count: 3.19 (02-21-24 @ 04:45)  WBC Count: 3.98 (02-20-24 @ 11:30)  WBC Count: 3.88 (02-19-24 @ 12:55)       Bilirubin Total: 0.2 mg/dL (02-21-24 @ 04:36)  Aspartate Aminotransferase (AST/SGOT): 22 U/L (02-21-24 @ 04:36)  Alanine Aminotransferase (ALT/SGPT): 44 U/L (02-21-24 @ 04:36)  Alkaline Phosphatase: 44 U/L (02-21-24 @ 04:36)  Bilirubin Total: 0.4 mg/dL (02-20-24 @ 11:53)  Aspartate Aminotransferase (AST/SGOT): 101 U/L (02-20-24 @ 11:53)  Alanine Aminotransferase (ALT/SGPT): 204 U/L (02-20-24 @ 11:53)  Alkaline Phosphatase: 182 U/L (02-20-24 @ 11:53)  Bilirubin Total: 0.4 mg/dL (02-20-24 @ 07:24)  Aspartate Aminotransferase (AST/SGOT): 116 U/L (02-20-24 @ 07:24)  Alanine Aminotransferase (ALT/SGPT): 231 U/L (02-20-24 @ 07:24)  Alkaline Phosphatase: 197 U/L (02-20-24 @ 07:24)  Bilirubin Total: 0.6 mg/dL (02-19-24 @ 12:55)  Aspartate Aminotransferase (AST/SGOT): 141 U/L (02-19-24 @ 12:55)  Alanine Aminotransferase (ALT/SGPT): 271 U/L (02-19-24 @ 12:55)  Alkaline Phosphatase: 220 U/L (02-19-24 @ 12:55)         Patient is a 89y old  Female who presents with a chief complaint of AMS (21 Feb 2024 11:25)    Being followed by ID for AMS        Interval history:  Pt remains unresponsive   EEG in place   episodes of hypotension  No other acute events      PAST MEDICAL & SURGICAL HISTORY:  Myasthenia gravis      Dementia      Myasthenia gravis      No significant past surgical history      No significant past surgical history        Allergies    sulfamethoxazole (Vomiting; Nausea)    Intolerances      Antimicrobials:    ampicillin  IVPB      ampicillin  IVPB 2 Gram(s) IV Intermittent every 8 hours  meropenem  IVPB 1000 milliGRAM(s) IV Intermittent every 12 hours    MEDICATIONS  (STANDING):  ampicillin  IVPB      ampicillin  IVPB 2 Gram(s) IV Intermittent every 8 hours  chlorhexidine 2% Cloths 1 Application(s) Topical daily  dexAMETHasone  Injectable 6 milliGRAM(s) IV Push every 8 hours  dextrose 5% + sodium chloride 0.45%. 1000 milliLiter(s) (75 mL/Hr) IV Continuous <Continuous>  meropenem  IVPB 1000 milliGRAM(s) IV Intermittent every 12 hours  sodium chloride 0.65% Nasal 1 Spray(s) Both Nostrils two times a day  sodium chloride 0.9% Bolus 500 milliLiter(s) IV Bolus once      Vital Signs Last 24 Hrs  T(C): 36.5 (02-21-24 @ 12:07), Max: 37.5 (02-21-24 @ 06:58)  T(F): 97.7 (02-21-24 @ 12:07), Max: 99.5 (02-21-24 @ 06:58)  HR: 99 (02-21-24 @ 12:07) (54 - 99)  BP: 131/81 (02-21-24 @ 12:07) (87/54 - 131/81)  BP(mean): --  RR: 18 (02-21-24 @ 12:07) (18 - 20)  SpO2: 94% (02-21-24 @ 12:07) (93% - 99%)    Physical Exam:    Constitutional unresponsive    HEENT pupils are small bilaterally    No oral exudate or erythema    Neck flexed  , pt with drool     Chest Good AE,CTA    CVS  S1 S2     Abd soft BS normal No tenderness     Ext No cyanosis clubbing or edema    IV site no erythema tenderness or discharge    Joints no swelling or LOM        Lab Data:                          11.1   6.18  )-----------( 50       ( 21 Feb 2024 05:51 )             35.2   Complete Blood Count + Automated Diff (02.21.24 @ 04:45)    WBC Count: 3.19 K/uL   RBC Count: 1.74 M/uL   Hemoglobin: 5.5 g/dL   Hematocrit: 18.2 %   Mean Cell Volume: 104.6 fl   Mean Cell Hemoglobin: 31.6 pg   Mean Cell Hemoglobin Conc: 30.2 gm/dL   Red Cell Distrib Width: 15.2 %   Platelet Count - Automated: 25 K/uL   Auto Neutrophil #: 2.72 K/uL   Auto Lymphocyte #: 0.18 K/uL   Auto Monocyte #: 0.25 K/uL   Auto Eosinophil #: 0.03 K/uL   Auto Basophil #: 0.00 K/uL   Auto Neutrophil %: 85.4: Differential percentages must be correlated with absolute numbers for  clinical significance. %   Auto Lymphocyte %: 5.6 %   Auto Monocyte %: 7.8 %   Auto Eosinophil %: 0.9 %   Auto Basophil %: 0.0 %   Auto Immature Granulocyte %: 0.3: (Includes meta, myelo and promyelocytes). Mild elevations in immature  granulocytes may be seen with many inflammatory processes and pregnancy;  clinical correlation suggested. %   Nucleated RBC: 0 /100 WBCs        02-21    149<H>  |  119<H>  |  34<H>  ----------------------------<  88  4.1   |  23  |  1.14    Ca    8.1<L>      21 Feb 2024 05:54  Phos  0.7     02-21  Mg     <.6     02-21    TPro  1.5<L>  /  Alb  0.8<L>  /  TBili  0.2  /  DBili  x   /  AST  22  /  ALT  44  /  AlkPhos  44  02-21    Urinalysis (02.19.24 @ 12:56)    Blood, Urine: Negative   pH Urine: 5.0   Glucose Qualitative, Urine: Negative mg/dL   Color: Yellow   Urine Appearance: Clear   Bilirubin: Negative   Ketone - Urine: Trace mg/dL   Specific Gravity: 1.026   Protein, Urine: Trace mg/dL   Urobilinogen: 1.0 mg/dL   Nitrite: Negative   Leukocyte Esterase Concentration: Negative    Blood Gas Venous - Lactate (02.21.24 @ 07:40)    Blood Gas Venous - Lactate: 0.9 mmol/L    Lactate, Blood (02.21.24 @ 04:36)    Lactate, Blood: >17.6 mmol/L          .Blood Blood-Peripheral  02-20-24   No growth at 24 hours  --  --      .Blood Blood-Peripheral  02-19-24   No growth at 24 hours  --  --      .Blood Blood-Peripheral  02-19-24   No growth at 24 hours  --  --      < from: CT Angio Neck w/ IV Cont (02.19.24 @ 16:39) >  CT HEAD:  Moderate ventriculomegaly, with findings suggestive of normal pressure   hydrocephalus.  No intracranial hemorrhage, mass effect or midline shift.    CTA HEAD:  Patent intracranial circulation without flow limiting stenosis.  Mild atherosclerotic calcification of bilateral petrous, cavernous, and   supraclinoid internal carotid arteries.    CTA NECK:  No evidence of significant stenosis or occlusion.    --- End of Report ---    < end of copied text >        EEG Impression / Clinical Correlate:  Abnormal prolonged EEG study due to   1-	Moderate to severe diffuse nonspecific cerebral dysfunction  2-	Generalized periodic discharges with triphasic morphology at a rate of 1-1.5 Hz typically seen in toxic/metabolic encephalopathy  No epileptic discharges recorded.  No seizures recorded.  •	        WBC Count: 6.18 (02-21-24 @ 05:51)  WBC Count: 3.19 (02-21-24 @ 04:45)  WBC Count: 3.98 (02-20-24 @ 11:30)  WBC Count: 3.88 (02-19-24 @ 12:55)       Bilirubin Total: 0.2 mg/dL (02-21-24 @ 04:36)  Aspartate Aminotransferase (AST/SGOT): 22 U/L (02-21-24 @ 04:36)  Alanine Aminotransferase (ALT/SGPT): 44 U/L (02-21-24 @ 04:36)  Alkaline Phosphatase: 44 U/L (02-21-24 @ 04:36)  Bilirubin Total: 0.4 mg/dL (02-20-24 @ 11:53)  Aspartate Aminotransferase (AST/SGOT): 101 U/L (02-20-24 @ 11:53)  Alanine Aminotransferase (ALT/SGPT): 204 U/L (02-20-24 @ 11:53)  Alkaline Phosphatase: 182 U/L (02-20-24 @ 11:53)  Bilirubin Total: 0.4 mg/dL (02-20-24 @ 07:24)  Aspartate Aminotransferase (AST/SGOT): 116 U/L (02-20-24 @ 07:24)  Alanine Aminotransferase (ALT/SGPT): 231 U/L (02-20-24 @ 07:24)  Alkaline Phosphatase: 197 U/L (02-20-24 @ 07:24)  Bilirubin Total: 0.6 mg/dL (02-19-24 @ 12:55)  Aspartate Aminotransferase (AST/SGOT): 141 U/L (02-19-24 @ 12:55)  Alanine Aminotransferase (ALT/SGPT): 271 U/L (02-19-24 @ 12:55)  Alkaline Phosphatase: 220 U/L (02-19-24 @ 12:55)

## 2024-02-21 NOTE — PROGRESS NOTE ADULT - SUBJECTIVE AND OBJECTIVE BOX
Neurology Progress Note    SUBJECTIVE/OBJECTIVE/INTERVAL EVENTS: Patient seen and examined at bedside w/ neuro attending .     MEDICATIONS  (STANDING):  ampicillin  IVPB      ampicillin  IVPB 2 Gram(s) IV Intermittent every 8 hours  chlorhexidine 2% Cloths 1 Application(s) Topical daily  dexAMETHasone  Injectable 6 milliGRAM(s) IV Push every 8 hours  dextrose 5% + sodium chloride 0.45%. 1000 milliLiter(s) (75 mL/Hr) IV Continuous <Continuous>  enoxaparin Injectable 40 milliGRAM(s) SubCutaneous every 24 hours  meropenem  IVPB 1000 milliGRAM(s) IV Intermittent every 12 hours  sodium chloride 0.65% Nasal 1 Spray(s) Both Nostrils two times a day  sodium chloride 0.9% Bolus 500 milliLiter(s) IV Bolus once    MEDICATIONS  (PRN):      VITALS & EXAMINATION:  Vital Signs Last 24 Hrs  T(C): 36.8 (21 Feb 2024 09:05), Max: 37.5 (21 Feb 2024 06:58)  T(F): 98.3 (21 Feb 2024 09:05), Max: 99.5 (21 Feb 2024 06:58)  HR: 97 (21 Feb 2024 09:05) (54 - 97)  BP: 109/76 (21 Feb 2024 09:05) (87/54 - 109/76)  BP(mean): --  RR: 20 (21 Feb 2024 09:05) (18 - 20)  SpO2: 96% (21 Feb 2024 09:05) (93% - 99%)    Parameters below as of 21 Feb 2024 09:05  Patient On (Oxygen Delivery Method): nasal cannula  O2 Flow (L/min): 4    Neurologic Exam:  Mental status - Opens eyes, not attending to examiner, not following commands, no meaningful verbal output  Motor - Increased tone throughout, withdraws extremities to pain x 4  Sensation - withdraws extremities to pain x 4  DTR's - 0 throughout  Coordination - PRIYANKA  Gait and station - PRIYANKA    LABORATORY:  CBC                       11.1   6.18  )-----------( 50       ( 21 Feb 2024 05:51 )             35.2     Chem 02-21    149<H>  |  119<H>  |  34<H>  ----------------------------<  88  4.1   |  23  |  1.14    Ca    8.1<L>      21 Feb 2024 05:54  Phos  0.7     02-21  Mg     <.6     02-21    TPro  1.5<L>  /  Alb  0.8<L>  /  TBili  0.2  /  DBili  x   /  AST  22  /  ALT  44  /  AlkPhos  44  02-21    LFTs LIVER FUNCTIONS - ( 21 Feb 2024 04:36 )  Alb: 0.8 g/dL / Pro: 1.5 g/dL / ALK PHOS: 44 U/L / ALT: 44 U/L / AST: 22 U/L / GGT: x           Coagulopathy PT/INR - ( 21 Feb 2024 04:36 )   PT: 23.4 sec;   INR: 2.28 ratio         PTT - ( 21 Feb 2024 04:36 )  PTT:52.5 sec  Lipid Panel   A1c   Cardiac enzymes     U/A Urinalysis Basic - ( 21 Feb 2024 05:54 )    Color: x / Appearance: x / SG: x / pH: x  Gluc: 88 mg/dL / Ketone: x  / Bili: x / Urobili: x   Blood: x / Protein: x / Nitrite: x   Leuk Esterase: x / RBC: x / WBC x   Sq Epi: x / Non Sq Epi: x / Bacteria: x      CSF  Immunological  Other    STUDIES & IMAGING: (EEG, CT, MR, U/S, TTE/LUIS): Neurology Progress Note    SUBJECTIVE/OBJECTIVE/INTERVAL EVENTS: Patient seen and examined at bedside w/ neuro attending. RRT called overnight for hypotension, hypothermia, altered mental status. EEG showing GPDs with triphasic morphology, no epileptic discharges.     MEDICATIONS  (STANDING):  ampicillin  IVPB      ampicillin  IVPB 2 Gram(s) IV Intermittent every 8 hours  chlorhexidine 2% Cloths 1 Application(s) Topical daily  dexAMETHasone  Injectable 6 milliGRAM(s) IV Push every 8 hours  dextrose 5% + sodium chloride 0.45%. 1000 milliLiter(s) (75 mL/Hr) IV Continuous <Continuous>  enoxaparin Injectable 40 milliGRAM(s) SubCutaneous every 24 hours  meropenem  IVPB 1000 milliGRAM(s) IV Intermittent every 12 hours  sodium chloride 0.65% Nasal 1 Spray(s) Both Nostrils two times a day  sodium chloride 0.9% Bolus 500 milliLiter(s) IV Bolus once    MEDICATIONS  (PRN):      VITALS & EXAMINATION:  Vital Signs Last 24 Hrs  T(C): 36.8 (21 Feb 2024 09:05), Max: 37.5 (21 Feb 2024 06:58)  T(F): 98.3 (21 Feb 2024 09:05), Max: 99.5 (21 Feb 2024 06:58)  HR: 97 (21 Feb 2024 09:05) (54 - 97)  BP: 109/76 (21 Feb 2024 09:05) (87/54 - 109/76)  BP(mean): --  RR: 20 (21 Feb 2024 09:05) (18 - 20)  SpO2: 96% (21 Feb 2024 09:05) (93% - 99%)    Parameters below as of 21 Feb 2024 09:05  Patient On (Oxygen Delivery Method): nasal cannula  O2 Flow (L/min): 4    Neurologic Exam:  Mental status - Opens eyes, not attending to examiner, not following commands, no meaningful verbal output  Motor - Increased tone throughout, withdraws extremities to pain x 4  Sensation - withdraws extremities to pain x 4  DTR's - 0 throughout  Coordination - PRIYANKA  Gait and station - PRIYANKA    LABORATORY:  CBC                       11.1   6.18  )-----------( 50       ( 21 Feb 2024 05:51 )             35.2     Chem 02-21    149<H>  |  119<H>  |  34<H>  ----------------------------<  88  4.1   |  23  |  1.14    Ca    8.1<L>      21 Feb 2024 05:54  Phos  0.7     02-21  Mg     <.6     02-21    TPro  1.5<L>  /  Alb  0.8<L>  /  TBili  0.2  /  DBili  x   /  AST  22  /  ALT  44  /  AlkPhos  44  02-21    LFTs LIVER FUNCTIONS - ( 21 Feb 2024 04:36 )  Alb: 0.8 g/dL / Pro: 1.5 g/dL / ALK PHOS: 44 U/L / ALT: 44 U/L / AST: 22 U/L / GGT: x           Coagulopathy PT/INR - ( 21 Feb 2024 04:36 )   PT: 23.4 sec;   INR: 2.28 ratio         PTT - ( 21 Feb 2024 04:36 )  PTT:52.5 sec  Lipid Panel   A1c   Cardiac enzymes     U/A Urinalysis Basic - ( 21 Feb 2024 05:54 )    Color: x / Appearance: x / SG: x / pH: x  Gluc: 88 mg/dL / Ketone: x  / Bili: x / Urobili: x   Blood: x / Protein: x / Nitrite: x   Leuk Esterase: x / RBC: x / WBC x   Sq Epi: x / Non Sq Epi: x / Bacteria: x      CSF  Immunological  Other    STUDIES & IMAGING: (EEG, CT, MR, U/S, TTE/LUIS):

## 2024-02-21 NOTE — CONSULT NOTE ADULT - ASSESSMENT
89 y/o F with PMHx of Myasthenia Gravis (not on medication) and dementia (A&Ox1, conversive at baseline) who presents from her nursing home with worsening lethargy and confusion. UA done as outpatient on 2/13 grossly positive and urine culture growing E. coli and Klebsiella, pansensitive. Found to be hypothermic in ED. Admitted for sepsis and metabolic encephalopathy in setting of UTI. Multiple RRTs called today. First RRT called for seizure-like activity. Patient loaded with Keppra and placed on EEG. Second RRT called for hypotension. Patient was given 4 L IVF with resolution of hypotension.  MICU consulted for acidotic blood gas.      #Urosepsis  #Seizure like activity  #Lactic acidosis  -c/w broad spectrum abx  -f/u EEG report today  -f/u neurology regarding EEG, LP and encephalopathy  -repeat full set of labs w/ active T&S, likely inaccurate given other abnormal blood values compared to prior  -avoid fluoroquinolones due to history of myasthenia gravis  -palliative care consult for [facility]. Patient is a poor candidate for intubation given age and comorbidities    Lauren Viera, PGY-2  Internal Medicine     89 y/o F with PMHx of Myasthenia Gravis (not on medication) and dementia (A&Ox1, conversive at baseline) who presents from her nursing home with worsening lethargy and confusion. UA done as outpatient on 2/13 grossly positive and urine culture growing E. coli and Klebsiella, pansensitive. Found to be hypothermic in ED. Admitted for sepsis and metabolic encephalopathy in setting of UTI. Multiple RRTs called today. First RRT called for seizure-like activity. Patient loaded with Keppra and placed on EEG. Second RRT called for hypotension. Patient was given 4 L IVF with resolution of hypotension.  MICU consulted for acidotic blood gas.      #Urosepsis  #Seizure like activity  #Lactic acidosis  -reviewed RRT note, called for hypotension, concern for seizures based on labs  -c/w broad spectrum abx for urosepsis and concern for meningitis  -f/u EEG report today  -f/u neurology regarding EEG, LP and encephalopathy  -repeat full set of labs w/ active T&S, likely inaccurate as other repeated lab values have returned as comparable to prior  -avoid fluoroquinolones due to history of myasthenia gravis  -palliative care consult for [facility]. Patient is a poor candidate for intubation given age and comorbidities    Lauren Viera, PGY-2  Internal Medicine     88 y/o F with PMHx of Myasthenia Gravis (not on medication) and dementia (A&Ox1, conversive at baseline) who presents from her nursing home with worsening lethargy and confusion. UA done as outpatient on 2/13 grossly positive and urine culture growing E. coli and Klebsiella, pansensitive. Found to be hypothermic in ED. Admitted for sepsis and metabolic encephalopathy in setting of UTI. Multiple RRTs called today. First RRT called for seizure-like activity. Patient loaded with Keppra and placed on EEG. Second RRT called for hypotension. Patient was given 4 L IVF with resolution of hypotension.  MICU consulted for abnormal blood gas.      #Urosepsis  #Seizure like activity  #Abnormal blood gas values  -reviewed RRT note, called for hypotension, concern for seizures based on labs  -c/w broad spectrum abx for urosepsis and concern for meningitis  -f/u EEG report today  -f/u neurology regarding EEG, LP and encephalopathy  -recommended repeat full set of labs w/ active T&S  -repeat labs including lactate corrected w/o intervention  -palliative care consult for [facility]. Patient is a poor candidate for intubation given age and comorbidities  -no need for MICU at this time; please re-consult if needed    Lauren Viera, PGY-2  Internal Medicine     89 y/o F with PMHx of Myasthenia Gravis (not on medication) and dementia (A&Ox1, conversive at baseline) who presents from her nursing home with worsening lethargy and confusion. UA done as outpatient on 2/13 grossly positive and urine culture growing E. coli and Klebsiella, pansensitive. Found to be hypothermic in ED. Admitted for sepsis and metabolic encephalopathy in setting of UTI. Multiple RRTs called today. First RRT called for seizure-like activity. Patient loaded with Keppra and placed on EEG. Second RRT called for hypotension. Patient was given 4 L IVF with resolution of hypotension.  MICU consulted for abnormal blood gas.      #Urosepsis  #Seizure like activity  #Encephalopathy  #Abnormal blood gas values  -reviewed RRT note, called for hypotension, concern for seizures based on labs  -c/w broad spectrum abx for urosepsis and concern for meningitis  -f/u EEG report today  -f/u neurology regarding EEG, LP and encephalopathy  -recommended repeat full set of labs w/ active T&S  -repeat labs including lactate corrected w/o intervention  -consider addition of free water flushes through NGT given hypernatremia, may contribute to encephalopathy  -palliative care consult for [facility]. Patient is a poor candidate for intubation given age and comorbidities  -no need for MICU at this time; please re-consult if needed    Lauren Viera, PGY-2  Internal Medicine

## 2024-02-21 NOTE — PROGRESS NOTE ADULT - ASSESSMENT
88 YO F with PMHx of Myasthenia Gravis (not on medication for past 9 years) and MHx of dementia     Assessment/Plan:  AMS(most likely due to metabolic/toxic encephalopathy  due to UTI) - move forward will LP and MRI tomorrow. Change abx to cover Meningitis/ Herpes Encephalitis with IV Vancomycin and Meropenem and Acyclovir worse. Patient is completely unresponsive with ? seizure activity. F/u neurologist.   hypotension- continue IV steroid and IV fluid as needed   thrombocytopenia- PLT down to 50,000. D/ C Lovenox. F/u hematology.   elevated LFT- Will  check RUQ ultrasound and Hepatitis panel.   NPH- incidental finding. F/u neurologist   UTI with E.coli and Klebsiella as  outpatient culture. - IV abx upgraded to Vanco and Meropenem  DVT ppx- Lovenox discontinued due to thrombocytopenia   leg arthritis- Acetaminophen PRN.  b/l cataract- F/u ophthalmolog  vitamin D deficiency- continue Vitamin D supplement.  risk of malnutrition -stable, continue supplement diet. F/u dietitian. Monitor weight.  constipation- senna at bedtime.  LBP 2/2 severe spinal stenosis with out cord pressure - no surgical intervention recommended neither accepted by family, continue PT OT as needed.  Dementia - Stable. Continue Aricept .  Dysphagia - Mechanical soft diet, aspiration precautions.  Myasthenia Gravis - stable , off of medication. Monitor CBC and BMP every 2 months.

## 2024-02-21 NOTE — CONSULT NOTE ADULT - SUBJECTIVE AND OBJECTIVE BOX
CC: rule out angioedema     HPI: 89F with PMH of Myasthenia Gravis (not on medication for past 9 years) and dementia (AOx1, conversive at baseline) admitted for AMS (AAOx0) in the setting of UTI. RRT was called twice over the past 24 hours, respectively for acute hypoxia and seizure like activity. ENT consulted for new findings of tongue protrusion and lip swelling. Pt is on EEG monitoring, A&O 0 and not responsive to verbal stimuli during encounter. unable to obtain further modifying factor due to current mental status.     PAST MEDICAL & SURGICAL HISTORY:  Myasthenia gravis    Dementia    Myasthenia gravis    No significant past surgical history    No significant past surgical history    Allergies    sulfamethoxazole (Vomiting; Nausea)    Intolerances    MEDICATIONS  (STANDING):  dextrose 5% + sodium chloride 0.45%. 1000 milliLiter(s) (75 mL/Hr) IV Continuous <Continuous>  enoxaparin Injectable 40 milliGRAM(s) SubCutaneous every 24 hours  piperacillin/tazobactam IVPB.. 3.375 Gram(s) IV Intermittent every 8 hours    MEDICATIONS  (PRN):    Social History: unable to obtain due to current mental status     Family history: unable to obtain due to current mental status     ROS: unable to obtain due to current mental status     Vital Signs Last 24 Hrs  T(C): 36.3 (20 Feb 2024 16:25), Max: 36.3 (20 Feb 2024 16:25)  T(F): 97.4 (20 Feb 2024 16:25), Max: 97.4 (20 Feb 2024 16:25)  HR: 63 (21 Feb 2024 00:00) (59 - 85)  BP: 103/56 (21 Feb 2024 00:00) (90/47 - 139/94)  BP(mean): --  RR: 20 (21 Feb 2024 00:00) (16 - 20)  SpO2: 96% (21 Feb 2024 00:00) (93% - 99%)    Parameters below as of 21 Feb 2024 00:00  Patient On (Oxygen Delivery Method): nasal cannula  O2 Flow (L/min): 4                        13.3   3.98  )-----------( 58       ( 20 Feb 2024 11:30 )             40.4    02-20    146<H>  |  111<H>  |  44<H>  ----------------------------<  280<H>  5.1   |  24  |  1.13    Ca    9.1      20 Feb 2024 11:53  Phos  3.3     02-20  Mg     2.2     02-20    TPro  5.5<L>  /  Alb  3.0<L>  /  TBili  0.4  /  DBili  x   /  AST  101<H>  /  ALT  204<H>  /  AlkPhos  182<H>  02-20   PT/INR - ( 20 Feb 2024 11:30 )   PT: 12.5 sec;   INR: 1.20 ratio      PTT - ( 20 Feb 2024 11:30 )  PTT:55.5 sec    PHYSICAL EXAM:  Gen: eyes close, active EEG monitoring   Skin: No rashes, bruises, or lesions  Head: Normocephalic, Atraumatic  Face: no edema, erythema, or fluctuance. Parotid glands soft without mass  Nose: left nose occluded with crusting, s/p removal. Nares bilaterally patent, no discharge  Mouth: no tongue elevation or significant tongue swelling. No drooling, no pooling of secretions. Mucosa moist, tongue/uvula midline, oropharynx clear  Neck: Flat, supple, no lymphadenopathy, trachea midline, no masses  Lymphatic: No lymphadenopathy  Resp: on nasal cannular, no stridor  CV: no peripheral edema/cyanosis  GI: nondistended   Peripheral vascular: no JVD or edema  Neuro: A&Ox0 not responsive to verbal stimuli     Fiberoptic Indirect Laryngoscopy (Ambu scope used):    Reason for Laryngoscopy: rule out angioedema     Patient was unable to cooperate with mirror.  Nasopharynx, oropharynx, and hypopharynx clear, no bleeding. posterior pharyngeal wall, vallecula, epiglottis, and subglottis appear normal. No erythema, edema, pooling of secretions, masses or lesions. Airway patent, no foreign body visualized. No glottic/supraglottic edema. arytenoids, vestibular folds, ventricles, pyriform sinuses, and aryepiglottic folds appear normal bilaterally. airway patent. noted pachyderma. Right vocal cord paresis and Left vocal cord paralysis in paramedian position, resulting in a large glottic gap.        CC: rule out angioedema     HPI: 89F with PMH of Myasthenia Gravis (not on medication for past 9 years) and dementia (AOx1, conversive at baseline) admitted for AMS (AAOx0) in the setting of UTI. RRT was called twice over the past 24 hours, respectively for acute hypoxia and seizure like activity. ENT consulted for new findings of tongue protrusion and lip swelling. Pt is on EEG monitoring, A&O 0 and not responsive to verbal stimuli during encounter. unable to obtain further modifying factor due to current mental status.     PAST MEDICAL & SURGICAL HISTORY:  Myasthenia gravis    Dementia    Myasthenia gravis    No significant past surgical history    No significant past surgical history    Allergies    sulfamethoxazole (Vomiting; Nausea)    Intolerances    MEDICATIONS  (STANDING):  dextrose 5% + sodium chloride 0.45%. 1000 milliLiter(s) (75 mL/Hr) IV Continuous <Continuous>  enoxaparin Injectable 40 milliGRAM(s) SubCutaneous every 24 hours  piperacillin/tazobactam IVPB.. 3.375 Gram(s) IV Intermittent every 8 hours    MEDICATIONS  (PRN):    Social History: unable to obtain due to current mental status     Family history: unable to obtain due to current mental status     ROS: unable to obtain due to current mental status     Vital Signs Last 24 Hrs  T(C): 36.3 (20 Feb 2024 16:25), Max: 36.3 (20 Feb 2024 16:25)  T(F): 97.4 (20 Feb 2024 16:25), Max: 97.4 (20 Feb 2024 16:25)  HR: 63 (21 Feb 2024 00:00) (59 - 85)  BP: 103/56 (21 Feb 2024 00:00) (90/47 - 139/94)  BP(mean): --  RR: 20 (21 Feb 2024 00:00) (16 - 20)  SpO2: 96% (21 Feb 2024 00:00) (93% - 99%)    Parameters below as of 21 Feb 2024 00:00  Patient On (Oxygen Delivery Method): nasal cannula  O2 Flow (L/min): 4                        13.3   3.98  )-----------( 58       ( 20 Feb 2024 11:30 )             40.4    02-20    146<H>  |  111<H>  |  44<H>  ----------------------------<  280<H>  5.1   |  24  |  1.13    Ca    9.1      20 Feb 2024 11:53  Phos  3.3     02-20  Mg     2.2     02-20    TPro  5.5<L>  /  Alb  3.0<L>  /  TBili  0.4  /  DBili  x   /  AST  101<H>  /  ALT  204<H>  /  AlkPhos  182<H>  02-20   PT/INR - ( 20 Feb 2024 11:30 )   PT: 12.5 sec;   INR: 1.20 ratio      PTT - ( 20 Feb 2024 11:30 )  PTT:55.5 sec    PHYSICAL EXAM:  Gen: eyes close, active EEG monitoring   Skin: No rashes, bruises, or lesions  Head: Normocephalic, Atraumatic  Face: no edema, erythema, or fluctuance. Parotid glands soft without mass  Nose: left nose occluded with crusting, s/p removal. Nares bilaterally patent, no discharge  Mouth: no tongue elevation or significant tongue swelling. No drooling, no pooling of secretions. Mucosa moist, tongue/uvula midline, oropharynx clear  Neck: Flat, supple, no lymphadenopathy, trachea midline, no masses  Lymphatic: No lymphadenopathy  Resp: on nasal cannular, no stridor  CV: no peripheral edema/cyanosis  GI: nondistended   Peripheral vascular: no JVD or edema  Neuro: A&Ox0 not responsive to verbal stimuli     Fiberoptic Indirect Laryngoscopy (Ambu scope used):    Reason for Laryngoscopy: rule out angioedema     Patient was unable to cooperate with mirror.  Nasopharynx, oropharynx, and hypopharynx clear, no bleeding. posterior pharyngeal wall, vallecula, epiglottis, and subglottis appear normal. No erythema, edema, pooling of secretions, masses or lesions. Airway patent, no foreign body visualized. No glottic/supraglottic edema. arytenoids, vestibular folds, ventricles, pyriform sinuses, and aryepiglottic folds appear normal bilaterally. airway patent. noted pachyderma. Right vocal cord paresis and Left vocal cord paralysis in paramedian position, resulting in a large glottic gap.

## 2024-02-21 NOTE — EEG REPORT - NS EEG TEXT BOX
REPORT OF CONTINUOUS VIDEO EEG      [facility]: [address], [place] [address], Phone: [phone]  [facility]: [id number], [place] [address], Phone: [phone]  [facility]: [address] [place] [address], Phone: [phone]    Patient Name: Slime Da Silva    Age: 89 year, : 1935  MRN #: -, Farmer: [id number]  Referring Physician: -  EEG #: [id number]    Study Date: 2024   Start Time: 10:15      End Date: 2024          End Time: 08:00     Study Duration: 21 h 14 m    Study Information:    EEG Recording Technique:  The patient underwent continuous Video-EEG monitoring, using Telemetry System hardware on the XLTek Digital System. EEG and video data were stored on a computer hard drive with important events saved in digital archive files. The material was reviewed by a physician (electroencephalographer / epileptologist) on a daily basis. Gilson and seizure detection algorithms were utilized and reviewed. An EEG Technician attended to the patient, and was available throughout daytime work hours.  The epilepsy center neurologist was available in person or on call 24-hours per day.    EEG Placement and Labeling of Electrodes:  The EEG was performed utilizing 20 channel referential EEG connections (coronal over temporal over parasagittal montage) using all standard 10-20 electrode placements with EKG, with additional electrodes placed in the inferior temporal region using the modified 10-10 montage electrode placements for elective admissions, or if deemed necessary. Recording was at a sampling rate of 256 samples per second per channel. Time synchronized digital video recording was done simultaneously with EEG recording. A low light infrared camera was used for low light recording.     History:  89 year old female with history of altered mental status is here to rule out seizures      Medication  no Pertinent medication      Interpretation:    [[[Abbreviation Key:  PDR=alpha rhythm/posterior dominant rhythm. A-P=anterior posterior.  Amplitude: ‘very low’:<20; ‘low’:20-49; ‘medium’:; ‘high’:>150uV.  Persistence for periodic/rhythmic patterns (% of epoch) ‘rare’:<1%; ‘occasional’:1-10%; ‘frequent’:10-50%; ‘abundant’:50-90%; ‘continuous’:>90%.  Persistence for sporadic discharges: ‘rare’:<1/hr; ‘occasional’:1/min-1/hr; ‘frequent’:>1/min; ‘abundant’:>1/10 sec.  RPP=rhythmic and periodic patterns; GRDA=generalized rhythmic delta activity; FIRDA=frontal intermittent GRDA; LRDA=lateralized rhythmic delta activity; TIRDA=temporal intermittent rhythmic delta activity;  LPD=PLED=lateralized periodic discharges; GPD=generalized periodic discharges; BIPDs =bilateral independent periodic discharges; Mf=multifocal; SIRPDs=stimulus induced rhythmic, periodic, or ictal appearing discharges; BIRDs=brief potentially ictal rhythmic discharges >4 Hz, lasting .5-10s; PFA (paroxysmal bursts >13 Hz or =8 Hz <10s).  Modifiers: +F=with fast component; +S=with spike component; +R=with rhythmic component.  S-B=burst suppression pattern.  Max=maximal. N1-drowsy; N2-stage II sleep; N3-slow wave sleep. SSS/BETS=small sharp spikes/benign epileptiform transients of sleep. HV=hyperventilation; PS=photic stimulation]]]      Daily EEG Visual Analysis    FINDINGS:      Background:  Symmetry: symmetric  Continuous: continuous  PDR: absent  Reactivity: present  Voltage: normal, [defined typically between 20-150uV]  Anterior Posterior Gradient: absent  Breach: absent    Background Slowing:  Generalized slowing: present. Diffuse delta theta irregular activity up to 5 Hz  Focal slowing: none was present.    State Changes:   -absent    Sporadic Epileptiform Discharges:   None    Rhythmic and Periodic Patterns (RPPs):  Nearly continuous generalized periodic discharges with triphasic morphology at a rate of 1-1.5     Electrographic and Electroclinical seizures:  None    Other Clinical Events:  None    Activation Procedures:   -Hyperventilation was not performed.    -Photic stimulation was not performed.    Artifacts:  Intermittent myogenic and movement artifacts were noted.    ECG:  The heart rate on single channel ECG was regular.    EEG Summary / Classification:  Abnormal EEG.  •	Background slowing  •	Triphasic waves    EEG Impression / Clinical Correlate:  Abnormal prolonged EEG study due to   1-	Moderate to severe diffuse nonspecific cerebral dysfunction  2-	Generalized periodic discharges with triphasic morphology at a rate of 1-1.5 Hz typically seen in toxic/metabolic encephalopathy  No epileptic discharges recorded.  No seizures recorded.  •	    ________________________________________    SINTIA Zuniga    Attending Physician, [facility] [place]      ------------------------------------  EEG Reading Room: [phone]  On Call Service After Hours: [phone]

## 2024-02-21 NOTE — PROGRESS NOTE ADULT - ASSESSMENT
Impression: Altered Mental Status, nonfocal neuro exam, likely toxic metabolic infectious encephalopathy.    Case seen with neuro attending during AM rounds. Recommendations not finalized until attested by attending.

## 2024-02-21 NOTE — CONSULT NOTE ADULT - PROBLEM SELECTOR RECOMMENDATION 9
- trial of IV PPI (pt currently NPO and unresponsive to verbal stimuli). can switch to PO PPI when awake and can tolerate diet.   - avoid spicy/fatty/acidic foods (fried foods, tomato, citrus juices, chocolate, mints, coffee, tea, angel, alcohol)   - avoid eating or drinking 2-3 hours before bedtime and lying down   - avoid bending over after eating.   - Elevation of HOB or use an extra pillow for sleep  - outpatient follow up at [facility] office, located at [address] [place] [address]. Please call ([phone]) to make appointment.
continue to be supportive  neurology f/u

## 2024-02-21 NOTE — CONSULT NOTE ADULT - PROBLEM SELECTOR RECOMMENDATION 2
- large glottic gap due to Left vocal cord paralysis in paramedian position and right vocal cord paresis noted during inhalation.   - however, laryngoscopy was performed when pt is unresponsive. pt will benefit from re-scoping when pt is awake and able to phonate.   - when pt's medical and mental status improves, if pt appears to have hoarseness or breathy voice. Can consult ENT for re-evaluation. - large glottic gap due to Left vocal cord paralysis in paramedian position and right vocal cord paresis, noted during inhalation.   - however, laryngoscopy was performed when pt is unresponsive. pt will benefit from re-scoping when pt is awake and able to phonate.   - when pt's medical and mental status improves, if pt appears to have hoarseness or breathy voice. Can consult ENT for re-evaluation. - large glottic gap due to Left vocal cord paralysis in paramedian position and right vocal cord paresis, noted during breathing   - however, laryngoscopy was performed when pt is unresponsive. pt will benefit from re-scoping when pt is awake and able to phonate.   - when pt's medical and mental status improves, if pt appears to have hoarseness or breathy voice. Can consult ENT for re-evaluation.

## 2024-02-21 NOTE — RAPID RESPONSE TEAM SUMMARY - NSSITUATIONBACKGROUNDRRT_GEN_ALL_CORE
88 YO F with PMHx of Myasthenia Gravis (not on medication for past 9 years) and MHx of dementia admitted for AMS in the setting of UTI. RRT called for seizure like activity. Patient hypothermic but HDS. Neuro called to bedside, recommended keppra. Labs drawn. Abx escalated to vanco and zosyn. CTH was done at admission last night, which showed incidental NPH without any acute findings. Patient remained HDS throughout RRT. Possible seizure in the setting of hypothermia. Discussed with primary team to follow up neuro recs, continue with diogo neal, and follow up on cultures. Can also check random cortisol and TSH to work up hypothermia if persistent.   
89F with PMH of Myasthenia Gravis (not on medication for past 9 years) and dementia (AOx1, conversive at baseline) admitted for AMS (AAOx0) in the setting of UTI.     RRT called for hypotension to the 80s systolic and hypothermia to 95F. On chart review, patient is being managed for Urosepsis with Zosyn.     Of note, patient is also on continuous EEG monitoring for seizure-like activity.     Of note, patient is AOx0 which they have been all day and has contracted neck with concern for meningoencephalitis. pending lumbar puncture by Neurology. Also on decadron by ENT for concern for airway edema.     Given all the above, antibiotics deemed to be insufficient. With concern for urosepsis (possibly to resistant organism) and possible meningoencephalitis, antibiotics were broadened to Vancomycin, Meropenem, and Ampicillin.    In addition,  bolus was given with multiple repeat blood pressures with systolic in the 100s.     EEG tech was contacted given concern for twitching but didn't respond. We paged to have called back MAR. Given concern for seizure like activity during the daytime reported by primary, patient was started on keppra. 
89F with PMH of Myasthenia Gravis (not on medication for past 9 years) and dementia (AOx1, conversive at baseline) admitted for AMS (AAOx0) in the setting of UTI. RRT was called earlier this AM for seizure like activity s/p neuro c/s, keppra, zosyn/vanc, and now on vEEG- see prior RRT note for full details.     RRT now called for hypotension. On arrival, VS: rectal temp initially 94 > 97F on repeat, BP 84/44, HR 75, RR 24, SpO4 94% on 2L NC. FS 67. On exam, pt somnolent, AAOx0, rigoring not responding to verbal commands, does withdraw to pain, b/l pinpoint pupils, reactive to light, lungs grossly clear, protecting airway. Primary team ACP at bedside, pt already s/p 2L IVF, pt was admitted overnight, did not received any IVF prior to this. No known cardiac/renal disease, no TTE on file. SBP ranged 80s-90s with MAPs of 50s-low 60s throughout RRT, MICU consulted. BP subsequently improved >140 on repeat x2 after 2L of IVF. Pt already received broadspectrum abx just prior to RRT. Labs including blood cultures, lactate already drawn from prior RRT, still pending in lab. Pt with shaking of b/l upper and lower extremities through RRT, spoke w/ neuro who reviewed vEEG- w/o evidence of seizure activity. Primary team to f/u labs and MICU recs.     - GOC addressed by primary ACP with HCP over the phone during RRT - would like to pursue pressors, chest compressions, and intubation if necessary- FULL CODE.

## 2024-02-21 NOTE — CONSULT NOTE ADULT - PROBLEM SELECTOR RECOMMENDATION 4
1) check folate  2) FSP & d-dimer  3) HIT antobody 1) check folate  2) FSP & d-dimer  3) HIT antibody

## 2024-02-21 NOTE — CHART NOTE - NSCHARTNOTEFT_GEN_A_CORE
HPI:  88 YO F with PMHx of Myasthenia Gravis (not on medication for past 9 years) and Hx of dementia .   Patient was confused for a few days . Urine culture was checked  and showed positive with Klebsiella and E.coli both sensitive to Cipro. Treated with Cipro for 3 days but confusion got worse. She has slurred  speech today so transferred to hospital for neurological evaluation and CT head.  (19 Feb 2024 18:09)     Patient's hospital course complicated by  UTI and urosepsis  Patient is s/p RRT for hypotension. Pt started on meropenem, ampicillin, vancomycin. Keprra started due to concern for seizure like activity during rapid. See RRT note for full details.   Post RRT, patient remained on floor.   F/u with CBC, CMP, mag, phos, PT/PTT/INR, cardiac enzymes, VBG with lytes and lactate, and BCx x 2 drawn.   Discussed with RN  Will endorse to primary attending  Continue to monitor   Will endorse to AM team    [place] PA-C  Department of medicine

## 2024-02-21 NOTE — CONSULT NOTE ADULT - SUBJECTIVE AND OBJECTIVE BOX
HPI:  most history obtained from chart &daughter  89 YO F with PMHx of Myasthenia Gravis (not on medication for past 9 years) and Hx of dementia .     Patient was confused for a few days . Urine culture was checked  and showed positive with Klebsiella and E.coli both sensitive to Cipro. Treated with Cipro for 3 days but confusion got worse. She has slurred  speech today so transferred to hospital for neurological evaluation and CT head.  (19 Feb 2024 18:09)    REVIEW OF SYSTEMS:    CONSTITUTIONAL: No weakness, fevers or chills, weight loss  EYES/ENT: No visual changes, no throat pain   RESPIRATORY: No cough, wheezing, hemoptysis; No shortness of breath  CARDIOVASCULAR: No chest pain or palpitations  GASTROINTESTINAL: No abdominal, nausea, vomiting, or hematemesis; No diarrhea or constipation. No melena or hematochezia.  GENITOURINARY: No dysuria, frequency or hematuria    Allergies:  	sulfamethoxazole: Drug, Vomiting, Nausea, Sulfa drugs    Home Medications:   * Patient Currently Takes Medications as of 08-Jan-2020 13:14 documented in Structured Notes  · 	senna oral tablet: 2 tab(s) orally once a day  · 	polyethylene glycol 3350 oral powder for reconstitution: 17 gram(s) orally once a day, As needed, Constipation  · 	docusate sodium 100 mg oral capsule: 1 cap(s) orally once a day  · 	Advil 200 mg oral tablet: 1 tab(s) orally every 6 hours, As Needed  · 	Pepto-Bismol 262 mg oral tablet, chewable: 2 tab(s) orally 4 times a day, As Needed  · 	Anacin 400 mg-32 mg oral tablet: 2 tab(s) orally every 6 hours, As Needed  · 	Aricept 10 mg oral tablet: 1 tab(s) orally once a day (at bedtime)    PAST MEDICAL HISTORY:  Dementia     Myasthenia gravis     Myasthenia gravis.     PAST SURGICAL HISTORY:  No significant past surgical history     No significant past surgical history.     FAMILY HISTORY:  No pertinent family history in first degree relatives  No pertinent family history in first degree relatives. No pertinent family history of: non-contributory.     Social History:  · Substance use	No  · Social History (marital status, living situation, occupation, and sexual history)	Alochol: Denied  Smoking: Nonsmoker  Drug Use: Denied     Tobacco Screening:  · Core Measure Site	No  · Has the patient used tobacco in the past 30 days?	No    Risk Assessment:    Present on Admission:  Deep Venous Thrombosis	no  Pulmonary Embolus	no  Urinary Catheter	no  Central Venous Catheter/PICC Line	no  Surgical Site Incision	no  Pressure Ulcer(s)	no     HIV Screening:  · In accordance with [place] State law, we offer every patient who comes to our ED an HIV test. Would you like to be tested today?	Opt out    VITAL SIGNS:        PHYSICAL EXAM:     GENERAL: no acute distress  HEENT: NC/AT, EOMI, neck supple, MMM  RESPIRATORY: LCTAB/L, no rhonchi, rales, or wheezing  CARDIOVASCULAR: RRR, no murmurs, gallops, rubs  ABDOMINAL: soft, non-tender, non-distended, positive bowel sounds   EXTREMITIES: no clubbing, cyanosis, or edema  NEUROLOGICAL: alert and oriented x 3, non-focal  LYMPHATIC: lymphatic: cervical, supraclavicular, axilla, inguinal  SKIN: no rashes or lesions   MUSCULOSKELETAL: no gross joint deformity                          11.1   6.18  )-----------( 50       ( 21 Feb 2024 05:51 )             35.2     02-21    149<H>  |  119<H>  |  34<H>  ----------------------------<  88  4.1   |  23  |  1.14    Ca    8.1<L>      21 Feb 2024 05:54  Phos  0.7     02-21  Mg     <.6     02-21    TPro  1.5<L>  /  Alb  0.8<L>  /  TBili  0.2  /  DBili  x   /  AST  22  /  ALT  44  /  AlkPhos  44  02-21      MEDICATIONS  (STANDING):  chlorhexidine 2% Cloths 1 Application(s) Topical daily  dexAMETHasone  Injectable 6 milliGRAM(s) IV Push every 8 hours  dextrose 5% + sodium chloride 0.45%. 1000 milliLiter(s) (75 mL/Hr) IV Continuous <Continuous>  meropenem  IVPB 1000 milliGRAM(s) IV Intermittent every 12 hours  sodium chloride 0.65% Nasal 1 Spray(s) Both Nostrils two times a day  sodium chloride 0.9% Bolus 500 milliLiter(s) IV Bolus once       HPI:  most history obtained from chart &daughter  88 YO F with PMHx of Myasthenia Gravis (not on medication for past 9 years) and Hx of dementia .     Patient was confused for a few days . Urine culture was checked  and showed positive with Klebsiella and E.coli both sensitive to Cipro. Treated with Cipro for 3 days but confusion got worse. She has slurred  speech today so transferred to hospital for neurological evaluation and CT head.  (19 Feb 2024 18:09)  Admission CBCD showed thrombocytopenia  Received Covid -vaccination 3-4 months ago.    REVIEW OF SYSTEMS:    CONSTITUTIONAL: No weakness, fevers or chills, weight loss  EYES/ENT: No visual changes, no throat pain   RESPIRATORY: No cough, wheezing, hemoptysis; No shortness of breath  CARDIOVASCULAR: No chest pain or palpitations  GASTROINTESTINAL: No abdominal, nausea, vomiting, or hematemesis; No diarrhea or constipation. No melena or hematochezia.  GENITOURINARY: No dysuria, frequency or hematuria    Allergies:  	sulfamethoxazole: Drug, Vomiting, Nausea, Sulfa drugs    Home Medications:   * Patient Currently Takes Medications as of 08-Jan-2020 13:14 documented in Structured Notes  · 	senna oral tablet: 2 tab(s) orally once a day  · 	polyethylene glycol 3350 oral powder for reconstitution: 17 gram(s) orally once a day, As needed, Constipation  · 	docusate sodium 100 mg oral capsule: 1 cap(s) orally once a day  · 	Advil 200 mg oral tablet: 1 tab(s) orally every 6 hours, As Needed  · 	Pepto-Bismol 262 mg oral tablet, chewable: 2 tab(s) orally 4 times a day, As Needed  · 	Anacin 400 mg-32 mg oral tablet: 2 tab(s) orally every 6 hours, As Needed  · 	Aricept 10 mg oral tablet: 1 tab(s) orally once a day (at bedtime)    PAST MEDICAL HISTORY:  Dementia     Myasthenia gravis     Myasthenia gravis.     PAST SURGICAL HISTORY:  No significant past surgical history     No significant past surgical history.     FAMILY HISTORY:  No pertinent family history in first degree relatives  No pertinent family history in first degree relatives. No pertinent family history of: non-contributory.     Social History:  · Substance use	No  · Social History (marital status, living situation, occupation, and sexual history); retired retail at [employer]	Alochol: Denied  Smoking: Nonsmoker  Drug Use: Denied     Tobacco Screening:  · Core Measure Site	No  · Has the patient used tobacco in the past 30 days?	No    Risk Assessment:    Present on Admission:  Deep Venous Thrombosis	no  Pulmonary Embolus	no  Urinary Catheter	no  Central Venous Catheter/PICC Line	no  Surgical Site Incision	no  Pressure Ulcer(s)	no     HIV Screening:  · In accordance with [place] State law, we offer every patient who comes to our ED an HIV test. Would you like to be tested today?	Opt out    VITAL SIGNS:        PHYSICAL EXAM:     GENERAL: no acute distress  HEENT: NC/AT, EOMI, neck supple, MMM  RESPIRATORY: LCTAB/L, no rhonchi, rales, or wheezing  CARDIOVASCULAR: RRR, no murmurs, gallops, rubs  ABDOMINAL: soft, non-tender, non-distended, positive bowel sounds   EXTREMITIES: no clubbing, cyanosis, or edema  NEUROLOGICAL: alert and oriented x 3, non-focal  LYMPHATIC: lymphatic: cervical, supraclavicular, axilla, inguinal  SKIN: no rashes or lesions   MUSCULOSKELETAL: no gross joint deformity                          11.1   6.18  )-----------( 50       ( 21 Feb 2024 05:51 )             35.2     02-21    149<H>  |  119<H>  |  34<H>  ----------------------------<  88  4.1   |  23  |  1.14    Ca    8.1<L>      21 Feb 2024 05:54  Phos  0.7     02-21  Mg     <.6     02-21    TPro  1.5<L>  /  Alb  0.8<L>  /  TBili  0.2  /  DBili  x   /  AST  22  /  ALT  44  /  AlkPhos  44  02-21      MEDICATIONS  (STANDING):  chlorhexidine 2% Cloths 1 Application(s) Topical daily  dexAMETHasone  Injectable 6 milliGRAM(s) IV Push every 8 hours  dextrose 5% + sodium chloride 0.45%. 1000 milliLiter(s) (75 mL/Hr) IV Continuous <Continuous>  meropenem  IVPB 1000 milliGRAM(s) IV Intermittent every 12 hours  sodium chloride 0.65% Nasal 1 Spray(s) Both Nostrils two times a day  sodium chloride 0.9% Bolus 500 milliLiter(s) IV Bolus once

## 2024-02-21 NOTE — CONSULT NOTE ADULT - ASSESSMENT
89F with PMH of Myasthenia Gravis (not on medication for past 9 years) and dementia (AOx1, conversive at baseline) admitted for AMS (AAOx0) in the setting of UTI. RRT was called twice over the past 24 hours, respectively for acute hypoxia and seizure like activity. ENT consulted to rule out angioedema. On exam, pt is A&Ox0 and not responsive to verbal stimuli. Tongue is protruding with bilateral lip swelling. But no significant tongue swelling, no tongue elevation, no drooling or pooling of secretions. On fiberoptic flexible laryngoscopy no laryngeal edema, no pooling of secretions in larynx, no aspiration. Patent airway. 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 also noted large glottic gap from right vocal cord paresis and left vocal cord paralysis in paramedian position.  89F with PMH of Myasthenia Gravis (not on medication for past 9 years) and dementia (AOx1, conversive at baseline) admitted for AMS (AAOx0) in the setting of UTI. RRT was called twice over the past 24 hours, respectively for acute hypoxia and seizure like activity. ENT consulted to rule out angioedema. On exam, pt is A&Ox0 and not responsive to verbal stimuli. Tongue is protruding with bilateral lip swelling. But no significant tongue swelling, no tongue elevation, no drooling or pooling of secretions. On fiberoptic flexible laryngoscopy no laryngeal edema, no blood or active bleeding, no pooling of secretions in larynx, no aspiration. Patent airway. 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 also noted large glottic gap from right vocal cord paresis and left vocal cord paralysis in paramedian position during inhalations.  89F with PMH of Myasthenia Gravis (not on medication for past 9 years) and dementia (AOx1, conversive at baseline) admitted for AMS (AAOx0) in the setting of UTI. RRT was called twice over the past 24 hours, respectively for acute hypoxia and seizure like activity. ENT consulted to rule out angioedema. On exam, pt is A&Ox0 and not responsive to verbal stimuli. Tongue is protruding with bilateral lip swelling. But no significant tongue swelling, no tongue elevation, no drooling or pooling of secretions. On fiberoptic flexible laryngoscopy no laryngeal edema, no blood or active bleeding, no pooling of secretions in larynx, no aspiration. Patent airway. 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 also noted large glottic gap from right vocal cord paresis and left vocal cord paralysis in paramedian position during inhalations and exhalations.

## 2024-02-21 NOTE — CHART NOTE - NSCHARTNOTEFT_GEN_A_CORE
Noted patient with swollen lips and tongue during routine rounding. Pt seen and assessed at bedside, currently AXOX0 as noted previously today. Noted to have lip swelling with protrusion of the tongue, however no rashes or swelling noted at other sites. Patient seen with MAR at bedside, able to elicit gag reflex. Of note patient had 2 RRT called earlier today for AMS, hypoxia, and seizure like activity.     Vital Signs Last 24 Hrs  T(C): 36.3 (20 Feb 2024 16:25), Max: 36.3 (20 Feb 2024 16:25)  T(F): 97.4 (20 Feb 2024 16:25), Max: 97.4 (20 Feb 2024 16:25)  HR: 63 (21 Feb 2024 00:00) (59 - 85)  BP: 103/56 (21 Feb 2024 00:00) (90/47 - 139/94)  BP(mean): --  RR: 20 (21 Feb 2024 00:00) (16 - 20)  SpO2: 96% (21 Feb 2024 00:00) (93% - 99%)    Parameters below as of 21 Feb 2024 00:00  Patient On (Oxygen Delivery Method): nasal cannula  O2 Flow (L/min): 4    Physical Exam:  General: WN/WD NAD.  Neurology: A&Ox0, responsive to painful stimuli. vEEG in place.   Head:  Normocephalic, atraumatic  Respiratory: CTA B/L  Abdominal: Soft, NT, ND no palpable mass  Skin: Warm to touch, swelling noted at the lips with tongue protrusion.                           13.3   3.98  )-----------( 58       ( 20 Feb 2024 11:30 )             40.4     02-20    146<H>  |  111<H>  |  44<H>  ----------------------------<  280<H>  5.1   |  24  |  1.13    Ca    9.1      20 Feb 2024 11:53  Phos  3.3     02-20  Mg     2.2     02-20      ABG - ( 20 Feb 2024 08:20 )  pH, Arterial: 7.35  pH, Blood: x     /  pCO2: 48    /  pO2: 115   / HCO3: 26    / Base Excess: 0.3   /  SaO2: 97.9        HPI:  88 YO F with PMHx of Myasthenia Gravis (not on medication for past 9 years) and Hx of dementia .     Patient was confused for a few days . Urine culture was checked  and showed positive with Klebsiella and E.coli both sensitive to Cipro. Treated with Cipro for 3 days but confusion got worse. She has slurred  speech today so transferred to hospital for neurological evaluation and CT head.  (19 Feb 2024 18:09)    Assessment & Plan:  #Edema of the mouth and tongue   >ENT c/s called. Pending recs  >VSS currently. Will continue to monitor VS Q4  >Will endorse primary team in the AM    Meagan Lake PA-C  Department of Medicine Noted patient with swollen lips and tongue during routine rounding. Pt seen and assessed at bedside, currently AXOX0 as noted previously today. Noted to have lip swelling with protrusion of the tongue, however no rashes or swelling noted at other sites. Patient seen with MAR at bedside, able to elicit gag reflex. Of note patient had 2 RRT called earlier today for AMS, hypoxia, and seizure like activity.     Vital Signs Last 24 Hrs  T(C): 36.3 (20 Feb 2024 16:25), Max: 36.3 (20 Feb 2024 16:25)  T(F): 97.4 (20 Feb 2024 16:25), Max: 97.4 (20 Feb 2024 16:25)  HR: 63 (21 Feb 2024 00:00) (59 - 85)  BP: 103/56 (21 Feb 2024 00:00) (90/47 - 139/94)  BP(mean): --  RR: 20 (21 Feb 2024 00:00) (16 - 20)  SpO2: 96% (21 Feb 2024 00:00) (93% - 99%)    Parameters below as of 21 Feb 2024 00:00  Patient On (Oxygen Delivery Method): nasal cannula  O2 Flow (L/min): 4    Physical Exam:  General: WN/WD NAD.  Neurology: A&Ox0, responsive to painful stimuli. vEEG in place.   Head:  Normocephalic, atraumatic  Respiratory: CTA B/L  Abdominal: Soft, NT, ND no palpable mass  Skin: Warm to touch, swelling noted at the lips with tongue protrusion.                           13.3   3.98  )-----------( 58       ( 20 Feb 2024 11:30 )             40.4     02-20    146<H>  |  111<H>  |  44<H>  ----------------------------<  280<H>  5.1   |  24  |  1.13    Ca    9.1      20 Feb 2024 11:53  Phos  3.3     02-20  Mg     2.2     02-20      ABG - ( 20 Feb 2024 08:20 )  pH, Arterial: 7.35  pH, Blood: x     /  pCO2: 48    /  pO2: 115   / HCO3: 26    / Base Excess: 0.3   /  SaO2: 97.9        HPI:  88 YO F with PMHx of Myasthenia Gravis (not on medication for past 9 years) and Hx of dementia .     Patient was confused for a few days . Urine culture was checked  and showed positive with Klebsiella and E.coli both sensitive to Cipro. Treated with Cipro for 3 days but confusion got worse. She has slurred  speech today so transferred to hospital for neurological evaluation and CT head.  (19 Feb 2024 18:09)    Assessment & Plan:  #Edema of the mouth and tongue   >ENT c/s called, pt seen at bedside. Pending recs  >VSS currently. Will continue to monitor VS Q4  >Will endorse primary team in the AM    Meagan Lake PA-C  Department of Medicine

## 2024-02-21 NOTE — RAPID RESPONSE TEAM SUMMARY - NSOTHERINTERVENTIONSRRT_GEN_ALL_CORE
RECOMMENDATIONS:   - Follow up with neurology for LP and EEG recording, as well as antiseizure management  - Consult ID, but c/w antibiotics Vanc, meropenem, and ampicillin for now  - BP monitoring Q1H (automatically programmed) with less frequent monitoring if remains stable  - serious concern over mental status, primary team called neurology overnight    RRT was summarized given all the above and there was no objections from those present to end the rapid. Instructed to call again if patient condition was to worsen RECOMMENDATIONS:   - Follow up with neurology for LP and EEG recording, as well as antiseizure management  - Consult ID, but c/w antibiotics Vanc, meropenem, and ampicillin for now  - BP monitoring Q1H (automatically programmed) with less frequent monitoring if remains stable  - serious concern over mental status, primary team called neurology overnight    RRT was summarized given all the above and there was no objections from those present to end the rapid. Instructed to call again if patient condition was to worsen    Post-note: Following the rapid, the labs came back with a serious metabolic acidosis and Lactate>16. There is a possibility that patient was having seizures, though not clearly visible clinically. Recommended EEG follow up with techs and STAT overnight Neurology consult. Also spoke with ACP and recommended MICU consult which she confirmed she will call

## 2024-02-21 NOTE — CONSULT NOTE ADULT - PROBLEM SELECTOR RECOMMENDATION 3
supportive
- rec decadron 6mg Q8H for 3 doses only.   - if more concerns or issues, please call [phone]   - case discussed with attending.

## 2024-02-21 NOTE — PROGRESS NOTE ADULT - ASSESSMENT
89 y/o F with PMHx of Myasthenia Gravis (not on medication) and dementia (A&Ox1, conversive at baseline) who presents from her nursing home with worsening lethargy and confusion. UA done as outpatient on 2/13 grossly positive and urine culture growing E. coli and Klebsiella, pansensitive. Found to be hypothermic in ED. Admitted for sepsis and metabolic encephalopathy in setting of UTI. Multiple RRTs called today. First RRT called for seizure-like activity. Patient loaded with Keppra and placed on EEG. Second RRT called for hypotension. Patient was given 4 L IVF with resolution of hypotension.      A/P    #R/O sepsis  unclear what caused acute decompensation. Pt was treated for UTI but urine negative here. Pt with remote h/o Myasthenia gravis and mild dementia.  Pt with possible seizure yesterday - 2/20  pt cahnged to meropenem to cover for possible meningitis-   ? Normal pressure hydrocephalous on CT- neuro to address  can d/ cthe  ampicillin, meropenem has listeria coverage  EEG not c/w HSV encephalits   suggest MRI of head to r/o stroke  ? significance of the NPH and how does it contribute to the mental status  Hypernatremia may be contributing to the mental status as well   would avoid certain abs- such as cipro with the myasthenia graavis- suggest neuro import to address   pt with clear cxr and no diarrhea .NOW on oxygen, consider CT of chest if can  can consider ct of chest / abd /pelvis if no improvement      #elevate LFTs  check RUQ ultrasound  check hepatitis screen  avoid hepatotoxci meds     #lactate   markedly elevated but improved  If ischemic bowel, wouldn't improve so quickly    #hypernatremia  likely contributing to the mental status     #thrombocytopenia  can't do spinal tap if low  hematology to address      Yeni Gabriel M.D. ,   please reach via teams   If no answer, or after 5PM/ weekends,  then please call  [phone]    Assessment and plan discussed with the primary team .

## 2024-02-21 NOTE — CONSULT NOTE ADULT - ASSESSMENT
88 YO F with PMHx of Myasthenia Gravis (not on medication for past 9 years) and Hx of dementia .     Patient was confused for a few days . Urine culture was checked  and showed positive with Klebsiella and E.coli both sensitive to Cipro. Treated with Cipro for 3 days but confusion got worse. She has slurred  speech today so transferred to hospital for neurological evaluation and CT head.  (19 Feb 2024 18:09)  Admission CBCD showed thrombocytopenia  Received Covid -vaccination 3-4 months ago.  Did received 2 days of Lovenox  r/o DIC, MDS, hypersplenism, folic def, &HIT

## 2024-02-21 NOTE — PROGRESS NOTE ADULT - SUBJECTIVE AND OBJECTIVE BOX
Patient is a 89y old  Female who presents with a chief complaint of AMS (21 Feb 2024 17:32)      INTERVAL HPI/OVERNIGHT EVENTS: Patient still obtunded and not responsive. Still on EEG. Spoke with neuro radiologist for LP. Discontinue  Lovenox. PLT going down to 50,000. No signs of bleeding. Consult hematologist. Discussed with ID who rather to discontinue Ampicillin and continue Meropenem and Vancomycin for possible  Meningitis and also adding Acyclovir to cover viral encephalitis.  Patient had multiple rapid response today. MICU  still does not consider her candidate for  intubation or being in ICU. Continue medical management. Lactic acid elevated. Continue IV hydration . Check CT abdomen/pelvis to r/o acute abdomen. F/u MRI which was not  done yet because she was not stable yet. As soon as she is stable hemodynamically  she can have MRI to ro stroke.     Pain Location & Control:     MEDICATIONS  (STANDING):  chlorhexidine 2% Cloths 1 Application(s) Topical daily  dexAMETHasone  Injectable 6 milliGRAM(s) IV Push every 8 hours  dextrose 5% + sodium chloride 0.45%. 1000 milliLiter(s) (75 mL/Hr) IV Continuous <Continuous>  meropenem  IVPB 1000 milliGRAM(s) IV Intermittent every 12 hours  sodium chloride 0.65% Nasal 1 Spray(s) Both Nostrils two times a day  sodium chloride 0.9% Bolus 500 milliLiter(s) IV Bolus once    MEDICATIONS  (PRN):      Allergies    sulfamethoxazole (Vomiting; Nausea)    Intolerances        REVIEW OF SYSTEMS:  UTO  unresponsive     Vital Signs Last 24 Hrs  T(C): 36.3 (21 Feb 2024 15:41), Max: 37.5 (21 Feb 2024 06:58)  T(F): 97.4 (21 Feb 2024 15:41), Max: 99.5 (21 Feb 2024 06:58)  HR: 98 (21 Feb 2024 15:41) (54 - 99)  BP: 136/76 (21 Feb 2024 15:41) (87/54 - 136/76)  BP(mean): --  RR: 18 (21 Feb 2024 15:41) (18 - 20)  SpO2: 93% (21 Feb 2024 15:41) (93% - 99%)    Parameters below as of 21 Feb 2024 15:41  Patient On (Oxygen Delivery Method): nasal cannula  O2 Flow (L/min): 4      PHYSICAL EXAM:  GENERAL: NAD, well-groomed, well-developed  HEAD:  Atraumatic, Normocephalic  EYES: EOMI, PERRLA, conjunctiva and sclera clear  ENMT: No tonsillar erythema, exudates, or enlargement; Moist mucous membranes, Good dentition, No lesions  NECK: Supple, No JVD, Normal thyroid  NERVOUS SYSTEM:  Alert & Oriented X 0 unresponsive and not following commands.   CHEST/LUNG: Clear to auscultation bilaterally; No rales, rhonchi, wheezing, or rubs  HEART: Regular rate and rhythm; No murmurs, rubs, or gallops  ABDOMEN: Soft, Nontender, Nondistended; Bowel sounds present  EXTREMITIES:  2+ Peripheral Pulses, No clubbing or cyanosis  LYMPH: No lymphadenopathy noted  SKIN: No rashes or lesions  INCISION:  Dressing dry and intact    LABS:                        11.1   6.18  )-----------( 50       ( 21 Feb 2024 05:51 )             35.2     21 Feb 2024 05:54    149    |  119    |  34     ----------------------------<  88     4.1     |  23     |  1.14     Ca    8.1        21 Feb 2024 05:54  Phos  0.7       21 Feb 2024 04:36  Mg     <.6       21 Feb 2024 04:36    TPro  1.5    /  Alb  0.8    /  TBili  0.2    /  DBili  x      /  AST  22     /  ALT  44     /  AlkPhos  44     21 Feb 2024 04:36    PT/INR - ( 21 Feb 2024 04:36 )   PT: 23.4 sec;   INR: 2.28 ratio         PTT - ( 21 Feb 2024 04:36 )  PTT:52.5 sec  Urinalysis Basic - ( 21 Feb 2024 05:54 )    Color: x / Appearance: x / SG: x / pH: x  Gluc: 88 mg/dL / Ketone: x  / Bili: x / Urobili: x   Blood: x / Protein: x / Nitrite: x   Leuk Esterase: x / RBC: x / WBC x   Sq Epi: x / Non Sq Epi: x / Bacteria: x      CAPILLARY BLOOD GLUCOSE      POCT Blood Glucose.: 124 mg/dL (21 Feb 2024 18:25)  POCT Blood Glucose.: 111 mg/dL (21 Feb 2024 11:58)  POCT Blood Glucose.: 84 mg/dL (21 Feb 2024 05:12)  POCT Blood Glucose.: 113 mg/dL (21 Feb 2024 04:05)  POCT Blood Glucose.: 106 mg/dL (21 Feb 2024 00:53)  POCT Blood Glucose.: 113 mg/dL (20 Feb 2024 23:15)        Cultures  Culture Results:   No growth at 24 hours (02-20-24 @ 08:56)  Culture Results:   No growth at 24 hours (02-20-24 @ 08:56)  Culture Results:   No growth at 48 Hours (02-19-24 @ 12:56)  Culture Results:   <10,000 CFU/mL Normal Urogenital Vidya (02-19-24 @ 12:56)  Culture Results:   No growth at 48 Hours (02-19-24 @ 12:55)    Lactate, Blood: >17.6 mmol/L (02-21-24 @ 04:36)    RADIOLOGY & ADDITIONAL TESTS:    Imaging Personally Reviewed:  [X ] YES  [ ] NO    Consultant(s) Notes Reviewed:  [ X] YES  [ ] NO    Care Discussed with Consultants/Other Providers [X ] YES  [ ] NO

## 2024-02-21 NOTE — CONSULT NOTE ADULT - ATTENDING COMMENTS
89F Hx NHR, Untreated Myasthenia Gravis, Dementia A&O x 1-2, admitted for Altered Mental Status, Confusion, Hypotension, Urosepsis seen by MICU consult yesterday after RRTs and Seizure-like activities on vEEG and Keppra loading. RRt called again this AM for Hypotension resolved with 1 Li IV Bolus. MICU called for abnormal Labs Hb 5.5, HCO3 <10, Lactate >17.   - Pt seen and lab reviewed   - Unchanged Neuro Status without clinical seizures on vEEG   - Swollen Tongue s/p ENT Scoped without airway compromise   - Repeated lab corrected without intervention     Patient seen and examined with ICU Resident/Fellow at bedside after lab data, medical records and radiology reports reviewed. I have read and agreeable in general with resident's Documented Note, Assessment and Management Plan which reflected my opinions from bedside round and discussion.

## 2024-02-22 LAB
ADD ON TEST-SPECIMEN IN LAB: SIGNIFICANT CHANGE UP
ALBUMIN SERPL ELPH-MCNC: 3.1 G/DL — LOW (ref 3.3–5)
ALP SERPL-CCNC: 156 U/L — HIGH (ref 40–120)
ALT FLD-CCNC: 129 U/L — HIGH (ref 10–45)
ANION GAP SERPL CALC-SCNC: 14 MMOL/L — SIGNIFICANT CHANGE UP (ref 5–17)
APTT BLD: 40 SEC — HIGH (ref 24.5–35.6)
AST SERPL-CCNC: 61 U/L — HIGH (ref 10–40)
BILIRUB DIRECT SERPL-MCNC: 0.1 MG/DL — SIGNIFICANT CHANGE UP (ref 0–0.3)
BILIRUB INDIRECT FLD-MCNC: 0.3 MG/DL — SIGNIFICANT CHANGE UP (ref 0.2–1)
BILIRUB SERPL-MCNC: 0.4 MG/DL — SIGNIFICANT CHANGE UP (ref 0.2–1.2)
BUN SERPL-MCNC: 29 MG/DL — HIGH (ref 7–23)
CALCIUM SERPL-MCNC: 8.3 MG/DL — LOW (ref 8.4–10.5)
CHLORIDE SERPL-SCNC: 114 MMOL/L — HIGH (ref 96–108)
CO2 SERPL-SCNC: 17 MMOL/L — LOW (ref 22–31)
CREAT SERPL-MCNC: 0.94 MG/DL — SIGNIFICANT CHANGE UP (ref 0.5–1.3)
EGFR: 58 ML/MIN/1.73M2 — LOW
FIBRINOGEN PPP-MCNC: 401 MG/DL — SIGNIFICANT CHANGE UP (ref 200–445)
FOLATE SERPL-MCNC: 12.3 NG/ML — SIGNIFICANT CHANGE UP
FSP PPP-MCNC: >=5 <20 UG/ML
GLUCOSE BLDC GLUCOMTR-MCNC: 130 MG/DL — HIGH (ref 70–99)
GLUCOSE BLDC GLUCOMTR-MCNC: 133 MG/DL — HIGH (ref 70–99)
GLUCOSE BLDC GLUCOMTR-MCNC: 94 MG/DL — SIGNIFICANT CHANGE UP (ref 70–99)
GLUCOSE BLDC GLUCOMTR-MCNC: 98 MG/DL — SIGNIFICANT CHANGE UP (ref 70–99)
GLUCOSE SERPL-MCNC: 134 MG/DL — HIGH (ref 70–99)
HCT VFR BLD CALC: 36.3 % — SIGNIFICANT CHANGE UP (ref 34.5–45)
HGB BLD-MCNC: 12.1 G/DL — SIGNIFICANT CHANGE UP (ref 11.5–15.5)
INR BLD: 1.12 RATIO — SIGNIFICANT CHANGE UP (ref 0.85–1.18)
MCHC RBC-ENTMCNC: 31.7 PG — SIGNIFICANT CHANGE UP (ref 27–34)
MCHC RBC-ENTMCNC: 33.3 GM/DL — SIGNIFICANT CHANGE UP (ref 32–36)
MCV RBC AUTO: 95 FL — SIGNIFICANT CHANGE UP (ref 80–100)
NRBC # BLD: 0 /100 WBCS — SIGNIFICANT CHANGE UP (ref 0–0)
PLATELET # BLD AUTO: 44 K/UL — LOW (ref 150–400)
POTASSIUM SERPL-MCNC: 4 MMOL/L — SIGNIFICANT CHANGE UP (ref 3.5–5.3)
POTASSIUM SERPL-SCNC: 4 MMOL/L — SIGNIFICANT CHANGE UP (ref 3.5–5.3)
PROT SERPL-MCNC: 5.4 G/DL — LOW (ref 6–8.3)
PROTHROM AB SERPL-ACNC: 11.7 SEC — SIGNIFICANT CHANGE UP (ref 9.5–13)
RBC # BLD: 3.82 M/UL — SIGNIFICANT CHANGE UP (ref 3.8–5.2)
RBC # FLD: 14.6 % — HIGH (ref 10.3–14.5)
SODIUM SERPL-SCNC: 145 MMOL/L — SIGNIFICANT CHANGE UP (ref 135–145)
WBC # BLD: 9.96 K/UL — SIGNIFICANT CHANGE UP (ref 3.8–10.5)
WBC # FLD AUTO: 9.96 K/UL — SIGNIFICANT CHANGE UP (ref 3.8–10.5)

## 2024-02-22 PROCEDURE — 71260 CT THORAX DX C+: CPT | Mod: 26

## 2024-02-22 PROCEDURE — 70551 MRI BRAIN STEM W/O DYE: CPT | Mod: 26

## 2024-02-22 PROCEDURE — 95718 EEG PHYS/QHP 2-12 HR W/VEEG: CPT

## 2024-02-22 PROCEDURE — 76700 US EXAM ABDOM COMPLETE: CPT | Mod: 26

## 2024-02-22 PROCEDURE — 99233 SBSQ HOSP IP/OBS HIGH 50: CPT

## 2024-02-22 PROCEDURE — 74177 CT ABD & PELVIS W/CONTRAST: CPT | Mod: 26

## 2024-02-22 RX ORDER — ACYCLOVIR SODIUM 500 MG
550 VIAL (EA) INTRAVENOUS EVERY 12 HOURS
Refills: 0 | Status: DISCONTINUED | OUTPATIENT
Start: 2024-02-22 | End: 2024-03-03

## 2024-02-22 RX ADMIN — Medication 111 MILLIGRAM(S): at 11:33

## 2024-02-22 RX ADMIN — MEROPENEM 100 MILLIGRAM(S): 1 INJECTION INTRAVENOUS at 17:22

## 2024-02-22 RX ADMIN — CHLORHEXIDINE GLUCONATE 1 APPLICATION(S): 213 SOLUTION TOPICAL at 13:31

## 2024-02-22 RX ADMIN — MEROPENEM 100 MILLIGRAM(S): 1 INJECTION INTRAVENOUS at 04:56

## 2024-02-22 RX ADMIN — Medication 1 SPRAY(S): at 04:57

## 2024-02-22 RX ADMIN — Medication 111 MILLIGRAM(S): at 21:58

## 2024-02-22 RX ADMIN — Medication 1 SPRAY(S): at 17:23

## 2024-02-22 RX ADMIN — SODIUM CHLORIDE 75 MILLILITER(S): 9 INJECTION, SOLUTION INTRAVENOUS at 04:56

## 2024-02-22 NOTE — EEG REPORT - NS EEG TEXT BOX
EEG Report [Charted Location: [address]] [Authored: 2024 09:06]- for Visit: [id number], Complete, Entered, Signed in Full, Available to Patient    EEG REPORT:    EEG Report:  · EEG Report	     REPORT OF CONTINUOUS VIDEO EEG      [facility]: [address] , [address], [place] [address], Phone: [phone]  [facility]: [address], [place] [address], Phone: [phone]  [facility]: [address] [place] [address], Phone: [phone]    Patient Name: Slime Da Silva    Age: 89 year, : 1935  MRN #: -, Farmer: [address] [place], [place] [id number] D-  Referring Physician: -  EEG #: [id number]    Study Date: 2024   Start Time: 08:00      End Date: 2024          End Time: 08:00     Study Duration: 24 h    Study Information:    EEG Recording Technique:  The patient underwent continuous Video-EEG monitoring, using Telemetry System hardware on the XLTek Digital System. EEG and video data were stored on a computer hard drive with important events saved in digital archive files. The material was reviewed by a physician (electroencephalographer / epileptologist) on a daily basis. Gilson and seizure detection algorithms were utilized and reviewed. An EEG Technician attended to the patient, and was available throughout daytime work hours.  The epilepsy center neurologist was available in person or on call 24-hours per day.    EEG Placement and Labeling of Electrodes:  The EEG was performed utilizing 20 channel referential EEG connections (coronal over temporal over parasagittal montage) using all standard 10-20 electrode placements with EKG, with additional electrodes placed in the inferior temporal region using the modified 10-10 montage electrode placements for elective admissions, or if deemed necessary. Recording was at a sampling rate of 256 samples per second per channel. Time synchronized digital video recording was done simultaneously with EEG recording. A low light infrared camera was used for low light recording.     History:  89 year old female with history of altered mental status is here to rule out seizures      Medication  no Pertinent medication      Interpretation:    [[[Abbreviation Key:  PDR=alpha rhythm/posterior dominant rhythm. A-P=anterior posterior.  Amplitude: ‘very low’:<20; ‘low’:20-49; ‘medium’:; ‘high’:>150uV.  Persistence for periodic/rhythmic patterns (% of epoch) ‘rare’:<1%; ‘occasional’:1-10%; ‘frequent’:10-50%; ‘abundant’:50-90%; ‘continuous’:>90%.  Persistence for sporadic discharges: ‘rare’:<1/hr; ‘occasional’:1/min-1/hr; ‘frequent’:>1/min; ‘abundant’:>1/10 sec.  RPP=rhythmic and periodic patterns; GRDA=generalized rhythmic delta activity; FIRDA=frontal intermittent GRDA; LRDA=lateralized rhythmic delta activity; TIRDA=temporal intermittent rhythmic delta activity;  LPD=PLED=lateralized periodic discharges; GPD=generalized periodic discharges; BIPDs =bilateral independent periodic discharges; Mf=multifocal; SIRPDs=stimulus induced rhythmic, periodic, or ictal appearing discharges; BIRDs=brief potentially ictal rhythmic discharges >4 Hz, lasting .5-10s; PFA (paroxysmal bursts >13 Hz or =8 Hz <10s).  Modifiers: +F=with fast component; +S=with spike component; +R=with rhythmic component.  S-B=burst suppression pattern.  Max=maximal. N1-drowsy; N2-stage II sleep; N3-slow wave sleep. SSS/BETS=small sharp spikes/benign epileptiform transients of sleep. HV=hyperventilation; PS=photic stimulation]]]      Daily EEG Visual Analysis    FINDINGS:      Background:  Symmetry: symmetric  Continuous: continuous  PDR: absent  Reactivity: present  Voltage: normal, [defined typically between 20-150uV]  Anterior Posterior Gradient: absent  Breach: absent    Background Slowing:  Generalized slowing: present. Diffuse delta theta irregular activity up to 6 Hz  Focal slowing: none was present.    State Changes:   -absent    Sporadic Epileptiform Discharges:   None    Rhythmic and Periodic Patterns (RPPs):  Nearly continuous generalized periodic discharges with triphasic morphology at a rate of 1-1.5     Electrographic and Electroclinical seizures:  None    Other Clinical Events:  None    Activation Procedures:   -Hyperventilation was not performed.    -Photic stimulation was not performed.    Artifacts:  Intermittent myogenic and movement artifacts were noted.    ECG:  The heart rate on single channel ECG was regular.    EEG Summary / Classification:  Abnormal EEG.  •	Background slowing  •	Triphasic waves    EEG Impression / Clinical Correlate:  Abnormal prolonged EEG study due to   1-	Moderate diffuse nonspecific cerebral dysfunction  2-	Frequent generalized periodic discharges with triphasic morphology at a rate of 1-1.5 Hz typically seen in toxic/metabolic encephalopathy  No epileptic discharges recorded.  No seizures recorded.  •	  In absence of additional concerns, recommend consideration for discontinuation of current EEG study with reconnection in future if warranted.    ________________________________________    SINTIA Zuniga    Attending Physician, [facility]      ------------------------------------  EEG Reading Room: [phone]  On Call Service After Hours: [phone]          Electronic Signatures:  Poli Agrawal)  (Signed 2024 09:07)  	Authored: EEG REPORT      Last Updated: 2024 09:07 by Poli Agrawal)            REPORT OF CONTINUOUS VIDEO EEG      [facility]: [address] [facility] [facility] [address], [place] [address], Phone: [phone]  [facility]: [address], [place] [address], Phone: [phone]  [facility]: [address] [place] [address], Phone: [phone]    Patient Name: Slime Da Silva    Age: 89 year, : 1935  MRN #: -, Farmer: [address] D-  Referring Physician: -  EEG #: [id number]    Study Date: 2024   Start Time: 08:00      End Date: 2024          End Time: 08:00     Study Duration: 24 h    Study Information:    EEG Recording Technique:  The patient underwent continuous Video-EEG monitoring, using Telemetry System hardware on the XLTek Digital System. EEG and video data were stored on a computer hard drive with important events saved in digital archive files. The material was reviewed by a physician (electroencephalographer / epileptologist) on a daily basis. Gilson and seizure detection algorithms were utilized and reviewed. An EEG Technician attended to the patient, and was available throughout daytime work hours.  The epilepsy center neurologist was available in person or on call 24-hours per day.    EEG Placement and Labeling of Electrodes:  The EEG was performed utilizing 20 channel referential EEG connections (coronal over temporal over parasagittal montage) using all standard 10-20 electrode placements with EKG, with additional electrodes placed in the inferior temporal region using the modified 10-10 montage electrode placements for elective admissions, or if deemed necessary. Recording was at a sampling rate of 256 samples per second per channel. Time synchronized digital video recording was done simultaneously with EEG recording. A low light infrared camera was used for low light recording.     History:  89 year old female with history of altered mental status is here to rule out seizures      Medication  no Pertinent medication      Interpretation:    [[[Abbreviation Key:  PDR=alpha rhythm/posterior dominant rhythm. A-P=anterior posterior.  Amplitude: ‘very low’:<20; ‘low’:20-49; ‘medium’:; ‘high’:>150uV.  Persistence for periodic/rhythmic patterns (% of epoch) ‘rare’:<1%; ‘occasional’:1-10%; ‘frequent’:10-50%; ‘abundant’:50-90%; ‘continuous’:>90%.  Persistence for sporadic discharges: ‘rare’:<1/hr; ‘occasional’:1/min-1/hr; ‘frequent’:>1/min; ‘abundant’:>1/10 sec.  RPP=rhythmic and periodic patterns; GRDA=generalized rhythmic delta activity; FIRDA=frontal intermittent GRDA; LRDA=lateralized rhythmic delta activity; TIRDA=temporal intermittent rhythmic delta activity;  LPD=PLED=lateralized periodic discharges; GPD=generalized periodic discharges; BIPDs =bilateral independent periodic discharges; Mf=multifocal; SIRPDs=stimulus induced rhythmic, periodic, or ictal appearing discharges; BIRDs=brief potentially ictal rhythmic discharges >4 Hz, lasting .5-10s; PFA (paroxysmal bursts >13 Hz or =8 Hz <10s).  Modifiers: +F=with fast component; +S=with spike component; +R=with rhythmic component.  S-B=burst suppression pattern.  Max=maximal. N1-drowsy; N2-stage II sleep; N3-slow wave sleep. SSS/BETS=small sharp spikes/benign epileptiform transients of sleep. HV=hyperventilation; PS=photic stimulation]]]      Daily EEG Visual Analysis    FINDINGS:      Background:  Symmetry: symmetric  Continuous: continuous  PDR: absent  Reactivity: present  Voltage: normal, [defined typically between 20-150uV]  Anterior Posterior Gradient: absent  Breach: absent    Background Slowing:  Generalized slowing: present. Diffuse delta theta irregular activity up to 6 Hz  Focal slowing: none was present.    State Changes:   -absent    Sporadic Epileptiform Discharges:   None    Rhythmic and Periodic Patterns (RPPs):  Nearly continuous generalized periodic discharges with triphasic morphology at a rate of 1-1.5     Electrographic and Electroclinical seizures:  None    Other Clinical Events:  None    Activation Procedures:   -Hyperventilation was not performed.    -Photic stimulation was not performed.    Artifacts:  Intermittent myogenic and movement artifacts were noted.    ECG:  The heart rate on single channel ECG was regular.    EEG Summary / Classification:  Abnormal EEG.  •	Background slowing  •	Triphasic waves    EEG Impression / Clinical Correlate:  Abnormal prolonged EEG study due to   1-	Moderate diffuse nonspecific cerebral dysfunction  2-	Frequent generalized periodic discharges with triphasic morphology at a rate of 1-1.5 Hz typically seen in toxic/metabolic encephalopathy  No epileptic discharges recorded.  No seizures recorded.  •	  In absence of additional concerns, recommend consideration for discontinuation of current EEG study with reconnection in future if warranted.    ________________________________________    SINTIA Zuniga    Attending Physician, [facility] [place]      ------------------------------------  EEG Reading Room: [phone]  On Call Service After Hours: [phone]          Electronic Signatures:  Poli Agrawal)  (Signed 2024 09:07)  	Authored: EEG REPORT      Last Updated: 2024 09:07 by Poli Agrawal)            REPORT OF CONTINUOUS VIDEO EEG      [facility]: [address], [place] [address], Phone: [phone]  [facility]: [address], [place] [address], Phone: [phone]  [facility]: [address] [place] [address], Phone: [phone]    Patient Name: Slime Da Silva    Age: 89 year, : 1935  Farmer: [address] D-  EEG #: [id number]    Study Date: 2024   Start Time: 08:00      End Date: 2024      End Time: 14:34   Study Duration: 30 h 34 m    Study Information:    EEG Recording Technique:  The patient underwent continuous Video-EEG monitoring, using Telemetry System hardware on the XLTek Digital System. EEG and video data were stored on a computer hard drive with important events saved in digital archive files. The material was reviewed by a physician (electroencephalographer / epileptologist) on a daily basis. Gilson and seizure detection algorithms were utilized and reviewed. An EEG Technician attended to the patient, and was available throughout daytime work hours.  The epilepsy center neurologist was available in person or on call 24-hours per day.    EEG Placement and Labeling of Electrodes:  The EEG was performed utilizing 20 channel referential EEG connections (coronal over temporal over parasagittal montage) using all standard 10-20 electrode placements with EKG, with additional electrodes placed in the inferior temporal region using the modified 10-10 montage electrode placements for elective admissions, or if deemed necessary. Recording was at a sampling rate of 256 samples per second per channel. Time synchronized digital video recording was done simultaneously with EEG recording. A low light infrared camera was used for low light recording.     History:  89 year old female with history of altered mental status is here to rule out seizures      Medication  no Pertinent medication      Interpretation:    [[[Abbreviation Key:  PDR=alpha rhythm/posterior dominant rhythm. A-P=anterior posterior.  Amplitude: ‘very low’:<20; ‘low’:20-49; ‘medium’:; ‘high’:>150uV.  Persistence for periodic/rhythmic patterns (% of epoch) ‘rare’:<1%; ‘occasional’:1-10%; ‘frequent’:10-50%; ‘abundant’:50-90%; ‘continuous’:>90%.  Persistence for sporadic discharges: ‘rare’:<1/hr; ‘occasional’:1/min-1/hr; ‘frequent’:>1/min; ‘abundant’:>1/10 sec.  RPP=rhythmic and periodic patterns; GRDA=generalized rhythmic delta activity; FIRDA=frontal intermittent GRDA; LRDA=lateralized rhythmic delta activity; TIRDA=temporal intermittent rhythmic delta activity;  LPD=PLED=lateralized periodic discharges; GPD=generalized periodic discharges; BIPDs =bilateral independent periodic discharges; Mf=multifocal; SIRPDs=stimulus induced rhythmic, periodic, or ictal appearing discharges; BIRDs=brief potentially ictal rhythmic discharges >4 Hz, lasting .5-10s; PFA (paroxysmal bursts >13 Hz or =8 Hz <10s).  Modifiers: +F=with fast component; +S=with spike component; +R=with rhythmic component.  S-B=burst suppression pattern.  Max=maximal. N1-drowsy; N2-stage II sleep; N3-slow wave sleep. SSS/BETS=small sharp spikes/benign epileptiform transients of sleep. HV=hyperventilation; PS=photic stimulation]]]      Daily EEG Visual Analysis    FINDINGS:      Background:  Symmetry: symmetric  Continuous: continuous  PDR: absent  Reactivity: present  Voltage: normal, [defined typically between 20-150uV]  Anterior Posterior Gradient: absent  Breach: absent    Background Slowing:  Generalized slowing: present. Diffuse delta theta irregular activity up to 6 Hz  Focal slowing: none was present.    State Changes:   -absent    Sporadic Epileptiform Discharges:   None    Rhythmic and Periodic Patterns (RPPs):  Nearly continuous generalized periodic discharges with triphasic morphology at a rate of 1-1.5     Electrographic and Electroclinical seizures:  None    Other Clinical Events:  None    Activation Procedures:   -Hyperventilation was not performed.    -Photic stimulation was not performed.    Artifacts:  Intermittent myogenic and movement artifacts were noted.    ECG:  The heart rate on single channel ECG was regular.    EEG Summary / Classification:  Abnormal EEG.  •	Background slowing  •	Triphasic waves    EEG Impression / Clinical Correlate:  Abnormal prolonged EEG study due to   1-	Moderate diffuse nonspecific cerebral dysfunction  2-	Frequent generalized periodic discharges with triphasic morphology at a rate of 1-1.5 Hz typically seen in toxic/metabolic encephalopathy  No epileptic discharges recorded.  No seizures recorded.  •	  In absence of additional concerns, recommend consideration for discontinuation of current EEG study with reconnection in future if warranted.    ________________________________________    SINTIA Zuniga    Attending Physician, [facility] [place]      ------------------------------------  EEG Reading Room: [phone]  On Call Service After Hours: [phone]          Electronic Signatures:  Poli Agrawal)  (Signed 2024 09:07)  	Authored: EEG REPORT      Last Updated: 2024 09:07 by Poli Agrawal)

## 2024-02-22 NOTE — DIETITIAN INITIAL EVALUATION ADULT - NSFNSGIIOFT_GEN_A_CORE
02-21-24 @ 07:01  -  02-22-24 @ 07:00  --------------------------------------------------------  OUT:    Stool (mL): 0 mL  Total OUT: 0 mL    Total NET: 0 mL

## 2024-02-22 NOTE — PROGRESS NOTE ADULT - SUBJECTIVE AND OBJECTIVE BOX
obtunded  minimal responsive      VITAL SIGNS:    T 97.4, P90 /84    PHYSICAL EXAM:     GENERAL: no acute distress  HEENT: NC/AT, EOMI, neck supple, MMM  RESPIRATORY: LCTAB/L, no rhonchi, rales, or wheezing  CARDIOVASCULAR: RRR, no murmurs, gallops, rubs  ABDOMINAL: soft, non-tender, non-distended, positive bowel sounds   EXTREMITIES: no clubbing, cyanosis, or edema  NEUROLOGICAL:obtuned                          12.1   9.96  )-----------( 44       ( 22 Feb 2024 07:31 )             36.3     02-22    145  |  114<H>  |  29<H>  ----------------------------<  134<H>  4.0   |  17<L>  |  0.94    Ca    8.3<L>      22 Feb 2024 07:31  Phos  0.7     02-21  Mg     <.6     02-21    TPro  5.4<L>  /  Alb  3.1<L>  /  TBili  0.4  /  DBili  0.1  /  AST  61<H>  /  ALT  129<H>  /  AlkPhos  156<H>  02-22      MEDICATIONS  (STANDING):  acyclovir IVPB 550 milliGRAM(s) IV Intermittent every 12 hours  chlorhexidine 2% Cloths 1 Application(s) Topical daily  dextrose 5% + sodium chloride 0.45%. 1000 milliLiter(s) (75 mL/Hr) IV Continuous <Continuous>  meropenem  IVPB 1000 milliGRAM(s) IV Intermittent every 12 hours  sodium chloride 0.65% Nasal 1 Spray(s) Both Nostrils two times a day  sodium chloride 0.9% Bolus 500 milliLiter(s) IV Bolus once

## 2024-02-22 NOTE — DIETITIAN INITIAL EVALUATION ADULT - OTHER INFO
Nutrition-Related Concerns:  -- s/p Two episodes of RRT (2/21- for hypotension) and (2/20-seizure like activity).

## 2024-02-22 NOTE — PROGRESS NOTE ADULT - ASSESSMENT
89 y/o F with PMHx of Myasthenia Gravis (not on medication) and dementia (A&Ox1, conversive at baseline) who presents from her nursing home with worsening lethargy and confusion. UA done as outpatient on 2/13 grossly positive and urine culture growing E. coli and Klebsiella, pansensitive. Found to be hypothermic in ED. Admitted for sepsis and metabolic encephalopathy in setting of UTI. Multiple RRTs called today. First RRT called for seizure-like activity. Patient loaded with Keppra and placed on EEG. Second RRT called for hypotension. Patient was given 4 L IVF with resolution of hypotension.      A/P    #R/O sepsis  unclear what caused acute decompensation. Pt was treated for UTI but urine negative here. Pt with remote h/o Myasthenia gravis and mild dementia.  Pt with possible seizure - 2/20  pt changed to meropenem to cover for possible meningitis-   ? Normal pressure hydrocephalous on CT- neuro to address  d/atiya the  ampicillin, meropenem has listeria coverage  EEG not c/w HSV encephalits, but will add acyclovir until more definitive diagnosis available, will adjust to renal funciton  for MRI of head  ? significance of the NPH and how does it contribute to the mental status  Hypernatremia may be contributing to the mental status as well   would avoid certain abs- such as cipro with the myasthenia graavis- suggest neuro import to address   pt with clear cxr and no diarrhea .NOW on oxygen, consider CT of chest if can  for ct of chest / abd /pelvis       #elevate LFTs  check RUQ ultrasound  check hepatitis screen  avoid hepatotoxci meds   trend     #lactate   markedly elevated but improved  If ischemic bowel, wouldn't improve so quickly- ? lab error     #hypernatremia  likely contributing to the mental status     #thrombocytopenia  can't do spinal tap if low  hematology to address      Yeni Gabriel M.D. ,   please reach via teams   If no answer, or after 5PM/ weekends,  then please call  [phone]    Assessment and plan discussed with the primary team .    I will be away  tomorrow. My associates will be covering. Please call [phone] with acute issues, questions.

## 2024-02-22 NOTE — PROGRESS NOTE ADULT - SUBJECTIVE AND OBJECTIVE BOX
Patient is a 89y old  Female who presents with a chief complaint of AMS (21 Feb 2024 19:31)    Being followed by ID for        Interval history:  No other acute events      ROS:  No cough,SOB,CP  No N/V/D  No abd pain  No urinary complaints  No HA  No joint or limb pain  No other complaints    PAST MEDICAL & SURGICAL HISTORY:  Myasthenia gravis      Dementia      Myasthenia gravis      No significant past surgical history      No significant past surgical history        Allergies    sulfamethoxazole (Vomiting; Nausea)    Intolerances      Antimicrobials:    acyclovir IVPB 550 milliGRAM(s) IV Intermittent every 12 hours  meropenem  IVPB 1000 milliGRAM(s) IV Intermittent every 12 hours    MEDICATIONS  (STANDING):  acyclovir IVPB 550 milliGRAM(s) IV Intermittent every 12 hours  chlorhexidine 2% Cloths 1 Application(s) Topical daily  dextrose 5% + sodium chloride 0.45%. 1000 milliLiter(s) (75 mL/Hr) IV Continuous <Continuous>  meropenem  IVPB 1000 milliGRAM(s) IV Intermittent every 12 hours  sodium chloride 0.65% Nasal 1 Spray(s) Both Nostrils two times a day  sodium chloride 0.9% Bolus 500 milliLiter(s) IV Bolus once      Vital Signs Last 24 Hrs  T(C): 35.9 (02-22-24 @ 06:37), Max: 36.5 (02-21-24 @ 12:07)  T(F): 96.6 (02-22-24 @ 06:37), Max: 97.7 (02-21-24 @ 12:07)  HR: 56 (02-22-24 @ 06:37) (56 - 99)  BP: 102/48 (02-22-24 @ 06:37) (102/48 - 170/94)  BP(mean): --  RR: 18 (02-22-24 @ 06:37) (18 - 18)  SpO2: 93% (02-22-24 @ 06:37) (93% - 95%)    Physical Exam:    Constitutional well preserved,comfortable,pleasant    HEENT PERRLA EOMI,No pallor or icterus    No oral exudate or erythema    Neck supple no JVD or LN    Chest Good AE,CTA    CVS RRR S1 S2 WNl No murmur or rub or gallop    Abd soft BS normal No tenderness no masses    Ext No cyanosis clubbing or edema    IV site no erythema tenderness or discharge    Joints no swelling or LOM    CNS AAO X 3 no focal    Lab Data:                          12.1   9.96  )-----------( 44       ( 22 Feb 2024 07:31 )             36.3       02-22    145  |  114<H>  |  29<H>  ----------------------------<  134<H>  4.0   |  17<L>  |  0.94    Ca    8.3<L>      22 Feb 2024 07:31  Phos  0.7     02-21  Mg     <.6     02-21    TPro  1.5<L>  /  Alb  0.8<L>  /  TBili  0.2  /  DBili  x   /  AST  22  /  ALT  44  /  AlkPhos  44  02-21      Urinalysis Basic - ( 22 Feb 2024 07:31 )    Color: x / Appearance: x / SG: x / pH: x  Gluc: 134 mg/dL / Ketone: x  / Bili: x / Urobili: x   Blood: x / Protein: x / Nitrite: x   Leuk Esterase: x / RBC: x / WBC x   Sq Epi: x / Non Sq Epi: x / Bacteria: x        .Blood Blood-Peripheral  02-21-24   No growth at 24 hours  --  --      .Blood Blood-Peripheral  02-20-24   No growth at 24 hours  --  --      .Blood Blood-Peripheral  02-19-24   No growth at 48 Hours  --  --      .Blood Blood-Peripheral  02-19-24   No growth at 48 Hours  --  --                    WBC Count: 9.96 (02-22-24 @ 07:31)  WBC Count: 6.18 (02-21-24 @ 05:51)  WBC Count: 3.19 (02-21-24 @ 04:45)  WBC Count: 3.98 (02-20-24 @ 11:30)  WBC Count: 3.88 (02-19-24 @ 12:55)       Bilirubin Total: 0.2 mg/dL (02-21-24 @ 04:36)  Aspartate Aminotransferase (AST/SGOT): 22 U/L (02-21-24 @ 04:36)  Alanine Aminotransferase (ALT/SGPT): 44 U/L (02-21-24 @ 04:36)  Alkaline Phosphatase: 44 U/L (02-21-24 @ 04:36)    Bilirubin Total: 0.4 mg/dL (02-20-24 @ 11:53)  Aspartate Aminotransferase (AST/SGOT): 101 U/L (02-20-24 @ 11:53)  Alanine Aminotransferase (ALT/SGPT): 204 U/L (02-20-24 @ 11:53)  Alkaline Phosphatase: 182 U/L (02-20-24 @ 11:53)  Bilirubin Total: 0.4 mg/dL (02-20-24 @ 07:24)  Aspartate Aminotransferase (AST/SGOT): 116 U/L (02-20-24 @ 07:24)  Alanine Aminotransferase (ALT/SGPT): 231 U/L (02-20-24 @ 07:24)  Alkaline Phosphatase: 197 U/L (02-20-24 @ 07:24)  Bilirubin Total: 0.6 mg/dL (02-19-24 @ 12:55)  Aspartate Aminotransferase (AST/SGOT): 141 U/L (02-19-24 @ 12:55)  Alanine Aminotransferase (ALT/SGPT): 271 U/L (02-19-24 @ 12:55)  Alkaline Phosphatase: 220 U/L (02-19-24 @ 12:55)         Patient is a 89y old  Female who presents with a chief complaint of AMS (21 Feb 2024 19:31)    Being followed by ID for        Interval history:  No other acute events      ROS:  No cough,SOB,CP  No N/V/D  No abd pain  No urinary complaints  No HA  No joint or limb pain  No other complaints    PAST MEDICAL & SURGICAL HISTORY:  Myasthenia gravis      Dementia      Myasthenia gravis      No significant past surgical history      No significant past surgical history        Allergies    sulfamethoxazole (Vomiting; Nausea)    Intolerances      Antimicrobials:    acyclovir IVPB 550 milliGRAM(s) IV Intermittent every 12 hours  meropenem  IVPB 1000 milliGRAM(s) IV Intermittent every 12 hours    MEDICATIONS  (STANDING):  acyclovir IVPB 550 milliGRAM(s) IV Intermittent every 12 hours  chlorhexidine 2% Cloths 1 Application(s) Topical daily  dextrose 5% + sodium chloride 0.45%. 1000 milliLiter(s) (75 mL/Hr) IV Continuous <Continuous>  meropenem  IVPB 1000 milliGRAM(s) IV Intermittent every 12 hours  sodium chloride 0.65% Nasal 1 Spray(s) Both Nostrils two times a day  sodium chloride 0.9% Bolus 500 milliLiter(s) IV Bolus once      Vital Signs Last 24 Hrs  T(C): 35.9 (02-22-24 @ 06:37), Max: 36.5 (02-21-24 @ 12:07)  T(F): 96.6 (02-22-24 @ 06:37), Max: 97.7 (02-21-24 @ 12:07)  HR: 56 (02-22-24 @ 06:37) (56 - 99)  BP: 102/48 (02-22-24 @ 06:37) (102/48 - 170/94)  BP(mean): --  RR: 18 (02-22-24 @ 06:37) (18 - 18)  SpO2: 93% (02-22-24 @ 06:37) (93% - 95%)    Physical Exam:    Constitutional well preserved,comfortable,pleasant    HEENT PERRLA EOMI,No pallor or icterus    No oral exudate or erythema    Neck supple no JVD or LN    Chest Good AE,CTA    CVS RRR S1 S2 WNl No murmur or rub or gallop    Abd soft BS normal No tenderness no masses    Ext No cyanosis clubbing or edema    IV site no erythema tenderness or discharge    Joints no swelling or LOM    CNS AAO X 3 no focal    Lab Data:                          12.1   9.96  )-----------( 44       ( 22 Feb 2024 07:31 )             36.3     Basic Metabolic Panel in AM (02.22.24 @ 07:31)    Sodium: 145 mmol/L   Potassium: 4.0 mmol/L   Chloride: 114 mmol/L   Carbon Dioxide: 17 mmol/L   Anion Gap: 14 mmol/L   Blood Urea Nitrogen: 29 mg/dL   Creatinine: 0.94 mg/dL   Glucose: 134 mg/dL   Calcium: 8.3 mg/dL   eGFR: 58: The estimated glomerular filtration rate (eGFR) is calculated using the  2021 CKD-EPI creatinine equation, which does not have a coefficient for  race and is validated in individuals 18 years of age and older (N Engl J  Med 2021; 385:9568-8979). Creatinine-based eGFR may be inaccurate in  various situations including but not limited to extremes of muscle mass,  altered dietary protein intake, or medications that affect renal tubular  creatinine secretion. mL/min/1.73m2            Comprehensive Metabolic Panel (02.20.24 @ 11:53)    Sodium: 146 mmol/L   Potassium: 5.1 mmol/L   Chloride: 111 mmol/L   Carbon Dioxide: 24 mmol/L   Anion Gap: 11 mmol/L   Blood Urea Nitrogen: 44 mg/dL   Creatinine: 1.13 mg/dL   Glucose: 280 mg/dL   Calcium: 9.1 mg/dL   Protein Total: 5.5 g/dL   Albumin: 3.0 g/dL   Bilirubin Total: 0.4 mg/dL   Alkaline Phosphatase: 182 U/L   Aspartate Aminotransferase (AST/SGOT): 101 U/L   Alanine Aminotransferase (ALT/SGPT): 204 U/L   eGFR: 47: The estimated glomerular filtration rate (eGFR) is calculated using the  2021 CKD-EPI creatinine equation, which does not have a coefficient for  race and is validated in individuals 18 years of age and older (N Engl J  Med 2021; 385:1180-2559). Creatinine-based eGFR may be inaccurate in  various situations including but not limited to extremes of muscle mass,  altered dietary protein intake, or medications that affect renal tubular  creatinine secretion. mL/min/1.73m2          Urinalysis Basic - ( 22 Feb 2024 07:31 )    Color: x / Appearance: x / SG: x / pH: x  Gluc: 134 mg/dL / Ketone: x  / Bili: x / Urobili: x   Blood: x / Protein: x / Nitrite: x   Leuk Esterase: x / RBC: x / WBC x   Sq Epi: x / Non Sq Epi: x / Bacteria: x        .Blood Blood-Peripheral  02-21-24   No growth at 24 hours  --  --      .Blood Blood-Peripheral  02-20-24   No growth at 24 hours  --  --      .Blood Blood-Peripheral  02-19-24   No growth at 48 Hours  --  --      .Blood Blood-Peripheral  02-19-24   No growth at 48 Hours  --  --                    WBC Count: 9.96 (02-22-24 @ 07:31)  WBC Count: 6.18 (02-21-24 @ 05:51)  WBC Count: 3.19 (02-21-24 @ 04:45)  WBC Count: 3.98 (02-20-24 @ 11:30)  WBC Count: 3.88 (02-19-24 @ 12:55)       Bilirubin Total: 0.2 mg/dL (02-21-24 @ 04:36)  Aspartate Aminotransferase (AST/SGOT): 22 U/L (02-21-24 @ 04:36)  Alanine Aminotransferase (ALT/SGPT): 44 U/L (02-21-24 @ 04:36)  Alkaline Phosphatase: 44 U/L (02-21-24 @ 04:36)    Bilirubin Total: 0.4 mg/dL (02-20-24 @ 11:53)  Aspartate Aminotransferase (AST/SGOT): 101 U/L (02-20-24 @ 11:53)  Alanine Aminotransferase (ALT/SGPT): 204 U/L (02-20-24 @ 11:53)  Alkaline Phosphatase: 182 U/L (02-20-24 @ 11:53)  Bilirubin Total: 0.4 mg/dL (02-20-24 @ 07:24)  Aspartate Aminotransferase (AST/SGOT): 116 U/L (02-20-24 @ 07:24)  Alanine Aminotransferase (ALT/SGPT): 231 U/L (02-20-24 @ 07:24)  Alkaline Phosphatase: 197 U/L (02-20-24 @ 07:24)  Bilirubin Total: 0.6 mg/dL (02-19-24 @ 12:55)  Aspartate Aminotransferase (AST/SGOT): 141 U/L (02-19-24 @ 12:55)  Alanine Aminotransferase (ALT/SGPT): 271 U/L (02-19-24 @ 12:55)  Alkaline Phosphatase: 220 U/L (02-19-24 @ 12:55)         Patient is a 89y old  Female who presents with a chief complaint of AMS (21 Feb 2024 19:31)    Being followed by ID for AMS        Interval history:  pt remains unresponsive  pt hypothermic  for MRI today and possibly CT of chest /abd/pelvis  No other acute events        PAST MEDICAL & SURGICAL HISTORY:  Myasthenia gravis      Dementia      Myasthenia gravis      No significant past surgical history      No significant past surgical history        Allergies    sulfamethoxazole (Vomiting; Nausea)    Intolerances      Antimicrobials:    acyclovir IVPB 550 milliGRAM(s) IV Intermittent every 12 hours  meropenem  IVPB 1000 milliGRAM(s) IV Intermittent every 12 hours    MEDICATIONS  (STANDING):  acyclovir IVPB 550 milliGRAM(s) IV Intermittent every 12 hours  chlorhexidine 2% Cloths 1 Application(s) Topical daily  dextrose 5% + sodium chloride 0.45%. 1000 milliLiter(s) (75 mL/Hr) IV Continuous <Continuous>  meropenem  IVPB 1000 milliGRAM(s) IV Intermittent every 12 hours  sodium chloride 0.65% Nasal 1 Spray(s) Both Nostrils two times a day  sodium chloride 0.9% Bolus 500 milliLiter(s) IV Bolus once      Vital Signs Last 24 Hrs  T(C): 35.9 (02-22-24 @ 06:37), Max: 36.5 (02-21-24 @ 12:07)  T(F): 96.6 (02-22-24 @ 06:37), Max: 97.7 (02-21-24 @ 12:07)  HR: 56 (02-22-24 @ 06:37) (56 - 99)  BP: 102/48 (02-22-24 @ 06:37) (102/48 - 170/94)  BP(mean): --  RR: 18 (02-22-24 @ 06:37) (18 - 18)  SpO2: 93% (02-22-24 @ 06:37) (93% - 95%)    Physical Exam:    Constitutional unresponsive    HEENT pupils small    No oral exudate or erythema    Neck supple no JVD or LN    Chest Good AE,CTA    CVS  S1 S2   Abd soft BS normal No tenderness     Ext No cyanosis clubbing or edema    IV site no erythema tenderness or discharge    Joints no swelling or LOM        Lab Data:                          12.1   9.96  )-----------( 44       ( 22 Feb 2024 07:31 )             36.3     Basic Metabolic Panel in AM (02.22.24 @ 07:31)    Sodium: 145 mmol/L   Potassium: 4.0 mmol/L   Chloride: 114 mmol/L   Carbon Dioxide: 17 mmol/L   Anion Gap: 14 mmol/L   Blood Urea Nitrogen: 29 mg/dL   Creatinine: 0.94 mg/dL   Glucose: 134 mg/dL   Calcium: 8.3 mg/dL   eGFR: 58: The estimated glomerular filtration rate (eGFR) is calculated using the  2021 CKD-EPI creatinine equation, which does not have a coefficient for  race and is validated in individuals 18 years of age and older (N Engl J  Med 2021; 385:5146-1708). Creatinine-based eGFR may be inaccurate in  various situations including but not limited to extremes of muscle mass,  altered dietary protein intake, or medications that affect renal tubular  creatinine secretion. mL/min/1.73m2            Comprehensive Metabolic Panel (02.20.24 @ 11:53)    Sodium: 146 mmol/L   Potassium: 5.1 mmol/L   Chloride: 111 mmol/L   Carbon Dioxide: 24 mmol/L   Anion Gap: 11 mmol/L   Blood Urea Nitrogen: 44 mg/dL   Creatinine: 1.13 mg/dL   Glucose: 280 mg/dL   Calcium: 9.1 mg/dL   Protein Total: 5.5 g/dL   Albumin: 3.0 g/dL   Bilirubin Total: 0.4 mg/dL   Alkaline Phosphatase: 182 U/L   Aspartate Aminotransferase (AST/SGOT): 101 U/L   Alanine Aminotransferase (ALT/SGPT): 204 U/L   eGFR: 47: The estimated glomerular filtration rate (eGFR) is calculated using the  2021 CKD-EPI creatinine equation, which does not have a coefficient for  race and is validated in individuals 18 years of age and older (N Engl J  Med 2021; 385:1982-3874). Creatinine-based eGFR may be inaccurate in  various situations including but not limited to extremes of muscle mass,  altered dietary protein intake, or medications that affect renal tubular  creatinine secretion. mL/min/1.73m2      Urinalysis (02.19.24 @ 12:56)    Blood, Urine: Negative   pH Urine: 5.0   Glucose Qualitative, Urine: Negative mg/dL   Color: Yellow   Urine Appearance: Clear   Bilirubin: Negative   Ketone - Urine: Trace mg/dL   Specific Gravity: 1.026   Protein, Urine: Trace mg/dL   Urobilinogen: 1.0 mg/dL   Nitrite: Negative   Leukocyte Esterase Concentration: Negative            .Blood Blood-Peripheral  02-21-24   No growth at 24 hours  --  --      .Blood Blood-Peripheral  02-20-24   No growth at 24 hours  --  --      .Blood Blood-Peripheral  02-19-24   No growth at 48 Hours  --  --      .Blood Blood-Peripheral  02-19-24   No growth at 48 Hours  --  --    WBC Count: 9.96 (02-22-24 @ 07:31)  WBC Count: 6.18 (02-21-24 @ 05:51)  WBC Count: 3.19 (02-21-24 @ 04:45)  WBC Count: 3.98 (02-20-24 @ 11:30)  WBC Count: 3.88 (02-19-24 @ 12:55)       Bilirubin Total: 0.2 mg/dL (02-21-24 @ 04:36)  Aspartate Aminotransferase (AST/SGOT): 22 U/L (02-21-24 @ 04:36)  Alanine Aminotransferase (ALT/SGPT): 44 U/L (02-21-24 @ 04:36)  Alkaline Phosphatase: 44 U/L (02-21-24 @ 04:36)    Bilirubin Total: 0.4 mg/dL (02-20-24 @ 11:53)  Aspartate Aminotransferase (AST/SGOT): 101 U/L (02-20-24 @ 11:53)  Alanine Aminotransferase (ALT/SGPT): 204 U/L (02-20-24 @ 11:53)  Alkaline Phosphatase: 182 U/L (02-20-24 @ 11:53)    Bilirubin Total: 0.4 mg/dL (02-20-24 @ 07:24)  Aspartate Aminotransferase (AST/SGOT): 116 U/L (02-20-24 @ 07:24)  Alanine Aminotransferase (ALT/SGPT): 231 U/L (02-20-24 @ 07:24)  Alkaline Phosphatase: 197 U/L (02-20-24 @ 07:24)    Bilirubin Total: 0.6 mg/dL (02-19-24 @ 12:55)  Aspartate Aminotransferase (AST/SGOT): 141 U/L (02-19-24 @ 12:55)  Alanine Aminotransferase (ALT/SGPT): 271 U/L (02-19-24 @ 12:55)  Alkaline Phosphatase: 220 U/L (02-19-24 @ 12:55)

## 2024-02-22 NOTE — PROGRESS NOTE ADULT - ASSESSMENT
89 YO F with PMHx of Myasthenia Gravis (not on medication for past 9 years) and MHx of dementia     Assessment/Plan:  AMS(most likely due to metabolic/toxic encephalopathy  due to UTI) - most likely due to stroke vs metabolic encephalopathy vs meningitis vs  encephalitis. Continue coverage with double antibiotic  IV Vancomycin and Meropenem and Acyclovir. Waiting for MRI.  Patient is completely unresponsive with ? seizure activity. F/u neurologist.   hypotension- continue IV steroid and IV fluid as needed   thrombocytopenia- PLT down to 44,000. DIC panel not significantly positive. PTT mildly elevated. Otherwise PT/INR normal. D/ C Lovenox. F/u hematology.  No plan for LP at this time. Family denied   elevated LFT- Will  check RUQ ultrasound and Hepatitis panel.   NPH- incidental finding. F/u neurologist   UTI with E.coli and Klebsiella as  outpatient culture. - IV abx upgraded to Vanco and Meropenem  DVT ppx- Lovenox discontinued due to thrombocytopenia   leg arthritis- Acetaminophen PRN.  b/l cataract- F/u ophthalmolog  vitamin D deficiency- continue Vitamin D supplement.  risk of malnutrition -stable, continue supplement diet. F/u dietitian. Monitor weight.  constipation- senna at bedtime.  LBP 2/2 severe spinal stenosis with out cord pressure - no surgical intervention recommended neither accepted by family, continue PT OT as needed.  Dementia - Stable. Continue Aricept .  Dysphagia - Mechanical soft diet, aspiration precautions.  Myasthenia Gravis - stable , off of medication. Monitor CBC and BMP every 2 months.

## 2024-02-22 NOTE — DIETITIAN INITIAL EVALUATION ADULT - PERTINENT LABORATORY DATA
02-22    145  |  114<H>  |  29<H>  ----------------------------<  134<H>  4.0   |  17<L>  |  0.94    Ca    8.3<L>      22 Feb 2024 07:31  Phos  0.7     02-21  Mg     <.6     02-21    TPro  1.5<L>  /  Alb  0.8<L>  /  TBili  0.2  /  DBili  x   /  AST  22  /  ALT  44  /  AlkPhos  44  02-21    POCT Blood Glucose.: 133 mg/dL (02-22-24 @ 05:36)  POCT Blood Glucose.: 130 mg/dL (02-22-24 @ 00:10)  POCT Blood Glucose.: 124 mg/dL (02-21-24 @ 18:25)  POCT Blood Glucose.: 111 mg/dL (02-21-24 @ 11:58)

## 2024-02-22 NOTE — DIETITIAN INITIAL EVALUATION ADULT - PERTINENT MEDS FT
MEDICATIONS  (STANDING):  acyclovir IVPB 550 milliGRAM(s) IV Intermittent every 12 hours  chlorhexidine 2% Cloths 1 Application(s) Topical daily  dextrose 5% + sodium chloride 0.45%. 1000 milliLiter(s) (75 mL/Hr) IV Continuous <Continuous>  meropenem  IVPB 1000 milliGRAM(s) IV Intermittent every 12 hours  sodium chloride 0.65% Nasal 1 Spray(s) Both Nostrils two times a day  sodium chloride 0.9% Bolus 500 milliLiter(s) IV Bolus once    MEDICATIONS  (PRN):

## 2024-02-22 NOTE — DIETITIAN INITIAL EVALUATION ADULT - ADD RECOMMEND
Medical team to advance diet when medically feasible via tolerated route. Defer diet/texture modification to medical team/SLP as indicated . If NPO status > 7 days, consider alternate means of nutrition if within pt/family GOC.

## 2024-02-22 NOTE — DIETITIAN INITIAL EVALUATION ADULT - REASON FOR ADMISSION
Chart Reviewed, Events Noted  "Patient is a 89y old  Female who presents with a chief complaint of AMS."

## 2024-02-22 NOTE — PROGRESS NOTE ADULT - SUBJECTIVE AND OBJECTIVE BOX
Patient is a 89y old  Female who presents with a chief complaint of Chart Reviewed, Events Noted  "Patient is a 89y old  Female who presents with a chief complaint of AMS."     (22 Feb 2024 11:41)      INTERVAL HPI/OVERNIGHT EVENTS: Patient still obtunded and unresponsive.  Vitals stable. Still hypothermic on warm blanket. Started on IV Acyclovir  to cover possible Herpes Encephalitis and also IV Vanco and Meropenem  to cover possible meningitis. Discussed with ID and cancelled LP. The family did  not give consent for LP as invasive procedure yet. PLT going down 44,000. DIC panel not really positive, unknown cause of thrombocytopenia, may be sepsis.   I asked neurology and many times we asked EEG technician   comes up  to disconnect  her from  EEG but waiting for them to come in  so we can proceed for MRI. Patient stable for MRI today.  Abdominal ultrasound still pending.     Pain Location & Control:     MEDICATIONS  (STANDING):  acyclovir IVPB 550 milliGRAM(s) IV Intermittent every 12 hours  chlorhexidine 2% Cloths 1 Application(s) Topical daily  dextrose 5% + sodium chloride 0.45%. 1000 milliLiter(s) (75 mL/Hr) IV Continuous <Continuous>  meropenem  IVPB 1000 milliGRAM(s) IV Intermittent every 12 hours  sodium chloride 0.65% Nasal 1 Spray(s) Both Nostrils two times a day  sodium chloride 0.9% Bolus 500 milliLiter(s) IV Bolus once    MEDICATIONS  (PRN):      Allergies    sulfamethoxazole (Vomiting; Nausea)    Intolerances        REVIEW OF SYSTEMS:  UTO obtunded and unresponsive     Vital Signs Last 24 Hrs  T(C): 36.1 (22 Feb 2024 13:15), Max: 36.9 (22 Feb 2024 10:15)  T(F): 97 (22 Feb 2024 13:15), Max: 98.5 (22 Feb 2024 10:15)  HR: 52 (22 Feb 2024 13:15) (52 - 87)  BP: 100/55 (22 Feb 2024 13:15) (100/55 - 170/94)  BP(mean): --  RR: 18 (22 Feb 2024 13:15) (18 - 18)  SpO2: 92% (22 Feb 2024 13:15) (92% - 95%)    Parameters below as of 22 Feb 2024 13:15  Patient On (Oxygen Delivery Method): nasal cannula  O2 Flow (L/min): 4      PHYSICAL EXAM:  GENERAL: NAD, well-groomed, well-developed  HEAD:  Atraumatic, Normocephalic  EYES: EOMI, PERRLA, conjunctiva and sclera clear  ENMT: No tonsillar erythema, exudates, or enlargement; Moist mucous membranes, Good dentition, No lesions  NECK: Supple, No JVD, Normal thyroid  NERVOUS SYSTEM:  Alert & Oriented X 0 obtunded and not following commands   CHEST/LUNG: Clear to auscultation bilaterally; No rales, rhonchi, wheezing, or rubs  HEART: Regular rate and rhythm; No murmurs, rubs, or gallops  ABDOMEN: Soft, Nontender, Nondistended; Bowel sounds present  EXTREMITIES:  2+ Peripheral Pulses, No clubbing or cyanosis  LYMPH: No lymphadenopathy noted  SKIN: No rashes or lesions    LABS:                        12.1   9.96  )-----------( 44       ( 22 Feb 2024 07:31 )             36.3     22 Feb 2024 07:31    145    |  114    |  29     ----------------------------<  134    4.0     |  17     |  0.94     Ca    8.3        22 Feb 2024 07:31      PT/INR - ( 22 Feb 2024 07:31 )   PT: 11.7 sec;   INR: 1.12 ratio         PTT - ( 22 Feb 2024 07:31 )  PTT:40.0 sec  Urinalysis Basic - ( 22 Feb 2024 07:31 )    Color: x / Appearance: x / SG: x / pH: x  Gluc: 134 mg/dL / Ketone: x  / Bili: x / Urobili: x   Blood: x / Protein: x / Nitrite: x   Leuk Esterase: x / RBC: x / WBC x   Sq Epi: x / Non Sq Epi: x / Bacteria: x      CAPILLARY BLOOD GLUCOSE      POCT Blood Glucose.: 94 mg/dL (22 Feb 2024 12:09)  POCT Blood Glucose.: 133 mg/dL (22 Feb 2024 05:36)  POCT Blood Glucose.: 130 mg/dL (22 Feb 2024 00:10)  POCT Blood Glucose.: 124 mg/dL (21 Feb 2024 18:25)        Cultures  Culture Results:   No growth at 24 hours (02-21-24 @ 05:33)  Culture Results:   No growth at 24 hours (02-21-24 @ 04:35)  Culture Results:   No growth at 48 Hours (02-20-24 @ 08:56)  Culture Results:   No growth at 48 Hours (02-20-24 @ 08:56)  Culture Results:   No growth at 48 Hours (02-19-24 @ 12:56)  Culture Results:   <10,000 CFU/mL Normal Urogenital Vidya (02-19-24 @ 12:56)  Culture Results:   No growth at 48 Hours (02-19-24 @ 12:55)      RADIOLOGY & ADDITIONAL TESTS:    Imaging Personally Reviewed:  [ X] YES  [ ] NO    Consultant(s) Notes Reviewed:  [X ] YES  [ ] NO    Care Discussed with Consultants/Other Providers [X ] YES  [ ] NO

## 2024-02-22 NOTE — DIETITIAN INITIAL EVALUATION ADULT - REASON INDICATOR FOR ASSESSMENT
MST Score 2 or >  Information obtained from: Review of pt's current medical record. Pt's  paper chart from nursing home.

## 2024-02-23 LAB
ADD ON TEST-SPECIMEN IN LAB: SIGNIFICANT CHANGE UP
AMYLASE P1 CFR SERPL: 229 U/L — HIGH (ref 25–125)
ANION GAP SERPL CALC-SCNC: 11 MMOL/L — SIGNIFICANT CHANGE UP (ref 5–17)
BUN SERPL-MCNC: 30 MG/DL — HIGH (ref 7–23)
CALCIUM SERPL-MCNC: 8.2 MG/DL — LOW (ref 8.4–10.5)
CHLORIDE SERPL-SCNC: 111 MMOL/L — HIGH (ref 96–108)
CO2 SERPL-SCNC: 21 MMOL/L — LOW (ref 22–31)
CREAT SERPL-MCNC: 0.98 MG/DL — SIGNIFICANT CHANGE UP (ref 0.5–1.3)
EGFR: 55 ML/MIN/1.73M2 — LOW
GLUCOSE BLDC GLUCOMTR-MCNC: 100 MG/DL — HIGH (ref 70–99)
GLUCOSE BLDC GLUCOMTR-MCNC: 103 MG/DL — HIGH (ref 70–99)
GLUCOSE BLDC GLUCOMTR-MCNC: 87 MG/DL — SIGNIFICANT CHANGE UP (ref 70–99)
GLUCOSE BLDC GLUCOMTR-MCNC: 88 MG/DL — SIGNIFICANT CHANGE UP (ref 70–99)
GLUCOSE BLDC GLUCOMTR-MCNC: 90 MG/DL — SIGNIFICANT CHANGE UP (ref 70–99)
GLUCOSE SERPL-MCNC: 109 MG/DL — HIGH (ref 70–99)
HCT VFR BLD CALC: 33.8 % — LOW (ref 34.5–45)
HGB BLD-MCNC: 11.4 G/DL — LOW (ref 11.5–15.5)
INR BLD: 1.16 RATIO — SIGNIFICANT CHANGE UP (ref 0.85–1.18)
LIDOCAIN IGE QN: 142 U/L — HIGH (ref 7–60)
MCHC RBC-ENTMCNC: 31.7 PG — SIGNIFICANT CHANGE UP (ref 27–34)
MCHC RBC-ENTMCNC: 33.7 GM/DL — SIGNIFICANT CHANGE UP (ref 32–36)
MCV RBC AUTO: 93.9 FL — SIGNIFICANT CHANGE UP (ref 80–100)
NRBC # BLD: 0 /100 WBCS — SIGNIFICANT CHANGE UP (ref 0–0)
PLATELET # BLD AUTO: 66 K/UL — LOW (ref 150–400)
POTASSIUM SERPL-MCNC: 3.3 MMOL/L — LOW (ref 3.5–5.3)
POTASSIUM SERPL-SCNC: 3.3 MMOL/L — LOW (ref 3.5–5.3)
PROTHROM AB SERPL-ACNC: 12.7 SEC — SIGNIFICANT CHANGE UP (ref 9.5–13)
RBC # BLD: 3.6 M/UL — LOW (ref 3.8–5.2)
RBC # BLD: 3.78 M/UL — LOW (ref 3.8–5.2)
RBC # FLD: 14.7 % — HIGH (ref 10.3–14.5)
RETICS #: 46.1 K/UL — SIGNIFICANT CHANGE UP (ref 25–125)
RETICS/RBC NFR: 1.2 % — SIGNIFICANT CHANGE UP (ref 0.5–2.5)
SODIUM SERPL-SCNC: 143 MMOL/L — SIGNIFICANT CHANGE UP (ref 135–145)
WBC # BLD: 14.14 K/UL — HIGH (ref 3.8–10.5)
WBC # FLD AUTO: 14.14 K/UL — HIGH (ref 3.8–10.5)

## 2024-02-23 PROCEDURE — 99233 SBSQ HOSP IP/OBS HIGH 50: CPT | Mod: GC

## 2024-02-23 PROCEDURE — 78227 HEPATOBIL SYST IMAGE W/DRUG: CPT | Mod: 26

## 2024-02-23 PROCEDURE — 99233 SBSQ HOSP IP/OBS HIGH 50: CPT

## 2024-02-23 RX ORDER — PANTOPRAZOLE SODIUM 20 MG/1
40 TABLET, DELAYED RELEASE ORAL
Refills: 0 | Status: DISCONTINUED | OUTPATIENT
Start: 2024-02-23 | End: 2024-03-08

## 2024-02-23 RX ORDER — THIAMINE MONONITRATE (VIT B1) 100 MG
50 TABLET ORAL DAILY
Refills: 0 | Status: DISCONTINUED | OUTPATIENT
Start: 2024-02-23 | End: 2024-02-23

## 2024-02-23 RX ORDER — POTASSIUM CHLORIDE 20 MEQ
10 PACKET (EA) ORAL
Refills: 0 | Status: COMPLETED | OUTPATIENT
Start: 2024-02-23 | End: 2024-02-23

## 2024-02-23 RX ADMIN — MEROPENEM 100 MILLIGRAM(S): 1 INJECTION INTRAVENOUS at 04:28

## 2024-02-23 RX ADMIN — Medication 100 MILLIEQUIVALENT(S): at 17:20

## 2024-02-23 RX ADMIN — PANTOPRAZOLE SODIUM 40 MILLIGRAM(S): 20 TABLET, DELAYED RELEASE ORAL at 18:02

## 2024-02-23 RX ADMIN — Medication 1 SPRAY(S): at 05:13

## 2024-02-23 RX ADMIN — CHLORHEXIDINE GLUCONATE 1 APPLICATION(S): 213 SOLUTION TOPICAL at 14:45

## 2024-02-23 RX ADMIN — Medication 111 MILLIGRAM(S): at 17:18

## 2024-02-23 RX ADMIN — Medication 111 MILLIGRAM(S): at 05:14

## 2024-02-23 RX ADMIN — Medication 1 SPRAY(S): at 17:20

## 2024-02-23 RX ADMIN — MEROPENEM 100 MILLIGRAM(S): 1 INJECTION INTRAVENOUS at 17:21

## 2024-02-23 RX ADMIN — Medication 100 MILLIEQUIVALENT(S): at 19:05

## 2024-02-23 RX ADMIN — PANTOPRAZOLE SODIUM 40 MILLIGRAM(S): 20 TABLET, DELAYED RELEASE ORAL at 15:26

## 2024-02-23 RX ADMIN — Medication 100 MILLIEQUIVALENT(S): at 14:41

## 2024-02-23 NOTE — PROGRESS NOTE ADULT - SUBJECTIVE AND OBJECTIVE BOX
remains obtunded  very minimal responsive   i    VITAL SIGNS:    T98.3 P99 /68 RR18    PHYSICAL EXAM:     GENERAL: no acute distress  HEENT: NC/AT, EOMI, neck supple, MMM  RESPIRATORY: LCTAB/L, no rhonchi, rales, or wheezing  CARDIOVASCULAR: RRR, no murmurs, gallops, rubs  ABDOMINAL: soft, non-tender, non-distended, positive bowel sounds   EXTREMITIES: no clubbing, cyanosis, or edema  NEUROLOGICAL: obtunded                      11.4   14.14 )-----------( 66       ( 23 Feb 2024 07:25 )             33.8     02-23    143  |  111<H>  |  30<H>  ----------------------------<  109<H>  3.3<L>   |  21<L>  |  0.98    Ca    8.2<L>      23 Feb 2024 07:27    TPro  5.4<L>  /  Alb  3.1<L>  /  TBili  0.4  /  DBili  0.1  /  AST  61<H>  /  ALT  129<H>  /  AlkPhos  156<H>  02-22      MEDICATIONS  (STANDING):  acyclovir IVPB 550 milliGRAM(s) IV Intermittent every 12 hours  chlorhexidine 2% Cloths 1 Application(s) Topical daily  dextrose 5% + sodium chloride 0.45%. 1000 milliLiter(s) (75 mL/Hr) IV Continuous <Continuous>  meropenem  IVPB 1000 milliGRAM(s) IV Intermittent every 12 hours  potassium chloride  10 mEq/100 mL IVPB 10 milliEquivalent(s) IV Intermittent every 1 hour  sodium chloride 0.65% Nasal 1 Spray(s) Both Nostrils two times a day  sodium chloride 0.9% Bolus 500 milliLiter(s) IV Bolus once

## 2024-02-23 NOTE — PROGRESS NOTE ADULT - SUBJECTIVE AND OBJECTIVE BOX
NEUROLOGY FOLLOW-UP CONSULT NOTE    RFC: AMS    Interval history: No acute neurologic events overnight.     Meds:  MEDICATIONS  (STANDING):  acyclovir IVPB 550 milliGRAM(s) IV Intermittent every 12 hours  chlorhexidine 2% Cloths 1 Application(s) Topical daily  dextrose 5% + sodium chloride 0.45%. 1000 milliLiter(s) (75 mL/Hr) IV Continuous <Continuous>  meropenem  IVPB 1000 milliGRAM(s) IV Intermittent every 12 hours  sodium chloride 0.65% Nasal 1 Spray(s) Both Nostrils two times a day  sodium chloride 0.9% Bolus 500 milliLiter(s) IV Bolus once  thiamine Injectable 50 milliGRAM(s) IV Push daily    MEDICATIONS  (PRN):      PMHx/PSHx/FHx/SHx:  Altered mental status    Myasthenia gravis    Dementia    Myasthenia gravis    Altered mental status    LPRD (laryngopharyngeal reflux disease)    Paralysis of left vocal cord    Lip swelling    Dementia    Altered mental state    Thrombocytopenia    Coagulation defect, unspecified    Sepsis secondary to UTI    Elevated LFTs    CONFUSION        Allergies:  sulfamethoxazole (Vomiting; Nausea)      ROS: PRIYANKA due to mental status    O:  T(C): 36.8 (02-23-24 @ 08:42), Max: 37.2 (02-23-24 @ 03:27)  HR: 75 (02-23-24 @ 08:42) (75 - 90)  BP: 126/68 (02-23-24 @ 08:42) (122/73 - 138/84)  RR: 18 (02-23-24 @ 08:42) (18 - 18)  SpO2: 93% (02-23-24 @ 08:42) (92% - 93%)    Focused neurologic exam:  MS - eyes closed, opens eyes briefly to voice stimuli, says "yes", speech fluent, unable to assess (rep/naming,  attn/conc/recent and remote memory/fund of knowledge) due to mental status  CN - irregular, nonreactive pupils, EOMI with VOR, blink to threat b/l,  corneal reflex present b/l, face sensation/str/hearing WNL b/l, tongue midline, unable to assess tonsil, trap b/l  Motor - Normal bulk/tone, B/L LE withdraw to noxious stimuli, RUE withdraw to noxious stimuli, LUE no movement   Sens -  withdraw to noxious stimuli  DTR's - 1+ all and downgoing b/l plantar response  Coord - PRIYANKA due to mental status  Gait and station -PRIYANKA due to fall risk and mental status  negative Brudzinski sign and negative Kernig sign    Pertinent labs/studies:    LABS:  cret                        11.4   14.14 )-----------( 66       ( 23 Feb 2024 07:25 )             33.8     02-23    143  |  111<H>  |  30<H>  ----------------------------<  109<H>  3.3<L>   |  21<L>  |  0.98    Ca    8.2<L>      23 Feb 2024 07:27    TPro  5.4<L>  /  Alb  3.1<L>  /  TBili  0.4  /  DBili  0.1  /  AST  61<H>  /  ALT  129<H>  /  AlkPhos  156<H>  02-22    PT/INR - ( 23 Feb 2024 07:25 )   PT: 12.7 sec;   INR: 1.16 ratio         PTT - ( 22 Feb 2024 07:31 )  PTT:40.0 sec    < from: MR Head No Cont (02.22.24 @ 21:07) >  FINDINGS: Ventriculomegaly is seen out of proportion to sulcal   prominence. There is also variable sulcal prominence. There is crowding   of the cerebral sulci at the vertex. The callosal angle appears sharp at   the level of the posterior commissure measured in the coronal plane.    Multiple nonspecific foci of T2/FLAIR hyperintensity are noted throughout   the deep and periventricular white matter of the cerebral hemispheres.   There is no associated mass effect. There is no evidence of acute   ischemia on the diffusion-weighted images.    There is diffuse cerebral volume loss with prominence of the sulci,   fissures, and cisternal spaces which is normal for the patient's age.   Ventricular size and configuration is unremarkable. Flow-voids are noted   throughout the major intracranial vessels, on the T2 weighted images,   consistent with their patency. There is probable sluggish flow within the   right transverse and sigmoid sinuses extending to the right jugular bulb.   The sellar region and posterior fossa appear unremarkable.    Scattered mucosal thickening is seen throughout the paranasal sinuses.   The bilateral tympanomastoid cavities are clear. Calvarial signal is   within normal limits. The orbits appear unremarkable.    IMPRESSION: No acute intracranial hemorrhage or evidence of acute   ischemia.    Unchanged imaging findings for which normal pressure hydrocephalus can be   considered. Clinical correlation is required for this diagnosis.    < end of copied text >  < from: CT Chest w/ IV Cont (02.22.24 @ 19:30) >  IMPRESSION:  *  Groundglass opacities in the bilateral lung apices, potentially   pulmonary edema or an infectious/inflammatory process.  *  Small bilateral pleural effusions.  *  Partially imaged mild rightmaxillary sinus mucosal thickening.  *  Distended gallbladder with cholelithiasis. Trace pericholecystic   fluid. Consider HIDA scan for further evaluation.  *  Peripancreatic fat stranding, potentially representing acute   pancreatitis. Recommend correlation with lipase.  *  Mild periduodenal fat infiltration, which may be reactive to   pancreatitis rather than representing duodenitis.    < end of copied text >  < from: CT Angio Neck w/ IV Cont (02.19.24 @ 16:39) >    IMPRESSION:    CT HEAD:  Moderate ventriculomegaly, with findings suggestive of normal pressure   hydrocephalus.  No intracranial hemorrhage, mass effect or midline shift.    CTA HEAD:  Patent intracranial circulation without flow limiting stenosis.  Mild atherosclerotic calcification of bilateral petrous, cavernous, and   supraclinoid internal carotid arteries.    CTA NECK:  No evidence of significant stenosis or occlusion.    < end of copied text >      EEG:  Study Date: 2/20/2024   Start Time: 10:15      End Date: 2/21/2024          End Time: 08:00     Study Duration: 21 h 14 m  EEG Summary / Classification:  Abnormal EEG.  •	Background slowing  •	Triphasic waves    EEG Impression / Clinical Correlate:  Abnormal prolonged EEG study due to   1-	Moderate to severe diffuse nonspecific cerebral dysfunction  2-	Generalized periodic discharges with triphasic morphology at a rate of 1-1.5 Hz typically seen in toxic/metabolic encephalopathy  No epileptic discharges recorded.  No seizures recorded.      EEG  Study Date: 2/21/2024   Start Time: 08:00      End Date: 2/22/2024      End Time: 14:34   Study Duration: 30 h 34 m  EEG Summary / Classification:  Abnormal EEG.  •	Background slowing  •	Triphasic waves    EEG Impression / Clinical Correlate:  Abnormal prolonged EEG study due to   1-	Moderate diffuse nonspecific cerebral dysfunction  2-	Frequent generalized periodic discharges with triphasic morphology at a rate of 1-1.5 Hz typically seen in toxic/metabolic encephalopathy  No epileptic discharges recorded.  No seizures recorded.

## 2024-02-23 NOTE — CONSULT NOTE ADULT - SUBJECTIVE AND OBJECTIVE BOX
Chief Complaint:  Patient is a 89y old  Female who presents with a chief complaint of AMS (23 Feb 2024 14:05)      Date of service: 02-23-24 @ 15:28    HPI:    The patient is a 89F with dementia and Myasthenia Gravis admitted for AMS. She was treated for UTI at symptom onset but mental status deteriorated prompting ED visit. On 2/22 she had 2 RRTs for AMS, hypoxia, and seizure like activity.         Allergies:  sulfamethoxazole (Vomiting; Nausea)      Home Medications:    Hospital Medications:  acyclovir IVPB 550 milliGRAM(s) IV Intermittent every 12 hours  chlorhexidine 2% Cloths 1 Application(s) Topical daily  dextrose 5% + sodium chloride 0.45%. 1000 milliLiter(s) IV Continuous <Continuous>  meropenem  IVPB 1000 milliGRAM(s) IV Intermittent every 12 hours  pantoprazole  Injectable 40 milliGRAM(s) IV Push two times a day  potassium chloride  10 mEq/100 mL IVPB 10 milliEquivalent(s) IV Intermittent every 1 hour  sodium chloride 0.65% Nasal 1 Spray(s) Both Nostrils two times a day  sodium chloride 0.9% Bolus 500 milliLiter(s) IV Bolus once      PMHX/PSHX:  Myasthenia gravis    Dementia    Myasthenia gravis    No significant past surgical history    No significant past surgical history        Family history:  No pertinent family history in first degree relatives    No pertinent family history in first degree relatives        Social History:   Denies ethanol use.  Denies illicit drug use.    ROS:     unable to obtain       PHYSICAL EXAM:     GENERAL:  Appears stated age, well-groomed, well-nourished, no distress  HEENT:  NC/AT,  conjunctivae anicteric, clear and pink,   NECK: supple, trachea midline  CHEST:  Full & symmetric excursion, no increased effort, breath sounds clear  HEART:  Regular rhythm, no JVD  ABDOMEN:  Soft, non-tender, non-distended, normoactive bowel sounds,  no masses , no hepatosplenomegaly  EXTREMITIES:  no cyanosis,clubbing or edema  SKIN:  No rash, erythema, or, ecchymoses, no jaundice  NEURO:  Alert, non-focal, no asterixis  PSYCH: Appropriate affect, oriented to place and time  RECTAL: Deferred      Vital Signs:  Vital Signs Last 24 Hrs  T(C): 36.8 (23 Feb 2024 08:42), Max: 37.2 (23 Feb 2024 03:27)  T(F): 98.3 (23 Feb 2024 08:42), Max: 99 (23 Feb 2024 05:41)  HR: 75 (23 Feb 2024 08:42) (75 - 90)  BP: 126/68 (23 Feb 2024 08:42) (122/73 - 138/84)  BP(mean): --  RR: 18 (23 Feb 2024 08:42) (18 - 18)  SpO2: 93% (23 Feb 2024 08:42) (92% - 93%)    Parameters below as of 23 Feb 2024 08:42  Patient On (Oxygen Delivery Method): nasal cannula  O2 Flow (L/min): 4    Daily     Daily     LABS: Labs personally reviewed by me:                        [id number]   14.14 )-----------( 66       ( 23 Feb 2024 07:25 )             33.8     02-23    143  |  111<H>  |  30<H>  ----------------------------<  109<H>  3.3<L>   |  21<L>  |  0.98    Ca    8.2<L>      23 Feb 2024 07:27    TPro  5.4<L>  /  Alb  3.1<L>  /  TBili  0.4  /  DBili  0.1  /  AST  61<H>  /  ALT  129<H>  /  AlkPhos  156<H>  02-22    LIVER FUNCTIONS - ( 22 Feb 2024 07:31 )  Alb: 3.1 g/dL / Pro: 5.4 g/dL / ALK PHOS: 156 U/L / ALT: 129 U/L / AST: 61 U/L / GGT: x           PT/INR - ( 23 Feb 2024 07:25 )   PT: 12.7 sec;   INR: 1.16 ratio         PTT - ( 22 Feb 2024 07:31 )  PTT:40.0 sec  Urinalysis Basic - ( 23 Feb 2024 07:27 )    Color: x / Appearance: x / SG: x / pH: x  Gluc: 109 mg/dL / Ketone: x  / Bili: x / Urobili: x   Blood: x / Protein: x / Nitrite: x   Leuk Esterase: x / RBC: x / WBC x   Sq Epi: x / Non Sq Epi: x / Bacteria: x          Imaging personally reviewed by me:           Chief Complaint:  Patient is a 89y old  Female who presents with a chief complaint of AMS (23 Feb 2024 14:05)      Date of service: 02-23-24 @ 15:28    HPI:    Pt is non verbal. Collateral information obtained from daughters at bedside The patient is a 89F with dementia and Myasthenia Gravis admitted for AMS. She was treated for a UTI at symptom onset but mental status deteriorated prompting ED visit. GI consulted for abnormal LFTs and gallstone on imaging. Per daughter, no recent complaints of abdominal pain, n/v, changes in bowel movements.         Allergies:  sulfamethoxazole (Vomiting; Nausea)      Home Medications:    Hospital Medications:  acyclovir IVPB 550 milliGRAM(s) IV Intermittent every 12 hours  chlorhexidine 2% Cloths 1 Application(s) Topical daily  dextrose 5% + sodium chloride 0.45%. 1000 milliLiter(s) IV Continuous <Continuous>  meropenem  IVPB 1000 milliGRAM(s) IV Intermittent every 12 hours  pantoprazole  Injectable 40 milliGRAM(s) IV Push two times a day  potassium chloride  10 mEq/100 mL IVPB 10 milliEquivalent(s) IV Intermittent every 1 hour  sodium chloride 0.65% Nasal 1 Spray(s) Both Nostrils two times a day  sodium chloride 0.9% Bolus 500 milliLiter(s) IV Bolus once      PMHX/PSHX:  Myasthenia gravis    Dementia    Myasthenia gravis    No significant past surgical history    No significant past surgical history        Family history:  No pertinent family history in first degree relatives    No pertinent family history in first degree relatives        Social History:   Denies ethanol use.  Denies illicit drug use.    ROS:     unable to obtain       PHYSICAL EXAM:     GENERAL:  Appears stated age, well-groomed, well-nourished, no distress  HEENT:  NC/AT,  conjunctivae anicteric, clear and pink,   NECK: supple, trachea midline  CHEST:  Full & symmetric excursion, no increased effort, breath sounds clear  HEART:  Regular rhythm, no JVD  ABDOMEN:  Soft, non-tender, non-distended, normoactive bowel sounds,  no masses , no hepatosplenomegaly  EXTREMITIES:  no cyanosis,clubbing or edema  SKIN:  No rash, erythema, or, ecchymoses, no jaundice  NEURO:  Alert, non-focal, no asterixis  PSYCH: Appropriate affect, oriented to place and time  RECTAL: Deferred      Vital Signs:  Vital Signs Last 24 Hrs  T(C): 36.8 (23 Feb 2024 08:42), Max: 37.2 (23 Feb 2024 03:27)  T(F): 98.3 (23 Feb 2024 08:42), Max: 99 (23 Feb 2024 05:41)  HR: 75 (23 Feb 2024 08:42) (75 - 90)  BP: 126/68 (23 Feb 2024 08:42) (122/73 - 138/84)  BP(mean): --  RR: 18 (23 Feb 2024 08:42) (18 - 18)  SpO2: 93% (23 Feb 2024 08:42) (92% - 93%)    Parameters below as of 23 Feb 2024 08:42  Patient On (Oxygen Delivery Method): nasal cannula  O2 Flow (L/min): 4    Daily     Daily     LABS: Labs personally reviewed by me:                        11.4   14.14 )-----------( 66       ( 23 Feb 2024 07:25 )             33.8     02-23    143  |  111<H>  |  30<H>  ----------------------------<  109<H>  3.3<L>   |  21<L>  |  0.98    Ca    8.2<L>      23 Feb 2024 07:27    TPro  5.4<L>  /  Alb  3.1<L>  /  TBili  0.4  /  DBili  0.1  /  AST  61<H>  /  ALT  129<H>  /  AlkPhos  156<H>  02-22    LIVER FUNCTIONS - ( 22 Feb 2024 07:31 )  Alb: 3.1 g/dL / Pro: 5.4 g/dL / ALK PHOS: 156 U/L / ALT: 129 U/L / AST: 61 U/L / GGT: x           PT/INR - ( 23 Feb 2024 07:25 )   PT: 12.7 sec;   INR: 1.16 ratio         PTT - ( 22 Feb 2024 07:31 )  PTT:40.0 sec  Urinalysis Basic - ( 23 Feb 2024 07:27 )    Color: x / Appearance: x / SG: x / pH: x  Gluc: 109 mg/dL / Ketone: x  / Bili: x / Urobili: x   Blood: x / Protein: x / Nitrite: x   Leuk Esterase: x / RBC: x / WBC x   Sq Epi: x / Non Sq Epi: x / Bacteria: x          Imaging personally reviewed by me:

## 2024-02-23 NOTE — PROGRESS NOTE ADULT - ASSESSMENT
89y (1935) with PMH of dementia,  myasthenia gravis? untreated on Aricept 10 mg daily for dementia presenting for altered mental status in the morning of the day of admission. Patient was noted to have a UTI and was started on ciprofloxacin for the past 3 days. Neurology consult for AMS    Impression:  Etiology of altered mental status is most likely related to toxic/ metabolic encephalopathy in the setting of acute medical illnesses( cholelithiasis, possible pancreatitis?, pleural effusion, UTI, hypernatremia, thrombocytopenia)) with components of hospital delirium.  No epileptiform pattern or seizure recorded on EEG. EEG shows generalized periodic discharges with triphasic morphology which is consist with toxic/metabolic encephalopathy. No acute intracranial pathology on CT/MR brain. No meningismus sign on exam and afebrile, lower suspicion for meningitis (patient is being empirically treated with antibiotics and antiviral). Possible NPH on CT/MR, however, patient would need further NPH workup with outpatient neurology.       Recommendations:  [] VEEG 2/20- 2/22: Frequent generalized periodic discharges with triphasic morphology at a rate of 1-1.5 Hz typically seen in toxic/metabolic encephalopathy. No epileptic discharges recorded. No seizures recorded.  [] MR brain non con 2/22: No acute intracranial hemorrhage or evidence of acute ischemia. Unchanged imaging findings for which normal pressure hydrocephalus can be considered.   [] CT C/A/P shows: Groundglass opacities in the bilateral lung apices, Small bilateral pleural effusions. Distended gallbladder with cholelithiasis. Trace pericholecystic fluid. Peripancreatic fat stranding, potentially representing acute   pancreatitis. management per primary/GI team  [] reviewed labs: TSH (wnl), T4 (wnl), B12 (high), UA (WNL), NH3 (wnl), Folate (wnl), lactate (elevated), BUN (elevated), Potassium (Low), CBC (leukocytosis), platelet (low-66)  [] continue treatment per ID team, rest of care per primary medical team  [] PT/OT therapy  [] neuro- check and fall precaution.  [] For NPH workup, patient can follow up outpatient with neurologist Dr. Sifuentes or Dr. Guerra [phone], [address] [facility], [place] [address] or Dr. Castellon (dementia specialist)  at [address] 1-2 weeks after discharge by calling [phone] to schedule this appointment  [] plans discussed with primary medical team  [] will continue follow up    Plans discussed with neurology attending, Dr. Hernandez   89y (1935) with PMH of dementia,  myasthenia gravis? untreated on Aricept 10 mg daily for dementia presenting for altered mental status in the morning of the day of admission. Patient was noted to have a UTI and was started on ciprofloxacin for the past 3 days. Neurology consult for AMS    Impression:  Etiology of altered mental status is most likely related to toxic/ metabolic encephalopathy in the setting of acute medical illnesses( cholelithiasis, possible pancreatitis?, pleural effusion, UTI, hypernatremia, thrombocytopenia)) with components of hospital delirium.  No epileptiform pattern or seizure recorded on EEG. EEG shows generalized periodic discharges with triphasic morphology which is consist with toxic/metabolic encephalopathy. No acute intracranial pathology on CT/MR brain. No meningismus sign on exam and afebrile, lower suspicion for meningitis (patient is being empirically treated with antibiotics and antiviral). Possible NPH on CT/MR, however, patient would need further NPH workup with outpatient neurology.       Recommendations:  [] VEEG 2/20- 2/22: Frequent generalized periodic discharges with triphasic morphology at a rate of 1-1.5 Hz typically seen in toxic/metabolic encephalopathy. No epileptic discharges recorded. No seizures recorded.  [] MR brain non con 2/22: No acute intracranial hemorrhage or evidence of acute ischemia. Unchanged imaging findings for which normal pressure hydrocephalus can be considered.   [] CT C/A/P shows: Groundglass opacities in the bilateral lung apices, Small bilateral pleural effusions. Distended gallbladder with cholelithiasis. Trace pericholecystic fluid. Peripancreatic fat stranding, potentially representing acute   pancreatitis. management per primary/GI team  [] ordered MG labs: acetylcholine blocking, modulating, binding antibodies, LRP4, Musk antibodies. & vitamin D25-hydroxy. reviewed labs: TSH (wnl), T4 (wnl), B12 (high), UA (WNL), NH3 (wnl), Folate (wnl), lactate (elevated), BUN (elevated), Potassium (Low), CBC (leukocytosis), platelet (low-66)  [] continue treatment per ID team, rest of care per primary medical team  [] PT/OT therapy  [] neuro- check and fall precaution.  [] For NPH workup, patient can follow up outpatient with neurologist Dr. Sifuentes or Dr. Guerra [phone], [address], [place] [address] or Dr. Castellon (dementia specialist)  at [address]. 1-2 weeks after discharge by calling [phone] to schedule this appointment  [] plans discussed with primary medical team  [] will continue follow up    Plans discussed with neurology attending, Dr. Hernandez   89y (1935) with PMH of dementia,  myasthenia gravis? untreated on Aricept 10 mg daily for dementia presenting for altered mental status in the morning of the day of admission. Patient was noted to have a UTI and was started on ciprofloxacin for the past 3 days. Neurology consult for AMS    Impression:  Etiology of altered mental status is most likely related to toxic/ metabolic encephalopathy in the setting of acute medical illnesses( cholelithiasis, possible pancreatitis?, pleural effusion, UTI, hypernatremia, thrombocytopenia)) with components of hospital delirium.  No epileptiform pattern or seizure recorded on EEG. EEG shows generalized periodic discharges with triphasic morphology which is consist with toxic/metabolic encephalopathy. No acute intracranial pathology on CT/MR brain. No meningismus sign on exam and afebrile, lower suspicion for meningitis (patient is being empirically treated with antibiotics and antiviral). Possible NPH on CT/MR, however, patient would need further NPH workup with outpatient neurology.       Recommendations:  [] VEEG 2/20- 2/22: Frequent generalized periodic discharges with triphasic morphology at a rate of 1-1.5 Hz typically seen in toxic/metabolic encephalopathy. No epileptic discharges recorded. No seizures recorded.  [] MR brain non con 2/22: No acute intracranial hemorrhage or evidence of acute ischemia. Unchanged imaging findings for which normal pressure hydrocephalus can be considered.   [] consider MR brain w/w/o to evaluate for any enhancement. continue treatment per ID team.  [] antibiotics to avoid or use with caution in MG:  Aminoglycosides? (eg, amikacin, gentamicin, neomycin, tobramycin)  Fluoroquinolones? (eg, ciprofloxacin, levofloxacin, moxifloxacin, ofloxacin)  Macrolides (eg, azithromycin, clarithromycin, erythromycin)    [] CT C/A/P shows: Groundglass opacities in the bilateral lung apices, Small bilateral pleural effusions. Distended gallbladder with cholelithiasis. Trace pericholecystic fluid. Peripancreatic fat stranding, potentially representing acute   pancreatitis. management per primary/GI team  [] ordered MG labs: acetylcholine blocking, modulating, binding antibodies, LRP4, Musk antibodies. & vitamin D25-hydroxy. reviewed labs: TSH (wnl), T4 (wnl), B12 (high), UA (WNL), NH3 (wnl), Folate (wnl), lactate (elevated), BUN (elevated), Potassium (Low), CBC (leukocytosis), platelet (low-66)  [] rest of care per primary medical team  [] PT/OT therapy  [] neuro- check and fall precaution.  [] For NPH workup, patient can follow up outpatient with neurologist Dr. Sifuentes or Dr. Guerra [phone], [address] [facility], [place] [address] or Dr. Castellon (dementia specialist)  at [address]. 1-2 weeks after discharge by calling [phone] to schedule this appointment  [] plans discussed with primary medical team  [] will continue follow up    Plans discussed with neurology attending, Dr. Hernandez   89y (1935) with PMH of dementia,  myasthenia gravis? untreated on Aricept 10 mg daily for dementia presenting for altered mental status in the morning of the day of admission. Patient was noted to have a UTI and was started on ciprofloxacin for the past 3 days. Neurology consult for AMS    Impression:  Etiology of altered mental status is most likely related to toxic/ metabolic encephalopathy in the setting of acute medical illnesses( cholelithiasis, possible pancreatitis?, pleural effusion, UTI, hypernatremia, thrombocytopenia)) with components of hospital delirium.  No epileptiform pattern or seizure recorded on EEG. EEG shows generalized periodic discharges with triphasic morphology which is consist with toxic/metabolic encephalopathy. No acute intracranial pathology on CT/MR brain. No meningismus sign on exam and afebrile, lower suspicion for meningitis (patient is being empirically treated with antibiotics and antiviral). Possible NPH on CT/MR, however, patient would need further NPH workup with outpatient neurology.       Recommendations:  [] VEEG 2/20- 2/22: Frequent generalized periodic discharges with triphasic morphology at a rate of 1-1.5 Hz typically seen in toxic/metabolic encephalopathy. No epileptic discharges recorded. No seizures recorded.  [] MR brain non con 2/22: No acute intracranial hemorrhage or evidence of acute ischemia. Unchanged imaging findings for which normal pressure hydrocephalus can be considered.   [] continue treatment per ID team.  [] antibiotics to avoid or use with caution in MG:  Aminoglycosides? (eg, amikacin, gentamicin, neomycin, tobramycin)  Fluoroquinolones? (eg, ciprofloxacin, levofloxacin, moxifloxacin, ofloxacin)  Macrolides (eg, azithromycin, clarithromycin, erythromycin)    [] CT C/A/P shows: Groundglass opacities in the bilateral lung apices, Small bilateral pleural effusions. Distended gallbladder with cholelithiasis. Trace pericholecystic fluid. Peripancreatic fat stranding, potentially representing acute   pancreatitis. management per primary/GI team  [] ordered MG labs: acetylcholine blocking, modulating, binding antibodies, LRP4, Musk antibodies. & vitamin D25-hydroxy. reviewed labs: TSH (wnl), T4 (wnl), B12 (high), UA (WNL), NH3 (wnl), Folate (wnl), lactate (elevated), BUN (elevated), Potassium (Low), CBC (leukocytosis), platelet (low-66)  [] rest of care per primary medical team  [] PT/OT therapy  [] neuro- check and fall precaution.  [] For NPH workup, patient can follow up outpatient with neurologist Dr. Sifuentes or Dr. Guerra [phone], [address] [facility], [place] [address] or Dr. Castellon (dementia specialist)  at [address]. 1-2 weeks after discharge by calling [phone] to schedule this appointment  [] plans discussed with primary medical team  [] will continue follow up    Plans discussed with neurology attending, Dr. Hernandez

## 2024-02-23 NOTE — PROGRESS NOTE ADULT - ASSESSMENT
89 f with Myasthenia Gravis (not on medication) and dementia (A&Ox1, conversive at baseline) had a positive u/a 2/13 outside with urine cx growing pan sensitive E-coli and klebsiella and pt was started on cipro, was sent from NH 2/19 for worsening lethargy and confusion.   here hypothermic to 89, lorena WBC 3  plt: 50s  head and neck CTA with ventriculomegaly and ?NPH  had RRTs for seizure like activity and hypotension  EEG with no seizure  brain MRI no acute infarct or hemorrhage, could be BPH  chest/abd CT: some GGO, distended gallbladder with cholelithiasis, peripancreatic and periduodenal fat stranding maybe pancreatitis  abd u/s: Cholelithiasis and gallbladder distention, otherwise without signs of acute cholecystitis. CBD within normal limits.  blood and urine cx negative  family did not want LP and pt was started on zackery and acyclovir, now improved hypothermia, WBC increased to 14 and slight improvement in mental status    AMS,   hypothermia, hypotension, thrombocytopenia, sepsis unclear etiology, was given cipro outside for UTI (pan sensitive E-coli and klebsiella)  ?seizure likely activity with metabolic acidosis and lactate>16 but EEG s/o metabolic encephalopathy, no seizure and MRI not s/o HSV but pt now on zackery, acyclovir and slightly better  here blood and urine cx negative, abd u/s and CT with cholelithiasis and distended gallbladder but no evidence of cholecystitis    * follow the HIDA  * c/w zackery for now  * monitor CBC/diff and CMP    The above assessment and plan was discussed with the primary team    Alexia Mccauley MD  contact on teams  After 5pm and on weekends call [phone] 89 f with Myasthenia Gravis (not on medication) and dementia (A&Ox1, conversive at baseline) had a positive u/a 2/13 outside with urine cx growing pan sensitive E-coli and klebsiella and pt was started on cipro, was sent from NH 2/19 for worsening lethargy and confusion.   here hypothermic to 89, lorena WBC 3  plt: 50s  head and neck CTA with ventriculomegaly and ?NPH  had RRTs for seizure like activity and hypotension  EEG with no seizure  brain MRI no acute infarct or hemorrhage, could be BPH  chest/abd CT: some GGO, distended gallbladder with cholelithiasis, peripancreatic and periduodenal fat stranding maybe pancreatitis  abd u/s: Cholelithiasis and gallbladder distention, otherwise without signs of acute cholecystitis. CBD within normal limits.  blood and urine cx negative  family did not want LP and pt was started on zackery and acyclovir, now improved hypothermia, WBC increased to 14 and slight improvement in mental status    AMS,   hypothermia, hypotension, thrombocytopenia, sepsis unclear etiology, was given cipro outside for UTI (pan sensitive E-coli and klebsiella)  ?seizure likely activity with metabolic acidosis and lactate>16 but EEG s/o metabolic encephalopathy, no seizure and MRI not s/o HSV but pt now on zackery, acyclovir and slightly better  here blood and urine cx negative, abd u/s and CT with cholelithiasis and distended gallbladder but no evidence of cholecystitis, has some transaminitis which is improving    * follow the HIDA  * check lipase  * would still do LP but family declined for now  * c/w zackery  * c/w acyclovir for now as LP was not done and no diagnosis yet  * monitor CBC/diff and CMP    The above assessment and plan was discussed with the primary team    Alexia Mccauley MD  contact on teams  After 5pm and on weekends call [phone]

## 2024-02-23 NOTE — CONSULT NOTE ADULT - ASSESSMENT
The patient is a 89F with dementia and Myasthenia Gravis admitted for AMS.     1. AMS  neurology recs appreciated     2. Cholelithiasis, distended GB   no e/o biliary obstruction   obtain kaci DUVAL doubt she has acute cholecystitis     3. peripancreatic fat stranding   check lipase level     4. abnormal LFTs  mixed pattern   downtrending since arrival   likely a byproduct of systemic infection   consider ECHO  trend LFTs       I had a prolonged conversation with the patient regarding the hospital course, differential diagnosis, results of diagnostic tests this far, and therapeutic modalities available. Plan of care discussed with the patient after the evaluation. Patient expresses a clear understanding of the plan of care. Fifty minutes spent on the total encounter, of which more than fifty percent of the encounter was spent on counseling and/or coordinating care by the attending physician. Advanced care planning forms were discussed. Code status including forceful chest compressions, defibrillation and intubation were discussed. The risks benefits and alternatives to pertinent gastrointestinal procedures and interventions were discussed in detail and all questions were answered. Duration: 15 Minutes.      Jj Harrison D.O.  Gastroenterology and Hepatology  [address] [place] [address], [address]  [place], [place]  Office: [phone]   The patient is a 89F with dementia and Myasthenia Gravis admitted for AMS.     1. AMS, seizure like activity   neurology recs appreciated   infectious workup ongoing     2. Cholelithiasis, distended GB   no e/o biliary obstruction  obtain HIDA    3. peripancreatic fat stranding   check lipase level     4. abnormal LFTs  mixed pattern   downtrending since arrival   likely a byproduct of systemic infection   consider ECHO  trend LFTs       I had a prolonged conversation with the patient regarding the hospital course, differential diagnosis, results of diagnostic tests this far, and therapeutic modalities available. Plan of care discussed with the patient after the evaluation. Patient expresses a clear understanding of the plan of care. Fifty minutes spent on the total encounter, of which more than fifty percent of the encounter was spent on counseling and/or coordinating care by the attending physician. Advanced care planning forms were discussed. Code status including forceful chest compressions, defibrillation and intubation were discussed. The risks benefits and alternatives to pertinent gastrointestinal procedures and interventions were discussed in detail and all questions were answered. Duration: 15 Minutes.      Jj Harrison D.O.  Gastroenterology and Hepatology  [address] [facility], [address]  [place], [place]  Office: [phone]   The patient is a 89F with dementia and Myasthenia Gravis admitted for AMS.     1. AMS, seizure like activity   neurology recs appreciated   infectious workup ongoing     2. Cholelithiasis, distended GB   no e/o biliary obstruction  obtain HIDA    3. peripancreatic fat stranding   check lipase level     4. abnormal LFTs  mixed pattern   downtrending since arrival   likely a byproduct of systemic infection   hepatitis panel ordered   consider ECHO  trend LFTs       I had a prolonged conversation with the patient regarding the hospital course, differential diagnosis, results of diagnostic tests this far, and therapeutic modalities available. Plan of care discussed with the patient after the evaluation. Patient expresses a clear understanding of the plan of care. Fifty minutes spent on the total encounter, of which more than fifty percent of the encounter was spent on counseling and/or coordinating care by the attending physician. Advanced care planning forms were discussed. Code status including forceful chest compressions, defibrillation and intubation were discussed. The risks benefits and alternatives to pertinent gastrointestinal procedures and interventions were discussed in detail and all questions were answered. Duration: 15 Minutes.      Jj Harrison D.O.  Gastroenterology and Hepatology  [address] [place] [address], [address]  [place], [place]  Office: [phone]

## 2024-02-23 NOTE — PROGRESS NOTE ADULT - SUBJECTIVE AND OBJECTIVE BOX
Patient is a 89y old  Female who presents with a chief complaint of AMS (23 Feb 2024 15:28)      INTERVAL HPI/OVERNIGHT EVENTS: Patient is more responsive today . Still lethargic but responsive to touch and verbal stimuli. MRI brain  negative for stroke. No signs of Herpes encephalitis on MRI neither EEG. Continue IV Acyclovir, Meropenem and Vancomycin.  Abdominal ultrasound showed cholelithiasis with gallbladder distention, concern for cholecystitis. Abdominal CT showed concern for pancreatitis. Consulted GI. GI recommended HIDA scan, F/u lipase, amylase and echocardiogram. F/u with GI.     Pain Location & Control:     MEDICATIONS  (STANDING):  acyclovir IVPB 550 milliGRAM(s) IV Intermittent every 12 hours  chlorhexidine 2% Cloths 1 Application(s) Topical daily  dextrose 5% + sodium chloride 0.45%. 1000 milliLiter(s) (75 mL/Hr) IV Continuous <Continuous>  meropenem  IVPB 1000 milliGRAM(s) IV Intermittent every 12 hours  pantoprazole  Injectable 40 milliGRAM(s) IV Push two times a day  sodium chloride 0.65% Nasal 1 Spray(s) Both Nostrils two times a day  sodium chloride 0.9% Bolus 500 milliLiter(s) IV Bolus once    MEDICATIONS  (PRN):      Allergies    sulfamethoxazole (Vomiting; Nausea)    Intolerances        REVIEW OF SYSTEMS:  UTO lethargic     Vital Signs Last 24 Hrs  T(C): 36.2 (23 Feb 2024 18:32), Max: 37.2 (23 Feb 2024 03:27)  T(F): 97.2 (23 Feb 2024 18:32), Max: 99 (23 Feb 2024 05:41)  HR: 75 (23 Feb 2024 18:32) (75 - 90)  BP: 101/52 (23 Feb 2024 18:32) (101/52 - 138/84)  BP(mean): --  RR: 18 (23 Feb 2024 18:32) (18 - 18)  SpO2: 91% (23 Feb 2024 18:32) (91% - 93%)    Parameters below as of 23 Feb 2024 18:32  Patient On (Oxygen Delivery Method): nasal cannula  O2 Flow (L/min): 4      PHYSICAL EXAM:  GENERAL: NAD, well-groomed, well-developed  HEAD:  Atraumatic, Normocephalic  EYES: EOMI, PERRLA, conjunctiva and sclera clear  ENMT: No tonsillar erythema, exudates, or enlargement; Moist mucous membranes, Good dentition, No lesions  NECK: Supple, No JVD, Normal thyroid  NERVOUS SYSTEM:  Alert & Oriented X0 responsive to verbal/ painful stimuli  CHEST/LUNG: Clear to auscultation bilaterally; No rales, rhonchi, wheezing, or rubs  HEART: Regular rate and rhythm; No murmurs, rubs, or gallops  ABDOMEN: Soft, Nontender, Nondistended; Bowel sounds present  EXTREMITIES:  2+ Peripheral Pulses, No clubbing or cyanosis  LYMPH: No lymphadenopathy noted  SKIN: No rashes or lesions      LABS:                        11.4   14.14 )-----------( 66       ( 23 Feb 2024 07:25 )             33.8     23 Feb 2024 07:27    143    |  111    |  30     ----------------------------<  109    3.3     |  21     |  0.98     Ca    8.2        23 Feb 2024 07:27      PT/INR - ( 23 Feb 2024 07:25 )   PT: 12.7 sec;   INR: 1.16 ratio         PTT - ( 22 Feb 2024 07:31 )  PTT:40.0 sec  Urinalysis Basic - ( 23 Feb 2024 07:27 )    Color: x / Appearance: x / SG: x / pH: x  Gluc: 109 mg/dL / Ketone: x  / Bili: x / Urobili: x   Blood: x / Protein: x / Nitrite: x   Leuk Esterase: x / RBC: x / WBC x   Sq Epi: x / Non Sq Epi: x / Bacteria: x      CAPILLARY BLOOD GLUCOSE      POCT Blood Glucose.: 103 mg/dL (23 Feb 2024 18:14)  POCT Blood Glucose.: 88 mg/dL (23 Feb 2024 11:45)  POCT Blood Glucose.: 90 mg/dL (23 Feb 2024 08:10)  POCT Blood Glucose.: 100 mg/dL (23 Feb 2024 00:55)        Cultures  Culture Results:   No growth at 48 Hours (02-21-24 @ 05:33)  Culture Results:   No growth at 48 Hours (02-21-24 @ 04:35)  Culture Results:   No growth at 72 Hours (02-20-24 @ 08:56)  Culture Results:   No growth at 72 Hours (02-20-24 @ 08:56)  Culture Results:   No growth at 4 days (02-19-24 @ 12:56)  Culture Results:   <10,000 CFU/mL Normal Urogenital Vidya (02-19-24 @ 12:56)  Culture Results:   No growth at 4 days (02-19-24 @ 12:55)      RADIOLOGY & ADDITIONAL TESTS:    Imaging Personally Reviewed:  [X ] YES  [ ] NO    Consultant(s) Notes Reviewed:  [ X] YES  [ ] NO    Care Discussed with Consultants/Other Providers [X ] YES  [ ] NO

## 2024-02-23 NOTE — PROGRESS NOTE ADULT - ASSESSMENT
89 YO F with PMHx of Myasthenia Gravis (not on medication for past 9 years) and MHx of dementia     Assessment/Plan:  AMS(most likely due to metabolic/toxic encephalopathy  due to UTI) - mildly better, more responsive. MRI negative for stroke and no signs of herpes encephalitis neither.  Continue coverage with double antibiotic  IV Vancomycin and Meropenem and Acyclovir.   cholelithiasis and concern for pancreatitis in CT- abdomen soft. GI not concerned for STAT ERCP. F/u HIDA scan, amylase, lipase, LFT.   hypotension- continue IV steroid and IV fluid as needed   thrombocytopenia- PLT down to 44,000. DIC panel not significantly positive. PTT mildly elevated. Otherwise PT/INR normal. D/ C Lovenox. F/u hematology.  No plan for LP at this time. Family denied   elevated LFT- Will  check RUQ ultrasound and Hepatitis panel.   NPH- incidental finding. F/u neurologist   UTI with E.coli and Klebsiella as  outpatient culture. - IV abx upgraded to Vanco and Meropenem  DVT ppx- Lovenox discontinued due to thrombocytopenia   leg arthritis- Acetaminophen PRN.  b/l cataract- F/u ophthalmolog  vitamin D deficiency- continue Vitamin D supplement.  risk of malnutrition -stable, continue supplement diet. F/u dietitian. Monitor weight.  constipation- senna at bedtime.  LBP 2/2 severe spinal stenosis with out cord pressure - no surgical intervention recommended neither accepted by family, continue PT OT as needed.  Dementia - Stable. Continue Aricept .  Dysphagia - Mechanical soft diet, aspiration precautions.  Myasthenia Gravis - stable , off of medication. Monitor CBC and BMP every 2 months.

## 2024-02-23 NOTE — PROGRESS NOTE ADULT - SUBJECTIVE AND OBJECTIVE BOX
Follow Up:  hypothermia, AMS    Interval History/ROS: as per family mental status better today, pt opens the eyes but does not respond, no cough or vomiting improved hypothermia        Allergies  sulfamethoxazole (Vomiting; Nausea)        ANTIMICROBIALS:  acyclovir IVPB 550 every 12 hours  meropenem  IVPB 1000 every 12 hours      OTHER MEDS:  chlorhexidine 2% Cloths 1 Application(s) Topical daily  dextrose 5% + sodium chloride 0.45%. 1000 milliLiter(s) IV Continuous <Continuous>  pantoprazole  Injectable 40 milliGRAM(s) IV Push two times a day  potassium chloride  10 mEq/100 mL IVPB 10 milliEquivalent(s) IV Intermittent every 1 hour  sodium chloride 0.65% Nasal 1 Spray(s) Both Nostrils two times a day  sodium chloride 0.9% Bolus 500 milliLiter(s) IV Bolus once      Vital Signs Last 24 Hrs  T(C): 36.8 (23 Feb 2024 08:42), Max: 37.2 (23 Feb 2024 03:27)  T(F): 98.3 (23 Feb 2024 08:42), Max: 99 (23 Feb 2024 05:41)  HR: 75 (23 Feb 2024 08:42) (75 - 90)  BP: 126/68 (23 Feb 2024 08:42) (122/73 - 138/84)  BP(mean): --  RR: 18 (23 Feb 2024 08:42) (18 - 18)  SpO2: 93% (23 Feb 2024 08:42) (92% - 93%)    Parameters below as of 23 Feb 2024 08:42  Patient On (Oxygen Delivery Method): nasal cannula  O2 Flow (L/min): 4      Physical Exam:  General:    NAD but not responding  Respiratory:    clear b/l,    no wheezing  abd:     soft,   BS +,   no tenderness  :   no CVAT,  no suprapubic tenderness,   no  wheeler  Musculoskeletal:   no joint swelling  vascular: no phlebitis  Skin:    no rash                          11.4   14.14 )-----------( 66       ( 23 Feb 2024 07:25 )             33.8       02-23    143  |  111<H>  |  30<H>  ----------------------------<  109<H>  3.3<L>   |  21<L>  |  0.98    Ca    8.2<L>      23 Feb 2024 07:27    TPro  5.4<L>  /  Alb  3.1<L>  /  TBili  0.4  /  DBili  0.1  /  AST  61<H>  /  ALT  129<H>  /  AlkPhos  156<H>  02-22      Urinalysis Basic - ( 23 Feb 2024 07:27 )    Color: x / Appearance: x / SG: x / pH: x  Gluc: 109 mg/dL / Ketone: x  / Bili: x / Urobili: x   Blood: x / Protein: x / Nitrite: x   Leuk Esterase: x / RBC: x / WBC x   Sq Epi: x / Non Sq Epi: x / Bacteria: x        MICROBIOLOGY:  v  .Blood Blood-Peripheral  02-21-24   No growth at 48 Hours  --  --      .Blood Blood-Peripheral  02-21-24   No growth at 48 Hours  --  --      .Blood Blood-Peripheral  02-20-24   No growth at 72 Hours  --  --      .Blood Blood-Peripheral  02-19-24   No growth at 72 Hours  --  --      .Blood Blood-Peripheral  02-19-24   No growth at 72 Hours  --  --                RADIOLOGY:  Images independently visualized and reviewed personally, findings as below  < from: MR Head No Cont (02.22.24 @ 21:07) >  IMPRESSION: No acute intracranial hemorrhage or evidence of acute   ischemia.    Unchanged imaging findings for which normal pressure hydrocephalus can be   considered. Clinical correlation is required for this diagnosis.    < end of copied text >  < from: CT Chest w/ IV Cont (02.22.24 @ 19:30) >  IMPRESSION:  *  Groundglass opacities in the bilateral lung apices, potentially   pulmonary edema or an infectious/inflammatory process.  *  Small bilateral pleural effusions.  *  Partially imaged mild rightmaxillary sinus mucosal thickening.  *  Distended gallbladder with cholelithiasis. Trace pericholecystic   fluid. Consider HIDA scan for further evaluation.  *  Peripancreatic fat stranding, potentially representing acute   pancreatitis. Recommend correlation with lipase.  *  Mild periduodenal fat infiltration, which may be reactive to   pancreatitis rather than representing duodenitis.    < end of copied text >  < from: US Abdomen Complete (02.22.24 @ 19:13) >  IMPRESSION:  Cholelithiasis and gallbladder distention, otherwise without signs of   acute cholecystitis. CBD within normal limits.    Bilateral kidneys unremarkable.    Bilateral pleural effusions.      < end of copied text >

## 2024-02-24 LAB
24R-OH-CALCIDIOL SERPL-MCNC: 19.4 NG/ML — LOW (ref 30–80)
ADD ON TEST-SPECIMEN IN LAB: SIGNIFICANT CHANGE UP
ALBUMIN SERPL ELPH-MCNC: 2.8 G/DL — LOW (ref 3.3–5)
ALP SERPL-CCNC: 140 U/L — HIGH (ref 40–120)
ALT FLD-CCNC: 68 U/L — HIGH (ref 10–45)
AMYLASE P1 CFR SERPL: 94 U/L — SIGNIFICANT CHANGE UP (ref 25–125)
ANION GAP SERPL CALC-SCNC: 9 MMOL/L — SIGNIFICANT CHANGE UP (ref 5–17)
AST SERPL-CCNC: 31 U/L — SIGNIFICANT CHANGE UP (ref 10–40)
BILIRUB DIRECT SERPL-MCNC: 0.2 MG/DL — SIGNIFICANT CHANGE UP (ref 0–0.3)
BILIRUB INDIRECT FLD-MCNC: 0.5 MG/DL — SIGNIFICANT CHANGE UP (ref 0.2–1)
BILIRUB SERPL-MCNC: 0.7 MG/DL — SIGNIFICANT CHANGE UP (ref 0.2–1.2)
BUN SERPL-MCNC: 28 MG/DL — HIGH (ref 7–23)
CALCIUM SERPL-MCNC: 8.4 MG/DL — SIGNIFICANT CHANGE UP (ref 8.4–10.5)
CHLORIDE SERPL-SCNC: 112 MMOL/L — HIGH (ref 96–108)
CO2 SERPL-SCNC: 22 MMOL/L — SIGNIFICANT CHANGE UP (ref 22–31)
CREAT SERPL-MCNC: <0.3 MG/DL — LOW (ref 0.5–1.3)
CULTURE RESULTS: SIGNIFICANT CHANGE UP
CULTURE RESULTS: SIGNIFICANT CHANGE UP
EGFR: 101 ML/MIN/1.73M2 — SIGNIFICANT CHANGE UP
GLUCOSE BLDC GLUCOMTR-MCNC: 101 MG/DL — HIGH (ref 70–99)
GLUCOSE BLDC GLUCOMTR-MCNC: 104 MG/DL — HIGH (ref 70–99)
GLUCOSE BLDC GLUCOMTR-MCNC: 93 MG/DL — SIGNIFICANT CHANGE UP (ref 70–99)
GLUCOSE BLDC GLUCOMTR-MCNC: 98 MG/DL — SIGNIFICANT CHANGE UP (ref 70–99)
GLUCOSE SERPL-MCNC: 97 MG/DL — SIGNIFICANT CHANGE UP (ref 70–99)
HAV IGM SER-ACNC: SIGNIFICANT CHANGE UP
HBV CORE IGM SER-ACNC: SIGNIFICANT CHANGE UP
HBV SURFACE AG SER-ACNC: SIGNIFICANT CHANGE UP
HCV AB S/CO SERPL IA: 0.08 S/CO — SIGNIFICANT CHANGE UP (ref 0–0.99)
HCV AB SERPL-IMP: SIGNIFICANT CHANGE UP
LIDOCAIN IGE QN: 50 U/L — SIGNIFICANT CHANGE UP (ref 7–60)
POTASSIUM SERPL-MCNC: 3.9 MMOL/L — SIGNIFICANT CHANGE UP (ref 3.5–5.3)
POTASSIUM SERPL-SCNC: 3.9 MMOL/L — SIGNIFICANT CHANGE UP (ref 3.5–5.3)
PROT SERPL-MCNC: 5.3 G/DL — LOW (ref 6–8.3)
SODIUM SERPL-SCNC: 143 MMOL/L — SIGNIFICANT CHANGE UP (ref 135–145)
SPECIMEN SOURCE: SIGNIFICANT CHANGE UP
SPECIMEN SOURCE: SIGNIFICANT CHANGE UP

## 2024-02-24 PROCEDURE — 99232 SBSQ HOSP IP/OBS MODERATE 35: CPT

## 2024-02-24 RX ADMIN — Medication 111 MILLIGRAM(S): at 05:51

## 2024-02-24 RX ADMIN — PANTOPRAZOLE SODIUM 40 MILLIGRAM(S): 20 TABLET, DELAYED RELEASE ORAL at 05:51

## 2024-02-24 RX ADMIN — MEROPENEM 100 MILLIGRAM(S): 1 INJECTION INTRAVENOUS at 17:33

## 2024-02-24 RX ADMIN — PANTOPRAZOLE SODIUM 40 MILLIGRAM(S): 20 TABLET, DELAYED RELEASE ORAL at 18:38

## 2024-02-24 RX ADMIN — Medication 111 MILLIGRAM(S): at 17:33

## 2024-02-24 RX ADMIN — Medication 1 SPRAY(S): at 17:34

## 2024-02-24 RX ADMIN — MEROPENEM 100 MILLIGRAM(S): 1 INJECTION INTRAVENOUS at 05:45

## 2024-02-24 RX ADMIN — Medication 1 SPRAY(S): at 05:45

## 2024-02-24 RX ADMIN — CHLORHEXIDINE GLUCONATE 1 APPLICATION(S): 213 SOLUTION TOPICAL at 17:34

## 2024-02-24 NOTE — PROGRESS NOTE ADULT - SUBJECTIVE AND OBJECTIVE BOX
Patient is a 89y old  Female who presents with a chief complaint of AMS (23 Feb 2024 19:19)    Being followed by ID for        Interval history:  No other acute events      ROS:  No cough,SOB,CP  No N/V/D  No abd pain  No urinary complaints  No HA  No joint or limb pain  No other complaints    PAST MEDICAL & SURGICAL HISTORY:  Myasthenia gravis      Dementia      Myasthenia gravis      No significant past surgical history      No significant past surgical history        Allergies    sulfamethoxazole (Vomiting; Nausea)    Intolerances      Antimicrobials:    acyclovir IVPB 550 milliGRAM(s) IV Intermittent every 12 hours  meropenem  IVPB 1000 milliGRAM(s) IV Intermittent every 12 hours    MEDICATIONS  (STANDING):  acyclovir IVPB 550 milliGRAM(s) IV Intermittent every 12 hours  chlorhexidine 2% Cloths 1 Application(s) Topical daily  dextrose 5% + sodium chloride 0.45%. 1000 milliLiter(s) (75 mL/Hr) IV Continuous <Continuous>  meropenem  IVPB 1000 milliGRAM(s) IV Intermittent every 12 hours  pantoprazole  Injectable 40 milliGRAM(s) IV Push two times a day  sodium chloride 0.65% Nasal 1 Spray(s) Both Nostrils two times a day  sodium chloride 0.9% Bolus 500 milliLiter(s) IV Bolus once      Vital Signs Last 24 Hrs  T(C): 36.7 (02-24-24 @ 09:41), Max: 37.1 (02-24-24 @ 04:38)  T(F): 98.1 (02-24-24 @ 09:41), Max: 98.7 (02-24-24 @ 04:38)  HR: 66 (02-24-24 @ 09:41) (66 - 77)  BP: 110/72 (02-24-24 @ 09:41) (101/52 - 117/62)  BP(mean): --  RR: 18 (02-24-24 @ 09:41) (18 - 18)  SpO2: 96% (02-24-24 @ 09:41) (91% - 97%)    Physical Exam:    Constitutional well preserved,comfortable,pleasant    HEENT PERRLA EOMI,No pallor or icterus    No oral exudate or erythema    Neck supple no JVD or LN    Chest Good AE,CTA    CVS RRR S1 S2 WNl No murmur or rub or gallop    Abd soft BS normal No tenderness no masses    Ext No cyanosis clubbing or edema    IV site no erythema tenderness or discharge    Joints no swelling or LOM    CNS AAO X 3 no focal    Lab Data:                          11.4   14.14 )-----------( 66       ( 23 Feb 2024 07:25 )             33.8       02-24    143  |  112<H>  |  28<H>  ----------------------------<  97  3.9   |  22  |  <0.30<L>    Ca    8.4      24 Feb 2024 07:13        Urinalysis Basic - ( 24 Feb 2024 07:13 )    Color: x / Appearance: x / SG: x / pH: x  Gluc: 97 mg/dL / Ketone: x  / Bili: x / Urobili: x   Blood: x / Protein: x / Nitrite: x   Leuk Esterase: x / RBC: x / WBC x   Sq Epi: x / Non Sq Epi: x / Bacteria: x        .Blood Blood-Peripheral  02-21-24   No growth at 48 Hours  --  --      .Blood Blood-Peripheral  02-21-24   No growth at 72 Hours  --  --      .Blood Blood-Peripheral  02-20-24   No growth at 72 Hours  --  --      .Blood Blood-Peripheral  02-19-24   No growth at 4 days  --  --      .Blood Blood-Peripheral  02-19-24   No growth at 4 days  --  --    < from: NM Hepatobiliary Imaging w/ RX (02.23.24 @ 22:50) >  IMPRESSION: Normal morphine-augmented hepatobiliary scan.    No evidence of acute cholecystitis or biliary obstruction.    < end of copied text >        < from: MR Head No Cont (02.22.24 @ 21:07) >    IMPRESSION: No acute intracranial hemorrhage or evidence of acute   ischemia.    Unchanged imaging findings for which normal pressure hydrocephalus can be   considered. Clinical correlation is required for this diagnosis.    --- End of Report ---        < end of copied text >      < from: CT Chest w/ IV Cont (02.22.24 @ 19:30) >    ACC: [id number] EXAM:  CT CHEST IC   ORDERED BY: MARIANNA DAVIS     ACC: [id number] EXAM:  CT ABDOMEN AND PELVIS IC   ORDERED BY: MARIANNA DAVIS     PROCEDURE DATE:  02/22/2024          INTERPRETATION:  CLINICAL INFORMATION: Evaluate for infection. Altered   mental status    COMPARISON: CT chest 1/6/2020. Abdominal ultrasound 2/22/2024.    CONTRAST/COMPLICATIONS:  IV Contrast: Omnipaque 350  90 cc administered   10 cc discarded  Oral Contrast: NONE  Complications: None reported at time of study completion    PROCEDURE:  CT of the Chest, Abdomen and Pelvis was performed.  Sagittal and coronal reformats were performed.    FINDINGS:  Motion degraded examination.    CHEST:  LUNGS, PLEURA, AND LARGE AIRWAYS: Patent central airways. Biapical   pleuroparenchymal scarring. Small bilateral pleural effusions and   bilateral lower lobe atelectasis. Groundglass opacities within the   bilateral lung apices.  VESSELS: Atherosclerotic changes. Coronary artery calcifications.  HEART: Cardiomegaly. No pericardial effusion.  MEDIASTINUM AND CARLO: No lymphadenopathy.  CHEST WALL AND LOWER NECK: Partially imaged mild right maxillary sinus   mucosal thickening.    ABDOMEN AND PELVIS:  LIVER: Subcentimeter hypodense focus in the right lobe that is too small   to characterize. Punctate calcification.  BILE DUCTS: Normal caliber.  GALLBLADDER: Distended. Cholelithiasis. Trace pericholecystic fluid.  SPLEEN: Within normal limits.  PANCREAS: Fatty atrophy. Peripancreatic fat stranding.  ADRENALS: Within normallimits.  KIDNEYS/URETERS: No hydronephrosis. Right subcentimeter hypodense focus   too small to characterize.    BLADDER: Within normal limits.  REPRODUCTIVE ORGANS: Uterus and adnexa within normal limits.    BOWEL: No bowel obstruction. Mild proximal periduodenal fat infiltration.   Sigmoid diverticulosis. No evidence for acute diverticulitis. Appendix is   normal.  PERITONEUM: Trace fluid.  VESSELS: Atherosclerotic changes.  RETROPERITONEUM/LYMPH NODES: No lymphadenopathy.  ABDOMINAL WALL: Small fat-containing umbilical hernia. Question prior   bilateral inguinal hernia repair; correlate with surgical history.   Subcutaneous edema.  BONES: Chronic right rib fractures. Degenerative changes. Grade 1-2   anterolisthesis of L4 on L5. Central canal narrowing at L4-L5. T7   hemangioma.    IMPRESSION:  *  Groundglass opacities in the bilateral lung apices, potentially   pulmonary edema or an infectious/inflammatory process.  *  Small bilateral pleural effusions.  *  Partially imaged mild rightmaxillary sinus mucosal thickening.  *  Distended gallbladder with cholelithiasis. Trace pericholecystic   fluid. Consider HIDA scan for further evaluation.  *  Peripancreatic fat stranding, potentially representing acute   pancreatitis. Recommend correlation with lipase.  *  Mild periduodenal fat infiltration, which may be reactive to   pancreatitis rather than representing duodenitis.        --- End of Report ---          < end of copied text >        WBC Count: 14.14 (02-23-24 @ 07:25)  WBC Count: 9.96 (02-22-24 @ 07:31)  WBC Count: 6.18 (02-21-24 @ 05:51)  WBC Count: 3.19 (02-21-24 @ 04:45)  WBC Count: 3.98 (02-20-24 @ 11:30)  WBC Count: 3.88 (02-19-24 @ 12:55)       Alkaline Phosphatase: 156 U/L (02-22-24 @ 07:31)  Aspartate Aminotransferase (AST/SGOT): 61 U/L (02-22-24 @ 07:31)  Alanine Aminotransferase (ALT/SGPT): 129 U/L (02-22-24 @ 07:31)  Bilirubin Total: 0.4 mg/dL (02-22-24 @ 07:31)  Bilirubin Total: 0.2 mg/dL (02-21-24 @ 04:36)  Aspartate Aminotransferase (AST/SGOT): 22 U/L (02-21-24 @ 04:36)  Alanine Aminotransferase (ALT/SGPT): 44 U/L (02-21-24 @ 04:36)  Alkaline Phosphatase: 44 U/L (02-21-24 @ 04:36)  Bilirubin Total: 0.4 mg/dL (02-20-24 @ 11:53)  Aspartate Aminotransferase (AST/SGOT): 101 U/L (02-20-24 @ 11:53)  Alanine Aminotransferase (ALT/SGPT): 204 U/L (02-20-24 @ 11:53)  Alkaline Phosphatase: 182 U/L (02-20-24 @ 11:53)  Bilirubin Total: 0.4 mg/dL (02-20-24 @ 07:24)  Aspartate Aminotransferase (AST/SGOT): 116 U/L (02-20-24 @ 07:24)  Alanine Aminotransferase (ALT/SGPT): 231 U/L (02-20-24 @ 07:24)  Alkaline Phosphatase: 197 U/L (02-20-24 @ 07:24)  Bilirubin Total: 0.6 mg/dL (02-19-24 @ 12:55)  Aspartate Aminotransferase (AST/SGOT): 141 U/L (02-19-24 @ 12:55)  Alanine Aminotransferase (ALT/SGPT): 271 U/L (02-19-24 @ 12:55)  Alkaline Phosphatase: 220 U/L (02-19-24 @ 12:55)      Amylase in AM (02.24.24 @ 07:13)    Amylase: 94 U/L    Amylase (02.23.24 @ 07:27)    Amylase: 229 U/L      Lipase in AM (02.24.24 @ 07:13)    Lipase: 50 U/L    Lipase (02.23.24 @ 07:27)    Lipase: 142 U/L     Patient is a 89y old  Female who presents with a chief complaint of AMS (23 Feb 2024 19:19)    Being followed by ID for        Interval history:  pt remains poorly responsive  HIDA scan negative  amylase and lipase less today   MRI with likely NPH  No other acute events        PAST MEDICAL & SURGICAL HISTORY:  Myasthenia gravis      Dementia      Myasthenia gravis      No significant past surgical history      No significant past surgical history        Allergies    sulfamethoxazole (Vomiting; Nausea)    Intolerances      Antimicrobials:    acyclovir IVPB 550 milliGRAM(s) IV Intermittent every 12 hours  meropenem  IVPB 1000 milliGRAM(s) IV Intermittent every 12 hours    MEDICATIONS  (STANDING):  acyclovir IVPB 550 milliGRAM(s) IV Intermittent every 12 hours  chlorhexidine 2% Cloths 1 Application(s) Topical daily  dextrose 5% + sodium chloride 0.45%. 1000 milliLiter(s) (75 mL/Hr) IV Continuous <Continuous>  meropenem  IVPB 1000 milliGRAM(s) IV Intermittent every 12 hours  pantoprazole  Injectable 40 milliGRAM(s) IV Push two times a day  sodium chloride 0.65% Nasal 1 Spray(s) Both Nostrils two times a day  sodium chloride 0.9% Bolus 500 milliLiter(s) IV Bolus once      Vital Signs Last 24 Hrs  T(C): 36.7 (02-24-24 @ 09:41), Max: 37.1 (02-24-24 @ 04:38)  T(F): 98.1 (02-24-24 @ 09:41), Max: 98.7 (02-24-24 @ 04:38)  HR: 66 (02-24-24 @ 09:41) (66 - 77)  BP: 110/72 (02-24-24 @ 09:41) (101/52 - 117/62)  BP(mean): --  RR: 18 (02-24-24 @ 09:41) (18 - 18)  SpO2: 96% (02-24-24 @ 09:41) (91% - 97%)    Physical Exam:    Constitutional well preserved,comfortable,pleasant    HEENT PERRLA EOMI,No pallor or icterus    No oral exudate or erythema    Neck supple no JVD or LN    Chest Good AE,CTA    CVS S1 S2     Abd soft BS normal No tenderness     Ext No cyanosis clubbing or edema    IV site no erythema tenderness or discharge    Joints no swelling or LOM    CNS AAO X 3 no focal    Lab Data:                          11.4   14.14 )-----------( 66       ( 23 Feb 2024 07:25 )             33.8       02-24    143  |  112<H>  |  28<H>  ----------------------------<  97  3.9   |  22  |  <0.30<L>    Ca    8.4      24 Feb 2024 07:13        Urinalysis Basic - ( 24 Feb 2024 07:13 )    Color: x / Appearance: x / SG: x / pH: x  Gluc: 97 mg/dL / Ketone: x  / Bili: x / Urobili: x   Blood: x / Protein: x / Nitrite: x   Leuk Esterase: x / RBC: x / WBC x   Sq Epi: x / Non Sq Epi: x / Bacteria: x        .Blood Blood-Peripheral  02-21-24   No growth at 48 Hours  --  --      .Blood Blood-Peripheral  02-21-24   No growth at 72 Hours  --  --      .Blood Blood-Peripheral  02-20-24   No growth at 72 Hours  --  --      .Blood Blood-Peripheral  02-19-24   No growth at 4 days  --  --      .Blood Blood-Peripheral  02-19-24   No growth at 4 days  --  --    < from: NM Hepatobiliary Imaging w/ RX (02.23.24 @ 22:50) >  IMPRESSION: Normal morphine-augmented hepatobiliary scan.    No evidence of acute cholecystitis or biliary obstruction.    < end of copied text >        < from: MR Head No Cont (02.22.24 @ 21:07) >    IMPRESSION: No acute intracranial hemorrhage or evidence of acute   ischemia.    Unchanged imaging findings for which normal pressure hydrocephalus can be   considered. Clinical correlation is required for this diagnosis.    --- End of Report ---        < end of copied text >      < from: CT Chest w/ IV Cont (02.22.24 @ 19:30) >    ACC: [id number] EXAM:  CT CHEST IC   ORDERED BY: MARIANNA DAVIS     ACC: [id number] EXAM:  CT ABDOMEN AND PELVIS IC   ORDERED BY: MARIANNA DAVIS     PROCEDURE DATE:  02/22/2024          INTERPRETATION:  CLINICAL INFORMATION: Evaluate for infection. Altered   mental status    COMPARISON: CT chest 1/6/2020. Abdominal ultrasound 2/22/2024.    CONTRAST/COMPLICATIONS:  IV Contrast: Omnipaque 350  90 cc administered   10 cc discarded  Oral Contrast: NONE  Complications: None reported at time of study completion    PROCEDURE:  CT of the Chest, Abdomen and Pelvis was performed.  Sagittal and coronal reformats were performed.    FINDINGS:  Motion degraded examination.    CHEST:  LUNGS, PLEURA, AND LARGE AIRWAYS: Patent central airways. Biapical   pleuroparenchymal scarring. Small bilateral pleural effusions and   bilateral lower lobe atelectasis. Groundglass opacities within the   bilateral lung apices.  VESSELS: Atherosclerotic changes. Coronary artery calcifications.  HEART: Cardiomegaly. No pericardial effusion.  MEDIASTINUM AND CARLO: No lymphadenopathy.  CHEST WALL AND LOWER NECK: Partially imaged mild right maxillary sinus   mucosal thickening.    ABDOMEN AND PELVIS:  LIVER: Subcentimeter hypodense focus in the right lobe that is too small   to characterize. Punctate calcification.  BILE DUCTS: Normal caliber.  GALLBLADDER: Distended. Cholelithiasis. Trace pericholecystic fluid.  SPLEEN: Within normal limits.  PANCREAS: Fatty atrophy. Peripancreatic fat stranding.  ADRENALS: Within normallimits.  KIDNEYS/URETERS: No hydronephrosis. Right subcentimeter hypodense focus   too small to characterize.    BLADDER: Within normal limits.  REPRODUCTIVE ORGANS: Uterus and adnexa within normal limits.    BOWEL: No bowel obstruction. Mild proximal periduodenal fat infiltration.   Sigmoid diverticulosis. No evidence for acute diverticulitis. Appendix is   normal.  PERITONEUM: Trace fluid.  VESSELS: Atherosclerotic changes.  RETROPERITONEUM/LYMPH NODES: No lymphadenopathy.  ABDOMINAL WALL: Small fat-containing umbilical hernia. Question prior   bilateral inguinal hernia repair; correlate with surgical history.   Subcutaneous edema.  BONES: Chronic right rib fractures. Degenerative changes. Grade 1-2   anterolisthesis of L4 on L5. Central canal narrowing at L4-L5. T7   hemangioma.    IMPRESSION:  *  Groundglass opacities in the bilateral lung apices, potentially   pulmonary edema or an infectious/inflammatory process.  *  Small bilateral pleural effusions.  *  Partially imaged mild rightmaxillary sinus mucosal thickening.  *  Distended gallbladder with cholelithiasis. Trace pericholecystic   fluid. Consider HIDA scan for further evaluation.  *  Peripancreatic fat stranding, potentially representing acute   pancreatitis. Recommend correlation with lipase.  *  Mild periduodenal fat infiltration, which may be reactive to   pancreatitis rather than representing duodenitis.        --- End of Report ---          < end of copied text >        WBC Count: 14.14 (02-23-24 @ 07:25)  WBC Count: 9.96 (02-22-24 @ 07:31)  WBC Count: 6.18 (02-21-24 @ 05:51)  WBC Count: 3.19 (02-21-24 @ 04:45)  WBC Count: 3.98 (02-20-24 @ 11:30)  WBC Count: 3.88 (02-19-24 @ 12:55)       Alkaline Phosphatase: 156 U/L (02-22-24 @ 07:31)  Aspartate Aminotransferase (AST/SGOT): 61 U/L (02-22-24 @ 07:31)  Alanine Aminotransferase (ALT/SGPT): 129 U/L (02-22-24 @ 07:31)  Bilirubin Total: 0.4 mg/dL (02-22-24 @ 07:31)  Bilirubin Total: 0.2 mg/dL (02-21-24 @ 04:36)  Aspartate Aminotransferase (AST/SGOT): 22 U/L (02-21-24 @ 04:36)  Alanine Aminotransferase (ALT/SGPT): 44 U/L (02-21-24 @ 04:36)  Alkaline Phosphatase: 44 U/L (02-21-24 @ 04:36)  Bilirubin Total: 0.4 mg/dL (02-20-24 @ 11:53)  Aspartate Aminotransferase (AST/SGOT): 101 U/L (02-20-24 @ 11:53)  Alanine Aminotransferase (ALT/SGPT): 204 U/L (02-20-24 @ 11:53)  Alkaline Phosphatase: 182 U/L (02-20-24 @ 11:53)  Bilirubin Total: 0.4 mg/dL (02-20-24 @ 07:24)  Aspartate Aminotransferase (AST/SGOT): 116 U/L (02-20-24 @ 07:24)  Alanine Aminotransferase (ALT/SGPT): 231 U/L (02-20-24 @ 07:24)  Alkaline Phosphatase: 197 U/L (02-20-24 @ 07:24)  Bilirubin Total: 0.6 mg/dL (02-19-24 @ 12:55)  Aspartate Aminotransferase (AST/SGOT): 141 U/L (02-19-24 @ 12:55)  Alanine Aminotransferase (ALT/SGPT): 271 U/L (02-19-24 @ 12:55)  Alkaline Phosphatase: 220 U/L (02-19-24 @ 12:55)      Amylase in AM (02.24.24 @ 07:13)    Amylase: 94 U/L    Amylase (02.23.24 @ 07:27)    Amylase: 229 U/L      Lipase in AM (02.24.24 @ 07:13)    Lipase: 50 U/L    Lipase (02.23.24 @ 07:27)    Lipase: 142 U/L     Patient is a 89y old  Female who presents with a chief complaint of AMS (23 Feb 2024 19:19)    Being followed by ID for        Interval history:  pt remains poorly responsive  HIDA scan negative  amylase and lipase less today   MRI with likely NPH  No other acute events        PAST MEDICAL & SURGICAL HISTORY:  Myasthenia gravis      Dementia      Myasthenia gravis      No significant past surgical history      No significant past surgical history        Allergies    sulfamethoxazole (Vomiting; Nausea)    Intolerances      Antimicrobials:    acyclovir IVPB 550 milliGRAM(s) IV Intermittent every 12 hours  meropenem  IVPB 1000 milliGRAM(s) IV Intermittent every 12 hours    MEDICATIONS  (STANDING):  acyclovir IVPB 550 milliGRAM(s) IV Intermittent every 12 hours  chlorhexidine 2% Cloths 1 Application(s) Topical daily  dextrose 5% + sodium chloride 0.45%. 1000 milliLiter(s) (75 mL/Hr) IV Continuous <Continuous>  meropenem  IVPB 1000 milliGRAM(s) IV Intermittent every 12 hours  pantoprazole  Injectable 40 milliGRAM(s) IV Push two times a day  sodium chloride 0.65% Nasal 1 Spray(s) Both Nostrils two times a day  sodium chloride 0.9% Bolus 500 milliLiter(s) IV Bolus once      Vital Signs Last 24 Hrs  T(C): 36.7 (02-24-24 @ 09:41), Max: 37.1 (02-24-24 @ 04:38)  T(F): 98.1 (02-24-24 @ 09:41), Max: 98.7 (02-24-24 @ 04:38)  HR: 66 (02-24-24 @ 09:41) (66 - 77)  BP: 110/72 (02-24-24 @ 09:41) (101/52 - 117/62)  BP(mean): --  RR: 18 (02-24-24 @ 09:41) (18 - 18)  SpO2: 96% (02-24-24 @ 09:41) (91% - 97%)    Physical Exam:    Constitutional  pt was able to say ok to a question but doesn't open eyes, follow commands ,etc    HEENT pupils small    Neck  flexed    Chest Good AE,CTA    CVS S1 S2     Abd soft BS normal No tenderness     Ext No cyanosis clubbing or edema    IV site no erythema tenderness or discharge    Joints no swelling or LOM    Lab Data:                          11.4   14.14 )-----------( 66       ( 23 Feb 2024 07:25 )             33.8       02-24    143  |  112<H>  |  28<H>  ----------------------------<  97  3.9   |  22  |  <0.30<L>    Ca    8.4      24 Feb 2024 07:13      Urinalysis (02.19.24 @ 12:56)    Blood, Urine: Negative   pH Urine: 5.0   Glucose Qualitative, Urine: Negative mg/dL   Color: Yellow   Urine Appearance: Clear   Bilirubin: Negative   Ketone - Urine: Trace mg/dL   Specific Gravity: 1.026   Protein, Urine: Trace mg/dL   Urobilinogen: 1.0 mg/dL   Nitrite: Negative   Leukocyte Esterase Concentration: Negative          .Blood Blood-Peripheral  02-21-24   No growth at 48 Hours  --  --      .Blood Blood-Peripheral  02-21-24   No growth at 72 Hours  --  --      .Blood Blood-Peripheral  02-20-24   No growth at 72 Hours  --  --      .Blood Blood-Peripheral  02-19-24   No growth at 4 days  --  --      .Blood Blood-Peripheral  02-19-24   No growth at 4 days  --  --    < from: NM Hepatobiliary Imaging w/ RX (02.23.24 @ 22:50) >  IMPRESSION: Normal morphine-augmented hepatobiliary scan.    No evidence of acute cholecystitis or biliary obstruction.    < end of copied text >        < from: MR Head No Cont (02.22.24 @ 21:07) >    IMPRESSION: No acute intracranial hemorrhage or evidence of acute   ischemia.    Unchanged imaging findings for which normal pressure hydrocephalus can be   considered. Clinical correlation is required for this diagnosis.    --- End of Report ---        < end of copied text >      < from: CT Chest w/ IV Cont (02.22.24 @ 19:30) >    ACC: [id number] EXAM:  CT CHEST IC   ORDERED BY: MARIANNA DAVIS     ACC: [id number] EXAM:  CT ABDOMEN AND PELVIS IC   ORDERED BY: MARIANNA DAVIS     PROCEDURE DATE:  02/22/2024          INTERPRETATION:  CLINICAL INFORMATION: Evaluate for infection. Altered   mental status    COMPARISON: CT chest 1/6/2020. Abdominal ultrasound 2/22/2024.    CONTRAST/COMPLICATIONS:  IV Contrast: Omnipaque 350  90 cc administered   10 cc discarded  Oral Contrast: NONE  Complications: None reported at time of study completion    PROCEDURE:  CT of the Chest, Abdomen and Pelvis was performed.  Sagittal and coronal reformats were performed.    FINDINGS:  Motion degraded examination.    CHEST:  LUNGS, PLEURA, AND LARGE AIRWAYS: Patent central airways. Biapical   pleuroparenchymal scarring. Small bilateral pleural effusions and   bilateral lower lobe atelectasis. Groundglass opacities within the   bilateral lung apices.  VESSELS: Atherosclerotic changes. Coronary artery calcifications.  HEART: Cardiomegaly. No pericardial effusion.  MEDIASTINUM AND CARLO: No lymphadenopathy.  CHEST WALL AND LOWER NECK: Partially imaged mild right maxillary sinus   mucosal thickening.    ABDOMEN AND PELVIS:  LIVER: Subcentimeter hypodense focus in the right lobe that is too small   to characterize. Punctate calcification.  BILE DUCTS: Normal caliber.  GALLBLADDER: Distended. Cholelithiasis. Trace pericholecystic fluid.  SPLEEN: Within normal limits.  PANCREAS: Fatty atrophy. Peripancreatic fat stranding.  ADRENALS: Within normallimits.  KIDNEYS/URETERS: No hydronephrosis. Right subcentimeter hypodense focus   too small to characterize.    BLADDER: Within normal limits.  REPRODUCTIVE ORGANS: Uterus and adnexa within normal limits.    BOWEL: No bowel obstruction. Mild proximal periduodenal fat infiltration.   Sigmoid diverticulosis. No evidence for acute diverticulitis. Appendix is   normal.  PERITONEUM: Trace fluid.  VESSELS: Atherosclerotic changes.  RETROPERITONEUM/LYMPH NODES: No lymphadenopathy.  ABDOMINAL WALL: Small fat-containing umbilical hernia. Question prior   bilateral inguinal hernia repair; correlate with surgical history.   Subcutaneous edema.  BONES: Chronic right rib fractures. Degenerative changes. Grade 1-2   anterolisthesis of L4 on L5. Central canal narrowing at L4-L5. T7   hemangioma.    IMPRESSION:  *  Groundglass opacities in the bilateral lung apices, potentially   pulmonary edema or an infectious/inflammatory process.  *  Small bilateral pleural effusions.  *  Partially imaged mild rightmaxillary sinus mucosal thickening.  *  Distended gallbladder with cholelithiasis. Trace pericholecystic   fluid. Consider HIDA scan for further evaluation.  *  Peripancreatic fat stranding, potentially representing acute   pancreatitis. Recommend correlation with lipase.  *  Mild periduodenal fat infiltration, which may be reactive to   pancreatitis rather than representing duodenitis.        --- End of Report ---          < end of copied text >        WBC Count: 14.14 (02-23-24 @ 07:25)  WBC Count: 9.96 (02-22-24 @ 07:31)  WBC Count: 6.18 (02-21-24 @ 05:51)  WBC Count: 3.19 (02-21-24 @ 04:45)  WBC Count: 3.98 (02-20-24 @ 11:30)  WBC Count: 3.88 (02-19-24 @ 12:55)       Alkaline Phosphatase: 156 U/L (02-22-24 @ 07:31)  Aspartate Aminotransferase (AST/SGOT): 61 U/L (02-22-24 @ 07:31)  Alanine Aminotransferase (ALT/SGPT): 129 U/L (02-22-24 @ 07:31)  Bilirubin Total: 0.4 mg/dL (02-22-24 @ 07:31)  Bilirubin Total: 0.2 mg/dL (02-21-24 @ 04:36)  Aspartate Aminotransferase (AST/SGOT): 22 U/L (02-21-24 @ 04:36)  Alanine Aminotransferase (ALT/SGPT): 44 U/L (02-21-24 @ 04:36)  Alkaline Phosphatase: 44 U/L (02-21-24 @ 04:36)  Bilirubin Total: 0.4 mg/dL (02-20-24 @ 11:53)  Aspartate Aminotransferase (AST/SGOT): 101 U/L (02-20-24 @ 11:53)  Alanine Aminotransferase (ALT/SGPT): 204 U/L (02-20-24 @ 11:53)  Alkaline Phosphatase: 182 U/L (02-20-24 @ 11:53)  Bilirubin Total: 0.4 mg/dL (02-20-24 @ 07:24)  Aspartate Aminotransferase (AST/SGOT): 116 U/L (02-20-24 @ 07:24)  Alanine Aminotransferase (ALT/SGPT): 231 U/L (02-20-24 @ 07:24)  Alkaline Phosphatase: 197 U/L (02-20-24 @ 07:24)  Bilirubin Total: 0.6 mg/dL (02-19-24 @ 12:55)  Aspartate Aminotransferase (AST/SGOT): 141 U/L (02-19-24 @ 12:55)  Alanine Aminotransferase (ALT/SGPT): 271 U/L (02-19-24 @ 12:55)  Alkaline Phosphatase: 220 U/L (02-19-24 @ 12:55)      Amylase in AM (02.24.24 @ 07:13)    Amylase: 94 U/L    Amylase (02.23.24 @ 07:27)    Amylase: 229 U/L      Lipase in AM (02.24.24 @ 07:13)    Lipase: 50 U/L    Lipase (02.23.24 @ 07:27)    Lipase: 142 U/L

## 2024-02-24 NOTE — PROGRESS NOTE ADULT - ASSESSMENT
low reticu in the setting of anemia s/o MDS, therefore, her cetiologies of thrombocytopenia is multifactorial

## 2024-02-24 NOTE — PROGRESS NOTE ADULT - ASSESSMENT
Subjective:    Patient ID: Mariann Huff is a 56 y.o. female.    Chief Complaint: Foot Pain (right foot / dr stanley 17)      HPI:   Mariann is a 56 y.o. female who presents to the podiatry clinic  with complaint of  right foot pain. Onset of the symptoms was several weeks ago. Precipitating event: none known. Current symptoms include: ability to bear weight, but with some pain, swelling and worsening symptoms after a period of activity. Aggravating factors: any weight bearing. Symptoms have gradually worsened. Patient has had no prior foot problems. Evaluation to date: none. Treatment to date: none. Patients rates pain 7/10 on pain scale.    DOI: ~ 17    HPI    Last Podiatry Enc: Visit date not found  Last Enc w/ Me: Visit date not found    Past Medical History:   Diagnosis Date    Anticoagulant long-term use     Asthma     Back pain     CAD (coronary artery disease)     s/p stentimg  (2), (1)    Carotid artery stenosis     Diabetes mellitus type 2 in obese     HTN (hypertension), benign     Hyperlipidemia     Keloid cicatrix     NPDR (nonproliferative diabetic retinopathy) 2015    NSTEMI (non-ST elevated myocardial infarction)     Nuclear sclerosis - Right Eye 3/18/2014    Primary localized osteoarthrosis, lower leg 2014    Sleep apnea      Past Surgical History:   Procedure Laterality Date    CARDIAC CATHETERIZATION      cataract extraction left eye      cataracts       SECTION, LOW TRANSVERSE      CORONARY ANGIOPLASTY      EXCISION TURBINATE, SUBMUCOUS      HAND SURGERY Left     HAND SURGERY Right     torn ligament repair/ Dr. Yeboah    HYSTERECTOMY      left foot surgery      left wrist surgery      NASAL SEPTUM SURGERY  5/7/15    rt elbow surgery      S/P LAD COATED STENT  2010    6 total     S/P OM1 STENT  2003    SINUS SURGERY      F.E.S.S.    TUBAL LIGATION       Social History     Social History    Marital status:   "    Spouse name: Shamir    Number of children: 2    Years of education: N/A     Occupational History    cafeteria [employer]     Social History Main Topics    Smoking status: Never Smoker    Smokeless tobacco: Never Used    Alcohol use No    Drug use: No    Sexual activity: Yes     Partners: Male     Birth control/ protection: Post-menopausal      Comment:      Other Topics Concern    Are You Pregnant Or Think You May Be? No    Breast-Feeding No     Social History Narrative    . 2 children.          Current Outpatient Prescriptions:     ACCU-CHEK EDIN PLUS METER Misc, , Disp: , Rfl:     amlodipine (NORVASC) 10 MG tablet, TAKE 1 TABLET (10 MG TOTAL) BY MOUTH ONCE DAILY., Disp: 30 tablet, Rfl: 11    aspirin 81 mg Tab, Take 81 mg by mouth. 1 Tablet Oral Every day, Disp: , Rfl:     atorvastatin (LIPITOR) 40 MG tablet, TAKE 1 TABLET (40 MG TOTAL) BY MOUTH ONCE DAILY., Disp: 30 tablet, Rfl: 11    BD INSULIN PEN NEEDLE UF MINI 31 x 3/16 " Ndle, USE 4 TIMES A DAY, Disp: 200 each, Rfl: 11    blood sugar diagnostic (ACCU-CHEK EDIN PLUS TEST STRP) Strp, TEST BLOOD SUGARS 4 TIMES DAILY, Disp: 150 strip, Rfl: 11    cyclobenzaprine (FLEXERIL) 10 MG tablet, TAKE 1 TABLET BY MOUTH 3 TIMES A DAY AS NEEDED FOR MUSCLE SPASMS. NO WORKING OR DRIVING ON THIS MED, Disp: 45 tablet, Rfl: 5    EFFIENT 10 mg Tab, TAKE 1 TABLET (10 MG TOTAL) BY MOUTH ONCE DAILY., Disp: 30 tablet, Rfl: 11    esomeprazole (NEXIUM) 40 MG capsule, TAKE 1 CAPSULE (40 MG TOTAL) BY MOUTH BEFORE BREAKFAST., Disp: 30 capsule, Rfl: 2    fenofibrate micronized (LOFIBRA) 134 MG Cap, TAKE 1 CAPSULE (134 MG TOTAL) BY MOUTH BEFORE BREAKFAST., Disp: 30 capsule, Rfl: 10    hydrocodone-acetaminophen 7.5-325mg (NORCO) 7.5-325 mg per tablet, Take 1 tablet by mouth every 8 (eight) hours as needed for Pain., Disp: 30 tablet, Rfl: 0    insulin aspart (NOVOLOG) 100 unit/mL " "InPn pen, 30 U in am, 30 units noon, 30 units pm; 150-200 +2, 201-250 + 4, 251-300 + 6, 301-350 +8, >350 +10 and call MD, Disp: 20 mL, Rfl: 11    insulin glargine, TOUJEO, (TOUJEO SOLOSTAR) 300 unit/mL (1.5 mL) InPn pen, Inject 84 Units into the skin once daily., Disp: 6 Syringe, Rfl: 11    INVOKANA 300 mg Tab tablet, Take 1 tablet (300 mg total) by mouth once daily., Disp: 30 tablet, Rfl: 11    lancets 30 gauge Misc, , Disp: , Rfl:     losartan (COZAAR) 100 MG tablet, TAKE 1 TABLET (100 MG TOTAL) BY MOUTH ONCE DAILY., Disp: 30 tablet, Rfl: 11    metoprolol succinate (TOPROL-XL) 100 MG 24 hr tablet, TAKE 1 TABLET (100 MG TOTAL) BY MOUTH ONCE DAILY., Disp: 30 tablet, Rfl: 11    nitroGLYCERIN (NITROSTAT) 0.4 MG SL tablet, Take one every 2-3 min till chestpain relief for 3 times and if still no relief, call MD or come to ED, Disp: 30 tablet, Rfl: 11    omega-3 acid ethyl esters (LOVAZA) 1 gram capsule, Take 1 g by mouth once daily. , Disp: , Rfl:     pen needle, diabetic (BD ULTRA-FINE GRABIEL PEN NEEDLES) 32 gauge x 5/32" Ndle, For use with insulin pens daily 4 times a day, Disp: 100 each, Rfl: 11    SURE COMFORT PEN NEEDLE 31 gauge x 5/16" Ndle, , Disp: , Rfl:     tramadol (ULTRAM) 50 mg tablet, Take 1 tablet (50 mg total) by mouth 3 (three) times daily as needed for Pain., Disp: 60 tablet, Rfl: 1    zolpidem (AMBIEN) 5 MG Tab, Take 1 tablet (5 mg total) by mouth nightly as needed., Disp: 30 tablet, Rfl: 2  Review of patient's allergies indicates:  No Known Allergies    ROS  ROS Constitutional:  General Appearance: well nourished  Vascular: negative for cramps, edema and bruising  Musculoskeletal: negative for joint paint and joint edema  Skin: negative for rashes and lesions  Neurological: negative for burning, tingling and numbness  Gastrointestinal: negative for stomach pain, nausea and vomiting        Objective:        BP (!) 165/78   Pulse 74   Ht 5' 4" (1.626 m)   Wt 72.1 kg (159 lb)   BMI 27.29 " The patient is a 89F with dementia and Myasthenia Gravis admitted for AMS.     1. AMS, seizure like activity   neurology following  infectious workup per primary team     2. Cholelithiasis, distended GB   no e/o biliary obstruction  HIDA negative for acute cholecystitis     3. peripancreatic fat stranding   lipase mildly elevated yesterday (< 3x ULN), today normal   doubt she has acute pancreatitis     4. abnormal LFTs, downtrending    likely a byproduct of systemic infection   hepatitis panel neg  consider ECHO  trend LFTs       I had a prolonged conversation with the patient regarding the hospital course, differential diagnosis, results of diagnostic tests this far, and therapeutic modalities available. Plan of care discussed with the patient after the evaluation. Patient expresses a clear understanding of the plan of care.       Jj Harrison D.O.  Gastroenterology and Hepatology  [address] [place] [address], [address]  [place], [place]  Office: [phone]   kg/m²     Ortho/SPM Exam  Physical Exam  LE exam con't:  V: DP 2/4, PT 2/4, CRT< 3s to all digits tested.     N: SILT in SP/DP/T/Maxi/Saph distributions.    Derm: Skin intact. No erythema, edema or ecchymosis.     Ortho:  +Motor EHL/FHL/TA/GA   +TTP 5th metatarsal R foot   Compartments soft/compressible. No pain on passive stretch of big toe. No calf  pain.        Assessment:     Imaging / Labs:    X-ray Foot Complete Right    Result Date: 5/22/2017  There appears to be an incomplete fracture through the mid fifth metatarsal in area of the patient's pain.  Mild degenerative changes are seen to first MP joint.  Some degenerative changes seen at the bases of the first metatarsal and calcaneal spurs are noted.     Fracture through the mid fifth metatarsal Electronically signed by: Florin Cooley MD Date:     05/22/17 Time:    13:40     Narrative     Time of Procedure: 07/07/17 08:18:33  Accession # [id number]    Comparison: 5/22/2017, 1333 hrs.    Number of views: 3.    Findings:  There is a healing transverse fracture of the mid diaphysis of the fifth metatarsal with fracture fragments in satisfactory position and alignment.  Fracture line remains conspicuous.    No new abnormality identified in this patient with mild degenerative change at the first MTP and spurring along the dorsal aspects of the tarsal bones.  Inferior calcaneal spur noted.   Impression       Please see above.       Electronically signed by: Fay Loyola MD  Date: 07/07/17  Time: 08:40              1. Closed nondisplaced fracture of fifth metatarsal bone of right foot with routine healing          Plan:       Orders Placed This Encounter    X-Ray Foot Complete Right     Procedures  .   Mariann was seen today for foot pain.    Diagnoses and all orders for this visit:    Closed nondisplaced fracture of fifth metatarsal bone of right foot with routine healing  -     X-Ray Foot Complete Right; Future        Mariann Huff is a 56 y.o. female  presenting w/   1. Closed nondisplaced fracture of fifth metatarsal bone of right foot with routine healing      DOI: ~ 6/2/17    -pt seen, evaluated, and managed  -dx discussed in detail. All questions/concerns addressed  -all tx options discussed. All alternatives, risks, benefits of all txs discussed  -The patient was educated regarding the above diagnosis. We discussed conservative care options regarding shoe wear and/or padding.  -rxs dispensed: none  -xr reviewed: callus forming. Bone healing, not healed  -new XR in 4 wks  -NWB in CAM RLE  -CAM boot dispensed  -crutches and training administered    Return in about 5 weeks (around 8/18/2017).

## 2024-02-24 NOTE — PROGRESS NOTE ADULT - SUBJECTIVE AND OBJECTIVE BOX
Date of Service: 02-24-24 @ 10:26           CARDIOLOGY     PROGRESS  NOTE   ________________________________________________    CHIEF COMPLAINT:Patient is a 89y old  Female who presents with a chief complaint of AMS (24 Feb 2024 10:21)  non complain, more responsive  	  REVIEW OF SYSTEMS:  CONSTITUTIONAL: No fever, weight loss, or fatigue  EYES: No eye pain, visual disturbances, or discharge  ENT:  No difficulty hearing, tinnitus, vertigo; No sinus or throat pain  NECK: No pain or stiffness  RESPIRATORY: No cough, wheezing, chills or hemoptysis; No Shortness of Breath  CARDIOVASCULAR: No chest pain, palpitations, passing out, dizziness, or leg swelling  GASTROINTESTINAL: No abdominal or epigastric pain. No nausea, vomiting, or hematemesis; No diarrhea or constipation. No melena or hematochezia.  GENITOURINARY: No dysuria, frequency, hematuria, or incontinence  NEUROLOGICAL: No headaches, memory loss, loss of strength, numbness, or tremors  SKIN: No itching, burning, rashes, or lesions   LYMPH Nodes: No enlarged glands  ENDOCRINE: No heat or cold intolerance; No hair loss  MUSCULOSKELETAL: No joint pain or swelling; No muscle, back, or extremity pain  PSYCHIATRIC: No depression, anxiety, mood swings, or difficulty sleeping  HEME/LYMPH: No easy bruising, or bleeding gums  ALLERGY AND IMMUNOLOGIC: No hives or eczema	    [ ] All others negative	  [x ] Unable to obtain    PHYSICAL EXAM:  T(C): 36.7 (02-24-24 @ 09:41), Max: 37.1 (02-24-24 @ 04:38)  HR: 66 (02-24-24 @ 09:41) (66 - 77)  BP: 110/72 (02-24-24 @ 09:41) (101/52 - 117/62)  RR: 18 (02-24-24 @ 09:41) (18 - 18)  SpO2: 96% (02-24-24 @ 09:41) (91% - 97%)  Wt(kg): --  I&O's Summary      Appearance: Normal	  HEENT:   Normal oral mucosa, PERRL, EOMI	  Lymphatic: No lymphadenopathy  Cardiovascular: Normal S1 S2, No JVD, + murmurs, No edema  Respiratory: rhonchi  Gastrointestinal:  Soft, Non-tender, + BS	  Skin: No rashes, No ecchymoses, No cyanosis	  Extremities: Normal range of motion, No clubbing, cyanosis or edema  Vascular: Peripheral pulses palpable 2+ bilaterally    MEDICATIONS  (STANDING):  acyclovir IVPB 550 milliGRAM(s) IV Intermittent every 12 hours  chlorhexidine 2% Cloths 1 Application(s) Topical daily  dextrose 5% + sodium chloride 0.45%. 1000 milliLiter(s) (75 mL/Hr) IV Continuous <Continuous>  meropenem  IVPB 1000 milliGRAM(s) IV Intermittent every 12 hours  pantoprazole  Injectable 40 milliGRAM(s) IV Push two times a day  sodium chloride 0.65% Nasal 1 Spray(s) Both Nostrils two times a day  sodium chloride 0.9% Bolus 500 milliLiter(s) IV Bolus once      TELEMETRY: 	    ECG:  	  RADIOLOGY:  OTHER: 	  	  LABS:	 	    CARDIAC MARKERS:                                11.4   14.14 )-----------( 66       ( 23 Feb 2024 07:25 )             33.8     02-24    143  |  112<H>  |  28<H>  ----------------------------<  97  3.9   |  22  |  <0.30<L>    Ca    8.4      24 Feb 2024 07:13      proBNP:   Lipid Profile:   HgA1c:   TSH: Thyroid Stimulating Hormone, Serum: 2.97 uIU/mL (02-20 @ 11:30)  Thyroid Stimulating Hormone, Serum: 2.45 uIU/mL (02-20 @ 07:28)    PT/INR - ( 23 Feb 2024 07:25 )   PT: 12.7 sec;   INR: 1.16 ratio         < from: [facility] Hepatobiliary Imaging w/ RX (02.23.24 @ 22:50) >  FINDINGS: There is prompt, homogeneous uptake of radiopharmaceutical by   the hepatocytes. Activity is first in the bowel at about 30 minutes and   in the gallbladder about 35 minutes after morphine administration. There   is good clearance of activity from the liver at the end of the study.    IMPRESSION: Normal morphine-augmented hepatobiliary scan.    No evidence of acute cholecystitis or biliary obstruction.    * follow the HIDA  * check lipase  * would still do LP but family declined for now  * c/w zackery  * c/w acyclovir for now as LP was not done and no diagnosis yet  * monitor CBC/diff and CMP    Assessment and plan  ---------------------------  89 YO F with PMHx of Myasthenia Gravis (not on medication for past 9 years) and MHx of dementia     Assessment/Plan:  AMS(most likely due to metabolic/toxic encephalopathy  due to UTI) - mildly better, more responsive. MRI negative for stroke and no signs of herpes encephalitis neither.  Continue coverage with double antibiotic  IV Vancomycin and Meropenem and Acyclovir.   cholelithiasis and concern for pancreatitis in CT- abdomen soft. GI not concerned for STAT ERCP. F/u HIDA scan, amylase, lipase, LFT.   hypotension- continue IV steroid and IV fluid as needed   thrombocytopenia- PLT down to 44,000. DIC panel not significantly positive. PTT mildly elevated. Otherwise PT/INR normal. D/ C Lovenox. F/u hematology.  No plan for LP at this time. Family denied   elevated LFT- Will  check RUQ ultrasound and Hepatitis panel.   NPH- incidental finding. F/u neurologist   UTI with E.coli and Klebsiella as  outpatient culture. - IV abx upgraded to Vanco and Meropenem  DVT ppx- Lovenox discontinued due to thrombocytopenia   leg arthritis- Acetaminophen PRN.  b/l cataract- F/u ophthalmolog  vitamin D deficiency- continue Vitamin D supplement.  risk of malnutrition -stable, continue supplement diet. F/u dietitian. Monitor weight.  constipation- senna at bedtime.  LBP 2/2 severe spinal stenosis with out cord pressure - no surgical intervention recommended neither accepted by family, continue PT OT as needed.  Dementia - Stable. Continue Aricept .  Dysphagia - Mechanical soft diet, aspiration precautions.  Myasthenia Gravis - stable , off of medication. Monitor CBC and BMP every 2 months.  HIDA scan negative  ID recommending LP, family refusing continuee ABX

## 2024-02-24 NOTE — PROGRESS NOTE ADULT - ASSESSMENT
89 y/o F with PMHx of Myasthenia Gravis (not on medication) and dementia (A&Ox1, conversive at baseline) who presents from her nursing home with worsening lethargy and confusion. UA done as outpatient on 2/13 grossly positive and urine culture growing E. coli and Klebsiella, pansensitive. Found to be hypothermic in ED. Admitted for sepsis and metabolic encephalopathy in setting of UTI. Multiple RRTs called today. First RRT called for seizure-like activity. Patient loaded with Keppra and placed on EEG. Second RRT called for hypotension. Patient was given 4 L IVF with resolution of hypotension.      A/P    #R/O sepsis  unclear what caused acute decompensation. Pt was treated for UTI but urine negative here. Pt with remote h/o Myasthenia gravis and mild dementia.  Pt with possible seizure - 2/20  pt changed to meropenem to cover for possible meningitis-   ? Normal pressure hydrocephalous on CT- neuro to address- this could certainly be contributing to mental status   d/atiya the  ampicillin, meropenem has listeria coverage  EEG not c/w HSV encephalits, but added  acyclovir until more definitive diagnosis available, will adjust to renal function- day # 3  ? significance of the NPH and how does it contribute to the mental status  Hypernatremia may be contributing to the mental status as well   would avoid certain abs- such as cipro with the myasthenia graavis- suggest neuro import to address   pt with clear cxr and no diarrhea .NOW on oxygen, consider CT of chest if can  CT with distended GB but HIDA negative. ? pancreatitis .amylase and lipase were elevated but improving.  could patient have passed a stones       #elevate LFTs  HIDA negative   check hepatitis screen  avoid hepatotoxci meds   trend - Please repeat    #lactate   markedly elevated but improved  If ischemic bowel, wouldn't improve so quickly- ? lab error     #hypernatremia  likely contributing to the mental status     #thrombocytopenia  can't do spinal tap if low  hematology to address      Yeni Gabriel M.D. ,   please reach via teams   If no answer, or after 5PM/ weekends,  then please call  [phone]    Assessment and plan discussed with the primary team .    I will be away  tomorrow. My associates will be covering. Please call [phone] with acute issues, questions.

## 2024-02-24 NOTE — PROGRESS NOTE ADULT - SUBJECTIVE AND OBJECTIVE BOX
Chief Complaint:  Patient is a 89y old  Female who presents with a chief complaint of AMS (24 Feb 2024 10:25)      Date of service 02-24-24 @ 11:29      Interval Events:   no acute events     Hospital Medications:  acyclovir IVPB 550 milliGRAM(s) IV Intermittent every 12 hours  chlorhexidine 2% Cloths 1 Application(s) Topical daily  dextrose 5% + sodium chloride 0.45%. 1000 milliLiter(s) IV Continuous <Continuous>  meropenem  IVPB 1000 milliGRAM(s) IV Intermittent every 12 hours  pantoprazole  Injectable 40 milliGRAM(s) IV Push two times a day  sodium chloride 0.65% Nasal 1 Spray(s) Both Nostrils two times a day  sodium chloride 0.9% Bolus 500 milliLiter(s) IV Bolus once        Review of Systems:  unable to obtain     PHYSICAL EXAM:   Vital Signs:  Vital Signs Last 24 Hrs  T(C): 36.7 (24 Feb 2024 09:41), Max: 37.1 (24 Feb 2024 04:38)  T(F): 98.1 (24 Feb 2024 09:41), Max: 98.7 (24 Feb 2024 04:38)  HR: 66 (24 Feb 2024 09:41) (66 - 77)  BP: 110/72 (24 Feb 2024 09:41) (101/52 - 117/62)  BP(mean): --  RR: 18 (24 Feb 2024 09:41) (18 - 18)  SpO2: 96% (24 Feb 2024 09:41) (91% - 97%)    Parameters below as of 24 Feb 2024 09:41  Patient On (Oxygen Delivery Method): nasal cannula  O2 Flow (L/min): 4    Daily     Daily       PHYSICAL EXAM:     GENERAL:  Appears stated age, well-groomed, well-nourished, no distress  HEENT:  NC/AT,  conjunctivae anicteric, clear and pink,   NECK: supple, trachea midline  CHEST:  Full & symmetric excursion, no increased effort, breath sounds clear  HEART:  Regular rhythm, no JVD  ABDOMEN:  Soft, non-tender, non-distended, normoactive bowel sounds,  no masses , no hepatosplenomegaly  EXTREMITIES:  no cyanosis,clubbing or edema  SKIN:  No rash, erythema, or, ecchymoses, no jaundice  NEURO:  Alert, non-focal, no asterixis  PSYCH: Appropriate affect, oriented to place and time  RECTAL: Deferred      LABS Personally reviewed by me:                        11.4   14.14 )-----------( 66       ( 23 Feb 2024 07:25 )             33.8       02-24    143  |  112<H>  |  28<H>  ----------------------------<  97  3.9   |  22  |  <0.30<L>    Ca    8.4      24 Feb 2024 07:13        PT/INR - ( 23 Feb 2024 07:25 )   PT: 12.7 sec;   INR: 1.16 ratio           Urinalysis Basic - ( 24 Feb 2024 07:13 )    Color: x / Appearance: x / SG: x / pH: x  Gluc: 97 mg/dL / Ketone: x  / Bili: x / Urobili: x   Blood: x / Protein: x / Nitrite: x   Leuk Esterase: x / RBC: x / WBC x   Sq Epi: x / Non Sq Epi: x / Bacteria: x      Amylase Serum94      Lipase serum50       Ammonia--                          11.4   14.14 )-----------( 66       ( 23 Feb 2024 07:25 )             33.8                         12.1   9.96  )-----------( 44       ( 22 Feb 2024 07:31 )             36.3       Imaging personally reviewed by me:

## 2024-02-24 NOTE — PROVIDER CONTACT NOTE (OTHER) - BACKGROUND
Pt d/x AMS.
pt baseline axo1, now axo0
Pt coming from nursing home, she was less interactive than normal upon waking up. Multiple UTIs
pt here for AMS and hypothermic earlier td
Pt d/x AMS

## 2024-02-24 NOTE — PROGRESS NOTE ADULT - SUBJECTIVE AND OBJECTIVE BOX
Obtunded  minimal responsive      VITAL SIGNS:    T98 p83 /60 RR18    PHYSICAL EXAM:     GENERAL: no acute distress  HEENT: NC/AT, EOMI, neck supple, MMM  RESPIRATORY: LCTAB/L, no rhonchi, rales, or wheezing  CARDIOVASCULAR: RRR, no murmurs, gallops, rubs  ABDOMINAL: soft, non-tender, non-distended, positive bowel sounds   NEUROLOGICAL: obtunded                          11.4   14.14 )-----------( 66       ( 23 Feb 2024 07:25 )             33.8     02-24    143  |  112<H>  |  28<H>  ----------------------------<  97  3.9   |  22  |  <0.30<L>    Ca    8.4      24 Feb 2024 07:13    TPro  5.3<L>  /  Alb  2.8<L>  /  TBili  0.7  /  DBili  0.2  /  AST  31  /  ALT  68<H>  /  AlkPhos  140<H>  02-24      MEDICATIONS  (STANDING):  acyclovir IVPB 550 milliGRAM(s) IV Intermittent every 12 hours  chlorhexidine 2% Cloths 1 Application(s) Topical daily  dextrose 5% + sodium chloride 0.45%. 1000 milliLiter(s) (75 mL/Hr) IV Continuous <Continuous>  meropenem  IVPB 1000 milliGRAM(s) IV Intermittent every 12 hours  pantoprazole  Injectable 40 milliGRAM(s) IV Push two times a day  sodium chloride 0.65% Nasal 1 Spray(s) Both Nostrils two times a day  sodium chloride 0.9% Bolus 500 milliLiter(s) IV Bolus once

## 2024-02-25 LAB
ANION GAP SERPL CALC-SCNC: 11 MMOL/L — SIGNIFICANT CHANGE UP (ref 5–17)
BUN SERPL-MCNC: 22 MG/DL — SIGNIFICANT CHANGE UP (ref 7–23)
CALCIUM SERPL-MCNC: 8.4 MG/DL — SIGNIFICANT CHANGE UP (ref 8.4–10.5)
CHLORIDE SERPL-SCNC: 109 MMOL/L — HIGH (ref 96–108)
CO2 SERPL-SCNC: 22 MMOL/L — SIGNIFICANT CHANGE UP (ref 22–31)
CREAT SERPL-MCNC: 0.73 MG/DL — SIGNIFICANT CHANGE UP (ref 0.5–1.3)
CULTURE RESULTS: SIGNIFICANT CHANGE UP
CULTURE RESULTS: SIGNIFICANT CHANGE UP
EGFR: 79 ML/MIN/1.73M2 — SIGNIFICANT CHANGE UP
GLUCOSE BLDC GLUCOMTR-MCNC: 103 MG/DL — HIGH (ref 70–99)
GLUCOSE BLDC GLUCOMTR-MCNC: 104 MG/DL — HIGH (ref 70–99)
GLUCOSE BLDC GLUCOMTR-MCNC: 94 MG/DL — SIGNIFICANT CHANGE UP (ref 70–99)
GLUCOSE BLDC GLUCOMTR-MCNC: 96 MG/DL — SIGNIFICANT CHANGE UP (ref 70–99)
GLUCOSE BLDC GLUCOMTR-MCNC: 99 MG/DL — SIGNIFICANT CHANGE UP (ref 70–99)
GLUCOSE SERPL-MCNC: 87 MG/DL — SIGNIFICANT CHANGE UP (ref 70–99)
HCT VFR BLD CALC: 31.8 % — LOW (ref 34.5–45)
HGB BLD-MCNC: 10.8 G/DL — LOW (ref 11.5–15.5)
MCHC RBC-ENTMCNC: 31.9 PG — SIGNIFICANT CHANGE UP (ref 27–34)
MCHC RBC-ENTMCNC: 34 GM/DL — SIGNIFICANT CHANGE UP (ref 32–36)
MCV RBC AUTO: 93.8 FL — SIGNIFICANT CHANGE UP (ref 80–100)
NRBC # BLD: 0 /100 WBCS — SIGNIFICANT CHANGE UP (ref 0–0)
PLATELET # BLD AUTO: 71 K/UL — LOW (ref 150–400)
POTASSIUM SERPL-MCNC: 3.5 MMOL/L — SIGNIFICANT CHANGE UP (ref 3.5–5.3)
POTASSIUM SERPL-SCNC: 3.5 MMOL/L — SIGNIFICANT CHANGE UP (ref 3.5–5.3)
RBC # BLD: 3.39 M/UL — LOW (ref 3.8–5.2)
RBC # FLD: 14.3 % — SIGNIFICANT CHANGE UP (ref 10.3–14.5)
SODIUM SERPL-SCNC: 142 MMOL/L — SIGNIFICANT CHANGE UP (ref 135–145)
SPECIMEN SOURCE: SIGNIFICANT CHANGE UP
SPECIMEN SOURCE: SIGNIFICANT CHANGE UP
WBC # BLD: 12.22 K/UL — HIGH (ref 3.8–10.5)
WBC # FLD AUTO: 12.22 K/UL — HIGH (ref 3.8–10.5)

## 2024-02-25 PROCEDURE — 93010 ELECTROCARDIOGRAM REPORT: CPT

## 2024-02-25 RX ORDER — ACETAMINOPHEN 500 MG
1000 TABLET ORAL ONCE
Refills: 0 | Status: COMPLETED | OUTPATIENT
Start: 2024-02-25 | End: 2024-02-25

## 2024-02-25 RX ADMIN — Medication 111 MILLIGRAM(S): at 17:08

## 2024-02-25 RX ADMIN — MEROPENEM 100 MILLIGRAM(S): 1 INJECTION INTRAVENOUS at 17:08

## 2024-02-25 RX ADMIN — MEROPENEM 100 MILLIGRAM(S): 1 INJECTION INTRAVENOUS at 04:55

## 2024-02-25 RX ADMIN — Medication 111 MILLIGRAM(S): at 05:04

## 2024-02-25 RX ADMIN — Medication 1 SPRAY(S): at 05:04

## 2024-02-25 RX ADMIN — Medication 1 SPRAY(S): at 17:08

## 2024-02-25 RX ADMIN — PANTOPRAZOLE SODIUM 40 MILLIGRAM(S): 20 TABLET, DELAYED RELEASE ORAL at 05:03

## 2024-02-25 RX ADMIN — PANTOPRAZOLE SODIUM 40 MILLIGRAM(S): 20 TABLET, DELAYED RELEASE ORAL at 17:09

## 2024-02-25 RX ADMIN — Medication 1000 MILLIGRAM(S): at 13:29

## 2024-02-25 RX ADMIN — Medication 400 MILLIGRAM(S): at 12:45

## 2024-02-25 RX ADMIN — CHLORHEXIDINE GLUCONATE 1 APPLICATION(S): 213 SOLUTION TOPICAL at 12:59

## 2024-02-25 NOTE — PROGRESS NOTE ADULT - SUBJECTIVE AND OBJECTIVE BOX
Date of Service: 02-25-24 @ 12:41           CARDIOLOGY     PROGRESS  NOTE   ________________________________________________    CHIEF COMPLAINT:Patient is a 89y old  Female who presents with a chief complaint of AMS (25 Feb 2024 11:19)  comfortable  	  REVIEW OF SYSTEMS:  CONSTITUTIONAL: No fever, weight loss, or fatigue  EYES: No eye pain, visual disturbances, or discharge  ENT:  No difficulty hearing, tinnitus, vertigo; No sinus or throat pain  NECK: No pain or stiffness  RESPIRATORY: No cough, wheezing, chills or hemoptysis; No Shortness of Breath  CARDIOVASCULAR: No chest pain, palpitations, passing out, dizziness, or leg swelling  GASTROINTESTINAL: No abdominal or epigastric pain. No nausea, vomiting, or hematemesis; No diarrhea or constipation. No melena or hematochezia.  GENITOURINARY: No dysuria, frequency, hematuria, or incontinence  NEUROLOGICAL: No headaches, memory loss, loss of strength, numbness, or tremors  SKIN: No itching, burning, rashes, or lesions   LYMPH Nodes: No enlarged glands  ENDOCRINE: No heat or cold intolerance; No hair loss  MUSCULOSKELETAL: No joint pain or swelling; No muscle, back, or extremity pain  PSYCHIATRIC: No depression, anxiety, mood swings, or difficulty sleeping  HEME/LYMPH: No easy bruising, or bleeding gums  ALLERGY AND IMMUNOLOGIC: No hives or eczema	    [ ] All others negative	  [x ] Unable to obtain    PHYSICAL EXAM:  T(C): 36.2 (02-25-24 @ 10:32), Max: 36.9 (02-24-24 @ 20:19)  HR: 75 (02-25-24 @ 10:32) (67 - 83)  BP: 106/75 (02-25-24 @ 10:32) (106/68 - 131/60)  RR: 18 (02-25-24 @ 10:32) (18 - 18)  SpO2: 100% (02-25-24 @ 10:32) (94% - 100%)  Wt(kg): --  I&O's Summary    24 Feb 2024 07:01  -  25 Feb 2024 07:00  --------------------------------------------------------  IN: 0 mL / OUT: 250 mL / NET: -250 mL        Appearance: Normal	  HEENT:   Normal oral mucosa, PERRL, EOMI	  Lymphatic: No lymphadenopathy  Cardiovascular: Normal S1 S2, No JVD, N+murmurs, No edema  Respiratory: rhonchi  Psychiatry:?dementia  Gastrointestinal:  Soft, Non-tender, + BS	  Skin: No rashes, No ecchymoses, No cyanosis	  Extremities: Normal range of motion, No clubbing, cyanosis or edema  Vascular: Peripheral pulses palpable 2+ bilaterally    MEDICATIONS  (STANDING):  acetaminophen   IVPB .. 1000 milliGRAM(s) IV Intermittent once  acyclovir IVPB 550 milliGRAM(s) IV Intermittent every 12 hours  chlorhexidine 2% Cloths 1 Application(s) Topical daily  dextrose 5% + sodium chloride 0.45%. 1000 milliLiter(s) (75 mL/Hr) IV Continuous <Continuous>  meropenem  IVPB 1000 milliGRAM(s) IV Intermittent every 12 hours  pantoprazole  Injectable 40 milliGRAM(s) IV Push two times a day  sodium chloride 0.65% Nasal 1 Spray(s) Both Nostrils two times a day  sodium chloride 0.9% Bolus 500 milliLiter(s) IV Bolus once      TELEMETRY: 	    ECG:  	  RADIOLOGY:  OTHER: 	  	  LABS:	 	    CARDIAC MARKERS:                          10.8   12.22 )-----------( 71       ( 25 Feb 2024 07:02 )             31.8     02-25    142  |  109<H>  |  22  ----------------------------<  87  3.5   |  22  |  0.73    Ca    8.4      25 Feb 2024 07:02    TPro  5.3<L>  /  Alb  2.8<L>  /  TBili  0.7  /  DBili  0.2  /  AST  31  /  ALT  68<H>  /  AlkPhos  140<H>  02-24    proBNP:   Lipid Profile:   HgA1c:   TSH: Thyroid Stimulating Hormone, Serum: 2.97 uIU/mL (02-20 @ 11:30)  Thyroid Stimulating Hormone, Serum: 2.45 uIU/mL (02-20 @ 07:28)    Blood Gas Venous - Lactate (02.21.24 @ 07:40)    Blood Gas Venous - Lactate: 0.9 mmol/L        Assessment and plan  ---------------------------  AMS(most likely due to metabolic/toxic encephalopathy  due to UTI) - mildly better, more responsive. MRI negative for stroke and no signs of herpes encephalitis neither.  Continue coverage with double antibiotic  IV Vancomycin and Meropenem and Acyclovir.   cholelithiasis and concern for pancreatitis in CT- abdomen soft. GI not concerned for STAT ERCP. F/u HIDA scan, amylase, lipase, LFT.   hypotension- continue IV steroid and IV fluid as needed   thrombocytopenia- PLT down to 44,000. DIC panel not significantly positive. PTT mildly elevated. Otherwise PT/INR normal. D/ C Lovenox. F/u hematology.  No plan for LP at this time. Family denied   elevated LFT- Will  check RUQ ultrasound and Hepatitis panel.   NPH- incidental finding. F/u neurologist   UTI with E.coli and Klebsiella as  outpatient culture. - IV abx upgraded to Vanco and Meropenem  DVT ppx- Lovenox discontinued due to thrombocytopenia   leg arthritis- Acetaminophen PRN.  b/l cataract- F/u ophthalmology  vitamin D deficiency- continue Vitamin D supplement.  risk of malnutrition -stable, continue supplement diet. F/u dietitian. Monitor weight.  constipation- senna at bedtime.  LBP 2/2 severe spinal stenosis with out cord pressure - no surgical intervention recommended neither accepted by family, continue PT OT as needed.  Dementia - Stable. Continue Aricept .  Dysphagia - Mechanical soft diet, aspiration precautions.  Myasthenia Gravis - stable , off of medication. Monitor CBC and BMP every 2 months.  HIDA scan negative  ID recommending LP, family refusing continuee ABX  lactate level is improving, normalized  nutrition status ?ngt feeding

## 2024-02-25 NOTE — PROGRESS NOTE ADULT - ASSESSMENT
The patient is a 89F with dementia and Myasthenia Gravis admitted for AMS.     1. AMS    2. Cholelithiasis, distended GB   no e/o acute cholecystitis or biliary obstruction     3. peripancreatic fat stranding   low suspicion for acute pancreatitis     4. abnormal LFTs, downtrending    likely a byproduct of systemic infection   hepatitis panel neg  pending ECHO  trend LFTs       I had a prolonged conversation with the patient regarding the hospital course, differential diagnosis, results of diagnostic tests this far, and therapeutic modalities available. Plan of care discussed with the patient after the evaluation. Patient expresses a clear understanding of the plan of care.       Jj Harrison D.O.  Gastroenterology and Hepatology  [address] include 234 beds at the [facility], [address]  [place], [place]  Office: [phone]   The patient is a 89F with dementia and Myasthenia Gravis admitted for AMS.     1. AMS, improving  she is awake and conversive today   ID workup per primary team     2. Cholelithiasis, distended GB   no e/o acute cholecystitis or biliary obstruction     3. peripancreatic fat stranding   low suspicion for acute pancreatitis     4. abnormal LFTs, downtrending    likely a byproduct of systemic infection   hepatitis panel neg  pending ECHO  trend LFTs       I had a prolonged conversation with the patient regarding the hospital course, differential diagnosis, results of diagnostic tests this far, and therapeutic modalities available. Plan of care discussed with the patient after the evaluation. Patient expresses a clear understanding of the plan of care.       Jj Harrison D.O.  Gastroenterology and Hepatology  [address] [place] [address], [address]  [place], [place]  Office: [phone]

## 2024-02-25 NOTE — PROGRESS NOTE ADULT - SUBJECTIVE AND OBJECTIVE BOX
Chief Complaint:  Patient is a 89y old  Female who presents with a chief complaint of AMS (24 Feb 2024 20:18)      Date of service 02-25-24 @ 11:19      Interval Events:   no acute events     Hospital Medications:  acyclovir IVPB 550 milliGRAM(s) IV Intermittent every 12 hours  chlorhexidine 2% Cloths 1 Application(s) Topical daily  dextrose 5% + sodium chloride 0.45%. 1000 milliLiter(s) IV Continuous <Continuous>  meropenem  IVPB 1000 milliGRAM(s) IV Intermittent every 12 hours  pantoprazole  Injectable 40 milliGRAM(s) IV Push two times a day  sodium chloride 0.65% Nasal 1 Spray(s) Both Nostrils two times a day  sodium chloride 0.9% Bolus 500 milliLiter(s) IV Bolus once        Review of Systems:  General:  No wt loss, fevers, chills, night sweats, fatigue,   Eyes:  Good vision, no reported pain  ENT:  No sore throat, pain, runny nose, dysphagia  CV:  No pain, palpitations, hypo/hypertension  Resp:  No dyspnea, cough, tachypnea, wheezing  GI:  See HPI  :  No pain, bleeding, incontinence, nocturia  Muscle:  No pain, weakness  Neuro:  No weakness, tingling, memory problems  Psych:  No fatigue, insomnia, mood problems, depression  Endocrine:  No polyuria, polydipsia, cold/heat intolerance  Heme:  No petechiae, ecchymosis, easy bruisability  Integumentary:  No rash, edema    PHYSICAL EXAM:   Vital Signs:  Vital Signs Last 24 Hrs  T(C): 36.2 (25 Feb 2024 10:32), Max: 36.9 (24 Feb 2024 20:19)  T(F): 97.2 (25 Feb 2024 10:32), Max: 98.4 (24 Feb 2024 20:19)  HR: 75 (25 Feb 2024 10:32) (67 - 83)  BP: 106/75 (25 Feb 2024 10:32) (106/68 - 131/60)  BP(mean): --  RR: 18 (25 Feb 2024 10:32) (18 - 18)  SpO2: 100% (25 Feb 2024 10:32) (94% - 100%)    Parameters below as of 25 Feb 2024 10:32  Patient On (Oxygen Delivery Method): nasal cannula  O2 Flow (L/min): 4    Daily     Daily       PHYSICAL EXAM:     GENERAL:  Appears stated age, well-groomed, well-nourished, no distress  HEENT:  NC/AT,  conjunctivae anicteric, clear and pink,   NECK: supple, trachea midline  CHEST:  Full & symmetric excursion, no increased effort, breath sounds clear  HEART:  Regular rhythm, no JVD  ABDOMEN:  Soft, non-tender, non-distended, normoactive bowel sounds,  no masses , no hepatosplenomegaly  EXTREMITIES:  no cyanosis,clubbing or edema  SKIN:  No rash, erythema, or, ecchymoses, no jaundice  NEURO:  Alert, non-focal, no asterixis  PSYCH: Appropriate affect, oriented to place and time  RECTAL: Deferred      LABS Personally reviewed by me:                        10.8 12.22 )-----------( 71       ( 25 Feb 2024 07:02 )             31.8     Mean Cell Volume: 93.8 fl (02-25-24 @ 07:02)    02-25    142  |  109<H>  |  22  ----------------------------<  87  3.5   |  22  |  0.73    Ca    8.4      25 Feb 2024 07:02    TPro  5.3<L>  /  Alb  2.8<L>  /  TBili  0.7  /  DBili  0.2  /  AST  31  /  ALT  68<H>  /  AlkPhos  140<H>  02-24    LIVER FUNCTIONS - ( 24 Feb 2024 07:13 )  Alb: 2.8 g/dL / Pro: 5.3 g/dL / ALK PHOS: 140 U/L / ALT: 68 U/L / AST: 31 U/L / GGT: x             Urinalysis Basic - ( 25 Feb 2024 07:02 )    Color: x / Appearance: x / SG: x / pH: x  Gluc: 87 mg/dL / Ketone: x  / Bili: x / Urobili: x   Blood: x / Protein: x / Nitrite: x   Leuk Esterase: x / RBC: x / WBC x   Sq Epi: x / Non Sq Epi: x / Bacteria: x                              10.8   12.22 )-----------( 71       ( 25 Feb 2024 07:02 )             31.8                         11.4   14.14 )-----------( 66       ( 23 Feb 2024 07:25 )             33.8       Imaging personally reviewed by me:             Chief Complaint:  Patient is a 89y old  Female who presents with a chief complaint of AMS (24 Feb 2024 20:18)      Date of service 02-25-24 @ 11:19      Interval Events:   awake and conversive today     Hospital Medications:  acyclovir IVPB 550 milliGRAM(s) IV Intermittent every 12 hours  chlorhexidine 2% Cloths 1 Application(s) Topical daily  dextrose 5% + sodium chloride 0.45%. 1000 milliLiter(s) IV Continuous <Continuous>  meropenem  IVPB 1000 milliGRAM(s) IV Intermittent every 12 hours  pantoprazole  Injectable 40 milliGRAM(s) IV Push two times a day  sodium chloride 0.65% Nasal 1 Spray(s) Both Nostrils two times a day  sodium chloride 0.9% Bolus 500 milliLiter(s) IV Bolus once        Review of Systems:  General:  No wt loss, fevers, chills, night sweats, fatigue,   Eyes:  Good vision, no reported pain  ENT:  No sore throat, pain, runny nose, dysphagia  CV:  No pain, palpitations, hypo/hypertension  Resp:  No dyspnea, cough, tachypnea, wheezing  GI:  See HPI  :  No pain, bleeding, incontinence, nocturia  Muscle:  No pain, weakness  Neuro:  No weakness, tingling, memory problems  Psych:  No fatigue, insomnia, mood problems, depression  Endocrine:  No polyuria, polydipsia, cold/heat intolerance  Heme:  No petechiae, ecchymosis, easy bruisability  Integumentary:  No rash, edema    PHYSICAL EXAM:   Vital Signs:  Vital Signs Last 24 Hrs  T(C): 36.2 (25 Feb 2024 10:32), Max: 36.9 (24 Feb 2024 20:19)  T(F): 97.2 (25 Feb 2024 10:32), Max: 98.4 (24 Feb 2024 20:19)  HR: 75 (25 Feb 2024 10:32) (67 - 83)  BP: 106/75 (25 Feb 2024 10:32) (106/68 - 131/60)  BP(mean): --  RR: 18 (25 Feb 2024 10:32) (18 - 18)  SpO2: 100% (25 Feb 2024 10:32) (94% - 100%)    Parameters below as of 25 Feb 2024 10:32  Patient On (Oxygen Delivery Method): nasal cannula  O2 Flow (L/min): 4    Daily     Daily       PHYSICAL EXAM:     GENERAL:  Appears stated age, well-groomed, well-nourished, no distress  HEENT:  NC/AT,  conjunctivae anicteric, clear and pink,   NECK: supple, trachea midline  CHEST:  Full & symmetric excursion, no increased effort, breath sounds clear  HEART:  Regular rhythm, no JVD  ABDOMEN:  Soft, non-tender, non-distended, normoactive bowel sounds,  no masses , no hepatosplenomegaly  EXTREMITIES:  no cyanosis,clubbing or edema  SKIN:  No rash, erythema, or, ecchymoses, no jaundice  NEURO:  Alert, non-focal, no asterixis  PSYCH: Appropriate affect, oriented to place and time  RECTAL: Deferred      LABS Personally reviewed by me:                        10.8 12.22 )-----------( 71       ( 25 Feb 2024 07:02 )             31.8     Mean Cell Volume: 93.8 fl (02-25-24 @ 07:02)    02-25    142  |  109<H>  |  22  ----------------------------<  87  3.5   |  22  |  0.73    Ca    8.4      25 Feb 2024 07:02    TPro  5.3<L>  /  Alb  2.8<L>  /  TBili  0.7  /  DBili  0.2  /  AST  31  /  ALT  68<H>  /  AlkPhos  140<H>  02-24    LIVER FUNCTIONS - ( 24 Feb 2024 07:13 )  Alb: 2.8 g/dL / Pro: 5.3 g/dL / ALK PHOS: 140 U/L / ALT: 68 U/L / AST: 31 U/L / GGT: x             Urinalysis Basic - ( 25 Feb 2024 07:02 )    Color: x / Appearance: x / SG: x / pH: x  Gluc: 87 mg/dL / Ketone: x  / Bili: x / Urobili: x   Blood: x / Protein: x / Nitrite: x   Leuk Esterase: x / RBC: x / WBC x   Sq Epi: x / Non Sq Epi: x / Bacteria: x                              10.8 12.22 )-----------( 71       ( 25 Feb 2024 07:02 )             31.8                         11.4   14.14 )-----------( 66       ( 23 Feb 2024 07:25 )             33.8       Imaging personally reviewed by me:

## 2024-02-26 ENCOUNTER — RESULT REVIEW (OUTPATIENT)
Age: 89
End: 2024-02-26

## 2024-02-26 LAB
CULTURE RESULTS: SIGNIFICANT CHANGE UP
CULTURE RESULTS: SIGNIFICANT CHANGE UP
GLUCOSE BLDC GLUCOMTR-MCNC: 87 MG/DL — SIGNIFICANT CHANGE UP (ref 70–99)
GLUCOSE BLDC GLUCOMTR-MCNC: 92 MG/DL — SIGNIFICANT CHANGE UP (ref 70–99)
GLUCOSE BLDC GLUCOMTR-MCNC: 97 MG/DL — SIGNIFICANT CHANGE UP (ref 70–99)
HCT VFR BLD CALC: 31.3 % — LOW (ref 34.5–45)
HGB BLD-MCNC: 10.7 G/DL — LOW (ref 11.5–15.5)
MCHC RBC-ENTMCNC: 31.7 PG — SIGNIFICANT CHANGE UP (ref 27–34)
MCHC RBC-ENTMCNC: 34.2 GM/DL — SIGNIFICANT CHANGE UP (ref 32–36)
MCV RBC AUTO: 92.6 FL — SIGNIFICANT CHANGE UP (ref 80–100)
NRBC # BLD: 0 /100 WBCS — SIGNIFICANT CHANGE UP (ref 0–0)
PLATELET # BLD AUTO: 88 K/UL — LOW (ref 150–400)
RBC # BLD: 3.38 M/UL — LOW (ref 3.8–5.2)
RBC # FLD: 14 % — SIGNIFICANT CHANGE UP (ref 10.3–14.5)
SPECIMEN SOURCE: SIGNIFICANT CHANGE UP
SPECIMEN SOURCE: SIGNIFICANT CHANGE UP
WBC # BLD: 11.8 K/UL — HIGH (ref 3.8–10.5)
WBC # FLD AUTO: 11.8 K/UL — HIGH (ref 3.8–10.5)

## 2024-02-26 PROCEDURE — 93306 TTE W/DOPPLER COMPLETE: CPT | Mod: 26

## 2024-02-26 PROCEDURE — 99232 SBSQ HOSP IP/OBS MODERATE 35: CPT

## 2024-02-26 RX ORDER — ACETAMINOPHEN 500 MG
1000 TABLET ORAL ONCE
Refills: 0 | Status: COMPLETED | OUTPATIENT
Start: 2024-02-26 | End: 2024-02-26

## 2024-02-26 RX ADMIN — Medication 1 SPRAY(S): at 05:15

## 2024-02-26 RX ADMIN — Medication 400 MILLIGRAM(S): at 14:19

## 2024-02-26 RX ADMIN — Medication 1 SPRAY(S): at 17:41

## 2024-02-26 RX ADMIN — Medication 111 MILLIGRAM(S): at 17:33

## 2024-02-26 RX ADMIN — CHLORHEXIDINE GLUCONATE 1 APPLICATION(S): 213 SOLUTION TOPICAL at 12:06

## 2024-02-26 RX ADMIN — SODIUM CHLORIDE 75 MILLILITER(S): 9 INJECTION, SOLUTION INTRAVENOUS at 05:16

## 2024-02-26 RX ADMIN — SODIUM CHLORIDE 75 MILLILITER(S): 9 INJECTION, SOLUTION INTRAVENOUS at 12:06

## 2024-02-26 RX ADMIN — PANTOPRAZOLE SODIUM 40 MILLIGRAM(S): 20 TABLET, DELAYED RELEASE ORAL at 18:44

## 2024-02-26 RX ADMIN — MEROPENEM 100 MILLIGRAM(S): 1 INJECTION INTRAVENOUS at 05:12

## 2024-02-26 RX ADMIN — PANTOPRAZOLE SODIUM 40 MILLIGRAM(S): 20 TABLET, DELAYED RELEASE ORAL at 05:16

## 2024-02-26 RX ADMIN — Medication 111 MILLIGRAM(S): at 05:58

## 2024-02-26 RX ADMIN — MEROPENEM 100 MILLIGRAM(S): 1 INJECTION INTRAVENOUS at 17:35

## 2024-02-26 RX ADMIN — Medication 1000 MILLIGRAM(S): at 15:00

## 2024-02-26 NOTE — PROGRESS NOTE ADULT - ASSESSMENT
The patient is a 89F with dementia and Myasthenia Gravis admitted for AMS.     1. AMS, improving  she is awake and conversive today   ID workup per primary team     2. Cholelithiasis, distended GB   no e/o acute cholecystitis or biliary obstruction on HIDA scan    3. peripancreatic fat stranding   low suspicion for acute pancreatitis   ppi daily while inpatient, then can d/c    4. abnormal LFTs, downtrending    likely a byproduct of systemic infection   hepatitis panel neg  pending ECHO  trend LFTs       I had a prolonged conversation with the patient regarding the hospital course, differential diagnosis, results of diagnostic tests this far, and therapeutic modalities available. Plan of care discussed with the patient after the evaluation. Patient expresses a clear understanding of the plan of care.       Jj Harrison D.O.  Gastroenterology and Hepatology  [address] [place] [address], [address]  [place], [place]  Office: [phone]

## 2024-02-26 NOTE — SWALLOW BEDSIDE ASSESSMENT ADULT - ASR SWALLOW ASPIRATION MONITOR
Monitor for s/s aspiration/laryngeal penetration. If noted:  D/C p.o. intake, provide non-oral nutrition/hydration/meds, and contact this service @ [phone]/change of breathing pattern/cough/gurgly voice/fever/pneumonia/throat clearing/upper respiratory infection

## 2024-02-26 NOTE — PROGRESS NOTE ADULT - SUBJECTIVE AND OBJECTIVE BOX
Patient is a 89y old  Female who presents with a chief complaint of AMS (26 Feb 2024 11:36)    Being followed by ID for        Interval history:  No other acute events      ROS:  No cough,SOB,CP  No N/V/D  No abd pain  No urinary complaints  No HA  No joint or limb pain  No other complaints    PAST MEDICAL & SURGICAL HISTORY:  Myasthenia gravis      Dementia      Myasthenia gravis      No significant past surgical history      No significant past surgical history        Allergies    sulfamethoxazole (Vomiting; Nausea)    Intolerances      Antimicrobials:    acyclovir IVPB 550 milliGRAM(s) IV Intermittent every 12 hours  meropenem  IVPB 1000 milliGRAM(s) IV Intermittent every 12 hours    MEDICATIONS  (STANDING):  acyclovir IVPB 550 milliGRAM(s) IV Intermittent every 12 hours  chlorhexidine 2% Cloths 1 Application(s) Topical daily  dextrose 5% + sodium chloride 0.45%. 1000 milliLiter(s) (75 mL/Hr) IV Continuous <Continuous>  meropenem  IVPB 1000 milliGRAM(s) IV Intermittent every 12 hours  pantoprazole  Injectable 40 milliGRAM(s) IV Push two times a day  sodium chloride 0.65% Nasal 1 Spray(s) Both Nostrils two times a day  sodium chloride 0.9% Bolus 500 milliLiter(s) IV Bolus once      Vital Signs Last 24 Hrs  T(C): 36.3 (02-26-24 @ 09:22), Max: 36.3 (02-25-24 @ 20:08)  T(F): 97.3 (02-26-24 @ 09:22), Max: 97.4 (02-25-24 @ 20:08)  HR: 67 (02-26-24 @ 09:22) (59 - 96)  BP: 128/78 (02-26-24 @ 09:22) (118/61 - 128/78)  BP(mean): --  RR: 18 (02-26-24 @ 09:22) (18 - 18)  SpO2: 98% (02-26-24 @ 09:22) (95% - 98%)    Physical Exam:    Constitutional well preserved,comfortable,pleasant    HEENT PERRLA EOMI,No pallor or icterus    No oral exudate or erythema    Neck supple no JVD or LN    Chest Good AE,CTA    CVS RRR S1 S2 WNl No murmur or rub or gallop    Abd soft BS normal No tenderness no masses    Ext No cyanosis clubbing or edema    IV site no erythema tenderness or discharge    Joints no swelling or LOM    CNS AAO X 3 no focal    Lab Data:                          10.7   11.80 )-----------( 88       ( 26 Feb 2024 07:21 )             31.3       02-25    142  |  109<H>  |  22  ----------------------------<  87  3.5   |  22  |  0.73    Ca    8.4      25 Feb 2024 07:02        Urinalysis Basic - ( 25 Feb 2024 07:02 )    Color: x / Appearance: x / SG: x / pH: x  Gluc: 87 mg/dL / Ketone: x  / Bili: x / Urobili: x   Blood: x / Protein: x / Nitrite: x   Leuk Esterase: x / RBC: x / WBC x   Sq Epi: x / Non Sq Epi: x / Bacteria: x        .Blood Blood-Peripheral  02-21-24   No growth at 5 days  --  --      .Blood Blood-Peripheral  02-21-24   No growth at 5 days  --  --      .Blood Blood-Peripheral  02-20-24   No growth at 5 days  --  --                    WBC Count: 11.80 (02-26-24 @ 07:21)  WBC Count: 12.22 (02-25-24 @ 07:02)  WBC Count: 14.14 (02-23-24 @ 07:25)  WBC Count: 9.96 (02-22-24 @ 07:31)  WBC Count: 6.18 (02-21-24 @ 05:51)  WBC Count: 3.19 (02-21-24 @ 04:45)  WBC Count: 3.98 (02-20-24 @ 11:30)       Alkaline Phosphatase: 140 U/L (02-24-24 @ 07:13)  Aspartate Aminotransferase (AST/SGOT): 31 U/L (02-24-24 @ 07:13)  Alanine Aminotransferase (ALT/SGPT): 68 U/L (02-24-24 @ 07:13)  Bilirubin Total: 0.7 mg/dL (02-24-24 @ 07:13)  Alkaline Phosphatase: 156 U/L (02-22-24 @ 07:31)  Aspartate Aminotransferase (AST/SGOT): 61 U/L (02-22-24 @ 07:31)  Alanine Aminotransferase (ALT/SGPT): 129 U/L (02-22-24 @ 07:31)  Bilirubin Total: 0.4 mg/dL (02-22-24 @ 07:31)         Patient is a 89y old  Female who presents with a chief complaint of AMS (26 Feb 2024 11:36)    Being followed by ID for AMS        Interval history:  although currently sleeping pt wsa doing much better today  followed some commands and was able to communicate with the family  No other acute events        PAST MEDICAL & SURGICAL HISTORY:  Myasthenia gravis      Dementia      Myasthenia gravis      No significant past surgical history      No significant past surgical history        Allergies    sulfamethoxazole (Vomiting; Nausea)    Intolerances      Antimicrobials:    acyclovir IVPB 550 milliGRAM(s) IV Intermittent every 12 hours  meropenem  IVPB 1000 milliGRAM(s) IV Intermittent every 12 hours    MEDICATIONS  (STANDING):  acyclovir IVPB 550 milliGRAM(s) IV Intermittent every 12 hours  chlorhexidine 2% Cloths 1 Application(s) Topical daily  dextrose 5% + sodium chloride 0.45%. 1000 milliLiter(s) (75 mL/Hr) IV Continuous <Continuous>  meropenem  IVPB 1000 milliGRAM(s) IV Intermittent every 12 hours  pantoprazole  Injectable 40 milliGRAM(s) IV Push two times a day  sodium chloride 0.65% Nasal 1 Spray(s) Both Nostrils two times a day  sodium chloride 0.9% Bolus 500 milliLiter(s) IV Bolus once      Vital Signs Last 24 Hrs  T(C): 36.3 (02-26-24 @ 09:22), Max: 36.3 (02-25-24 @ 20:08)  T(F): 97.3 (02-26-24 @ 09:22), Max: 97.4 (02-25-24 @ 20:08)  HR: 67 (02-26-24 @ 09:22) (59 - 96)  BP: 128/78 (02-26-24 @ 09:22) (118/61 - 128/78)  BP(mean): --  RR: 18 (02-26-24 @ 09:22) (18 - 18)  SpO2: 98% (02-26-24 @ 09:22) (95% - 98%)    Physical Exam:    Constitutional sleeping    Neck flexed o JVD or LN    Chest Good AE,CTA    CVS  S1 S2 m    Abd soft BS normal No tenderness     Ext No cyanosis clubbing or edema    IV site no erythema tenderness or discharge      Lab Data:                          10.7   11.80 )-----------( 88       ( 26 Feb 2024 07:21 )             31.3       02-25    142  |  109<H>  |  22  ----------------------------<  87  3.5   |  22  |  0.73    Ca    8.4      25 Feb 2024 07:02          .Blood Blood-Peripheral  02-21-24   No growth at 5 days  --  --      .Blood Blood-Peripheral  02-21-24   No growth at 5 days  --  --      .Blood Blood-Peripheral  02-20-24   No growth at 5 days  --  --        < from: MR Head No Cont (02.22.24 @ 21:07) >  IMPRESSION: No acute intracranial hemorrhage or evidence of acute   ischemia.    Unchanged imaging findings for which normal pressure hydrocephalus can be   considered. Clinical correlation is required for this diagnosis.    --- End of Report ---    < end of copied text >              WBC Count: 11.80 (02-26-24 @ 07:21)  WBC Count: 12.22 (02-25-24 @ 07:02)  WBC Count: 14.14 (02-23-24 @ 07:25)  WBC Count: 9.96 (02-22-24 @ 07:31)  WBC Count: 6.18 (02-21-24 @ 05:51)  WBC Count: 3.19 (02-21-24 @ 04:45)  WBC Count: 3.98 (02-20-24 @ 11:30)       Alkaline Phosphatase: 140 U/L (02-24-24 @ 07:13)  Aspartate Aminotransferase (AST/SGOT): 31 U/L (02-24-24 @ 07:13)  Alanine Aminotransferase (ALT/SGPT): 68 U/L (02-24-24 @ 07:13)  Bilirubin Total: 0.7 mg/dL (02-24-24 @ 07:13)    Alkaline Phosphatase: 156 U/L (02-22-24 @ 07:31)  Aspartate Aminotransferase (AST/SGOT): 61 U/L (02-22-24 @ 07:31)  Alanine Aminotransferase (ALT/SGPT): 129 U/L (02-22-24 @ 07:31)  Bilirubin Total: 0.4 mg/dL (02-22-24 @ 07:31)    Amylase in AM (02.24.24 @ 07:13)    Amylase: 94 U/L  Lipase in AM (02.24.24 @ 07:13)    Lipase: 50 U/L

## 2024-02-26 NOTE — SWALLOW BEDSIDE ASSESSMENT ADULT - PHARYNGEAL PHASE
Delayed pharyngeal swallow/Throat clear post oral intake Delayed pharyngeal swallow/Cough post oral intake Delayed pharyngeal swallow

## 2024-02-26 NOTE — PROGRESS NOTE ADULT - SUBJECTIVE AND OBJECTIVE BOX
Patient is a 89y old  Female who presents with a chief complaint of AMS (26 Feb 2024 16:12)      INTERVAL HPI/OVERNIGHT EVENTS: She is awake and back to her baseline mental status and talking. She is mildly confused at baseline. Otherwise stable. Hypotension resolved. MRI negative just showed NPH.  GI recommended no signs of cholecystitis neither pancreatitis, amylase and lipase normal. HIDS scan with morphine injection also normal and did not show abnormality. Continue IV Meropenem. Consider to discontinue Acyclovir. Renal function still stable. WBC improving. PLT coming up to 88,000. No need for LP anymore at this time. Echo  was done, results pending.     Pain Location & Control:     MEDICATIONS  (STANDING):  acyclovir IVPB 550 milliGRAM(s) IV Intermittent every 12 hours  chlorhexidine 2% Cloths 1 Application(s) Topical daily  dextrose 5% + sodium chloride 0.45%. 1000 milliLiter(s) (75 mL/Hr) IV Continuous <Continuous>  meropenem  IVPB 1000 milliGRAM(s) IV Intermittent every 12 hours  pantoprazole  Injectable 40 milliGRAM(s) IV Push two times a day  sodium chloride 0.65% Nasal 1 Spray(s) Both Nostrils two times a day  sodium chloride 0.9% Bolus 500 milliLiter(s) IV Bolus once    MEDICATIONS  (PRN):      Allergies    sulfamethoxazole (Vomiting; Nausea)    Intolerances        REVIEW OF SYSTEMS:  CONSTITUTIONAL: No fever, weight loss, or fatigue  EYES: No eye pain, visual disturbances, or discharge  ENMT:  No difficulty hearing, tinnitus, vertigo; No sinus or throat pain  NECK: No pain or stiffness  BREASTS: No pain, masses, or nipple discharge  RESPIRATORY: No cough, wheezing, chills or hemoptysis; No shortness of breath  CARDIOVASCULAR: No chest pain, palpitations, dizziness, or leg swelling  GASTROINTESTINAL: No abdominal or epigastric pain. No nausea, vomiting, or hematemesis; No diarrhea or constipation. No melena or hematochezia.  GENITOURINARY: No dysuria, frequency, hematuria, or incontinence  NEUROLOGICAL: No headaches, memory loss, loss of strength, numbness, or tremors  SKIN: No itching, burning, rashes, or lesions   LYMPH NODES: No enlarged glands  ENDOCRINE: No heat or cold intolerance; No hair loss; No polydipsia or polyuria  MUSCULOSKELETAL: No back pain  PSYCHIATRIC: No depression, anxiety, mood swings, or difficulty sleeping  HEME/LYMPH: No easy bruising, or bleeding gums  ALLERGY AND IMMUNOLOGIC: No hives or eczema    Vital Signs Last 24 Hrs  T(C): 36.3 (26 Feb 2024 09:22), Max: 36.3 (25 Feb 2024 20:08)  T(F): 97.3 (26 Feb 2024 09:22), Max: 97.4 (25 Feb 2024 20:08)  HR: 67 (26 Feb 2024 09:22) (59 - 96)  BP: 128/78 (26 Feb 2024 09:22) (118/61 - 128/78)  BP(mean): --  RR: 18 (26 Feb 2024 09:22) (18 - 18)  SpO2: 98% (26 Feb 2024 09:22) (95% - 98%)    Parameters below as of 26 Feb 2024 09:22  Patient On (Oxygen Delivery Method): nasal cannula  O2 Flow (L/min): 4      PHYSICAL EXAM:  GENERAL: NAD, well-groomed, well-developed  HEAD:  Atraumatic, Normocephalic  EYES: EOMI, PERRLA, conjunctiva and sclera clear  ENMT: No tonsillar erythema, exudates, or enlargement; Moist mucous membranes, Good dentition, No lesions  NECK: Supple, No JVD, Normal thyroid  NERVOUS SYSTEM:  Alert & Oriented X2  CHEST/LUNG: Clear to auscultation bilaterally; No rales, rhonchi, wheezing, or rubs  HEART: Regular rate and rhythm; No murmurs, rubs, or gallops  ABDOMEN: Soft, Nontender, Nondistended; Bowel sounds present  EXTREMITIES:  2+ Peripheral Pulses, No clubbing or cyanosis  LYMPH: No lymphadenopathy noted  SKIN: No rashes or lesions      LABS:                        10.7   11.80 )-----------( 88       ( 26 Feb 2024 07:21 )             31.3       Ca    8.4        25 Feb 2024 07:02        Urinalysis Basic - ( 25 Feb 2024 07:02 )    Color: x / Appearance: x / SG: x / pH: x  Gluc: 87 mg/dL / Ketone: x  / Bili: x / Urobili: x   Blood: x / Protein: x / Nitrite: x   Leuk Esterase: x / RBC: x / WBC x   Sq Epi: x / Non Sq Epi: x / Bacteria: x      CAPILLARY BLOOD GLUCOSE      POCT Blood Glucose.: 92 mg/dL (26 Feb 2024 11:54)  POCT Blood Glucose.: 87 mg/dL (26 Feb 2024 05:48)  POCT Blood Glucose.: 103 mg/dL (25 Feb 2024 23:55)  POCT Blood Glucose.: 104 mg/dL (25 Feb 2024 18:02)        Cultures  Culture Results:   No growth at 5 days (02-21-24 @ 05:33)  Culture Results:   No growth at 5 days (02-21-24 @ 04:35)  Culture Results:   No growth at 5 days (02-20-24 @ 08:56)  Culture Results:   No growth at 5 days (02-20-24 @ 08:56)      RADIOLOGY & ADDITIONAL TESTS:    Imaging Personally Reviewed:  [ X] YES  [ ] NO    Consultant(s) Notes Reviewed:  [X ] YES  [ ] NO    Care Discussed with Consultants/Other Providers [ X] YES  [ ] NO

## 2024-02-26 NOTE — SWALLOW BEDSIDE ASSESSMENT ADULT - SWALLOW EVAL: RECOMMENDED DIET
puree and moderately thick liquids - all PO via tsp; no cup; no straw; only feed when fully awake, alert, and participative

## 2024-02-26 NOTE — SWALLOW BEDSIDE ASSESSMENT ADULT - COMMENTS
ENT consulted on 2/20 for tongue and lip swelling. On fiberoptic flexible laryngoscopy no laryngeal edema, no blood or active bleeding, no pooling of secretions in larynx, no aspiration. Patent airway. 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 also noted large glottic gap from right vocal cord paresis and left vocal cord paralysis in paramedian position during inhalations and exhalations.  Recommended decadron and PPI, as well as re-scope when pt awake to assess VF movement with phonation.    GI consulted -> no e/o acute cholecystitis or biliary obstruction on HIDA scan.    Per RD note no active diet order since 2/19.    Swallow hx: seen by this service 1/4/2020 with recommendations for soft diet 2/2 inadequate dentition for mastication.

## 2024-02-26 NOTE — SWALLOW BEDSIDE ASSESSMENT ADULT - ADDITIONAL RECOMMENDATIONS
Maintain good oral hygiene.  This service will continue to follow.    GOALS:  1. Pt/family/caregiver will demonstrate understanding and carryover of dysphagia management (safe swallow guidelines, compensatory strategies, dysphagia diet).  2. Pt will tolerate recommended diet with no overt, clinical s/s of aspiration. Maintain good oral hygiene.  This service will continue to follow.  Swallow function may wax and wane given cognitive status.    GOALS:  1. Pt/family/caregiver will demonstrate understanding and carryover of dysphagia management (safe swallow guidelines, compensatory strategies, dysphagia diet).  2. Pt will tolerate recommended diet with no overt, clinical s/s of aspiration.

## 2024-02-26 NOTE — PROGRESS NOTE ADULT - ASSESSMENT
89 YO F with PMHx of Myasthenia Gravis (not on medication for past 9 years) and MHx of dementia     Assessment/Plan:  AMS(most likely due to metabolic/toxic encephalopathy  due to UTI) - – improved back to baseline mental status with mild dementia.  Most likely due to NPH. Evaluated by neuro recommended f/u  with neurology as outpatient.   cholelithiasis - without  cholecystitis neither pancreatitis. Normal amylase and lipase.   hypotension- continue IV steroid and IV fluid as needed   thrombocytopenia- PLT down to 44,000. DIC panel not significantly positive. PTT mildly elevated. Otherwise PT/INR normal. D/ C Lovenox. F/u hematology.  No plan for LP at this time. Family denied   elevated LFT- Will  check RUQ ultrasound and Hepatitis panel.   NPH- incidental finding. F/u neurologist   UTI with E.coli and Klebsiella as  outpatient culture. - IV abx upgraded to Vanco and Meropenem  DVT ppx- Lovenox discontinued due to thrombocytopenia   leg arthritis- Acetaminophen PRN.  b/l cataract- F/u ophthalmolog  vitamin D deficiency- continue Vitamin D supplement.  risk of malnutrition -stable, continue supplement diet. F/u dietitian. Monitor weight.  constipation- senna at bedtime.  LBP 2/2 severe spinal stenosis with out cord pressure - no surgical intervention recommended neither accepted by family, continue PT OT as needed.  Dementia - Stable. Continue Aricept .  Dysphagia - Mechanical soft diet, aspiration precautions.  Myasthenia Gravis - stable , off of medication. Monitor CBC and BMP every 2 months.

## 2024-02-26 NOTE — CHART NOTE - NSCHARTNOTEFT_GEN_A_CORE
Nutrition Follow Up Note  Patient seen for: Inadequate diet order >/=3 Days Follow Up    Chart reviewed, events noted. "Patient is a 89y old  Female who presents with a chief complaint of AMS"    Source: [X] Patient       [x] Current Medical Record     [] RN        [] Family at bedside       [] Other:    -If unable to interview patient: [] Trach/Vent/BiPAP  [] Disoriented/confused/inappropriate to interview    Diet Order:  No Active Diet Order    - Is current order appropriate/adequate? [] Yes  [X]  No:       - Nutrition-related concerns:      -      -       -      -GI: Constipation; not ordered for any Bowel Regimen.       Weights:   Drug Dosing Weight  Weight (kg): 54.4 (2024 12:19)  BMI (kg/m2): 20 (2024 12:19)  Daily Weight in k.1 ()  -- Question accuracy of daily weight on , will continue to monitor weight status as able during present admission    Nutritionally Pertinent MEDICATIONS  (STANDING):  acyclovir IVPB  dextrose 5% + sodium chloride 0.45%.  meropenem  IVPB  pantoprazole  Injectable  sodium chloride 0.9% Bolus    Pertinent Labs:     142  |  109<H>  |  22  ----------------------------<  87  3.5   |  22  |  0.73    Ca    8.4      2024 07:02      Finger Sticks:  POCT Blood Glucose.: 87 mg/dL ( @ 05:48)  POCT Blood Glucose.: 103 mg/dL ( @ 23:55)  POCT Blood Glucose.: 104 mg/dL ( @ 18:02)  POCT Blood Glucose.: 99 mg/dL ( @ 12:31)      Skin per nursing documentation: -- No pressure injuries noted per flow sheets   Edema: -- +1 generalized & +2 to bilateral hands and & legs per flow sheets    Estimated Needs:   [] no change since previous assessment  [X] recalculated: Based on IBW of 125 lbs/     Previous Nutrition Diagnosis: Swallowing Difficulty   Nutrition Diagnosis is: [X] ongoing  [] resolved [] not applicable     New Nutrition Diagnosis: [] Not applicable    Nutrition Care Plan:  [] In Progress  [] Achieved  [] Not applicable    Nutrition Interventions:     Education Provided:       [] Yes:  [X] No:        Recommendations:            Monitoring and Evaluation:   Continue to monitor nutritional intake, tolerance to diet prescription, weights, labs, skin integrity      RD remains available upon request and will follow up per protocol  Jimena Alicia MS,RDN,CDN AVAILABLE ON TEAMS Nutrition Follow Up Note  Patient seen for: Inadequate diet order >/=3 Days Follow Up    Chart reviewed, events noted. "Patient is a 89y old  Female who presents with a chief complaint of AMS"    Source: [X] Patient       [x] Current Medical Record     [] RN        [] Family at bedside       [] Other:    -If unable to interview patient: [] Trach/Vent/BiPAP  [] Disoriented/confused/inappropriate to interview    Diet Order:  No Active Diet Order    - Is current order appropriate/adequate? [] Yes  [X]  No:     - Nutrition-related concerns:      - No active diet order since , was mechanically altered       - Ordered for IVF of dextrose 5% + sodium chloride 0.45%.       -GI: Constipation; not ordered for any Bowel Regimen.       - Discharge plan per care coordination is for patient to return to [facility] once medically cleared    Nutrition Focused Physical Examination.   Conducted with patient's verbal consent; limited exam to due patient being in pain:  Muscle Loss  [x] Temples - Moderate  [x] Clavicle - Moderate  Fat Loss  [x] Orbital - Moderate      Weights:   Drug Dosing Weight  Weight (kg): 54.4 (2024 12:19)  BMI (kg/m2): 20 (2024 12:19)  Daily Weight in k.1 ()  -- Question accuracy of daily weight on , will continue to monitor weight status as able during present admission    Nutritionally Pertinent MEDICATIONS  (STANDING):  acyclovir IVPB 550 milliGRAM(s) IV Intermittent every 12 hours  dextrose 5% + sodium chloride 0.45%. 1000 milliLiter(s) (75 mL/Hr) IV Continuous <Continuous>  meropenem  IVPB 1000 milliGRAM(s) IV Intermittent every 12 hours  pantoprazole  Injectable 40 milliGRAM(s) IV Push two times a day  sodium chloride 0.65% Nasal 1 Spray(s) Both Nostrils two times a day  sodium chloride 0.9% Bolus 500 milliLiter(s) IV Bolus once      Pertinent Labs:     142  |  109<H>  |  22  ----------------------------<  87  3.5   |  22  |  0.73    Ca    8.4      2024 07:02      Finger Sticks:  POCT Blood Glucose.: 87 mg/dL ( @ 05:48)  POCT Blood Glucose.: 103 mg/dL ( @ 23:55)  POCT Blood Glucose.: 104 mg/dL ( @ 18:02)  POCT Blood Glucose.: 99 mg/dL ( @ 12:31)      Skin per nursing documentation: -- No pressure injuries noted per flow sheets   Edema: -- +1 generalized & +2 to bilateral hands and & legs per flow sheets    Estimated Needs:   [] no change since previous assessment  [X] recalculated: Based on IBW of 125 lbs/ 56.7 kg  25-30 kcal/kg=1,417.5-1,701 kcal  1.0-1.2 gm/kg= 56.7- 68.04 gram pro   Defer fluid needs to medical team     Previous Nutrition Diagnosis: Swallowing Difficulty   Nutrition Diagnosis is: [X] ongoing  [] resolved [] not applicable     New Nutrition Diagnosis: Acute Severe Malnutrition related to decreased ability to consume sufficient energy as evidenced by moderate muscle wasting and fat loss, </=50% of estimated nutritional needs for >/= 5 days    Nutrition Care Plan:  [X] In Progress  [] Achieved  [] Not applicable    Nutrition Interventions:     Education Provided:       [] Yes:  [X] No:        Recommendations:      If alternate means of nutrition are deemed medically appropriate:   1. Patient at risk for refeeding syndrome, as tolerated, recommend Jevity 1.2 @ 10mL/hr and advance by 10mL q12H until goal rate of 50 mL/hr x24 hrs is reached. Regimen at goal provides 1200 mL total volume, 968 mL free water, 1440 kcal/d and 67 g pro/day (25.4 kcal/kg and 1.2 g/kg) based on ideal body weight 56.7 kg  2. Defer Free Water Flushes to Medical Team   2. Trend electrolytes closely and replete as indicated   3. Add Multivitamin and Thiamine daily in setting of refeeding risk     If pt is cleared to resume PO diet:  1. Recommend no therapeutic dietary restrictions  2. Defer diet/texture advancement to medical team/SLP as indicated   3.  Add Multivitamin and Thiamine daily in setting of refeeding risk   4. Consider adding oral nutritional supplement of Ensure Plus High Protein 3x/day to optimize PO intakes     Monitoring and Evaluation:   Continue to monitor nutritional intake, tolerance to diet prescription, weights, labs, skin integrity    RD remains available upon request and will follow up per protocol  Jimena Alicia, MS,RDN,CDN AVAILABLE ON TEAMS

## 2024-02-26 NOTE — SWALLOW BEDSIDE ASSESSMENT ADULT - ORAL PREPARATORY PHASE
reduced orientation to feeding task requiring cues/Reduced oral grading reduced orientation to feeding task requiring cues; unable to express via straw

## 2024-02-26 NOTE — PROGRESS NOTE ADULT - SUBJECTIVE AND OBJECTIVE BOX
Chief Complaint:  Patient is a 89y old  Female who presents with a chief complaint of AMS (24 Feb 2024 20:18)      Date of service 02-25-24 @ 11:19      Interval Events:   awake and conversive today   no gi complaints    Hospital Medications:  acyclovir IVPB 550 milliGRAM(s) IV Intermittent every 12 hours  chlorhexidine 2% Cloths 1 Application(s) Topical daily  dextrose 5% + sodium chloride 0.45%. 1000 milliLiter(s) IV Continuous <Continuous>  meropenem  IVPB 1000 milliGRAM(s) IV Intermittent every 12 hours  pantoprazole  Injectable 40 milliGRAM(s) IV Push two times a day  sodium chloride 0.65% Nasal 1 Spray(s) Both Nostrils two times a day  sodium chloride 0.9% Bolus 500 milliLiter(s) IV Bolus once        Review of Systems:  General:  No wt loss, fevers, chills, night sweats, fatigue,   Eyes:  Good vision, no reported pain  ENT:  No sore throat, pain, runny nose, dysphagia  CV:  No pain, palpitations, hypo/hypertension  Resp:  No dyspnea, cough, tachypnea, wheezing  GI:  See HPI  :  No pain, bleeding, incontinence, nocturia  Muscle:  No pain, weakness  Neuro:  No weakness, tingling, memory problems  Psych:  No fatigue, insomnia, mood problems, depression  Endocrine:  No polyuria, polydipsia, cold/heat intolerance  Heme:  No petechiae, ecchymosis, easy bruisability  Integumentary:  No rash, edema    PHYSICAL EXAM:   Vital Signs:  Vital Signs Last 24 Hrs  T(C): 36.2 (25 Feb 2024 10:32), Max: 36.9 (24 Feb 2024 20:19)  T(F): 97.2 (25 Feb 2024 10:32), Max: 98.4 (24 Feb 2024 20:19)  HR: 75 (25 Feb 2024 10:32) (67 - 83)  BP: 106/75 (25 Feb 2024 10:32) (106/68 - 131/60)  BP(mean): --  RR: 18 (25 Feb 2024 10:32) (18 - 18)  SpO2: 100% (25 Feb 2024 10:32) (94% - 100%)    Parameters below as of 25 Feb 2024 10:32  Patient On (Oxygen Delivery Method): nasal cannula  O2 Flow (L/min): 4    Daily     Daily       PHYSICAL EXAM:     GENERAL:  Appears stated age, well-groomed, well-nourished, no distress  HEENT:  NC/AT,  conjunctivae anicteric, clear and pink,   NECK: supple, trachea midline  CHEST:  Full & symmetric excursion, no increased effort, breath sounds clear  HEART:  Regular rhythm, no JVD  ABDOMEN:  Soft, non-tender, non-distended, normoactive bowel sounds,  no masses , no hepatosplenomegaly  EXTREMITIES:  no cyanosis,clubbing or edema  SKIN:  No rash, erythema, or, ecchymoses, no jaundice  NEURO:  Alert, non-focal, no asterixis  PSYCH: Appropriate affect, oriented to place and time  RECTAL: Deferred      LABS Personally reviewed by me:                        10.8   12.22 )-----------( 71       ( 25 Feb 2024 07:02 )             31.8     Mean Cell Volume: 93.8 fl (02-25-24 @ 07:02)    02-25    142  |  109<H>  |  22  ----------------------------<  87  3.5   |  22  |  0.73    Ca    8.4      25 Feb 2024 07:02    TPro  5.3<L>  /  Alb  2.8<L>  /  TBili  0.7  /  DBili  0.2  /  AST  31  /  ALT  68<H>  /  AlkPhos  140<H>  02-24    LIVER FUNCTIONS - ( 24 Feb 2024 07:13 )  Alb: 2.8 g/dL / Pro: 5.3 g/dL / ALK PHOS: 140 U/L / ALT: 68 U/L / AST: 31 U/L / GGT: x             Urinalysis Basic - ( 25 Feb 2024 07:02 )    Color: x / Appearance: x / SG: x / pH: x  Gluc: 87 mg/dL / Ketone: x  / Bili: x / Urobili: x   Blood: x / Protein: x / Nitrite: x   Leuk Esterase: x / RBC: x / WBC x   Sq Epi: x / Non Sq Epi: x / Bacteria: x                              10.8   12.22 )-----------( 71       ( 25 Feb 2024 07:02 )             31.8                         11.4   14.14 )-----------( 66       ( 23 Feb 2024 07:25 )             33.8       Imaging personally reviewed by me:

## 2024-02-26 NOTE — PROGRESS NOTE ADULT - ASSESSMENT
88 y/o F with PMHx of Myasthenia Gravis (not on medication) and dementia (A&Ox1, conversive at baseline) who presents from her nursing home with worsening lethargy and confusion. UA done as outpatient on 2/13 grossly positive and urine culture growing E. coli and Klebsiella, pansensitive. Found to be hypothermic in ED. Admitted for sepsis and metabolic encephalopathy in setting of UTI. Multiple RRTs called today. First RRT called for seizure-like activity. Patient loaded with Keppra and placed on EEG. Second RRT called for hypotension. Patient was given 4 L IVF with resolution of hypotension.      A/P    #R/O sepsis  unclear what caused acute decompensation. Pt was treated for UTI but urine negative here. Pt with remote h/o Myasthenia gravis and mild dementia.  Pt with possible seizure - 2/20  pt changed to meropenem to cover for possible meningitis-   ? Normal pressure hydrocephalous on CT- neuro to address- this could certainly be contributing to mental status   d/atiya the  ampicillin, meropenem has listeria coverage  EEG not c/w HSV encephalits, but added  acyclovir until more definitive diagnosis available, will adjust to renal function- day # 3  ? significance of the NPH and how does it contribute to the mental status  Hypernatremia may be contributing to the mental status as well   would avoid certain abs- such as cipro with the myasthenia graavis- suggest neuro import to address   pt with clear cxr and no diarrhea .NOW on oxygen, consider CT of chest if can  CT with distended GB but HIDA negative. ? pancreatitis .amylase and lipase were elevated but improving.  could patient have passed a stones   day # 5 meropenem and day # 4 acyckivir     #elevate LFTs  HIDA negative   check hepatitis screen  avoid hepatotoxci meds   trend - Please repeat      #hypernatremia  likely contributing to the mental status     #thrombocytopenia  can't do spinal tap if low  hematology to address  improving       Yeni Gabriel M.D. ,   please reach via teams   If no answer, or after 5PM/ weekends,  then please call  [phone]    Assessment and plan discussed with the primary team .

## 2024-02-26 NOTE — SWALLOW BEDSIDE ASSESSMENT ADULT - SWALLOW EVAL: DIAGNOSIS
Pt with AMS in setting of UTI and presents with an oropharyngeal dysphagia characterized by reduced orientation to feeding task, reduced labial seal to utensil, inability to express fluids via straw, delayed oral transit time, suspected delayed trigger of the swallow, immediate throating clearing post intake of mildly thick liquids, and coughing post intake of thin liquids.

## 2024-02-27 LAB
ADAMTS13 ACTIVITY: 26.7 % — SIGNIFICANT CHANGE UP
ADAMTS13-COMMENT: SIGNIFICANT CHANGE UP
ANION GAP SERPL CALC-SCNC: 10 MMOL/L — SIGNIFICANT CHANGE UP (ref 5–17)
BUN SERPL-MCNC: 11 MG/DL — SIGNIFICANT CHANGE UP (ref 7–23)
CALCIUM SERPL-MCNC: 8.2 MG/DL — LOW (ref 8.4–10.5)
CHLORIDE SERPL-SCNC: 108 MMOL/L — SIGNIFICANT CHANGE UP (ref 96–108)
CO2 SERPL-SCNC: 22 MMOL/L — SIGNIFICANT CHANGE UP (ref 22–31)
CREAT SERPL-MCNC: 0.62 MG/DL — SIGNIFICANT CHANGE UP (ref 0.5–1.3)
EGFR: 85 ML/MIN/1.73M2 — SIGNIFICANT CHANGE UP
FIBRINOGEN AG PPP IA-MCNC: 591 MG/DL — HIGH (ref 233–496)
GLUCOSE BLDC GLUCOMTR-MCNC: 67 MG/DL — LOW (ref 70–99)
GLUCOSE BLDC GLUCOMTR-MCNC: 72 MG/DL — SIGNIFICANT CHANGE UP (ref 70–99)
GLUCOSE BLDC GLUCOMTR-MCNC: 77 MG/DL — SIGNIFICANT CHANGE UP (ref 70–99)
GLUCOSE BLDC GLUCOMTR-MCNC: 79 MG/DL — SIGNIFICANT CHANGE UP (ref 70–99)
GLUCOSE BLDC GLUCOMTR-MCNC: 80 MG/DL — SIGNIFICANT CHANGE UP (ref 70–99)
GLUCOSE BLDC GLUCOMTR-MCNC: 84 MG/DL — SIGNIFICANT CHANGE UP (ref 70–99)
GLUCOSE BLDC GLUCOMTR-MCNC: 90 MG/DL — SIGNIFICANT CHANGE UP (ref 70–99)
GLUCOSE SERPL-MCNC: 70 MG/DL — SIGNIFICANT CHANGE UP (ref 70–99)
HCT VFR BLD CALC: 31 % — LOW (ref 34.5–45)
HGB BLD-MCNC: 10.6 G/DL — LOW (ref 11.5–15.5)
MCHC RBC-ENTMCNC: 31.5 PG — SIGNIFICANT CHANGE UP (ref 27–34)
MCHC RBC-ENTMCNC: 34.2 GM/DL — SIGNIFICANT CHANGE UP (ref 32–36)
MCV RBC AUTO: 92.3 FL — SIGNIFICANT CHANGE UP (ref 80–100)
NRBC # BLD: 0 /100 WBCS — SIGNIFICANT CHANGE UP (ref 0–0)
PLATELET # BLD AUTO: 124 K/UL — LOW (ref 150–400)
POTASSIUM SERPL-MCNC: 3.5 MMOL/L — SIGNIFICANT CHANGE UP (ref 3.5–5.3)
POTASSIUM SERPL-SCNC: 3.5 MMOL/L — SIGNIFICANT CHANGE UP (ref 3.5–5.3)
RBC # BLD: 3.36 M/UL — LOW (ref 3.8–5.2)
RBC # FLD: 14.1 % — SIGNIFICANT CHANGE UP (ref 10.3–14.5)
SODIUM SERPL-SCNC: 140 MMOL/L — SIGNIFICANT CHANGE UP (ref 135–145)
VWF CP INHIB PPP-ACNC: 4 UNITS/ML — SIGNIFICANT CHANGE UP
WBC # BLD: 9.1 K/UL — SIGNIFICANT CHANGE UP (ref 3.8–10.5)
WBC # FLD AUTO: 9.1 K/UL — SIGNIFICANT CHANGE UP (ref 3.8–10.5)

## 2024-02-27 PROCEDURE — 99232 SBSQ HOSP IP/OBS MODERATE 35: CPT

## 2024-02-27 PROCEDURE — 93970 EXTREMITY STUDY: CPT | Mod: 26

## 2024-02-27 RX ORDER — SODIUM CHLORIDE 9 MG/ML
500 INJECTION, SOLUTION INTRAVENOUS
Refills: 0 | Status: DISCONTINUED | OUTPATIENT
Start: 2024-02-27 | End: 2024-02-27

## 2024-02-27 RX ORDER — DONEPEZIL HYDROCHLORIDE 10 MG/1
10 TABLET, FILM COATED ORAL AT BEDTIME
Refills: 0 | Status: DISCONTINUED | OUTPATIENT
Start: 2024-02-27 | End: 2024-03-08

## 2024-02-27 RX ORDER — SODIUM CHLORIDE 9 MG/ML
500 INJECTION, SOLUTION INTRAVENOUS
Refills: 0 | Status: DISCONTINUED | OUTPATIENT
Start: 2024-02-27 | End: 2024-03-01

## 2024-02-27 RX ADMIN — Medication 111 MILLIGRAM(S): at 05:11

## 2024-02-27 RX ADMIN — MEROPENEM 100 MILLIGRAM(S): 1 INJECTION INTRAVENOUS at 05:11

## 2024-02-27 RX ADMIN — Medication 111 MILLIGRAM(S): at 18:32

## 2024-02-27 RX ADMIN — DONEPEZIL HYDROCHLORIDE 10 MILLIGRAM(S): 10 TABLET, FILM COATED ORAL at 21:59

## 2024-02-27 RX ADMIN — PANTOPRAZOLE SODIUM 40 MILLIGRAM(S): 20 TABLET, DELAYED RELEASE ORAL at 18:29

## 2024-02-27 RX ADMIN — MEROPENEM 100 MILLIGRAM(S): 1 INJECTION INTRAVENOUS at 18:28

## 2024-02-27 RX ADMIN — SODIUM CHLORIDE 75 MILLILITER(S): 9 INJECTION, SOLUTION INTRAVENOUS at 00:51

## 2024-02-27 RX ADMIN — PANTOPRAZOLE SODIUM 40 MILLIGRAM(S): 20 TABLET, DELAYED RELEASE ORAL at 05:15

## 2024-02-27 RX ADMIN — Medication 1 SPRAY(S): at 18:30

## 2024-02-27 RX ADMIN — Medication 1 SPRAY(S): at 05:11

## 2024-02-27 RX ADMIN — CHLORHEXIDINE GLUCONATE 1 APPLICATION(S): 213 SOLUTION TOPICAL at 11:42

## 2024-02-27 NOTE — PROGRESS NOTE ADULT - PROBLEM SELECTOR PLAN 6
continue to monitor  repeat LFT
continue to monitor  pending CT scan
continue to monitor
continue to monitor  pending CT scan of abdomen

## 2024-02-27 NOTE — PROVIDER CONTACT NOTE (HYPOGLYCEMIA EVENT) - NS PROVIDER CONTACT BACKGROUND-HYPO
Age: 89y    Gender: Female    POCT Blood Glucose:  84 mg/dL (02-27-24 @ 04:33)  67 mg/dL (02-27-24 @ 00:35)  72 mg/dL (02-27-24 @ 00:32)  97 mg/dL (02-26-24 @ 19:30)  92 mg/dL (02-26-24 @ 11:54)  87 mg/dL (02-26-24 @ 05:48)      eMAR:

## 2024-02-27 NOTE — PROGRESS NOTE ADULT - SUBJECTIVE AND OBJECTIVE BOX
more alert  conversational  tolerating thick soft diet    REVIEW OF SYSTEMS:    RESPIRATORY: No cough, wheezing, hemoptysis; No shortness of breath  CARDIOVASCULAR: No chest pain or palpitations    VITAL SIGNS:    T97.5 P81, /69 RR18    PHYSICAL EXAM:     GENERAL: no acute distress  HEENT: NC/AT, EOMI, neck supple, MMM  RESPIRATORY: LCTAB/L, no rhonchi, rales, or wheezing  CARDIOVASCULAR: RRR, no murmurs, gallops, rubs  ABDOMINAL: soft, non-tender, non-distended, positive bowel sounds   EXTREMITIES: no clubbing, cyanosis, or edema  NEUROLOGICAL: alert alternate w period of drowsiness                          10.7   11.80 )-----------( 88       ( 26 Feb 2024 07:21 )             31.3             MEDICATIONS  (STANDING):  acyclovir IVPB 550 milliGRAM(s) IV Intermittent every 12 hours  chlorhexidine 2% Cloths 1 Application(s) Topical daily  dextrose 5% + sodium chloride 0.9%. 500 milliLiter(s) (75 mL/Hr) IV Continuous <Continuous>  dextrose 5%. 500 milliLiter(s) (75 mL/Hr) IV Continuous <Continuous>  meropenem  IVPB 1000 milliGRAM(s) IV Intermittent every 12 hours  pantoprazole  Injectable 40 milliGRAM(s) IV Push two times a day  sodium chloride 0.65% Nasal 1 Spray(s) Both Nostrils two times a day

## 2024-02-27 NOTE — PROGRESS NOTE ADULT - ASSESSMENT
The patient is a 89F with dementia and Myasthenia Gravis admitted for AMS.     1. AMS  improving, she is more conversive   ID workup per primary team     2. Cholelithiasis, distended GB   no e/o acute cholecystitis or biliary obstruction on HIDA scan    3. peripancreatic fat stranding   low suspicion for acute pancreatitis   ppi daily while inpatient, then can d/c    4. abnormal LFTs, downtrending    likely a byproduct of systemic infection   hepatitis panel neg  Echo  trend LFTs       I had a prolonged conversation with the patient regarding the hospital course, differential diagnosis, results of diagnostic tests this far, and therapeutic modalities available. Plan of care discussed with the patient after the evaluation. Patient expresses a clear understanding of the plan of care.       Jj Harrison D.O.  Gastroenterology and Hepatology  [address]  [place], [place]  Office: [phone]

## 2024-02-27 NOTE — PROGRESS NOTE ADULT - SUBJECTIVE AND OBJECTIVE BOX
Patient is a 89y old  Female who presents with a chief complaint of AMS (27 Feb 2024 18:09)    Being followed by ID for AMS        Interval history:  pt continues to improve  remains afebrile   No other acute events      PAST MEDICAL & SURGICAL HISTORY:  Myasthenia gravis      Dementia      Myasthenia gravis      No significant past surgical history      No significant past surgical history        Allergies    sulfamethoxazole (Vomiting; Nausea)    Intolerances      Antimicrobials:    acyclovir IVPB 550 milliGRAM(s) IV Intermittent every 12 hours  meropenem  IVPB 1000 milliGRAM(s) IV Intermittent every 12 hours    MEDICATIONS  (STANDING):  acyclovir IVPB 550 milliGRAM(s) IV Intermittent every 12 hours  chlorhexidine 2% Cloths 1 Application(s) Topical daily  dextrose 5% + sodium chloride 0.9%. 500 milliLiter(s) (75 mL/Hr) IV Continuous <Continuous>  donepezil 10 milliGRAM(s) Oral at bedtime  meropenem  IVPB 1000 milliGRAM(s) IV Intermittent every 12 hours  pantoprazole  Injectable 40 milliGRAM(s) IV Push two times a day  sodium chloride 0.65% Nasal 1 Spray(s) Both Nostrils two times a day      Vital Signs Last 24 Hrs  T(C): 36.3 (02-27-24 @ 16:45), Max: 36.7 (02-27-24 @ 00:00)  T(F): 97.4 (02-27-24 @ 16:45), Max: 98.1 (02-27-24 @ 00:00)  HR: 76 (02-27-24 @ 16:45) (68 - 81)  BP: 146/74 (02-27-24 @ 16:45) (132/69 - 156/73)  BP(mean): --  RR: 18 (02-27-24 @ 16:45) (18 - 18)  SpO2: 94% (02-27-24 @ 16:45) (92% - 99%)    Physical Exam:    Constitutional lying comfortably    answers to some simple questions  Neck flexed    Chest Good AE,CTA    CVS  S1 S2     Abd soft BS normal No tenderness     Ext No cyanosis clubbing or edema    IV site no erythema tenderness or discharge    Joints no swelling or LOM    Lab Data:                          10.6   9.10  )-----------( 124      ( 27 Feb 2024 13:50 )             31.0       02-27    140  |  108  |  11  ----------------------------<  70  3.5   |  22  |  0.62    Ca    8.2<L>      27 Feb 2024 13:50            .Blood Blood-Peripheral  02-21-24   No growth at 5 days  --  --      .Blood Blood-Peripheral  02-21-24   No growth at 5 days  --  --                    WBC Count: 9.10 (02-27-24 @ 13:50)  WBC Count: 11.80 (02-26-24 @ 07:21)  WBC Count: 12.22 (02-25-24 @ 07:02)  WBC Count: 14.14 (02-23-24 @ 07:25)  WBC Count: 9.96 (02-22-24 @ 07:31)  WBC Count: 6.18 (02-21-24 @ 05:51)  WBC Count: 3.19 (02-21-24 @ 04:45)       Alkaline Phosphatase: 140 U/L (02-24-24 @ 07:13)  Aspartate Aminotransferase (AST/SGOT): 31 U/L (02-24-24 @ 07:13)  Alanine Aminotransferase (ALT/SGPT): 68 U/L (02-24-24 @ 07:13)  Bilirubin Total: 0.7 mg/dL (02-24-24 @ 07:13)      < from: [facility] Duplex Upper Ext Vein Scan, Bilat (02.27.24 @ 17:45) >  IMPRESSION:  No evidence of deep venousthrombosis in either upper extremity.    < end of copied text >    < from: [facility] Hepatobiliary Imaging w/ RX (02.23.24 @ 22:50) >    IMPRESSION: Normal morphine-augmented hepatobiliary scan.    No evidence of acute cholecystitis or biliary obstruction.      < end of copied text >

## 2024-02-27 NOTE — PROGRESS NOTE ADULT - SUBJECTIVE AND OBJECTIVE BOX
NEUROLOGY FOLLOW-UP CONSULT NOTE    RFC: AMS    Interval history: No acute neurologic events overnight.     Meds:  MEDICATIONS  (STANDING):  acyclovir IVPB 550 milliGRAM(s) IV Intermittent every 12 hours  chlorhexidine 2% Cloths 1 Application(s) Topical daily  dextrose 5% + sodium chloride 0.9%. 500 milliLiter(s) (75 mL/Hr) IV Continuous <Continuous>  dextrose 5%. 500 milliLiter(s) (75 mL/Hr) IV Continuous <Continuous>  meropenem  IVPB 1000 milliGRAM(s) IV Intermittent every 12 hours  pantoprazole  Injectable 40 milliGRAM(s) IV Push two times a day  sodium chloride 0.65% Nasal 1 Spray(s) Both Nostrils two times a day    MEDICATIONS  (PRN):        PMHx/PSHx/FHx/SHx:  Altered mental status    Myasthenia gravis    Dementia    Myasthenia gravis    Altered mental status    LPRD (laryngopharyngeal reflux disease)    Paralysis of left vocal cord    Lip swelling    Dementia    Altered mental state    Thrombocytopenia    Coagulation defect, unspecified    Sepsis secondary to UTI    Elevated LFTs    CONFUSION        Allergies:  sulfamethoxazole (Vomiting; Nausea)      ROS: PRIYANKA due to mental status    O:  T(C): 36.4 (02-27-24 @ 08:50), Max: 36.7 (02-27-24 @ 00:00)  T(F): 97.5 (02-27-24 @ 08:50), Max: 98.1 (02-27-24 @ 00:00)  HR: 81 (02-27-24 @ 08:50) (57 - 81)  BP: 132/69 (02-27-24 @ 08:50) (112/70 - 156/73)  RR: 18 (02-27-24 @ 08:50) (18 - 18)  SpO2: 92% (02-27-24 @ 08:50) (92% - 99%)  Wt(kg): --    Focused neurologic exam:  MS - AOX person only, speech fluent, follows commands, rep/naming intact, attn/conc intact, recent and remote memory/fund of knowledge impaired  CN - irregular, nonreactive pupils, EOMI, VFF, face sensation/str/hearing WNL b/l, tonsil, tongue midline,  trap b/l 4+/5  Motor - Normal bulk/tone, extremities strength 4+/5   Sens -  LT intact throughout  DTR's - 1+ all and downgoing b/l plantar response  Coord - FTN intact b/l  Gait and station -PRIYANKA due to fall risk and mental status  negative Brudzinski sign and negative Kernig sign    Pertinent labs/studies:      LABS:  cret                        10.7   11.80 )-----------( 88       ( 26 Feb 2024 07:21 )             31.3       < from: MR Head No Cont (02.22.24 @ 21:07) >  FINDINGS: Ventriculomegaly is seen out of proportion to sulcal   prominence. There is also variable sulcal prominence. There is crowding   of the cerebral sulci at the vertex. The callosal angle appears sharp at   the level of the posterior commissure measured in the coronal plane.    Multiple nonspecific foci of T2/FLAIR hyperintensity are noted throughout   the deep and periventricular white matter of the cerebral hemispheres.   There is no associated mass effect. There is no evidence of acute   ischemia on the diffusion-weighted images.    There is diffuse cerebral volume loss with prominence of the sulci,   fissures, and cisternal spaces which is normal for the patient's age.   Ventricular size and configuration is unremarkable. Flow-voids are noted   throughout the major intracranial vessels, on the T2 weighted images,   consistent with their patency. There is probable sluggish flow within the   right transverse and sigmoid sinuses extending to the right jugular bulb.   The sellar region and posterior fossa appear unremarkable.    Scattered mucosal thickening is seen throughout the paranasal sinuses.   The bilateral tympanomastoid cavities are clear. Calvarial signal is   within normal limits. The orbits appear unremarkable.    IMPRESSION: No acute intracranial hemorrhage or evidence of acute   ischemia.    Unchanged imaging findings for which normal pressure hydrocephalus can be   considered. Clinical correlation is required for this diagnosis.    < end of copied text >  < from: CT Chest w/ IV Cont (02.22.24 @ 19:30) >  IMPRESSION:  *  Groundglass opacities in the bilateral lung apices, potentially   pulmonary edema or an infectious/inflammatory process.  *  Small bilateral pleural effusions.  *  Partially imaged mild rightmaxillary sinus mucosal thickening.  *  Distended gallbladder with cholelithiasis. Trace pericholecystic   fluid. Consider HIDA scan for further evaluation.  *  Peripancreatic fat stranding, potentially representing acute   pancreatitis. Recommend correlation with lipase.  *  Mild periduodenal fat infiltration, which may be reactive to   pancreatitis rather than representing duodenitis.    < end of copied text >  < from: CT Angio Neck w/ IV Cont (02.19.24 @ 16:39) >    IMPRESSION:    CT HEAD:  Moderate ventriculomegaly, with findings suggestive of normal pressure   hydrocephalus.  No intracranial hemorrhage, mass effect or midline shift.    CTA HEAD:  Patent intracranial circulation without flow limiting stenosis.  Mild atherosclerotic calcification of bilateral petrous, cavernous, and   supraclinoid internal carotid arteries.    CTA NECK:  No evidence of significant stenosis or occlusion.    < end of copied text >      EEG:  Study Date: 2/20/2024   Start Time: 10:15      End Date: 2/21/2024          End Time: 08:00     Study Duration: 21 h 14 m  EEG Summary / Classification:  Abnormal EEG.  •	Background slowing  •	Triphasic waves    EEG Impression / Clinical Correlate:  Abnormal prolonged EEG study due to   1-	Moderate to severe diffuse nonspecific cerebral dysfunction  2-	Generalized periodic discharges with triphasic morphology at a rate of 1-1.5 Hz typically seen in toxic/metabolic encephalopathy  No epileptic discharges recorded.  No seizures recorded.      EEG  Study Date: 2/21/2024   Start Time: 08:00      End Date: 2/22/2024      End Time: 14:34   Study Duration: 30 h 34 m  EEG Summary / Classification:  Abnormal EEG.  •	Background slowing  •	Triphasic waves    EEG Impression / Clinical Correlate:  Abnormal prolonged EEG study due to   1-	Moderate diffuse nonspecific cerebral dysfunction  2-	Frequent generalized periodic discharges with triphasic morphology at a rate of 1-1.5 Hz typically seen in toxic/metabolic encephalopathy  No epileptic discharges recorded.  No seizures recorded.

## 2024-02-27 NOTE — PROGRESS NOTE ADULT - ASSESSMENT
88 y/o F with PMHx of Myasthenia Gravis (not on medication) and dementia (A&Ox1, conversive at baseline) who presents from her nursing home with worsening lethargy and confusion. UA done as outpatient on 2/13 grossly positive and urine culture growing E. coli and Klebsiella, pansensitive. Found to be hypothermic in ED. Admitted for sepsis and metabolic encephalopathy in setting of UTI. Multiple RRTs called today. First RRT called for seizure-like activity. Patient loaded with Keppra and placed on EEG. Second RRT called for hypotension. Patient was given 4 L IVF with resolution of hypotension.      A/P    #R/O sepsis  unclear what caused acute decompensation. Pt was treated for UTI but urine negative here. Pt with remote h/o Myasthenia gravis and mild dementia.  Pt with possible seizure - 2/20  pt changed to meropenem to cover for possible meningitis-   ? Normal pressure hydrocephalous on CT- discussed with neuro- this wouldn't account for her acute change  on meropenem to cover for possible meningitis and /or to cover biliary sepsis   EEG not c/w HSV encephalits, but added  acyclovir until more definitive diagnosis available, will adjust to renal function- day #5  Hypernatremia improved    would avoid certain abs- such as cipro with the myasthenia graavis- suggest neuro import to address     CT with distended GB but HIDA negative. ? pancreatitis .amylase and lipase were elevated but improving.  could patient have passed a stones   day # 6 meropenem and day # 5 acyckivir     #elevate LFTs  HIDA negative   check hepatitis screen  avoid hepatotoxci meds   trend - Please repeat      #hypernatremia  resolved    #thrombocytopenia  can't do spinal tap if low  hematology to address  improving       Yeni Gabriel M.D. ,   please reach via teams   If no answer, or after 5PM/ weekends,  then please call  [phone]    Assessment and plan discussed with the primary team  and with neurology    I will be away  tomorrow. My associates will be covering. Please call [phone] with acute issues, questions.

## 2024-02-27 NOTE — PROGRESS NOTE ADULT - SUBJECTIVE AND OBJECTIVE BOX
INTERVAL HPI/OVERNIGHT EVENTS:    tolerating diet    MEDICATIONS  (STANDING):  acyclovir IVPB 550 milliGRAM(s) IV Intermittent every 12 hours  chlorhexidine 2% Cloths 1 Application(s) Topical daily  dextrose 5% + sodium chloride 0.9%. 500 milliLiter(s) (75 mL/Hr) IV Continuous <Continuous>  dextrose 5%. 500 milliLiter(s) (75 mL/Hr) IV Continuous <Continuous>  meropenem  IVPB 1000 milliGRAM(s) IV Intermittent every 12 hours  pantoprazole  Injectable 40 milliGRAM(s) IV Push two times a day  sodium chloride 0.65% Nasal 1 Spray(s) Both Nostrils two times a day    MEDICATIONS  (PRN):      Allergies    sulfamethoxazole (Vomiting; Nausea)    Intolerances        Review of Systems:    General:  No wt loss, fevers, chills, night sweats, fatigue   Eyes:  Good vision, no reported pain  ENT:  No sore throat, pain, runny nose, dysphagia  CV:  No pain, palpitations, hypo/hypertension  Resp:  No dyspnea, cough, tachypnea, wheezing  GI:  No pain, No nausea, No vomiting, No diarrhea, No constipation, No weight loss, No fever, No pruritis, No rectal bleeding, No melena, No dysphagia  :  No pain, bleeding, incontinence, nocturia  Muscle:  No pain, weakness  Neuro:  No weakness, tingling, memory problems  Psych:  No fatigue, insomnia, mood problems, depression  Endocrine:  No polyuria, polydypsia, cold/heat intolerance  Heme:  No petechiae, ecchymosis, easy bruisability  Skin:  No rash, tattoos, scars, edema      Vital Signs Last 24 Hrs  T(C): 36.4 (27 Feb 2024 08:50), Max: 36.7 (27 Feb 2024 00:00)  T(F): 97.5 (27 Feb 2024 08:50), Max: 98.1 (27 Feb 2024 00:00)  HR: 81 (27 Feb 2024 08:50) (57 - 81)  BP: 132/69 (27 Feb 2024 08:50) (112/70 - 156/73)  BP(mean): --  RR: 18 (27 Feb 2024 08:50) (18 - 18)  SpO2: 92% (27 Feb 2024 08:50) (92% - 99%)    Parameters below as of 27 Feb 2024 08:50  Patient On (Oxygen Delivery Method): nasal cannula  O2 Flow (L/min): 3      PHYSICAL EXAM:    Constitutional: NAD  HEENT: EOMI, throat clear  Neck: No LAD, supple  Respiratory: CTA and P  Cardiovascular: S1 and S2, RRR, no M  Gastrointestinal: BS+, soft, NT/ND, neg HSM,  Extremities: No peripheral edema, neg clubbing, cyanosis  Vascular: 2+ peripheral pulses  Neurological: A/O x 3, no focal deficits  Psychiatric: Normal mood, normal affect  Skin: No rashes      LABS:                        10.7   11.80 )-----------( 88       ( 26 Feb 2024 07:21 )             31.3                 RADIOLOGY & ADDITIONAL TESTS:

## 2024-02-27 NOTE — PROGRESS NOTE ADULT - SUBJECTIVE AND OBJECTIVE BOX
Patient is a 89y old  Female who presents with a chief complaint of AMS (27 Feb 2024 13:09)      INTERVAL HPI/OVERNIGHT EVENTS: - Patient much better awake and talking, She is eating better. Cultures negative. Discussed with ID recommended 7 days total Meropenem and 14 days f Acyclovir. Monitor renal function closely. L arm swelling, venous doppler pending, most likely due to IV fluid. Patient was hypoglycemic today due to poor appetite. Restarted on IV fluid D5W. Monitor BS.     Pain Location & Control:     MEDICATIONS  (STANDING):  acyclovir IVPB 550 milliGRAM(s) IV Intermittent every 12 hours  chlorhexidine 2% Cloths 1 Application(s) Topical daily  dextrose 5% + sodium chloride 0.9%. 500 milliLiter(s) (75 mL/Hr) IV Continuous <Continuous>  donepezil 10 milliGRAM(s) Oral at bedtime  meropenem  IVPB 1000 milliGRAM(s) IV Intermittent every 12 hours  pantoprazole  Injectable 40 milliGRAM(s) IV Push two times a day  sodium chloride 0.65% Nasal 1 Spray(s) Both Nostrils two times a day    MEDICATIONS  (PRN):      Allergies    sulfamethoxazole (Vomiting; Nausea)    Intolerances        REVIEW OF SYSTEMS:  CONSTITUTIONAL: No fever, weight loss, or fatigue  EYES: No eye pain, visual disturbances, or discharge  ENMT:  No difficulty hearing, tinnitus, vertigo; No sinus or throat pain  NECK: No pain or stiffness  BREASTS: No pain, masses, or nipple discharge  RESPIRATORY: No cough, wheezing, chills or hemoptysis; No shortness of breath  CARDIOVASCULAR: No chest pain, palpitations, dizziness, or leg swelling  GASTROINTESTINAL: No abdominal or epigastric pain. No nausea, vomiting, or hematemesis; No diarrhea or constipation. No melena or hematochezia.  GENITOURINARY: No dysuria, frequency, hematuria, or incontinence  NEUROLOGICAL: No headaches, memory loss, loss of strength, numbness, or tremors  SKIN: No itching, burning, rashes, or lesions   LYMPH NODES: No enlarged glands  ENDOCRINE: No heat or cold intolerance; No hair loss; No polydipsia or polyuria  MUSCULOSKELETAL: No back pain  PSYCHIATRIC: No depression, anxiety, mood swings, or difficulty sleeping  HEME/LYMPH: No easy bruising, or bleeding gums  ALLERGY AND IMMUNOLOGIC: No hives or eczema    Vital Signs Last 24 Hrs  T(C): 36.3 (27 Feb 2024 16:45), Max: 36.7 (27 Feb 2024 00:00)  T(F): 97.4 (27 Feb 2024 16:45), Max: 98.1 (27 Feb 2024 00:00)  HR: 76 (27 Feb 2024 16:45) (68 - 81)  BP: 146/74 (27 Feb 2024 16:45) (132/69 - 156/73)  BP(mean): --  RR: 18 (27 Feb 2024 16:45) (18 - 18)  SpO2: 94% (27 Feb 2024 16:45) (92% - 99%)    Parameters below as of 27 Feb 2024 16:45  Patient On (Oxygen Delivery Method): nasal cannula  O2 Flow (L/min): 3      PHYSICAL EXAM:  GENERAL: NAD, well-groomed, well-developed  HEAD:  Atraumatic, Normocephalic  EYES: EOMI, PERRLA, conjunctiva and sclera clear  ENMT: No tonsillar erythema, exudates, or enlargement; Moist mucous membranes, Good dentition, No lesions  NECK: Supple, No JVD, Normal thyroid  NERVOUS SYSTEM:  Alert & Oriented X2 responsive, mildly confused.   CHEST/LUNG: Clear to auscultation bilaterally; No rales, rhonchi, wheezing, or rubs  HEART: Regular rate and rhythm; No murmurs, rubs, or gallops  ABDOMEN: Soft, Nontender, Nondistended; Bowel sounds present  EXTREMITIES:  2+ Peripheral Pulses, No clubbing or cyanosis  LYMPH: No lymphadenopathy noted  SKIN: No rashes or lesions      LABS:                        10.6   9.10  )-----------( 124      ( 27 Feb 2024 13:50 )             31.0     27 Feb 2024 13:50    140    |  108    |  11     ----------------------------<  70     3.5     |  22     |  0.62     Ca    8.2        27 Feb 2024 13:50        Urinalysis Basic - ( 27 Feb 2024 13:50 )    Color: x / Appearance: x / SG: x / pH: x  Gluc: 70 mg/dL / Ketone: x  / Bili: x / Urobili: x   Blood: x / Protein: x / Nitrite: x   Leuk Esterase: x / RBC: x / WBC x   Sq Epi: x / Non Sq Epi: x / Bacteria: x      CAPILLARY BLOOD GLUCOSE      POCT Blood Glucose.: 90 mg/dL (27 Feb 2024 12:18)  POCT Blood Glucose.: 80 mg/dL (27 Feb 2024 06:56)  POCT Blood Glucose.: 77 mg/dL (27 Feb 2024 06:26)  POCT Blood Glucose.: 84 mg/dL (27 Feb 2024 04:33)  POCT Blood Glucose.: 67 mg/dL (27 Feb 2024 00:35)  POCT Blood Glucose.: 72 mg/dL (27 Feb 2024 00:32)  POCT Blood Glucose.: 97 mg/dL (26 Feb 2024 19:30)        Cultures  Culture Results:   No growth at 5 days (02-21-24 @ 05:33)  Culture Results:   No growth at 5 days (02-21-24 @ 04:35)      RADIOLOGY & ADDITIONAL TESTS:    Imaging Personally Reviewed:  [X ] YES  [ ] NO    Consultant(s) Notes Reviewed:  [ X] YES  [ ] NO    Care Discussed with Consultants/Other Providers [X ] YES  [ ] NO

## 2024-02-27 NOTE — PROGRESS NOTE ADULT - ASSESSMENT
88 YO F with PMHx of Myasthenia Gravis (not on medication for past 9 years) and MHx of dementia     Assessment/Plan:  AMS(most likely due to metabolic/toxic encephalopathy  due to UTI) - – improved back to baseline mental status with mild dementia.  Most likely due to NPH. Evaluated by neuro recommended f/u  with neurology as outpatient. ID recommended Total 7 days Meropenem and 14 days Acyclvir for possible Herpes Encephalitis.   Hypoglycemia- due to poor appetite , still needs IV fluid D5 W  cholelithiasis - without  cholecystitis neither pancreatitis. Normal amylase and lipase.   hypotension- continue IV steroid and IV fluid as needed   thrombocytopenia- PLT down to 44,000. DIC panel not significantly positive. PTT mildly elevated. Otherwise PT/INR normal. D/ C Lovenox. F/u hematology.  No plan for LP at this time. Family denied   elevated LFT- Will  check RUQ ultrasound and Hepatitis panel.   NPH- - neurology does not believe acute mental status changes, could be  due to NPH. F/u neurology as outpatient  for NPH workup.   UTI with E.coli and Klebsiella as  outpatient culture. - IV abx upgraded to Vanco and Meropenem  DVT ppx- Lovenox discontinued due to thrombocytopenia   leg arthritis- Acetaminophen PRN.  b/l cataract- F/u ophthalmolog  vitamin D deficiency- continue Vitamin D supplement.  risk of malnutrition -stable, continue supplement diet. F/u dietitian. Monitor weight.  constipation- senna at bedtime.  LBP 2/2 severe spinal stenosis with out cord pressure - no surgical intervention recommended neither accepted by family, continue PT OT as needed.  Dementia - Stable. Continue Aricept .  Dysphagia - Mechanical soft diet, aspiration precautions.  Myasthenia Gravis - stable , off of medication. Monitor CBC and BMP every 2 months.

## 2024-02-27 NOTE — PROGRESS NOTE ADULT - ASSESSMENT
89y (1935) with PMH of dementia,  myasthenia gravis? untreated on Aricept 10 mg daily for dementia presenting for altered mental status in the morning of the day of admission. Patient was noted to have a UTI and was started on ciprofloxacin for the past 3 days. Neurology consult for AMS    Impression:  Etiology of altered mental status is most likely related to toxic/ metabolic encephalopathy in the setting of acute medical illnesses( cholelithiasis, pleural effusion, hypernatremia, thrombocytopenia)) with components of hospital delirium.  No epileptiform pattern or seizure recorded on EEG. EEG shows generalized periodic discharges with triphasic morphology which is consist with toxic/metabolic encephalopathy. No acute intracranial pathology on CT/MR brain. No meningismus sign on exam and afebrile, lower suspicion for meningitis (patient is being empirically treated with antibiotics and antiviral).   - Ventriculomegaly on CT/MR. Per daughter, patient did have chronic decline of cognitive status but no persistent urinary incontinence or magnetic gait. Patient would need further NPH workup with outpatient neurology.   2/27: Patient's mental status has improved, AOX1, able to follow all commands.     Recommendations:  [] please resume home dementia med Aricept 10mg if patient can tolerate PO medication  [] VEEG 2/20- 2/22: Frequent generalized periodic discharges with triphasic morphology at a rate of 1-1.5 Hz typically seen in toxic/metabolic encephalopathy. No epileptic discharges recorded. No seizures recorded.  [] MR brain non con 2/22: No acute intracranial hemorrhage or evidence of acute ischemia. Unchanged imaging findings for which normal pressure hydrocephalus can be considered.   [] continue treatment per ID team.  [] antibiotics to avoid or use with caution in MG:  Aminoglycosides? (eg, amikacin, gentamicin, neomycin, tobramycin)  Fluoroquinolones? (eg, ciprofloxacin, levofloxacin, moxifloxacin, ofloxacin)  Macrolides (eg, azithromycin, clarithromycin, erythromycin)    [] CT C/A/P shows: Groundglass opacities in the bilateral lung apices, Small bilateral pleural effusions. Distended gallbladder with cholelithiasis. Trace pericholecystic fluid. Peripancreatic fat stranding, potentially representing acute   pancreatitis. management per primary/GI team  [] pending labs: acetylcholine blocking, modulating, binding antibodies, LRP4, Musk antibodies. & vitamin D25-hydroxy. reviewed labs: TSH (wnl), T4 (wnl), B12 (high), UA (WNL), NH3 (wnl), Folate (wnl), lactate (elevated), BUN (elevated), Potassium (Low), CBC (leukocytosis), platelet (low-66)  [] PT/OT therapy  [] neuro- check and fall precaution.  [] For NPH workup, patient can follow up outpatient with neurologist Dr. Sifuentes or Dr. Guerra [phone], [address] [facility], [place] [address] or Dr. Castellon (dementia specialist)  at [address] [place] [address]. 1-2 weeks after discharge by calling [phone] to schedule this appointment  [] rest of care per primary medical team  [] plans discussed with primary medical team  [] No further inpatient neurology recommendation, please call neurology team [phone] if any questions.    Plans discussed with neurology attending, Dr. Tucker

## 2024-02-28 LAB
ACRM BINDING ANTIBODY: 0.84 NMOL/L — HIGH (ref 0–0.24)
ANION GAP SERPL CALC-SCNC: 12 MMOL/L — SIGNIFICANT CHANGE UP (ref 5–17)
BUN SERPL-MCNC: 11 MG/DL — SIGNIFICANT CHANGE UP (ref 7–23)
CALCIUM SERPL-MCNC: 8.7 MG/DL — SIGNIFICANT CHANGE UP (ref 8.4–10.5)
CHLORIDE SERPL-SCNC: 104 MMOL/L — SIGNIFICANT CHANGE UP (ref 96–108)
CO2 SERPL-SCNC: 25 MMOL/L — SIGNIFICANT CHANGE UP (ref 22–31)
CREAT SERPL-MCNC: 0.61 MG/DL — SIGNIFICANT CHANGE UP (ref 0.5–1.3)
EGFR: 85 ML/MIN/1.73M2 — SIGNIFICANT CHANGE UP
GLUCOSE BLDC GLUCOMTR-MCNC: 124 MG/DL — HIGH (ref 70–99)
GLUCOSE BLDC GLUCOMTR-MCNC: 80 MG/DL — SIGNIFICANT CHANGE UP (ref 70–99)
GLUCOSE SERPL-MCNC: 56 MG/DL — LOW (ref 70–99)
HCT VFR BLD CALC: 31.9 % — LOW (ref 34.5–45)
HGB BLD-MCNC: 10.9 G/DL — LOW (ref 11.5–15.5)
MAGNESIUM SERPL-MCNC: 1.8 MG/DL — SIGNIFICANT CHANGE UP (ref 1.6–2.6)
MCHC RBC-ENTMCNC: 32.1 PG — SIGNIFICANT CHANGE UP (ref 27–34)
MCHC RBC-ENTMCNC: 34.2 GM/DL — SIGNIFICANT CHANGE UP (ref 32–36)
MCV RBC AUTO: 93.8 FL — SIGNIFICANT CHANGE UP (ref 80–100)
NRBC # BLD: 0 /100 WBCS — SIGNIFICANT CHANGE UP (ref 0–0)
PHOSPHATE SERPL-MCNC: 2 MG/DL — LOW (ref 2.5–4.5)
PLATELET # BLD AUTO: 149 K/UL — LOW (ref 150–400)
POTASSIUM SERPL-MCNC: 3.3 MMOL/L — LOW (ref 3.5–5.3)
POTASSIUM SERPL-SCNC: 3.3 MMOL/L — LOW (ref 3.5–5.3)
RBC # BLD: 3.4 M/UL — LOW (ref 3.8–5.2)
RBC # FLD: 13.8 % — SIGNIFICANT CHANGE UP (ref 10.3–14.5)
SODIUM SERPL-SCNC: 141 MMOL/L — SIGNIFICANT CHANGE UP (ref 135–145)
WBC # BLD: 8.48 K/UL — SIGNIFICANT CHANGE UP (ref 3.8–10.5)
WBC # FLD AUTO: 8.48 K/UL — SIGNIFICANT CHANGE UP (ref 3.8–10.5)

## 2024-02-28 RX ORDER — POTASSIUM CHLORIDE 20 MEQ
40 PACKET (EA) ORAL ONCE
Refills: 0 | Status: COMPLETED | OUTPATIENT
Start: 2024-02-28 | End: 2024-03-01

## 2024-02-28 RX ADMIN — DONEPEZIL HYDROCHLORIDE 10 MILLIGRAM(S): 10 TABLET, FILM COATED ORAL at 21:48

## 2024-02-28 RX ADMIN — PANTOPRAZOLE SODIUM 40 MILLIGRAM(S): 20 TABLET, DELAYED RELEASE ORAL at 05:17

## 2024-02-28 RX ADMIN — PANTOPRAZOLE SODIUM 40 MILLIGRAM(S): 20 TABLET, DELAYED RELEASE ORAL at 18:07

## 2024-02-28 RX ADMIN — Medication 111 MILLIGRAM(S): at 18:06

## 2024-02-28 RX ADMIN — Medication 111 MILLIGRAM(S): at 05:17

## 2024-02-28 RX ADMIN — Medication 85 MILLIMOLE(S): at 14:20

## 2024-02-28 RX ADMIN — CHLORHEXIDINE GLUCONATE 1 APPLICATION(S): 213 SOLUTION TOPICAL at 14:20

## 2024-02-28 NOTE — PHYSICAL THERAPY INITIAL EVALUATION ADULT - PERTINENT HX OF CURRENT PROBLEM, REHAB EVAL
89 y/o F with PMHx of Myasthenia Gravis (not on medication) and dementia (A&Ox1, conversive at baseline) who presents from her nursing home with worsening lethargy and confusion. UA done as outpatient on 2/13 grossly positive and urine culture growing E. coli and Klebsiella, pansensitive. Found to be hypothermic in ED. Admitted for sepsis and metabolic encephalopathy in setting of UTI. Multiple RRTs called today. First RRT called for seizure-like activity. Patient loaded with Keppra and placed on EEG. Second RRT called for hypotension. Patient was given 4 L IVF with resolution of hypotension. CT chest/abd:Groundglass opacities in the bilateral lung apices, potentially pulmonary edema or an infectious/inflammatory process.Small bilateral pleural effusions.Partially imaged mild right maxillary sinus mucosal thickening. Distended gallbladder with cholelithiasis. Trace pericholecystic fluid. Consider HIDA scan for further evaluation.Peripancreatic fat stranding, potentially representing acute pancreatitis. Recommend correlation with lipase.Mild periduodenal fat infiltration, which may be reactive to pancreatitis rather than representing duodenitis. CT HEAD:Moderate ventriculomegaly, with findings suggestive of normal pressure hydrocephalus.No intracranial hemorrhage, mass effect or midline shift.CTA HEAD:Patent intracranial circulation without flow limiting stenosis.Mild atherosclerotic calcification of bilateral petrous, cavernous, and supraclinoid internal carotid arteries.CTA NECK:No evidence of significant stenosis or occlusion. MRI head:CT HEAD:Moderate ventriculomegaly, with findings suggestive of normal pressure hydrocephalus.No intracranial hemorrhage, mass effect or midline shift.CTA HEAD:Patent intracranial circulation without flow limiting stenosis.Mild atherosclerotic calcification of bilateral petrous, cavernous, and supraclinoid internal carotid arteries.CTA NECK:No evidence of significant stenosis or occlusion.

## 2024-02-28 NOTE — CHART NOTE - NSCHARTNOTEFT_GEN_A_CORE
89 y/o F with PMHx of Myasthenia Gravis (not on medication) and dementia (A&Ox1, conversive at baseline) who presents from her nursing home with worsening lethargy and confusion. UA done as outpatient grossly positive and urine culture growing E. coli and Klebsiella. Patient was started on ciprofloxacin 3 days prior to presentation but has not improved. In the ED, patient found to be hypothermic (89 F). CTH with moderate ventriculomegaly with findings suggestive of normal pressure hydrocephalus. CTA head and neck unremarkable. CXR clear. Patient given CTX for UTI. Outpatient UA from 2/13 with small LE, moderate bacteria, and WBC 84. Urine culture 2/13 positive for E. coli and Klebsiella. ENT consulted on 2/20 for tongue and lip swelling. On fiberoptic flexible laryngoscopy no laryngeal edema, no blood or active bleeding, no pooling of secretions in larynx, no aspiration. Patent airway. 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 also noted large glottic gap from right vocal cord paresis and left vocal cord paralysis in paramedian position during inhalations and exhalations. Recommended decadron and PPI, as well as re-scope when pt awake to assess VF movement with phonation. GI consulted -> no e/o acute cholecystitis or biliary obstruction on HIDA scan. Per RD note no active diet order since 2/19. Swallow hx: seen by this service 1/4/2020 with recommendations for soft diet 2/2 inadequate dentition for mastication. 2/26/24 Swallowing evaluation upon this admission recommended puree with moderately thickened liquids.  Pt seen on this date to reevaluate candidacy for diet upgrade. Pt semi upright in bed, oriented 1-2 and denied pain. Pt following simple commands and expressing simple needs/wants verbally. Pt requesting d/c back to residence. Labial edema noted with decreased speech intelligibility although oral mucosa unremarkable and lingual ROM WFL. Pt required feeding assistance and tactile cues for cup drinking (hand over hand assistance). Pt accepted tspn/cup sips of moderately thickened liquids and thin liquids although unable to draw up bolus via straw. Pt is missing dentition and minced and moist texture not provided. Pt accepted a few tspn of purees. Formation, oral grading, and control of bolus are mildly impaired although Pt's skills WFL for purees, moderately thickened liquids and thin liquids. Clear vocal quality noted post swallow and no s/s of laryngeal penetration or aspiration were evident with single sips of thin/moderately thickened liquids via cup with tactile cues or puree textures.   Impressions: Pt presents with oral preparatory and oral stage dysphagia which may be superimposed upon by fluctuation in mentation and labial edema.  Recommendations: Puree diet with single cup sips of thin liquids and total feeding assistance with tactile cues (hand over hand for cup drinking). Maintain aspiration precautions. Restorative swallow therapy f/u 1 x week; Pt will tolerate diet with no s/s of aspiration. Will continue to follow while patient is in-house; swallowing therapy post d/c; Pt may require swallowing therapy post d/c. SLP communicated aforementioned information with CORRIE Morfin via teams.  Berna Law MS CCC-SLP   Pgr # [phone]

## 2024-02-28 NOTE — PHYSICAL THERAPY INITIAL EVALUATION ADULT - STRENGTHENING, PT EVAL
GOAL: pt will inc B UE/LE strength by 1/2 grade to assist with ADLs and promote functional independence by 2-4 weeks

## 2024-02-28 NOTE — PROGRESS NOTE ADULT - SUBJECTIVE AND OBJECTIVE BOX
Patient is a 89y old  Female who presents with a chief complaint of AMS (28 Feb 2024 16:24)      INTERVAL HPI/OVERNIGHT EVENTS: Patient medically stable. Awake and eating. Monitor calorie count for next 3 days. Continue IV abx.     Pain Location & Control:     MEDICATIONS  (STANDING):  acyclovir IVPB 550 milliGRAM(s) IV Intermittent every 12 hours  chlorhexidine 2% Cloths 1 Application(s) Topical daily  dextrose 5% + sodium chloride 0.9%. 500 milliLiter(s) (75 mL/Hr) IV Continuous <Continuous>  donepezil 10 milliGRAM(s) Oral at bedtime  pantoprazole  Injectable 40 milliGRAM(s) IV Push two times a day  potassium chloride   Powder 40 milliEquivalent(s) Oral once    MEDICATIONS  (PRN):      Allergies    sulfamethoxazole (Vomiting; Nausea)    Intolerances        REVIEW OF SYSTEMS:  CONSTITUTIONAL: No fever, weight loss, or fatigue  EYES: No eye pain, visual disturbances, or discharge  ENMT:  No difficulty hearing, tinnitus, vertigo; No sinus or throat pain  NECK: No pain or stiffness  BREASTS: No pain, masses, or nipple discharge  RESPIRATORY: No cough, wheezing, chills or hemoptysis; No shortness of breath  CARDIOVASCULAR: No chest pain, palpitations, dizziness, or leg swelling  GASTROINTESTINAL: No abdominal or epigastric pain. No nausea, vomiting, or hematemesis; No diarrhea or constipation. No melena or hematochezia.  GENITOURINARY: No dysuria, frequency, hematuria, or incontinence  NEUROLOGICAL: No headaches, memory loss, loss of strength, numbness, or tremors  SKIN: No itching, burning, rashes, or lesions   LYMPH NODES: No enlarged glands  ENDOCRINE: No heat or cold intolerance; No hair loss; No polydipsia or polyuria  MUSCULOSKELETAL: No back pain  PSYCHIATRIC: No depression, anxiety, mood swings, or difficulty sleeping  HEME/LYMPH: No easy bruising, or bleeding gums  ALLERGY AND IMMUNOLOGIC: No hives or eczema    Vital Signs Last 24 Hrs  T(C): 36.8 (28 Feb 2024 19:47), Max: 36.9 (28 Feb 2024 10:00)  T(F): 98.3 (28 Feb 2024 19:47), Max: 98.4 (28 Feb 2024 10:00)  HR: 82 (28 Feb 2024 19:47) (77 - 83)  BP: 157/80 (28 Feb 2024 19:47) (135/68 - 159/86)  BP(mean): --  RR: 18 (28 Feb 2024 19:47) (18 - 18)  SpO2: 92% (28 Feb 2024 19:47) (92% - 98%)    Parameters below as of 28 Feb 2024 19:47  Patient On (Oxygen Delivery Method): nasal cannula  O2 Flow (L/min): 3      PHYSICAL EXAM:  GENERAL: NAD, well-groomed, well-developed  HEAD:  Atraumatic, Normocephalic  EYES: EOMI, PERRLA, conjunctiva and sclera clear  ENMT: No tonsillar erythema, exudates, or enlargement; Moist mucous membranes, Good dentition, No lesions  NECK: Supple, No JVD, Normal thyroid  NERVOUS SYSTEM:  Alert & Oriented X2  CHEST/LUNG: Clear to auscultation bilaterally; No rales, rhonchi, wheezing, or rubs  HEART: Regular rate and rhythm; No murmurs, rubs, or gallops  ABDOMEN: Soft, Nontender, Nondistended; Bowel sounds present  EXTREMITIES:  2+ Peripheral Pulses, No clubbing or cyanosis  LYMPH: No lymphadenopathy noted  SKIN: No rashes or lesions      LABS:                        10.9   8.48  )-----------( 149      ( 28 Feb 2024 06:57 )             31.9     28 Feb 2024 06:57    141    |  104    |  11     ----------------------------<  56     3.3     |  25     |  0.61     Ca    8.7        28 Feb 2024 06:57  Phos  2.0       28 Feb 2024 06:57  Mg     1.8       28 Feb 2024 06:57        Urinalysis Basic - ( 28 Feb 2024 06:57 )    Color: x / Appearance: x / SG: x / pH: x  Gluc: 56 mg/dL / Ketone: x  / Bili: x / Urobili: x   Blood: x / Protein: x / Nitrite: x   Leuk Esterase: x / RBC: x / WBC x   Sq Epi: x / Non Sq Epi: x / Bacteria: x      CAPILLARY BLOOD GLUCOSE      POCT Blood Glucose.: 124 mg/dL (28 Feb 2024 17:57)  POCT Blood Glucose.: 80 mg/dL (28 Feb 2024 12:09)        Cultures      RADIOLOGY & ADDITIONAL TESTS:    Imaging Personally Reviewed:  X] YES  [ ] NO    Consultant(s) Notes Reviewed:  [X ] YES  [ ] NO    Care Discussed with Consultants/Other Providers [ x] YES  [ ] NO

## 2024-02-28 NOTE — PROGRESS NOTE ADULT - ASSESSMENT
89 YO F with PMHx of Myasthenia Gravis (not on medication for past 9 years) and MHx of dementia     Assessment/Plan:  AMS(most likely due to metabolic/toxic encephalopathy  due to UTI) - – improved back to baseline mental status with mild dementia.  Most likely due to NPH. Evaluated by neuro recommended f/u  with neurology as outpatient. ID recommended Total 7 days Meropenem and 14 days Acyclvir for possible Herpes Encephalitis.   Hypoglycemia- resolved. Appetite improved.   cholelithiasis - without  cholecystitis neither pancreatitis. Normal amylase and lipase.   hypotension- continue IV steroid and IV fluid as needed   thrombocytopenia- PLT down to 44,000. DIC panel not significantly positive. PTT mildly elevated. Otherwise PT/INR normal. D/ C Lovenox. F/u hematology.  No plan for LP at this time. Family denied   elevated LFT- Will  check RUQ ultrasound and Hepatitis panel.   NPH- - neurology does not believe acute mental status changes, could be  due to NPH. F/u neurology as outpatient  for NPH workup.   UTI with E.coli and Klebsiella as  outpatient culture. - IV abx upgraded to Vanco and Meropenem  DVT ppx- Lovenox discontinued due to thrombocytopenia   leg arthritis- Acetaminophen PRN.  b/l cataract- F/u ophthalmolog  vitamin D deficiency- continue Vitamin D supplement.  risk of malnutrition -stable, continue supplement diet. F/u dietitian. Monitor weight.  constipation- senna at bedtime.  LBP 2/2 severe spinal stenosis with out cord pressure - no surgical intervention recommended neither accepted by family, continue PT OT as needed.  Dementia - Stable. Continue Aricept .  Dysphagia - Mechanical soft diet, aspiration precautions.  Myasthenia Gravis - stable , off of medication. Monitor CBC and BMP every 2 months.

## 2024-02-28 NOTE — PROGRESS NOTE ADULT - ASSESSMENT
The patient is a 89F with dementia and Myasthenia Gravis admitted for AMS.     1. AMS  improving, she is more conversive   ID workup per primary team     2. Cholelithiasis, distended GB   no e/o acute cholecystitis or biliary obstruction on HIDA scan    3. peripancreatic fat stranding   low suspicion for acute pancreatitis   ppi daily while inpatient, then can d/c    4. abnormal LFTs, downtrending    likely a byproduct of systemic infection   hepatitis panel neg  Echo  trend LFTs     5. Dysphagia  appreciate speech pathology      I had a prolonged conversation with the patient regarding the hospital course, differential diagnosis, results of diagnostic tests this far, and therapeutic modalities available. Plan of care discussed with the patient after the evaluation. Patient expresses a clear understanding of the plan of care.       Jj Harrison D.O.  Gastroenterology and Hepatology  [address] [facility], [address]  [place], [place]  Office: [phone]

## 2024-02-29 LAB
ANION GAP SERPL CALC-SCNC: 13 MMOL/L — SIGNIFICANT CHANGE UP (ref 5–17)
BUN SERPL-MCNC: 13 MG/DL — SIGNIFICANT CHANGE UP (ref 7–23)
CALCIUM SERPL-MCNC: 8 MG/DL — LOW (ref 8.4–10.5)
CHLORIDE SERPL-SCNC: 102 MMOL/L — SIGNIFICANT CHANGE UP (ref 96–108)
CO2 SERPL-SCNC: 27 MMOL/L — SIGNIFICANT CHANGE UP (ref 22–31)
CREAT SERPL-MCNC: 0.65 MG/DL — SIGNIFICANT CHANGE UP (ref 0.5–1.3)
EGFR: 84 ML/MIN/1.73M2 — SIGNIFICANT CHANGE UP
GLUCOSE BLDC GLUCOMTR-MCNC: 137 MG/DL — HIGH (ref 70–99)
GLUCOSE BLDC GLUCOMTR-MCNC: 98 MG/DL — SIGNIFICANT CHANGE UP (ref 70–99)
GLUCOSE SERPL-MCNC: 75 MG/DL — SIGNIFICANT CHANGE UP (ref 70–99)
HCT VFR BLD CALC: 31.1 % — LOW (ref 34.5–45)
HGB BLD-MCNC: 10.6 G/DL — LOW (ref 11.5–15.5)
MCHC RBC-ENTMCNC: 31.6 PG — SIGNIFICANT CHANGE UP (ref 27–34)
MCHC RBC-ENTMCNC: 34.1 GM/DL — SIGNIFICANT CHANGE UP (ref 32–36)
MCV RBC AUTO: 92.8 FL — SIGNIFICANT CHANGE UP (ref 80–100)
NRBC # BLD: 0 /100 WBCS — SIGNIFICANT CHANGE UP (ref 0–0)
PHOSPHATE SERPL-MCNC: 2.3 MG/DL — LOW (ref 2.5–4.5)
PLATELET # BLD AUTO: 188 K/UL — SIGNIFICANT CHANGE UP (ref 150–400)
POTASSIUM SERPL-MCNC: 3.1 MMOL/L — LOW (ref 3.5–5.3)
POTASSIUM SERPL-SCNC: 3.1 MMOL/L — LOW (ref 3.5–5.3)
RBC # BLD: 3.35 M/UL — LOW (ref 3.8–5.2)
RBC # FLD: 13.6 % — SIGNIFICANT CHANGE UP (ref 10.3–14.5)
SODIUM SERPL-SCNC: 142 MMOL/L — SIGNIFICANT CHANGE UP (ref 135–145)
WBC # BLD: 9.85 K/UL — SIGNIFICANT CHANGE UP (ref 3.8–10.5)
WBC # FLD AUTO: 9.85 K/UL — SIGNIFICANT CHANGE UP (ref 3.8–10.5)

## 2024-02-29 PROCEDURE — 99232 SBSQ HOSP IP/OBS MODERATE 35: CPT

## 2024-02-29 RX ORDER — POTASSIUM PHOSPHATE, MONOBASIC POTASSIUM PHOSPHATE, DIBASIC 236; 224 MG/ML; MG/ML
30 INJECTION, SOLUTION INTRAVENOUS ONCE
Refills: 0 | Status: COMPLETED | OUTPATIENT
Start: 2024-02-29 | End: 2024-03-01

## 2024-02-29 RX ORDER — POTASSIUM CHLORIDE 20 MEQ
10 PACKET (EA) ORAL ONCE
Refills: 0 | Status: COMPLETED | OUTPATIENT
Start: 2024-02-29 | End: 2024-02-29

## 2024-02-29 RX ADMIN — Medication 100 MILLIEQUIVALENT(S): at 23:32

## 2024-02-29 RX ADMIN — Medication 111 MILLIGRAM(S): at 05:17

## 2024-02-29 RX ADMIN — PANTOPRAZOLE SODIUM 40 MILLIGRAM(S): 20 TABLET, DELAYED RELEASE ORAL at 05:17

## 2024-02-29 RX ADMIN — SODIUM CHLORIDE 75 MILLILITER(S): 9 INJECTION, SOLUTION INTRAVENOUS at 21:28

## 2024-02-29 RX ADMIN — PANTOPRAZOLE SODIUM 40 MILLIGRAM(S): 20 TABLET, DELAYED RELEASE ORAL at 17:40

## 2024-02-29 RX ADMIN — Medication 111 MILLIGRAM(S): at 17:40

## 2024-02-29 RX ADMIN — CHLORHEXIDINE GLUCONATE 1 APPLICATION(S): 213 SOLUTION TOPICAL at 11:27

## 2024-02-29 RX ADMIN — DONEPEZIL HYDROCHLORIDE 10 MILLIGRAM(S): 10 TABLET, FILM COATED ORAL at 21:27

## 2024-02-29 NOTE — PROGRESS NOTE ADULT - SUBJECTIVE AND OBJECTIVE BOX
INTERVAL HPI/OVERNIGHT EVENTS:    events noted, eris po    MEDICATIONS  (STANDING):  acyclovir IVPB 550 milliGRAM(s) IV Intermittent every 12 hours  chlorhexidine 2% Cloths 1 Application(s) Topical daily  dextrose 5% + sodium chloride 0.9%. 500 milliLiter(s) (75 mL/Hr) IV Continuous <Continuous>  donepezil 10 milliGRAM(s) Oral at bedtime  pantoprazole  Injectable 40 milliGRAM(s) IV Push two times a day  potassium chloride   Powder 40 milliEquivalent(s) Oral once    MEDICATIONS  (PRN):      Allergies    sulfamethoxazole (Vomiting; Nausea)    Intolerances        Review of Systems:    General:  No wt loss, fevers, chills, night sweats, fatigue   Eyes:  Good vision, no reported pain  ENT:  No sore throat, pain, runny nose, dysphagia  CV:  No pain, palpitations, hypo/hypertension  Resp:  No dyspnea, cough, tachypnea, wheezing  GI:  No pain, No nausea, No vomiting, No diarrhea, No constipation, No weight loss, No fever, No pruritis, No rectal bleeding, No melena, No dysphagia  :  No pain, bleeding, incontinence, nocturia  Muscle:  No pain, weakness  Neuro:  No weakness, tingling, memory problems  Psych:  No fatigue, insomnia, mood problems, depression  Endocrine:  No polyuria, polydypsia, cold/heat intolerance  Heme:  No petechiae, ecchymosis, easy bruisability  Skin:  No rash, tattoos, scars, edema      Vital Signs Last 24 Hrs  T(C): 36.4 (29 Feb 2024 10:07), Max: 36.9 (29 Feb 2024 00:00)  T(F): 97.6 (29 Feb 2024 10:07), Max: 98.4 (29 Feb 2024 00:00)  HR: 109 (29 Feb 2024 10:07) (73 - 109)  BP: 126/78 (29 Feb 2024 10:07) (126/78 - 157/80)  BP(mean): --  RR: 18 (29 Feb 2024 10:07) (18 - 18)  SpO2: 95% (29 Feb 2024 10:07) (92% - 95%)    Parameters below as of 29 Feb 2024 10:07  Patient On (Oxygen Delivery Method): nasal cannula  O2 Flow (L/min): 3      PHYSICAL EXAM:    Constitutional: NAD  HEENT: EOMI, throat clear  Neck: No LAD, supple  Respiratory: CTA and P  Cardiovascular: S1 and S2, RRR, no M  Gastrointestinal: BS+, soft, NT/ND, neg HSM,  Extremities: No peripheral edema, neg clubbing, cyanosis  Vascular: 2+ peripheral pulses  Neurological: A/O x 3, no focal deficits  Psychiatric: Normal mood, normal affect  Skin: No rashes      LABS:                        10.6   9.85  )-----------( 188      ( 29 Feb 2024 10:07 )             31.1     02-29    142  |  102  |  13  ----------------------------<  75  3.1<L>   |  27  |  0.65    Ca    8.0<L>      29 Feb 2024 10:07  Phos  2.3     02-29  Mg     1.8     02-28        Urinalysis Basic - ( 29 Feb 2024 10:07 )    Color: x / Appearance: x / SG: x / pH: x  Gluc: 75 mg/dL / Ketone: x  / Bili: x / Urobili: x   Blood: x / Protein: x / Nitrite: x   Leuk Esterase: x / RBC: x / WBC x   Sq Epi: x / Non Sq Epi: x / Bacteria: x        RADIOLOGY & ADDITIONAL TESTS:

## 2024-02-29 NOTE — PROGRESS NOTE ADULT - SUBJECTIVE AND OBJECTIVE BOX
Patient is a 89y old  Female who presents with a chief complaint of AMS (29 Feb 2024 16:29)      INTERVAL HPI/OVERNIGHT EVENTS: Stable. Continue current treatment.     Pain Location & Control:     MEDICATIONS  (STANDING):  acyclovir IVPB 550 milliGRAM(s) IV Intermittent every 12 hours  chlorhexidine 2% Cloths 1 Application(s) Topical daily  dextrose 5% + sodium chloride 0.9%. 500 milliLiter(s) (75 mL/Hr) IV Continuous <Continuous>  donepezil 10 milliGRAM(s) Oral at bedtime  pantoprazole  Injectable 40 milliGRAM(s) IV Push two times a day  potassium chloride   Powder 40 milliEquivalent(s) Oral once    MEDICATIONS  (PRN):      Allergies    sulfamethoxazole (Vomiting; Nausea)    Intolerances        REVIEW OF SYSTEMS:  CONSTITUTIONAL: No fever, weight loss, or fatigue  EYES: No eye pain, visual disturbances, or discharge  ENMT:  No difficulty hearing, tinnitus, vertigo; No sinus or throat pain  NECK: No pain or stiffness  BREASTS: No pain, masses, or nipple discharge  RESPIRATORY: No cough, wheezing, chills or hemoptysis; No shortness of breath  CARDIOVASCULAR: No chest pain, palpitations, dizziness, or leg swelling  GASTROINTESTINAL: No abdominal or epigastric pain. No nausea, vomiting, or hematemesis; No diarrhea or constipation. No melena or hematochezia.  GENITOURINARY: No dysuria, frequency, hematuria, or incontinence  NEUROLOGICAL: No headaches, memory loss, loss of strength, numbness, or tremors  SKIN: No itching, burning, rashes, or lesions   LYMPH NODES: No enlarged glands  ENDOCRINE: No heat or cold intolerance; No hair loss; No polydipsia or polyuria  MUSCULOSKELETAL: No back pain  PSYCHIATRIC: No depression, anxiety, mood swings, or difficulty sleeping  HEME/LYMPH: No easy bruising, or bleeding gums  ALLERGY AND IMMUNOLOGIC: No hives or eczema    Vital Signs Last 24 Hrs  T(C): 36.7 (29 Feb 2024 14:00), Max: 36.9 (29 Feb 2024 00:00)  T(F): 98 (29 Feb 2024 14:00), Max: 98.4 (29 Feb 2024 00:00)  HR: 86 (29 Feb 2024 14:00) (73 - 109)  BP: 141/70 (29 Feb 2024 14:00) (126/78 - 157/80)  BP(mean): --  RR: 16 (29 Feb 2024 14:00) (16 - 18)  SpO2: 94% (29 Feb 2024 14:00) (92% - 95%)    Parameters below as of 29 Feb 2024 14:00  Patient On (Oxygen Delivery Method): nasal cannula  O2 Flow (L/min): 2      PHYSICAL EXAM:  GENERAL: NAD, well-groomed, well-developed  HEAD:  Atraumatic, Normocephalic  EYES: EOMI, PERRLA, conjunctiva and sclera clear  ENMT: No tonsillar erythema, exudates, or enlargement; Moist mucous membranes, Good dentition, No lesions  NECK: Supple, No JVD, Normal thyroid  NERVOUS SYSTEM:  Alert & Oriented X 2   CHEST/LUNG: Clear to auscultation bilaterally; No rales, rhonchi, wheezing, or rubs  HEART: Regular rate and rhythm; No murmurs, rubs, or gallops  ABDOMEN: Soft, Nontender, Nondistended; Bowel sounds present  EXTREMITIES:  2+ Peripheral Pulses, No clubbing or cyanosis  LYMPH: No lymphadenopathy noted  SKIN: No rashes or lesions      LABS:                        10.6   9.85  )-----------( 188      ( 29 Feb 2024 10:07 )             31.1     29 Feb 2024 10:07    142    |  102    |  13     ----------------------------<  75     3.1     |  27     |  0.65     Ca    8.0        29 Feb 2024 10:07  Phos  2.3       29 Feb 2024 10:07        Urinalysis Basic - ( 29 Feb 2024 10:07 )    Color: x / Appearance: x / SG: x / pH: x  Gluc: 75 mg/dL / Ketone: x  / Bili: x / Urobili: x   Blood: x / Protein: x / Nitrite: x   Leuk Esterase: x / RBC: x / WBC x   Sq Epi: x / Non Sq Epi: x / Bacteria: x      CAPILLARY BLOOD GLUCOSE      POCT Blood Glucose.: 98 mg/dL (29 Feb 2024 12:32)  POCT Blood Glucose.: 124 mg/dL (28 Feb 2024 17:57)        Cultures      RADIOLOGY & ADDITIONAL TESTS:    Imaging Personally Reviewed:  [X ] YES  [ ] NO    Consultant(s) Notes Reviewed:  [ X] YES  [ ] NO    Care Discussed with Consultants/Other Providers [ X] YES  [ ] NO

## 2024-02-29 NOTE — PROGRESS NOTE ADULT - SUBJECTIVE AND OBJECTIVE BOX
Patient is a 89y old  Female who presents with a chief complaint of AMS (29 Feb 2024 13:22)    Being followed by ID for        Interval history:  No other acute events      ROS:  No cough,SOB,CP  No N/V/D  No abd pain  No urinary complaints  No HA  No joint or limb pain  No other complaints    PAST MEDICAL & SURGICAL HISTORY:  Myasthenia gravis      Dementia      Myasthenia gravis      No significant past surgical history      No significant past surgical history        Allergies    sulfamethoxazole (Vomiting; Nausea)    Intolerances      Antimicrobials:    acyclovir IVPB 550 milliGRAM(s) IV Intermittent every 12 hours    MEDICATIONS  (STANDING):  acyclovir IVPB 550 milliGRAM(s) IV Intermittent every 12 hours  chlorhexidine 2% Cloths 1 Application(s) Topical daily  dextrose 5% + sodium chloride 0.9%. 500 milliLiter(s) (75 mL/Hr) IV Continuous <Continuous>  donepezil 10 milliGRAM(s) Oral at bedtime  pantoprazole  Injectable 40 milliGRAM(s) IV Push two times a day  potassium chloride   Powder 40 milliEquivalent(s) Oral once      Vital Signs Last 24 Hrs  T(C): 36.4 (02-29-24 @ 10:07), Max: 36.9 (02-29-24 @ 00:00)  T(F): 97.6 (02-29-24 @ 10:07), Max: 98.4 (02-29-24 @ 00:00)  HR: 109 (02-29-24 @ 10:07) (73 - 109)  BP: 126/78 (02-29-24 @ 10:07) (126/78 - 157/80)  BP(mean): --  RR: 18 (02-29-24 @ 10:07) (18 - 18)  SpO2: 95% (02-29-24 @ 10:07) (92% - 95%)    Physical Exam:    Constitutional well preserved,comfortable,pleasant    HEENT PERRLA EOMI,No pallor or icterus    No oral exudate or erythema    Neck supple no JVD or LN    Chest Good AE,CTA    CVS RRR S1 S2 WNl No murmur or rub or gallop    Abd soft BS normal No tenderness no masses    Ext No cyanosis clubbing or edema    IV site no erythema tenderness or discharge    Joints no swelling or LOM    CNS AAO X 3 no focal    Lab Data:                          10.6   9.85  )-----------( 188      ( 29 Feb 2024 10:07 )             31.1       02-29    142  |  102  |  13  ----------------------------<  75  3.1<L>   |  27  |  0.65    Ca    8.0<L>      29 Feb 2024 10:07  Phos  2.3     02-29  Mg     1.8     02-28        Urinalysis Basic - ( 29 Feb 2024 10:07 )    Color: x / Appearance: x / SG: x / pH: x  Gluc: 75 mg/dL / Ketone: x  / Bili: x / Urobili: x   Blood: x / Protein: x / Nitrite: x   Leuk Esterase: x / RBC: x / WBC x   Sq Epi: x / Non Sq Epi: x / Bacteria: x                      WBC Count: 9.85 (02-29-24 @ 10:07)  WBC Count: 8.48 (02-28-24 @ 06:57)  WBC Count: 9.10 (02-27-24 @ 13:50)  WBC Count: 11.80 (02-26-24 @ 07:21)  WBC Count: 12.22 (02-25-24 @ 07:02)  WBC Count: 14.14 (02-23-24 @ 07:25)              Patient is a 89y old  Female who presents with a chief complaint of AMS (29 Feb 2024 13:22)    Being followed by ID for ams        Interval history:  pt continues to improve   completed IV abs  still on IV acyclovir   No other acute events        PAST MEDICAL & SURGICAL HISTORY:  Myasthenia gravis      Dementia      Myasthenia gravis      No significant past surgical history      No significant past surgical history        Allergies    sulfamethoxazole (Vomiting; Nausea)    Intolerances      Antimicrobials:    acyclovir IVPB 550 milliGRAM(s) IV Intermittent every 12 hours    MEDICATIONS  (STANDING):  acyclovir IVPB 550 milliGRAM(s) IV Intermittent every 12 hours  chlorhexidine 2% Cloths 1 Application(s) Topical daily  dextrose 5% + sodium chloride 0.9%. 500 milliLiter(s) (75 mL/Hr) IV Continuous <Continuous>  donepezil 10 milliGRAM(s) Oral at bedtime  pantoprazole  Injectable 40 milliGRAM(s) IV Push two times a day  potassium chloride   Powder 40 milliEquivalent(s) Oral once      Vital Signs Last 24 Hrs  T(C): 36.4 (02-29-24 @ 10:07), Max: 36.9 (02-29-24 @ 00:00)  T(F): 97.6 (02-29-24 @ 10:07), Max: 98.4 (02-29-24 @ 00:00)  HR: 109 (02-29-24 @ 10:07) (73 - 109)  BP: 126/78 (02-29-24 @ 10:07) (126/78 - 157/80)  BP(mean): --  RR: 18 (02-29-24 @ 10:07) (18 - 18)  SpO2: 95% (02-29-24 @ 10:07) (92% - 95%)    Physical Exam:    Constitutional well preserved,comfortable,pleasant    HEENT PERRLA EOMI,No pallor or icterus    No oral exudate or erythema    Neck supple no JVD or LN    Chest Good AE,CTA    CVS  S1 S2     Abd soft BS normal No tenderness     Ext No cyanosis clubbing or edema    IV site no erythema tenderness or discharge    Joints no swelling or LOM        Lab Data:                          10.6   9.85  )-----------( 188      ( 29 Feb 2024 10:07 )             31.1       02-29    142  |  102  |  13  ----------------------------<  75  3.1<L>   |  27  |  0.65    Ca    8.0<L>      29 Feb 2024 10:07  Phos  2.3     02-29  Mg     1.8     02-28          WBC Count: 9.85 (02-29-24 @ 10:07)  WBC Count: 8.48 (02-28-24 @ 06:57)  WBC Count: 9.10 (02-27-24 @ 13:50)  WBC Count: 11.80 (02-26-24 @ 07:21)  WBC Count: 12.22 (02-25-24 @ 07:02)  WBC Count: 14.14 (02-23-24 @ 07:25)

## 2024-02-29 NOTE — PROGRESS NOTE ADULT - ASSESSMENT
The patient is a 89F with dementia and Myasthenia Gravis admitted for AMS.     1. AMS  improving, she is more conversive   ID workup per primary team     2. Cholelithiasis, distended GB   no e/o acute cholecystitis or biliary obstruction on HIDA scan    3. peripancreatic fat stranding   low suspicion for acute pancreatitis   ppi daily while inpatient, then can d/c    4. abnormal LFTs, downtrending    likely a byproduct of systemic infection   hepatitis panel neg    trend LFTs     5. Dysphagia  appreciate speech pathology      I had a prolonged conversation with the patient regarding the hospital course, differential diagnosis, results of diagnostic tests this far, and therapeutic modalities available. Plan of care discussed with the patient after the evaluation. Patient expresses a clear understanding of the plan of care.       Jj Harrison D.O.  Gastroenterology and Hepatology  [address] [place] [address], [address]  [place], [place]  Office: [phone]

## 2024-02-29 NOTE — CHART NOTE - NSCHARTNOTEFT_GEN_A_CORE
Nutrition Follow Up Note  Patient seen for: 3-DAY Calorie Count Initiation     Chart reviewed, events noted.    Source: [] Patient       [x]Current Medical record       [X] RN        [] Family at bedside       [X] Other: PCA    -If unable to interview patient: [] Trach/Vent/BiPAP  [] Disoriented/confused/inappropriate to interview    Diet Order:   Diet, Pureed:   Single Sips Only (24)    - Is current order appropriate/adequate? [X] Yes  []  No:     - PO intake :   [] >75%  Adequate    [] 50-75%  Fair       [X] <50%  Poor    - Nutrition-related concerns:      - Intake: Was assess by SLP, now ordered for PO diet of pureed with single sips only       -Ordered for IVF of dextrose 5% + sodium chloride 0.45% for hydration      -GI: Constipation; not ordered for any Bowel Regimen.       - Patient triggered for nutrition consult for 3-Day calorie count. Calorie Count sheet placed by RD in patient's assigned room, nursing staff made aware.       - Renal: Hypokalemia; ordered for KCL powder for repletion         Weights: Drug Dosing Weight  Weight (kg): 54.4 (2024 12:19)  BMI (kg/m2): 20 (2024 12:19)  Daily Weight in k.2 ()    Nutritionally Pertinent MEDICATIONS  (STANDING):  acyclovir IVPB 550 milliGRAM(s) IV Intermittent every 12 hours  dextrose 5% + sodium chloride 0.9%. 500 milliLiter(s) (75 mL/Hr) IV Continuous <Continuous>  donepezil 10 milliGRAM(s) Oral at bedtime  pantoprazole  Injectable 40 milliGRAM(s) IV Push two times a day  potassium chloride   Powder 40 milliEquivalent(s) Oral once    Pertinent Labs:  @ 10:07: Na 142, BUN 13, Cr 0.65, BG 75, K+ 3.1<L>, Phos 2.3<L>, Mg --, Alk Phos --, ALT/SGPT --, AST/SGOT --, HbA1c --    Finger Sticks:  POCT Blood Glucose.: 124 mg/dL ( @ 17:57)      Skin per nursing documentation: -- No pressure injuries noted per flow sheets   Edema: -- +1 generalized edema      Estimated Needs:   [X] no change since previous assessment  Based on IBW of 125 lbs/ 56.7 kg  25-30 kcal/kg=1,417.5-1,701 kcal  1.0-1.2 gm/kg= 56.7- 68.04 gram pro  [] recalculated:     Previous Nutrition Diagnosis:   1. Swallowing Difficulty  2. Acute Severe Malnutrition  Nutrition Diagnosis is: [X] ongoing  [] resolved [] not applicable     New Nutrition Diagnosis: [X] Not applicable    Nutrition Care Plan:  [X] In Progress  [] Achieved  [] Not applicable    Nutrition Interventions:     Education Provided:       [] Yes:  [X] No:        Recommendations:      1. Recommend  to continue no therapeutic dietary restrictions  2. Defer diet/texture advancement to medical team/SLP as indicated   3. Add Multivitamin and Thiamine daily in setting of refeeding risk   4.  Will add Magic Cup 2x/day to optimize PO intakes   5. Nutrition Services to follow up with results  of Calorie Count upon completion.     Monitoring and Evaluation:   Continue to monitor nutritional intake, tolerance to diet prescription, weights, labs, skin integrity    RD remains available upon request and will follow up per protocol  Jimena Alicia, MS,RDN,CDN AVAILABLE ON TEAMS

## 2024-02-29 NOTE — PROGRESS NOTE ADULT - ASSESSMENT
89 y/o F with PMHx of Myasthenia Gravis (not on medication) and dementia (A&Ox1, conversive at baseline) who presents from her nursing home with worsening lethargy and confusion. UA done as outpatient on 2/13 grossly positive and urine culture growing E. coli and Klebsiella, pansensitive. Found to be hypothermic in ED. Admitted for sepsis and metabolic encephalopathy in setting of UTI. Multiple RRTs called today. First RRT called for seizure-like activity. Patient loaded with Keppra and placed on EEG. Second RRT called for hypotension. Patient was given 4 L IVF with resolution of hypotension.      A/P    #R/O sepsis  unclear what caused acute decompensation. Pt was treated for UTI but urine negative here. Pt with remote h/o Myasthenia gravis and mild dementia.  Pt with possible seizure - 2/20  pt changed to meropenem to cover for possible meningitis-   ? Normal pressure hydrocephalous on CT- discussed with neuro- this wouldn't account for her acute change  on meropenem to cover for possible meningitis and /or to cover biliary sepsis   EEG not c/w HSV encephalits, but added  acyclovir until more definitive diagnosis available, will adjust to renal function- day #7  Hypernatremia improved    would avoid certain abs- such as cipro with the myasthenia graavis- suggest neuro import to address     CT with distended GB but HIDA negative. ? pancreatitis .amylase and lipase were elevated but improving.  could patient have passed a stones   completed  meropenem and day # 7 acyclovir  ( will need to continue to monitor renal function)    #elevate LFTs  HIDA negative   check hepatitis screen  avoid hepatotoxci meds   trend - Please repeat      #hypernatremia  resolved    #thrombocytopenia  can't do spinal tap if low  hematology to address  improving       Yeni Gabriel M.D. ,   please reach via teams   If no answer, or after 5PM/ weekends,  then please call  [phone]    Assessment and plan discussed with the primary team  and with neurology

## 2024-02-29 NOTE — CHART NOTE - NSCHARTNOTEFT_GEN_A_CORE
89 y/o F with PMHx of Myasthenia Gravis (not on medication) and dementia (A&Ox1, conversive at baseline) who presents from her nursing home with worsening lethargy and confusion. UA done as outpatient grossly positive and urine culture growing E. coli and Klebsiella. Patient was started on ciprofloxacin 3 days prior to presentation but has not improved. In the ED, patient found to be hypothermic (89 F). CTH with moderate ventriculomegaly with findings suggestive of normal pressure hydrocephalus. CTA head and neck unremarkable. CXR clear. Patient given CTX for UTI. Outpatient UA from 2/13 with small LE, moderate bacteria, and WBC 84. Urine culture 2/13 positive for E. coli and Klebsiella. ENT consulted on 2/20 for tongue and lip swelling. On fiberoptic flexible laryngoscopy no laryngeal edema, no blood or active bleeding, no pooling of secretions in larynx, no aspiration. Patent airway. 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 also noted large glottic gap from right vocal cord paresis and left vocal cord paralysis in paramedian position during inhalations and exhalations. Recommended decadron and PPI, as well as re-scope when pt awake to assess VF movement with phonation. GI consulted -> no e/o acute cholecystitis or biliary obstruction on HIDA scan. Per RD note no active diet order since 2/19. Swallow hx: seen by this service 1/4/2020 with recommendations for soft diet 2/2 inadequate dentition for mastication. 2/26/24 Swallowing evaluation upon this admission recommended puree with moderately thickened liquids. 2/28/24 Swallowing reassessment at b/s recommended puree diet with single sips of thin liquids.  Pt seen on this date for swallowing therapy f/u. . Pt positioned semi upright in bed, oriented to self and denied pain. Pt intermittently following commands with hypophonia and decreased speech intelligibility noted. Oral mucosa unremarkable and lingual ROM WFL. Pt required feeding assistance and tactile cues for cup drinking (hand over hand assistance). Labial weakness with decreased oral grading/labial seal to cup noted. In addition, decreased formation, control and transfer of bolus suspected with thin liquids with mild intermittent a-p spillage noted. Coughing noted s/p sequential sip of thin liquid was evident as Pt did not follow commands to take single sips. Pt evidences improved oral grading and control of bolus with moderately thickened liquids and purees; no s/s of aspiration evident.     Impressions: Pt presents with oral preparatory and oral stage dysphagia which may be superimposed upon by fluctuation in mentation and labial edema. Intermittent anterior spillage noted with cup drinking on thin liquids and cough post swallow on sequential sips of thin liquids may be s/s of laryngeal penetration/aspiration.  Recommendations: Puree diet with moderately thickened liquids and total feeding assistance with tactile cues (hand over hand for cup drinking). Maintain aspiration precautions. Restorative swallow therapy f/u 1 x week; Pt will tolerate diet with no s/s of aspiration. Will continue to follow while patient is in-house; swallowing therapy post d/c; swallowing therapy post d/c. SLP communicated aforementioned information with CORRIE Awad via teams  Berna Law MS CCC-SLP   Pgr # [phone].

## 2024-02-29 NOTE — PROGRESS NOTE ADULT - SUBJECTIVE AND OBJECTIVE BOX
more conversative    REVIEW OF SYSTEMS:    RESPIRATORY: No cough, wheezing, hemoptysis; No shortness of breath  CARDIOVASCULAR: No chest pain or palpitations    VITAL SIGNS:    T98 P86, /70 RR16    PHYSICAL EXAM:     RESPIRATORY: LCTAB/L, no rhonchi, rales, or wheezing  CARDIOVASCULAR: RRR, no murmurs, gallops, rubs  ABDOMINAL: soft, non-tender, non-distended, positive bowel sounds   EXTREMITIES: no clubbing, cyanosis, or edema  NEUROLOGICAL: alert                       10.6   9.85  )-----------( 188      ( 29 Feb 2024 10:07 )             31.1     02-29    142  |  102  |  13  ----------------------------<  75  3.1<L>   |  27  |  0.65    Ca    8.0<L>      29 Feb 2024 10:07  Phos  2.3     02-29  Mg     1.8     02-28        MEDICATIONS  (STANDING):  acyclovir IVPB 550 milliGRAM(s) IV Intermittent every 12 hours  chlorhexidine 2% Cloths 1 Application(s) Topical daily  dextrose 5% + sodium chloride 0.9%. 500 milliLiter(s) (75 mL/Hr) IV Continuous <Continuous>  donepezil 10 milliGRAM(s) Oral at bedtime  pantoprazole  Injectable 40 milliGRAM(s) IV Push two times a day  potassium chloride   Powder 40 milliEquivalent(s) Oral once

## 2024-02-29 NOTE — PROGRESS NOTE ADULT - ASSESSMENT
88 YO F with PMHx of Myasthenia Gravis (not on medication for past 9 years) and MHx of dementia     Assessment/Plan:  AMS(most likely due to metabolic/toxic encephalopathy  due to UTI) - – improved back to baseline mental status with mild dementia.  Most likely due to NPH. Evaluated by neuro recommended f/u  with neurology as outpatient. ID recommended Total 7 days Meropenem and 14 days Acyclvir for possible Herpes Encephalitis.   Hypoglycemia- resolved. Appetite improved.   cholelithiasis - without  cholecystitis neither pancreatitis. Normal amylase and lipase.   hypotension- continue IV steroid and IV fluid as needed   thrombocytopenia- PLT down to 44,000. DIC panel not significantly positive. PTT mildly elevated. Otherwise PT/INR normal. D/ C Lovenox. F/u hematology.  No plan for LP at this time. Family denied   elevated LFT- Will  check RUQ ultrasound and Hepatitis panel.   NPH- - neurology does not believe acute mental status changes, could be  due to NPH. F/u neurology as outpatient  for NPH workup.   UTI with E.coli and Klebsiella as  outpatient culture. - IV abx upgraded to Vanco and Meropenem  DVT ppx- Lovenox discontinued due to thrombocytopenia   leg arthritis- Acetaminophen PRN.  b/l cataract- F/u ophthalmolog  vitamin D deficiency- continue Vitamin D supplement.  risk of malnutrition -stable, continue supplement diet. F/u dietitian. Monitor weight.  constipation- senna at bedtime.  LBP 2/2 severe spinal stenosis with out cord pressure - no surgical intervention recommended neither accepted by family, continue PT OT as needed.  Dementia - Stable. Continue Aricept .  Dysphagia - Mechanical soft diet, aspiration precautions.  Myasthenia Gravis - stable , off of medication. Monitor CBC and BMP every 2 months.

## 2024-03-01 LAB
ACHR BLOCK AB SER-ACNC: 27 % — HIGH (ref 0–25)
ACHR MOD AB SER-ACNC: 25 % — SIGNIFICANT CHANGE UP (ref 0–45)
ANION GAP SERPL CALC-SCNC: 10 MMOL/L — SIGNIFICANT CHANGE UP (ref 5–17)
BUN SERPL-MCNC: 16 MG/DL — SIGNIFICANT CHANGE UP (ref 7–23)
CALCIUM SERPL-MCNC: 8.7 MG/DL — SIGNIFICANT CHANGE UP (ref 8.4–10.5)
CHLORIDE SERPL-SCNC: 105 MMOL/L — SIGNIFICANT CHANGE UP (ref 96–108)
CO2 SERPL-SCNC: 26 MMOL/L — SIGNIFICANT CHANGE UP (ref 22–31)
CREAT SERPL-MCNC: 1.22 MG/DL — SIGNIFICANT CHANGE UP (ref 0.5–1.3)
EGFR: 42 ML/MIN/1.73M2 — LOW
GLUCOSE BLDC GLUCOMTR-MCNC: 106 MG/DL — HIGH (ref 70–99)
GLUCOSE BLDC GLUCOMTR-MCNC: 118 MG/DL — HIGH (ref 70–99)
GLUCOSE BLDC GLUCOMTR-MCNC: 136 MG/DL — HIGH (ref 70–99)
GLUCOSE BLDC GLUCOMTR-MCNC: 138 MG/DL — HIGH (ref 70–99)
GLUCOSE SERPL-MCNC: 114 MG/DL — HIGH (ref 70–99)
HCT VFR BLD CALC: 31 % — LOW (ref 34.5–45)
HGB BLD-MCNC: 10.6 G/DL — LOW (ref 11.5–15.5)
MCHC RBC-ENTMCNC: 32.1 PG — SIGNIFICANT CHANGE UP (ref 27–34)
MCHC RBC-ENTMCNC: 34.2 GM/DL — SIGNIFICANT CHANGE UP (ref 32–36)
MCV RBC AUTO: 93.9 FL — SIGNIFICANT CHANGE UP (ref 80–100)
NRBC # BLD: 0 /100 WBCS — SIGNIFICANT CHANGE UP (ref 0–0)
PHOSPHATE SERPL-MCNC: 4.1 MG/DL — SIGNIFICANT CHANGE UP (ref 2.5–4.5)
PLATELET # BLD AUTO: 223 K/UL — SIGNIFICANT CHANGE UP (ref 150–400)
POTASSIUM SERPL-MCNC: 3.6 MMOL/L — SIGNIFICANT CHANGE UP (ref 3.5–5.3)
POTASSIUM SERPL-SCNC: 3.6 MMOL/L — SIGNIFICANT CHANGE UP (ref 3.5–5.3)
RBC # BLD: 3.3 M/UL — LOW (ref 3.8–5.2)
RBC # FLD: 13.5 % — SIGNIFICANT CHANGE UP (ref 10.3–14.5)
SODIUM SERPL-SCNC: 141 MMOL/L — SIGNIFICANT CHANGE UP (ref 135–145)
WBC # BLD: 11.23 K/UL — HIGH (ref 3.8–10.5)
WBC # FLD AUTO: 11.23 K/UL — HIGH (ref 3.8–10.5)

## 2024-03-01 PROCEDURE — 99232 SBSQ HOSP IP/OBS MODERATE 35: CPT

## 2024-03-01 RX ORDER — SODIUM CHLORIDE 9 MG/ML
1000 INJECTION INTRAMUSCULAR; INTRAVENOUS; SUBCUTANEOUS
Refills: 0 | Status: DISCONTINUED | OUTPATIENT
Start: 2024-03-01 | End: 2024-03-03

## 2024-03-01 RX ADMIN — CHLORHEXIDINE GLUCONATE 1 APPLICATION(S): 213 SOLUTION TOPICAL at 12:49

## 2024-03-01 RX ADMIN — Medication 40 MILLIEQUIVALENT(S): at 05:59

## 2024-03-01 RX ADMIN — SODIUM CHLORIDE 75 MILLILITER(S): 9 INJECTION INTRAMUSCULAR; INTRAVENOUS; SUBCUTANEOUS at 19:01

## 2024-03-01 RX ADMIN — PANTOPRAZOLE SODIUM 40 MILLIGRAM(S): 20 TABLET, DELAYED RELEASE ORAL at 19:07

## 2024-03-01 RX ADMIN — DONEPEZIL HYDROCHLORIDE 10 MILLIGRAM(S): 10 TABLET, FILM COATED ORAL at 22:46

## 2024-03-01 RX ADMIN — Medication 111 MILLIGRAM(S): at 19:00

## 2024-03-01 RX ADMIN — POTASSIUM PHOSPHATE, MONOBASIC POTASSIUM PHOSPHATE, DIBASIC 83.33 MILLIMOLE(S): 236; 224 INJECTION, SOLUTION INTRAVENOUS at 02:28

## 2024-03-01 RX ADMIN — PANTOPRAZOLE SODIUM 40 MILLIGRAM(S): 20 TABLET, DELAYED RELEASE ORAL at 05:59

## 2024-03-01 RX ADMIN — Medication 111 MILLIGRAM(S): at 05:59

## 2024-03-01 RX ADMIN — SODIUM CHLORIDE 75 MILLILITER(S): 9 INJECTION, SOLUTION INTRAVENOUS at 02:28

## 2024-03-01 NOTE — PROGRESS NOTE ADULT - SUBJECTIVE AND OBJECTIVE BOX
Patient is a 89y old  Female who presents with a chief complaint of AMS (01 Mar 2024 12:27)    Being followed by ID for        Interval history:  No other acute events      ROS:  No cough,SOB,CP  No N/V/D  No abd pain  No urinary complaints  No HA  No joint or limb pain  No other complaints    PAST MEDICAL & SURGICAL HISTORY:  Myasthenia gravis      Dementia      Myasthenia gravis      No significant past surgical history      No significant past surgical history        Allergies    sulfamethoxazole (Vomiting; Nausea)    Intolerances      Antimicrobials:    acyclovir IVPB 550 milliGRAM(s) IV Intermittent every 12 hours    MEDICATIONS  (STANDING):  acyclovir IVPB 550 milliGRAM(s) IV Intermittent every 12 hours  chlorhexidine 2% Cloths 1 Application(s) Topical daily  dextrose 5% + sodium chloride 0.9%. 500 milliLiter(s) (75 mL/Hr) IV Continuous <Continuous>  donepezil 10 milliGRAM(s) Oral at bedtime  pantoprazole  Injectable 40 milliGRAM(s) IV Push two times a day      Vital Signs Last 24 Hrs  T(C): 36.3 (03-01-24 @ 04:07), Max: 36.8 (02-29-24 @ 16:26)  T(F): 97.4 (03-01-24 @ 04:07), Max: 98.2 (02-29-24 @ 16:26)  HR: 81 (03-01-24 @ 04:07) (77 - 88)  BP: 136/75 (03-01-24 @ 09:08) (136/75 - 146/86)  BP(mean): --  RR: 18 (03-01-24 @ 09:08) (17 - 18)  SpO2: 94% (03-01-24 @ 09:08) (94% - 95%)    Physical Exam:    Constitutional well preserved,comfortable,pleasant    HEENT PERRLA EOMI,No pallor or icterus    No oral exudate or erythema    Neck supple no JVD or LN    Chest Good AE,CTA    CVS RRR S1 S2 WNl No murmur or rub or gallop    Abd soft BS normal No tenderness no masses    Ext No cyanosis clubbing or edema    IV site no erythema tenderness or discharge    Joints no swelling or LOM    CNS AAO X 3 no focal    Lab Data:                          10.6   11.23 )-----------( 223      ( 01 Mar 2024 07:07 )             31.0       03-01    141  |  105  |  16  ----------------------------<  114<H>  3.6   |  26  |  1.22    Ca    8.7      01 Mar 2024 07:06  Phos  4.1     03-01        Urinalysis Basic - ( 01 Mar 2024 07:06 )    Color: x / Appearance: x / SG: x / pH: x  Gluc: 114 mg/dL / Ketone: x  / Bili: x / Urobili: x   Blood: x / Protein: x / Nitrite: x   Leuk Esterase: x / RBC: x / WBC x   Sq Epi: x / Non Sq Epi: x / Bacteria: x                      WBC Count: 11.23 (03-01-24 @ 07:07)  WBC Count: 9.85 (02-29-24 @ 10:07)  WBC Count: 8.48 (02-28-24 @ 06:57)  WBC Count: 9.10 (02-27-24 @ 13:50)  WBC Count: 11.80 (02-26-24 @ 07:21)  WBC Count: 12.22 (02-25-24 @ 07:02)              Patient is a 89y old  Female who presents with a chief complaint of AMS (01 Mar 2024 12:27)    Being followed by ID for AMS        Interval history:    Patient continues to improve  daughter has been feeding pt  No other acute events        PAST MEDICAL & SURGICAL HISTORY:  Myasthenia gravis      Dementia      Myasthenia gravis      No significant past surgical history      No significant past surgical history        Allergies    sulfamethoxazole (Vomiting; Nausea)    Intolerances      Antimicrobials:    acyclovir IVPB 550 milliGRAM(s) IV Intermittent every 12 hours    MEDICATIONS  (STANDING):  acyclovir IVPB 550 milliGRAM(s) IV Intermittent every 12 hours  chlorhexidine 2% Cloths 1 Application(s) Topical daily  dextrose 5% + sodium chloride 0.9%. 500 milliLiter(s) (75 mL/Hr) IV Continuous <Continuous>  donepezil 10 milliGRAM(s) Oral at bedtime  pantoprazole  Injectable 40 milliGRAM(s) IV Push two times a day      Vital Signs Last 24 Hrs  T(C): 36.3 (03-01-24 @ 04:07), Max: 36.8 (02-29-24 @ 16:26)  T(F): 97.4 (03-01-24 @ 04:07), Max: 98.2 (02-29-24 @ 16:26)  HR: 81 (03-01-24 @ 04:07) (77 - 88)  BP: 136/75 (03-01-24 @ 09:08) (136/75 - 146/86)  BP(mean): --  RR: 18 (03-01-24 @ 09:08) (17 - 18)  SpO2: 94% (03-01-24 @ 09:08) (94% - 95%)    Physical Exam:    Constitutional  resting quielty   talks at times  - hard to understabd    HEENT PERRLA EOMI,No pallor or icterus    No oral exudate or erythema    Neck supple no JVD or LN    Chest Good AE,CTA    CVS  S1 S2     Abd soft BS normal No tenderness     Ext No cyanosis clubbing or edema    IV site no erythema tenderness or discharge    Joints no swelling or LOM        Lab Data:                          10.6   11.23 )-----------( 223      ( 01 Mar 2024 07:07 )             31.0       03-01    141  |  105  |  16  ----------------------------<  114<H>  3.6   |  26  |  1.22    Ca    8.7      01 Mar 2024 07:06  Phos  4.1     03-01        Urinalysis (02.19.24 @ 12:56)    Glucose Qualitative, Urine: Negative mg/dL   pH Urine: 5.0   Blood, Urine: Negative   Color: Yellow   Urine Appearance: Clear   Bilirubin: Negative   Ketone - Urine: Trace mg/dL   Specific Gravity: 1.026   Protein, Urine: Trace mg/dL   Urobilinogen: 1.0 mg/dL   Nitrite: Negative   Leukocyte Esterase Concentration: Negative        WBC Count: 11.23 (03-01-24 @ 07:07)  WBC Count: 9.85 (02-29-24 @ 10:07)  WBC Count: 8.48 (02-28-24 @ 06:57)  WBC Count: 9.10 (02-27-24 @ 13:50)  WBC Count: 11.80 (02-26-24 @ 07:21)  WBC Count: 12.22 (02-25-24 @ 07:02)

## 2024-03-01 NOTE — PROGRESS NOTE ADULT - SUBJECTIVE AND OBJECTIVE BOX
Patient is a 89y old  Female who presents with a chief complaint of AMS (01 Mar 2024 14:41)      INTERVAL HPI/OVERNIGHT EVENTS: Stable. Cre mildly elevated. Patient not eating. Changed IV fluid NS 75 cc/hr. Monitor renal function closely. Discussed with daughter at bedside.      Pain Location & Control:     MEDICATIONS  (STANDING):  acyclovir IVPB 550 milliGRAM(s) IV Intermittent every 12 hours  chlorhexidine 2% Cloths 1 Application(s) Topical daily  donepezil 10 milliGRAM(s) Oral at bedtime  pantoprazole  Injectable 40 milliGRAM(s) IV Push two times a day  sodium chloride 0.9%. 1000 milliLiter(s) (75 mL/Hr) IV Continuous <Continuous>    MEDICATIONS  (PRN):      Allergies    sulfamethoxazole (Vomiting; Nausea)    Intolerances        REVIEW OF SYSTEMS:  CONSTITUTIONAL: No fever, weight loss, or fatigue  EYES: No eye pain, visual disturbances, or discharge  ENMT:  No difficulty hearing, tinnitus, vertigo; No sinus or throat pain  NECK: No pain or stiffness  BREASTS: No pain, masses, or nipple discharge  RESPIRATORY: No cough, wheezing, chills or hemoptysis; No shortness of breath  CARDIOVASCULAR: No chest pain, palpitations, dizziness, or leg swelling  GASTROINTESTINAL: No abdominal or epigastric pain. No nausea, vomiting, or hematemesis; No diarrhea or constipation. No melena or hematochezia.  GENITOURINARY: No dysuria, frequency, hematuria, or incontinence  NEUROLOGICAL: No headaches, memory loss, loss of strength, numbness, or tremors  SKIN: No itching, burning, rashes, or lesions   LYMPH NODES: No enlarged glands  ENDOCRINE: No heat or cold intolerance; No hair loss; No polydipsia or polyuria  MUSCULOSKELETAL: No back pain  PSYCHIATRIC: No depression, anxiety, mood swings, or difficulty sleeping  HEME/LYMPH: No easy bruising, or bleeding gums  ALLERGY AND IMMUNOLOGIC: No hives or eczema    Vital Signs Last 24 Hrs  T(C): 36.3 (01 Mar 2024 04:07), Max: 36.3 (01 Mar 2024 00:00)  T(F): 97.4 (01 Mar 2024 04:07), Max: 97.4 (01 Mar 2024 00:00)  HR: 81 (01 Mar 2024 04:07) (81 - 88)  BP: 136/75 (01 Mar 2024 09:08) (136/75 - 146/86)  BP(mean): --  RR: 18 (01 Mar 2024 09:08) (18 - 18)  SpO2: 94% (01 Mar 2024 09:08) (94% - 95%)    Parameters below as of 01 Mar 2024 09:08  Patient On (Oxygen Delivery Method): nasal cannula  O2 Flow (L/min): 2      PHYSICAL EXAM:  GENERAL: NAD, well-groomed, well-developed  HEAD:  Atraumatic, Normocephalic  EYES: EOMI, PERRLA, conjunctiva and sclera clear  ENMT: No tonsillar erythema, exudates, or enlargement; Moist mucous membranes, Good dentition, No lesions  NECK: Supple, No JVD, Normal thyroid  NERVOUS SYSTEM:  Alert & Oriented X2  CHEST/LUNG: Clear to auscultation bilaterally; No rales, rhonchi, wheezing, or rubs  HEART: Regular rate and rhythm; No murmurs, rubs, or gallops  ABDOMEN: Soft, Nontender, Nondistended; Bowel sounds present  EXTREMITIES:  2+ Peripheral Pulses, No clubbing or cyanosis  LYMPH: No lymphadenopathy noted  SKIN: No rashes or lesions    LABS:                        10.6   11.23 )-----------( 223      ( 01 Mar 2024 07:07 )             31.0     01 Mar 2024 07:06    141    |  105    |  16     ----------------------------<  114    3.6     |  26     |  1.22     Ca    8.7        01 Mar 2024 07:06  Phos  4.1       01 Mar 2024 07:06        Urinalysis Basic - ( 01 Mar 2024 07:06 )    Color: x / Appearance: x / SG: x / pH: x  Gluc: 114 mg/dL / Ketone: x  / Bili: x / Urobili: x   Blood: x / Protein: x / Nitrite: x   Leuk Esterase: x / RBC: x / WBC x   Sq Epi: x / Non Sq Epi: x / Bacteria: x      CAPILLARY BLOOD GLUCOSE      POCT Blood Glucose.: 106 mg/dL (01 Mar 2024 12:44)  POCT Blood Glucose.: 118 mg/dL (01 Mar 2024 05:24)  POCT Blood Glucose.: 136 mg/dL (01 Mar 2024 00:16)  POCT Blood Glucose.: 137 mg/dL (29 Feb 2024 19:02)        Cultures      RADIOLOGY & ADDITIONAL TESTS:    Imaging Personally Reviewed:  [X ] YES  [ ] NO    Consultant(s) Notes Reviewed:  [ X] YES  [ ] NO    Care Discussed with Consultants/Other Providers [ X] YES  [ ] NO

## 2024-03-01 NOTE — CONSULT NOTE ADULT - CONSULT REQUESTED DATE/TIME
20-Feb-2024 13:55
21-Feb-2024 05:52
23-Feb-2024 09:00
21-Feb-2024
20-Feb-2024 16:09
19-Feb-2024 16:59
01-Mar-2024 18:31
21-Feb-2024 00:11

## 2024-03-01 NOTE — CONSULT NOTE ADULT - SUBJECTIVE AND OBJECTIVE BOX
Patient is a 89y Female whom presented to the hospital with     PAST MEDICAL & SURGICAL HISTORY:  Myasthenia gravis      Dementia      Myasthenia gravis      No significant past surgical history      No significant past surgical history          MEDICATIONS  (STANDING):  acyclovir IVPB 550 milliGRAM(s) IV Intermittent every 12 hours  chlorhexidine 2% Cloths 1 Application(s) Topical daily  donepezil 10 milliGRAM(s) Oral at bedtime  pantoprazole  Injectable 40 milliGRAM(s) IV Push two times a day  sodium chloride 0.9%. 1000 milliLiter(s) (75 mL/Hr) IV Continuous <Continuous>      Allergies    sulfamethoxazole (Vomiting; Nausea)    Intolerances        SOCIAL HISTORY:  Denies ETOh,Smoking,     FAMILY HISTORY:  No pertinent family history in first degree relatives    No pertinent family history in first degree relatives        REVIEW OF SYSTEMS:    unable to obtained a good review system      VITAL:  T(C): , Max: 36.8 (03-01-24 @ 16:52)  T(F): , Max: 98.2 (03-01-24 @ 16:52)  HR: 82 (03-01-24 @ 16:52)  BP: 127/75 (03-01-24 @ 16:52)  BP(mean): --  RR: 18 (03-01-24 @ 16:52)  SpO2: 94% (03-01-24 @ 16:52)  Wt(kg): --    I and O's:    02-29 @ 07:01  -  03-01 @ 07:00  --------------------------------------------------------  IN: 900 mL / OUT: 1000 mL / NET: -100 mL          PHYSICAL EXAM:    Constitutional: NAD  HEENT: conjunctive   clear   Neck:  No JVD  Respiratory: CTAB  Cardiovascular: S1 and S2  Gastrointestinal: BS+, soft, NT/ND  Extremities: No peripheral edema    LABS:                        10.6   11.23 )-----------( 223      ( 01 Mar 2024 07:07 )             31.0     03-01    141  |  105  |  16  ----------------------------<  114<H>  3.6   |  26  |  1.22    Ca    8.7      01 Mar 2024 07:06  Phos  4.1     03-01        Urine Studies:  Urinalysis Basic - ( 01 Mar 2024 07:06 )    Color: x / Appearance: x / SG: x / pH: x  Gluc: 114 mg/dL / Ketone: x  / Bili: x / Urobili: x   Blood: x / Protein: x / Nitrite: x   Leuk Esterase: x / RBC: x / WBC x   Sq Epi: x / Non Sq Epi: x / Bacteria: x            RADIOLOGY & ADDITIONAL STUDIES:

## 2024-03-01 NOTE — CONSULT NOTE ADULT - ASSESSMENT
89 YO F with PMHx of Myasthenia Gravis (not on medication for past 9 years) and Hx of dementia .   Patient was confused for a few days . Urine culture was checked  and showed positive with Klebsiella and E.coli both sensitive to Cipro. Treated with Cipro for 3 days but confusion got worse. She has slurred  speech today so transferred to hospital for neurological evaluation and CT head.  (19 Feb 2024 18:09)      CHRONIC KIDNEY DISEASE, STAGE 2: sodium chloride 0.9%. 1000 milliLiter(s) (75 mL/Hr) IV Continuous  Serum creatinine is stable at 1.2 , approximating a GFR of controlled  ml/min.   There is no progression.  No uremic symptoms. No evidence of  worsening  Anemia. Fluid status stable.   Will continue to avoid nephrotoxic drugs.  Patient remains asymptomatic.  Continue current therapy.      Admit for septic workup and ID evaluation,send blood and urine cx,serial lactate levels,monitor vitals nimesh mondragons hydration,monitor urine output and renal profile,iv abx as per id cons  acyclovir IVPB 550 milliGRAM(s) IV Intermittent every 12 hours

## 2024-03-01 NOTE — PROGRESS NOTE ADULT - ASSESSMENT
89 YO F with PMHx of Myasthenia Gravis (not on medication for past 9 years) and MHx of dementia     Assessment/Plan:  ARF- Cre up  may 2/2  poor appetite or Acyclovir. Continue IV fluid   AMS(most likely due to metabolic/toxic encephalopathy  due to UTI) - – improved back to baseline mental status with mild dementia.  Most likely due to NPH. Evaluated by neuro recommended f/u  with neurology as outpatient. ID recommended Total 7 days Meropenem and 14 days Acyclvir for possible Herpes Encephalitis.   Hypoglycemia- resolved. Appetite improved.   cholelithiasis - without  cholecystitis neither pancreatitis. Normal amylase and lipase.   hypotension- continue IV steroid and IV fluid as needed   thrombocytopenia- PLT down to 44,000. DIC panel not significantly positive. PTT mildly elevated. Otherwise PT/INR normal. D/ C Lovenox. F/u hematology.  No plan for LP at this time. Family denied   elevated LFT- Will  check RUQ ultrasound and Hepatitis panel.   NPH- - neurology does not believe acute mental status changes, could be  due to NPH. F/u neurology as outpatient  for NPH workup.   UTI with E.coli and Klebsiella as  outpatient culture. - IV abx upgraded to Vanco and Meropenem  DVT ppx- Lovenox discontinued due to thrombocytopenia   leg arthritis- Acetaminophen PRN.  b/l cataract- F/u ophthalmolog  vitamin D deficiency- continue Vitamin D supplement.  risk of malnutrition -stable, continue supplement diet. F/u dietitian. Monitor weight.  constipation- senna at bedtime.  LBP 2/2 severe spinal stenosis with out cord pressure - no surgical intervention recommended neither accepted by family, continue PT OT as needed.  Dementia - Stable. Continue Aricept .  Dysphagia - Mechanical soft diet, aspiration precautions.  Myasthenia Gravis - stable , off of medication. Monitor CBC and BMP every 2 months.

## 2024-03-01 NOTE — PROGRESS NOTE ADULT - ASSESSMENT
88 y/o F with PMHx of Myasthenia Gravis (not on medication) and dementia (A&Ox1, conversive at baseline) who presents from her nursing home with worsening lethargy and confusion. UA done as outpatient on 2/13 grossly positive and urine culture growing E. coli and Klebsiella, pansensitive. Found to be hypothermic in ED. Admitted for sepsis and metabolic encephalopathy in setting of UTI. Multiple RRTs called today. First RRT called for seizure-like activity. Patient loaded with Keppra and placed on EEG. Second RRT called for hypotension. Patient was given 4 L IVF with resolution of hypotension.      A/P    #R/O sepsis  unclear what caused acute decompensation. Pt was treated for UTI but urine negative here. Pt with remote h/o Myasthenia gravis and mild dementia.  Pt with possible seizure - 2/20  pt changed to meropenem to cover for possible meningitis-   ? Normal pressure hydrocephalous on CT- discussed with neuro- this wouldn't account for her acute change  on meropenem to cover for possible meningitis and /or to cover biliary sepsis   EEG not c/w HSV encephalitis but added  acyclovir until more definitive diagnosis available, will adjust to renal function- day #8  Hypernatremia improved    would avoid certain abs- such as cipro with the myasthenia graavis- suggest neuro import to address     CT with distended GB but HIDA negative. ? pancreatitis .amylase and lipase were elevated but improving.  could patient have passed a stones   completed  meropenem and day # 8 acyclovir  ( will need to continue to monitor renal function)  plan is for a 14 day course     #elevate LFTs  HIDA negative   check hepatitis screen  avoid hepatotoxci meds   trend - Please repeat      #hypernatremia  resolved          Yeni Gabriel M.D. ,   please reach via teams   If no answer, or after 5PM/ weekends,  then please call  [phone]    Assessment and plan discussed with the primary team      ID will follow the patient PRN. Please recontact ID if we can be of further assistance . [phone]    I will be away as of tomorrow but my  associates will be available . Please call [phone] with acute issues, questions.

## 2024-03-02 LAB
GLUCOSE BLDC GLUCOMTR-MCNC: 105 MG/DL — HIGH (ref 70–99)
GLUCOSE BLDC GLUCOMTR-MCNC: 107 MG/DL — HIGH (ref 70–99)
GLUCOSE BLDC GLUCOMTR-MCNC: 109 MG/DL — HIGH (ref 70–99)

## 2024-03-02 RX ORDER — ACETAMINOPHEN 500 MG
1000 TABLET ORAL ONCE
Refills: 0 | Status: DISCONTINUED | OUTPATIENT
Start: 2024-03-02 | End: 2024-03-08

## 2024-03-02 RX ADMIN — Medication 111 MILLIGRAM(S): at 05:47

## 2024-03-02 RX ADMIN — SODIUM CHLORIDE 75 MILLILITER(S): 9 INJECTION INTRAMUSCULAR; INTRAVENOUS; SUBCUTANEOUS at 13:57

## 2024-03-02 RX ADMIN — CHLORHEXIDINE GLUCONATE 1 APPLICATION(S): 213 SOLUTION TOPICAL at 13:56

## 2024-03-02 RX ADMIN — Medication 111 MILLIGRAM(S): at 17:38

## 2024-03-02 RX ADMIN — DONEPEZIL HYDROCHLORIDE 10 MILLIGRAM(S): 10 TABLET, FILM COATED ORAL at 21:55

## 2024-03-02 RX ADMIN — PANTOPRAZOLE SODIUM 40 MILLIGRAM(S): 20 TABLET, DELAYED RELEASE ORAL at 05:47

## 2024-03-02 RX ADMIN — PANTOPRAZOLE SODIUM 40 MILLIGRAM(S): 20 TABLET, DELAYED RELEASE ORAL at 17:39

## 2024-03-02 NOTE — PROGRESS NOTE ADULT - ASSESSMENT
88 YO F with PMHx of Myasthenia Gravis (not on medication for past 9 years) and Hx of dementia .   Patient was confused for a few days . Urine culture was checked  and showed positive with Klebsiella and E.coli both sensitive to Cipro. Treated with Cipro for 3 days but confusion got worse. She has slurred  speech today so transferred to hospital for neurological evaluation and CT head.  (19 Feb 2024 18:09)      CHRONIC KIDNEY DISEASE, STAGE 2: sodium chloride 0.9%. 1000 milliLiter(s) (75 mL/Hr) IV Continuous  Serum creatinine is stable at 1.2 , approximating a GFR of controlled  ml/min.   There is no progression.  No uremic symptoms. No evidence of  worsening  Anemia. Fluid status stable.   Will continue to avoid nephrotoxic drugs.  Patient remains asymptomatic.  Continue current therapy.      Admit for septic workup and ID evaluation,send blood and urine cx,serial lactate levels,monitor vitals nimesh mondragons hydration,monitor urine output and renal profile,iv abx as per id cons  acyclovir IVPB 550 milliGRAM(s) IV Intermittent every 12 hours

## 2024-03-02 NOTE — PROGRESS NOTE ADULT - SUBJECTIVE AND OBJECTIVE BOX
INTERVAL HPI/OVERNIGHT EVENTS:    no events     Review of Systems:    General:  No wt loss, fevers, chills, night sweats, fatigue   Eyes:  Good vision, no reported pain  ENT:  No sore throat, pain, runny nose, dysphagia  CV:  No pain, palpitations, hypo/hypertension  Resp:  No dyspnea, cough, tachypnea, wheezing  GI:  No pain, No nausea, No vomiting, No diarrhea, No constipation, No weight loss, No fever, No pruritis, No rectal bleeding, No melena, No dysphagia  :  No pain, bleeding, incontinence, nocturia  Muscle:  No pain, weakness  Neuro:  No weakness, tingling, memory problems  Psych:  No fatigue, insomnia, mood problems, depression  Endocrine:  No polyuria, polydypsia, cold/heat intolerance  Heme:  No petechiae, ecchymosis, easy bruisability  Skin:  No rash, tattoos, scars, edema        PHYSICAL EXAM:    Constitutional: NAD  HEENT: EOMI, throat clear  Neck: No LAD, supple  Respiratory: CTA and P  Cardiovascular: S1 and S2, RRR, no M  Gastrointestinal: BS+, soft, NT/ND, neg HSM,  Extremities: No peripheral edema, neg clubbing, cyanosis  Vascular: 2+ peripheral pulses  Neurological: A/O x 3, no focal deficits  Psychiatric: Normal mood, normal affect  Skin: No rashes

## 2024-03-02 NOTE — PROGRESS NOTE ADULT - SUBJECTIVE AND OBJECTIVE BOX
Date of Service: 03-02-24 @ 11:57           CARDIOLOGY     PROGRESS  NOTE   ________________________________________________    CHIEF COMPLAINT:Patient is a 89y old  Female who presents with a chief complaint of AMS (01 Mar 2024 18:30)  no complain  	  REVIEW OF SYSTEMS:  CONSTITUTIONAL: No fever, weight loss, or fatigue  EYES: No eye pain, visual disturbances, or discharge  ENT:  No difficulty hearing, tinnitus, vertigo; No sinus or throat pain  NECK: No pain or stiffness  RESPIRATORY: No cough, wheezing, chills or hemoptysis; No Shortness of Breath  CARDIOVASCULAR: No chest pain, palpitations, passing out, dizziness, or leg swelling  GASTROINTESTINAL: No abdominal or epigastric pain. No nausea, vomiting, or hematemesis; No diarrhea or constipation. No melena or hematochezia.  GENITOURINARY: No dysuria, frequency, hematuria, or incontinence  NEUROLOGICAL: No headaches, memory loss, loss of strength, numbness, or tremors  SKIN: No itching, burning, rashes, or lesions   LYMPH Nodes: No enlarged glands  ENDOCRINE: No heat or cold intolerance; No hair loss  MUSCULOSKELETAL: No joint pain or swelling; No muscle, back, or extremity pain  PSYCHIATRIC: No depression, anxiety, mood swings, or difficulty sleeping  HEME/LYMPH: No easy bruising, or bleeding gums  ALLERGY AND IMMUNOLOGIC: No hives or eczema	    [ ] All others negative	  [x ] Unable to obtain    PHYSICAL EXAM:  T(C): 36.4 (03-02-24 @ 09:02), Max: 36.8 (03-01-24 @ 16:52)  HR: 76 (03-02-24 @ 09:02) (76 - 93)  BP: 136/81 (03-02-24 @ 09:02) (126/77 - 140/83)  RR: 18 (03-02-24 @ 09:02) (18 - 18)  SpO2: 90% (03-02-24 @ 09:02) (90% - 94%)  Wt(kg): --  I&O's Summary    01 Mar 2024 07:01  -  02 Mar 2024 07:00  --------------------------------------------------------  IN: 0 mL / OUT: 200 mL / NET: -200 mL        Appearance: Normal	  HEENT:   Normal oral mucosa, PERRL, EOMI	  Lymphatic: No lymphadenopathy  Cardiovascular: Normal S1 S2, No JVD, + murmurs, No edema  Respiratory: rhonchi  Gastrointestinal:  Soft, Non-tender, + BS	  Skin: No rashes, No ecchymoses, No cyanosis	  Extremities: Normal range of motion, No clubbing, cyanosis or edema  Vascular: Peripheral pulses palpable 2+ bilaterally    MEDICATIONS  (STANDING):  acyclovir IVPB 550 milliGRAM(s) IV Intermittent every 12 hours  chlorhexidine 2% Cloths 1 Application(s) Topical daily  donepezil 10 milliGRAM(s) Oral at bedtime  pantoprazole  Injectable 40 milliGRAM(s) IV Push two times a day  sodium chloride 0.9%. 1000 milliLiter(s) (75 mL/Hr) IV Continuous <Continuous>      TELEMETRY: 	    ECG:  	  RADIOLOGY:  OTHER: 	  	  LABS:	 	    CARDIAC MARKERS:                                10.6   11.23 )-----------( 223      ( 01 Mar 2024 07:07 )             31.0     03-01    141  |  105  |  16  ----------------------------<  114<H>  3.6   |  26  |  1.22    Ca    8.7      01 Mar 2024 07:06  Phos  4.1     03-01      proBNP:   Lipid Profile:   HgA1c:   TSH: Thyroid Stimulating Hormone, Serum: 2.97 uIU/mL (02-20 @ 11:30)  Thyroid Stimulating Hormone, Serum: 2.45 uIU/mL (02-20 @ 07:28)          Assessment and plan  --------------------------  88 YO F with PMHx of Myasthenia Gravis (not on medication for past 9 years) and MHx of dementia   ARF- Cre up  may 2/2  poor appetite or Acyclovir. Continue IV fluid   AMS(most likely due to metabolic/toxic encephalopathy  due to UTI) - – improved back to baseline mental status with mild dementia.  Most likely due to NPH. Evaluated by neuro recommended f/u  with neurology as outpatient. ID recommended Total 7 days Meropenem and 14 days Acyclvir for possible Herpes Encephalitis.   Hypoglycemia- resolved. Appetite improved.   cholelithiasis - without  cholecystitis neither pancreatitis. Normal amylase and lipase.   hypotension- continue IV steroid and IV fluid as needed   thrombocytopenia- PLT down to 44,000. DIC panel not significantly positive. PTT mildly elevated. Otherwise PT/INR normal. D/ C Lovenox. F/u hematology.  No plan for LP at this time. Family denied   elevated LFT- Will  check RUQ ultrasound and Hepatitis panel.   NPH- - neurology does not believe acute mental status changes, could be  due to NPH. F/u neurology as outpatient  for NPH workup.   UTI with E.coli and Klebsiella as  outpatient culture. - IV abx upgraded to Vanco and Meropenem  DVT ppx- Lovenox discontinued due to thrombocytopenia   leg arthritis- Acetaminophen PRN.  b/l cataract- F/u ophthalmolog  vitamin D deficiency- continue Vitamin D supplement.  risk of malnutrition -stable, continue supplement diet. F/u dietitian. Monitor weight.  constipation- senna at bedtime.  LBP 2/2 severe spinal stenosis with out cord pressure - no surgical intervention recommended neither accepted by family, continue PT OT as needed.  Dementia - Stable. Continue Aricept .  Dysphagia - Mechanical soft diet, aspiration precautions.  Myasthenia Gravis - stable , off of medication. Monitor CBC and BMP every 2 months.  continue current meds

## 2024-03-02 NOTE — PROGRESS NOTE ADULT - SUBJECTIVE AND OBJECTIVE BOX
Patient is a 89y Female whom presented to the hospital with     PAST MEDICAL & SURGICAL HISTORY:  Myasthenia gravis      Dementia      Myasthenia gravis      No significant past surgical history      No significant past surgical history          MEDICATIONS  (STANDING):  acyclovir IVPB 550 milliGRAM(s) IV Intermittent every 12 hours  chlorhexidine 2% Cloths 1 Application(s) Topical daily  donepezil 10 milliGRAM(s) Oral at bedtime  pantoprazole  Injectable 40 milliGRAM(s) IV Push two times a day  sodium chloride 0.9%. 1000 milliLiter(s) (75 mL/Hr) IV Continuous <Continuous>      Allergies    sulfamethoxazole (Vomiting; Nausea)    Intolerances        SOCIAL HISTORY:  Denies ETOh,Smoking,     FAMILY HISTORY:  No pertinent family history in first degree relatives    No pertinent family history in first degree relatives        REVIEW OF SYSTEMS:    unable to obtained a good review system      VITAL:  T(C): , Max: 36.8 (03-01-24 @ 16:52)  T(F): , Max: 98.2 (03-01-24 @ 16:52)  HR: 82 (03-01-24 @ 16:52)  BP: 127/75 (03-01-24 @ 16:52)  BP(mean): --  RR: 18 (03-01-24 @ 16:52)  SpO2: 94% (03-01-24 @ 16:52)  Wt(kg): --    I and O's:    02-29 @ 07:01  -  03-01 @ 07:00  --------------------------------------------------------  IN: 900 mL / OUT: 1000 mL / NET: -100 mL          PHYSICAL EXAM:    Constitutional: NAD  HEENT: conjunctive   clear   Neck:  No JVD  Respiratory: CTAB  Cardiovascular: S1 and S2  Gastrointestinal: BS+, soft, NT/ND  Extremities: No peripheral edema                        10.6   11.23 )-----------( 223      ( 01 Mar 2024 07:07 )             31.0       CBC Full  -  ( 01 Mar 2024 07:07 )  WBC Count : 11.23 K/uL  RBC Count : 3.30 M/uL  Hemoglobin : 10.6 g/dL  Hematocrit : 31.0 %  Platelet Count - Automated : 223 K/uL  Mean Cell Volume : 93.9 fl  Mean Cell Hemoglobin : 32.1 pg  Mean Cell Hemoglobin Concentration : 34.2 gm/dL  Auto Neutrophil # : x  Auto Lymphocyte # : x  Auto Monocyte # : x  Auto Eosinophil # : x  Auto Basophil # : x  Auto Neutrophil % : x  Auto Lymphocyte % : x  Auto Monocyte % : x  Auto Eosinophil % : x  Auto Basophil % : x      03-01    141  |  105  |  16  ----------------------------<  114<H>  3.6   |  26  |  1.22    Ca    8.7      01 Mar 2024 07:06  Phos  4.1     03-01        CAPILLARY BLOOD GLUCOSE      POCT Blood Glucose.: 109 mg/dL (02 Mar 2024 11:57)  POCT Blood Glucose.: 105 mg/dL (02 Mar 2024 05:57)  POCT Blood Glucose.: 107 mg/dL (02 Mar 2024 00:09)      Vital Signs Last 24 Hrs  T(C): 36.5 (02 Mar 2024 16:18), Max: 36.5 (02 Mar 2024 16:18)  T(F): 97.7 (02 Mar 2024 16:18), Max: 97.7 (02 Mar 2024 16:18)  HR: 80 (02 Mar 2024 16:18) (76 - 93)  BP: 138/80 (02 Mar 2024 16:18) (126/77 - 140/83)  BP(mean): --  RR: 18 (02 Mar 2024 16:18) (18 - 18)  SpO2: 91% (02 Mar 2024 16:18) (90% - 91%)    Parameters below as of 02 Mar 2024 16:18  Patient On (Oxygen Delivery Method): nasal cannula  O2 Flow (L/min): 2      Urinalysis Basic - ( 01 Mar 2024 07:06 )    Color: x / Appearance: x / SG: x / pH: x  Gluc: 114 mg/dL / Ketone: x  / Bili: x / Urobili: x   Blood: x / Protein: x / Nitrite: x   Leuk Esterase: x / RBC: x / WBC x   Sq Epi: x / Non Sq Epi: x / Bacteria: x          LABS:                        10.6   11.23 )-----------( 223      ( 01 Mar 2024 07:07 )             31.0     03-01    141  |  105  |  16  ----------------------------<  114<H>  3.6   |  26  |  1.22    Ca    8.7      01 Mar 2024 07:06  Phos  4.1     03-01        Urine Studies:  Urinalysis Basic - ( 01 Mar 2024 07:06 )    Color: x / Appearance: x / SG: x / pH: x  Gluc: 114 mg/dL / Ketone: x  / Bili: x / Urobili: x   Blood: x / Protein: x / Nitrite: x   Leuk Esterase: x / RBC: x / WBC x   Sq Epi: x / Non Sq Epi: x / Bacteria: x            RADIOLOGY & ADDITIONAL STUDIES:

## 2024-03-02 NOTE — PROGRESS NOTE ADULT - ASSESSMENT
The patient is a 89F with dementia and Myasthenia Gravis admitted for AMS.     1. AMS  improving, she is more conversive   ID workup per primary team     2. Cholelithiasis, distended GB   no e/o acute cholecystitis or biliary obstruction on HIDA scan    3. peripancreatic fat stranding   low suspicion for acute pancreatitis   ppi daily while inpatient, then can d/c    4. abnormal LFTs, downtrending    likely a byproduct of systemic infection   hepatitis panel neg    trend LFTs     5. Dysphagia  appreciate speech pathology

## 2024-03-03 LAB
ANION GAP SERPL CALC-SCNC: 12 MMOL/L — SIGNIFICANT CHANGE UP (ref 5–17)
BUN SERPL-MCNC: 23 MG/DL — SIGNIFICANT CHANGE UP (ref 7–23)
CALCIUM SERPL-MCNC: 8.4 MG/DL — SIGNIFICANT CHANGE UP (ref 8.4–10.5)
CHLORIDE SERPL-SCNC: 110 MMOL/L — HIGH (ref 96–108)
CO2 SERPL-SCNC: 24 MMOL/L — SIGNIFICANT CHANGE UP (ref 22–31)
CREAT SERPL-MCNC: 2.59 MG/DL — HIGH (ref 0.5–1.3)
EGFR: 17 ML/MIN/1.73M2 — LOW
GLUCOSE BLDC GLUCOMTR-MCNC: 101 MG/DL — HIGH (ref 70–99)
GLUCOSE BLDC GLUCOMTR-MCNC: 109 MG/DL — HIGH (ref 70–99)
GLUCOSE BLDC GLUCOMTR-MCNC: 110 MG/DL — HIGH (ref 70–99)
GLUCOSE BLDC GLUCOMTR-MCNC: 129 MG/DL — HIGH (ref 70–99)
GLUCOSE SERPL-MCNC: 111 MG/DL — HIGH (ref 70–99)
HCT VFR BLD CALC: 27.5 % — LOW (ref 34.5–45)
HGB BLD-MCNC: 9.8 G/DL — LOW (ref 11.5–15.5)
MCHC RBC-ENTMCNC: 32.9 PG — SIGNIFICANT CHANGE UP (ref 27–34)
MCHC RBC-ENTMCNC: 35.6 GM/DL — SIGNIFICANT CHANGE UP (ref 32–36)
MCV RBC AUTO: 92.3 FL — SIGNIFICANT CHANGE UP (ref 80–100)
NRBC # BLD: 0 /100 WBCS — SIGNIFICANT CHANGE UP (ref 0–0)
PLATELET # BLD AUTO: 242 K/UL — SIGNIFICANT CHANGE UP (ref 150–400)
POTASSIUM SERPL-MCNC: 3.6 MMOL/L — SIGNIFICANT CHANGE UP (ref 3.5–5.3)
POTASSIUM SERPL-SCNC: 3.6 MMOL/L — SIGNIFICANT CHANGE UP (ref 3.5–5.3)
RBC # BLD: 2.98 M/UL — LOW (ref 3.8–5.2)
RBC # FLD: 13.9 % — SIGNIFICANT CHANGE UP (ref 10.3–14.5)
SODIUM SERPL-SCNC: 146 MMOL/L — HIGH (ref 135–145)
WBC # BLD: 10.81 K/UL — HIGH (ref 3.8–10.5)
WBC # FLD AUTO: 10.81 K/UL — HIGH (ref 3.8–10.5)

## 2024-03-03 RX ORDER — SODIUM CHLORIDE 9 MG/ML
1000 INJECTION INTRAMUSCULAR; INTRAVENOUS; SUBCUTANEOUS
Refills: 0 | Status: DISCONTINUED | OUTPATIENT
Start: 2024-03-03 | End: 2024-03-05

## 2024-03-03 RX ADMIN — SODIUM CHLORIDE 100 MILLILITER(S): 9 INJECTION INTRAMUSCULAR; INTRAVENOUS; SUBCUTANEOUS at 17:45

## 2024-03-03 RX ADMIN — SODIUM CHLORIDE 100 MILLILITER(S): 9 INJECTION INTRAMUSCULAR; INTRAVENOUS; SUBCUTANEOUS at 16:19

## 2024-03-03 RX ADMIN — Medication 111 MILLIGRAM(S): at 05:17

## 2024-03-03 RX ADMIN — DONEPEZIL HYDROCHLORIDE 10 MILLIGRAM(S): 10 TABLET, FILM COATED ORAL at 22:03

## 2024-03-03 RX ADMIN — PANTOPRAZOLE SODIUM 40 MILLIGRAM(S): 20 TABLET, DELAYED RELEASE ORAL at 17:45

## 2024-03-03 RX ADMIN — PANTOPRAZOLE SODIUM 40 MILLIGRAM(S): 20 TABLET, DELAYED RELEASE ORAL at 05:17

## 2024-03-03 RX ADMIN — CHLORHEXIDINE GLUCONATE 1 APPLICATION(S): 213 SOLUTION TOPICAL at 10:20

## 2024-03-03 NOTE — PROGRESS NOTE ADULT - SUBJECTIVE AND OBJECTIVE BOX
Date of Service: 03-03-24 @ 11:14           med    PROGRESS  NOTE   ________________________________________________    CHIEF COMPLAINT:Patient is a 89y old  Female who presents with a chief complaint of AMS (03 Mar 2024 09:45)  no complain, more alert  	  REVIEW OF SYSTEMS:  CONSTITUTIONAL: No fever, weight loss, or fatigue  EYES: No eye pain, visual disturbances, or discharge  ENT:  No difficulty hearing, tinnitus, vertigo; No sinus or throat pain  NECK: No pain or stiffness  RESPIRATORY: No cough, wheezing, chills or hemoptysis; No Shortness of Breath  CARDIOVASCULAR: No chest pain, palpitations, passing out, dizziness, or leg swelling  GASTROINTESTINAL: No abdominal or epigastric pain. No nausea, vomiting, or hematemesis; No diarrhea or constipation. No melena or hematochezia.  GENITOURINARY: No dysuria, frequency, hematuria, or incontinence  NEUROLOGICAL: No headaches, memory loss, loss of strength, numbness, or tremors  SKIN: No itching, burning, rashes, or lesions   LYMPH Nodes: No enlarged glands  ENDOCRINE: No heat or cold intolerance; No hair loss  MUSCULOSKELETAL: No joint pain or swelling; No muscle, back, or extremity pain  PSYCHIATRIC: No depression, anxiety, mood swings, or difficulty sleeping  HEME/LYMPH: No easy bruising, or bleeding gums  ALLERGY AND IMMUNOLOGIC: No hives or eczema	    [ ] All others negative	  [x ] Unable to obtain    PHYSICAL EXAM:  T(C): 37.4 (03-03-24 @ 04:30), Max: 37.4 (03-03-24 @ 04:30)  HR: 93 (03-03-24 @ 04:30) (80 - 96)  BP: 163/79 (03-03-24 @ 04:30) (107/85 - 163/79)  RR: 18 (03-03-24 @ 04:30) (18 - 18)  SpO2: 94% (03-03-24 @ 04:30) (91% - 94%)  Wt(kg): --  I&O's Summary    02 Mar 2024 07:01  -  03 Mar 2024 07:00  --------------------------------------------------------  IN: 950 mL / OUT: 900 mL / NET: 50 mL        Appearance: Normal	  HEENT:   Normal oral mucosa, PERRL, EOMI	  Lymphatic: No lymphadenopathy  Cardiovascular: Normal S1 S2, No JVD, + murmurs, No edema  Respiratory: rhonchi  Psychiatry: dementia  Gastrointestinal:  Soft, Non-tender, + BS	  Skin: No rashes, No ecchymoses, No cyanosis	  Neurologic: Non-focal  Extremities: Normal range of motion, No clubbing, cyanosis or edema  Vascular: Peripheral pulses palpable 2+ bilaterally    MEDICATIONS  (STANDING):  acetaminophen   IVPB .. 1000 milliGRAM(s) IV Intermittent once  acyclovir IVPB 550 milliGRAM(s) IV Intermittent every 12 hours  chlorhexidine 2% Cloths 1 Application(s) Topical daily  donepezil 10 milliGRAM(s) Oral at bedtime  pantoprazole  Injectable 40 milliGRAM(s) IV Push two times a day  sodium chloride 0.9%. 1000 milliLiter(s) (75 mL/Hr) IV Continuous <Continuous>      TELEMETRY: 	    ECG:  	  RADIOLOGY:  OTHER: 	  	  LABS:	 	    CARDIAC MARKERS:                          9.8    10.81 )-----------( 242      ( 03 Mar 2024 07:18 )             27.5     03-03    146<H>  |  110<H>  |  23  ----------------------------<  111<H>  3.6   |  24  |  2.59<H>    Ca    8.4      03 Mar 2024 07:25      proBNP:   Lipid Profile:   HgA1c:   TSH: Thyroid Stimulating Hormone, Serum: 2.97 uIU/mL (02-20 @ 11:30)  Thyroid Stimulating Hormone, Serum: 2.45 uIU/mL (02-20 @ 07:28)      < from: TTE W or WO Ultrasound Enhancing Agent (02.26.24 @ 11:19) >   1. Left ventricular cavity is normal in size. Left ventricular systolic function is normal with an ejection fraction of 56 % by Hong's method of disks. There are no regional wall motion abnormalities seen.   2. There is moderate (grade 2) left ventricular diastolic dysfunction.   3. Normal right ventricular cavity size and normal systolic function.   4. The left atrium is severely dilated.   5. The right atrium is severely dilated.   6. There is fibrocalcific aortic valve sclerosis without stenosis. There is mild aortic regurgitation.   7. There is mild to moderate mitral regurgitation.   8. There is moderate tricuspid regurgitation. Estimated pulmonary artery systolic pressure is 45 mmHg.   9. No pericardial effusion seen.  10. No prior echocardiogram is available for comparison.    Assessment and plan  ---------------------------  88 YO F with PMHx of Myasthenia Gravis (not on medication for past 9 years) and MHx of dementia   ARF- Cre up  may 2/2  poor appetite or Acyclovir. Continue IV fluid   AMS(most likely due to metabolic/toxic encephalopathy  due to UTI) - – improved back to baseline mental status with mild dementia.  Most likely due to NPH. Evaluated by neuro recommended f/u  with neurology as outpatient. ID recommended Total 7 days Meropenem and 14 days Acyclvir for possible Herpes Encephalitis.   Hypoglycemia- resolved. Appetite improved.   cholelithiasis - without  cholecystitis neither pancreatitis. Normal amylase and lipase.   hypotension- continue IV steroid and IV fluid as needed   thrombocytopenia- PLT down to 44,000. DIC panel not significantly positive. PTT mildly elevated. Otherwise PT/INR normal. D/ C Lovenox. F/u hematology.  No plan for LP at this time. Family denied   elevated LFT- Will  check RUQ ultrasound and Hepatitis panel.   NPH- - neurology does not believe acute mental status changes, could be  due to NPH. F/u neurology as outpatient  for NPH workup.   UTI with E.coli and Klebsiella as  outpatient culture. - IV abx upgraded to Vanco and Meropenem  DVT ppx- Lovenox discontinued due to thrombocytopenia   leg arthritis- Acetaminophen PRN.  b/l cataract- F/u ophthalmolog  vitamin D deficiency- continue Vitamin D supplement.  risk of malnutrition -stable, continue supplement diet. F/u dietitian. Monitor weight.  constipation- senna at bedtime.  LBP 2/2 severe spinal stenosis with out cord pressure - no surgical intervention recommended neither accepted by family, continue PT OT as needed.  Dementia - Stable. Continue Aricept .  Dysphagia - Mechanical soft diet, aspiration precautions.  Myasthenia Gravis - stable , off of medication. Monitor CBC and BMP every 2 months.  continue current meds  discussed with daughter, pt needs assistant with meals  speech and swallow fu  oob to chair  fu bp closely

## 2024-03-03 NOTE — PROGRESS NOTE ADULT - SUBJECTIVE AND OBJECTIVE BOX
Patient is a 89y Female whom presented to the hospital with mak     PAST MEDICAL & SURGICAL HISTORY:  Myasthenia gravis      Dementia      Myasthenia gravis      No significant past surgical history      No significant past surgical history          MEDICATIONS  (STANDING):  acyclovir IVPB 550 milliGRAM(s) IV Intermittent every 12 hours  chlorhexidine 2% Cloths 1 Application(s) Topical daily  donepezil 10 milliGRAM(s) Oral at bedtime  pantoprazole  Injectable 40 milliGRAM(s) IV Push two times a day  sodium chloride 0.9%. 1000 milliLiter(s) (75 mL/Hr) IV Continuous <Continuous>      Allergies    sulfamethoxazole (Vomiting; Nausea)    Intolerances        SOCIAL HISTORY:  Denies ETOh,Smoking,     FAMILY HISTORY:  No pertinent family history in first degree relatives    No pertinent family history in first degree relatives        REVIEW OF SYSTEMS:    unable to obtained a good review system                          9.8    10.81 )-----------( 242      ( 03 Mar 2024 07:18 )             27.5       CBC Full  -  ( 03 Mar 2024 07:18 )  WBC Count : 10.81 K/uL  RBC Count : 2.98 M/uL  Hemoglobin : 9.8 g/dL  Hematocrit : 27.5 %  Platelet Count - Automated : 242 K/uL  Mean Cell Volume : 92.3 fl  Mean Cell Hemoglobin : 32.9 pg  Mean Cell Hemoglobin Concentration : 35.6 gm/dL  Auto Neutrophil # : x  Auto Lymphocyte # : x  Auto Monocyte # : x  Auto Eosinophil # : x  Auto Basophil # : x  Auto Neutrophil % : x  Auto Lymphocyte % : x  Auto Monocyte % : x  Auto Eosinophil % : x  Auto Basophil % : x      03-03    146<H>  |  110<H>  |  23  ----------------------------<  111<H>  3.6   |  24  |  2.59<H>    Ca    8.4      03 Mar 2024 07:25        CAPILLARY BLOOD GLUCOSE      POCT Blood Glucose.: 101 mg/dL (03 Mar 2024 12:10)  POCT Blood Glucose.: 110 mg/dL (03 Mar 2024 06:06)  POCT Blood Glucose.: 109 mg/dL (03 Mar 2024 00:19)      Vital Signs Last 24 Hrs  T(C): 37.1 (03 Mar 2024 14:25), Max: 37.4 (03 Mar 2024 04:30)  T(F): 98.8 (03 Mar 2024 14:25), Max: 99.3 (03 Mar 2024 04:30)  HR: 90 (03 Mar 2024 14:25) (80 - 104)  BP: 134/80 (03 Mar 2024 14:25) (107/85 - 163/79)  BP(mean): --  RR: 18 (03 Mar 2024 14:25) (18 - 18)  SpO2: 95% (03 Mar 2024 14:25) (91% - 95%)    Parameters below as of 03 Mar 2024 14:25  Patient On (Oxygen Delivery Method): nasal cannula  O2 Flow (L/min): 2      Urinalysis Basic - ( 03 Mar 2024 07:25 )    Color: x / Appearance: x / SG: x / pH: x  Gluc: 111 mg/dL / Ketone: x  / Bili: x / Urobili: x   Blood: x / Protein: x / Nitrite: x   Leuk Esterase: x / RBC: x / WBC x   Sq Epi: x / Non Sq Epi: x / Bacteria: x              PHYSICAL EXAM:    Constitutional: NAD  HEENT: conjunctive   clear   Neck:  No JVD  Respiratory: CTAB  Cardiovascular: S1 and S2  Gastrointestinal: BS+, soft, NT/ND  Extremities: No peripheral edema

## 2024-03-03 NOTE — PROGRESS NOTE ADULT - ASSESSMENT
88 YO F with PMHx of Myasthenia Gravis (not on medication for past 9 years) and Hx of dementia .   Patient was confused for a few days . Urine culture was checked  and showed positive with Klebsiella and E.coli both sensitive to Cipro. Treated with Cipro for 3 days but confusion got worse. She has slurred  speech today so transferred to hospital for neurological evaluation and CT head.  (19 Feb 2024 18:09)      ACUTE RENAL FAILURE: increase iv fluid   Serum creatinine is  at  2.59   , approximating GFR at   ml/min.   There is no progression . No uremic symptoms  No evidence of anemia .  Fluid status stable.  Will continue to avoid nephrotoxic drugs.  Patient remains asymptomatic.   Continue current therapy.  hold  diuretic.  hold   ACE inhibitor.  hold   ARB.  Additional evaluation:   ECG,    echocardiogram,     CXR,  will obtained recent   renal ultrasound to evalaute kidney size and possible stones ,      Admit for septic workup and ID evaluation,send blood and urine cx,serial lactate levels,monitor vitals closley,ivfs hydration,monitor urine output and renal profile,iv abx as per id cons  dc acyclovir IVPB 550 milliGRAM(s) IV Intermittent every 12 hours

## 2024-03-03 NOTE — CHART NOTE - NSCHARTNOTEFT_GEN_A_CORE
Brief Nutrition Note  3-Day Calorie Count Evaluation.     Chart reviewed, events noted.     Diet Order:   Diet, Pureed:   Moderately Thick Liquids (MODTHICKLIQS)  Single Sips Only (02-29-24)    - Is current order appropriate/adequate? [x] See recommendations below.      Estimated Needs:   Energy: 1417.5-1701 kcal/day (25-30 kcal/kg)  Protein: 56.7- 68.04 g pro/day (1.0-1.2 g pro/kg)  Fluid needs deferred to team.   Based on  lbs/ 56.7 kg    - Calorie Count results:  -Day 1 (2/29): Pt consumed ~221 kcal and ~7.6 g pro.  Meets ~15.5% lower estimated energy needs, and ~13.4% lower estimated protein needs.  -Day 2 (3/01): Pt consumed ~704 kcal and ~20 g pro. Meets ~49.6% lower estimated energy needs, and ~35% lower estimated protein needs.  -Day 3 (3/02): Pt consumed ~595 kcal and ~22 g pro. Meets ~42% lower estimated energy needs, and ~38.8% lower estimated protein needs.  -Overall, pt is not meeting estimated protein energy needs based on this 3 day calorie count.       Recommendations:      1) - If within GOC, Pt will benefit from supplemental EN regimen. Consider providing Jevity 1.2 @ 10mL/hr as tolerated, advance by 10mL q12H until goal rate @50 mL/hr x24 hrs.           -- Regimen at goal provides: 1200 mL formula, 1440 kcal/day (25.4 kcal/kg), 67 g pro/day (1.2 g/kg), and 968 mL free water/day based on IBW 56.7 kg.          -- Recommend providing Multivitamin and thiamine daily for REFEEDING RISK pending no medical contraindications.       - If alternate means of nutrition NOT within GOC, Recommend continue diet free of therapeutic restrictions & provide Ensure Plus oral nutrition supplements 3x/day to optimize protein-energy intake.          -- Defer texture/consistency to SLP/team.   2) Continue to monitor PO intake, weight, labs, skin, GI status, and diet.  3) Nutrition care plan to remain consistent with pt GOC.  4) RD to follow up per protocol / remains available PRN.     Monitoring and Evaluation:   Continue to monitor nutritional intake, tolerance to diet prescription, weights, labs, skin integrity    RD remains available upon request and will follow up per protocol  Liliya Stallings RDN, CDN / TEAMS

## 2024-03-04 DIAGNOSIS — N17.9 ACUTE KIDNEY FAILURE, UNSPECIFIED: ICD-10-CM

## 2024-03-04 LAB
ALBUMIN SERPL ELPH-MCNC: 2.8 G/DL — LOW (ref 3.3–5)
ALP SERPL-CCNC: 165 U/L — HIGH (ref 40–120)
ALT FLD-CCNC: 28 U/L — SIGNIFICANT CHANGE UP (ref 10–45)
AMYLASE P1 CFR SERPL: 23 U/L — LOW (ref 25–125)
ANION GAP SERPL CALC-SCNC: 18 MMOL/L — HIGH (ref 5–17)
AST SERPL-CCNC: 24 U/L — SIGNIFICANT CHANGE UP (ref 10–40)
BILIRUB DIRECT SERPL-MCNC: 0.4 MG/DL — HIGH (ref 0–0.3)
BILIRUB INDIRECT FLD-MCNC: 0.6 MG/DL — SIGNIFICANT CHANGE UP (ref 0.2–1)
BILIRUB SERPL-MCNC: 1 MG/DL — SIGNIFICANT CHANGE UP (ref 0.2–1.2)
BUN SERPL-MCNC: 23 MG/DL — SIGNIFICANT CHANGE UP (ref 7–23)
CALCIUM SERPL-MCNC: 8.5 MG/DL — SIGNIFICANT CHANGE UP (ref 8.4–10.5)
CHLORIDE SERPL-SCNC: 112 MMOL/L — HIGH (ref 96–108)
CHOLEST SERPL-MCNC: 189 MG/DL — SIGNIFICANT CHANGE UP
CO2 SERPL-SCNC: 22 MMOL/L — SIGNIFICANT CHANGE UP (ref 22–31)
CREAT SERPL-MCNC: 2.25 MG/DL — HIGH (ref 0.5–1.3)
EGFR: 20 ML/MIN/1.73M2 — LOW
GLUCOSE BLDC GLUCOMTR-MCNC: 100 MG/DL — HIGH (ref 70–99)
GLUCOSE BLDC GLUCOMTR-MCNC: 102 MG/DL — HIGH (ref 70–99)
GLUCOSE BLDC GLUCOMTR-MCNC: 109 MG/DL — HIGH (ref 70–99)
GLUCOSE BLDC GLUCOMTR-MCNC: 113 MG/DL — HIGH (ref 70–99)
GLUCOSE BLDC GLUCOMTR-MCNC: 117 MG/DL — HIGH (ref 70–99)
GLUCOSE SERPL-MCNC: 99 MG/DL — SIGNIFICANT CHANGE UP (ref 70–99)
HCG SERPL-ACNC: <2 MIU/ML — SIGNIFICANT CHANGE UP
HCT VFR BLD CALC: 29.5 % — LOW (ref 34.5–45)
HDLC SERPL-MCNC: 39 MG/DL — LOW
HGB BLD-MCNC: 9.8 G/DL — LOW (ref 11.5–15.5)
IRON SATN MFR SERPL: 21 % — SIGNIFICANT CHANGE UP (ref 14–50)
IRON SATN MFR SERPL: 36 UG/DL — SIGNIFICANT CHANGE UP (ref 30–160)
LACTATE SERPL-SCNC: 1 MMOL/L — SIGNIFICANT CHANGE UP (ref 0.5–2)
LIDOCAIN IGE QN: 9 U/L — SIGNIFICANT CHANGE UP (ref 7–60)
LIPID PNL WITH DIRECT LDL SERPL: 131 MG/DL — HIGH
MAGNESIUM SERPL-MCNC: 1.6 MG/DL — SIGNIFICANT CHANGE UP (ref 1.6–2.6)
MCHC RBC-ENTMCNC: 32.2 PG — SIGNIFICANT CHANGE UP (ref 27–34)
MCHC RBC-ENTMCNC: 33.2 GM/DL — SIGNIFICANT CHANGE UP (ref 32–36)
MCV RBC AUTO: 97 FL — SIGNIFICANT CHANGE UP (ref 80–100)
NON HDL CHOLESTEROL: 150 MG/DL — HIGH
NRBC # BLD: 0 /100 WBCS — SIGNIFICANT CHANGE UP (ref 0–0)
PHOSPHATE SERPL-MCNC: 3.8 MG/DL — SIGNIFICANT CHANGE UP (ref 2.5–4.5)
PLATELET # BLD AUTO: 246 K/UL — SIGNIFICANT CHANGE UP (ref 150–400)
POTASSIUM SERPL-MCNC: 3.3 MMOL/L — LOW (ref 3.5–5.3)
POTASSIUM SERPL-SCNC: 3.3 MMOL/L — LOW (ref 3.5–5.3)
PROT SERPL-MCNC: 5.7 G/DL — LOW (ref 6–8.3)
RBC # BLD: 3.04 M/UL — LOW (ref 3.8–5.2)
RBC # FLD: 14.2 % — SIGNIFICANT CHANGE UP (ref 10.3–14.5)
SODIUM SERPL-SCNC: 152 MMOL/L — HIGH (ref 135–145)
TIBC SERPL-MCNC: 171 UG/DL — LOW (ref 220–430)
TRIGL SERPL-MCNC: 106 MG/DL — SIGNIFICANT CHANGE UP
TSH SERPL-MCNC: 1.32 UIU/ML — SIGNIFICANT CHANGE UP (ref 0.27–4.2)
UIBC SERPL-MCNC: 135 UG/DL — SIGNIFICANT CHANGE UP (ref 110–370)
URATE SERPL-MCNC: 7.3 MG/DL — HIGH (ref 2.5–7)
VIT B12 SERPL-MCNC: 1262 PG/ML — HIGH (ref 232–1245)
WBC # BLD: 10.51 K/UL — HIGH (ref 3.8–10.5)
WBC # FLD AUTO: 10.51 K/UL — HIGH (ref 3.8–10.5)

## 2024-03-04 PROCEDURE — 99233 SBSQ HOSP IP/OBS HIGH 50: CPT

## 2024-03-04 PROCEDURE — 76770 US EXAM ABDO BACK WALL COMP: CPT | Mod: 26

## 2024-03-04 PROCEDURE — 93010 ELECTROCARDIOGRAM REPORT: CPT | Mod: 76

## 2024-03-04 RX ORDER — AMLODIPINE BESYLATE 2.5 MG/1
5 TABLET ORAL DAILY
Refills: 0 | Status: DISCONTINUED | OUTPATIENT
Start: 2024-03-04 | End: 2024-03-08

## 2024-03-04 RX ORDER — POTASSIUM CHLORIDE 20 MEQ
10 PACKET (EA) ORAL
Refills: 0 | Status: COMPLETED | OUTPATIENT
Start: 2024-03-04 | End: 2024-03-05

## 2024-03-04 RX ADMIN — DONEPEZIL HYDROCHLORIDE 10 MILLIGRAM(S): 10 TABLET, FILM COATED ORAL at 22:23

## 2024-03-04 RX ADMIN — CHLORHEXIDINE GLUCONATE 1 APPLICATION(S): 213 SOLUTION TOPICAL at 12:52

## 2024-03-04 RX ADMIN — PANTOPRAZOLE SODIUM 40 MILLIGRAM(S): 20 TABLET, DELAYED RELEASE ORAL at 05:28

## 2024-03-04 RX ADMIN — Medication 100 MILLIEQUIVALENT(S): at 22:24

## 2024-03-04 RX ADMIN — PANTOPRAZOLE SODIUM 40 MILLIGRAM(S): 20 TABLET, DELAYED RELEASE ORAL at 18:41

## 2024-03-04 NOTE — PROGRESS NOTE ADULT - ASSESSMENT
89 YO F with PMHx of Myasthenia Gravis (not on medication for past 9 years) and MHx of dementia     Assessment/Plan:  BOOGIE due to IV Acyclovir-  Continue IV fluid. Renal bladder  ultrasound ordered. Monitor urine output. D/C Acyclovir. F/u nephrologist.   FTT - due to severe anorexia and poor appetite- not eating well. Will  discuss with family for alternative way of feeding, NG vs peg tube. Continue IV fluid.   AMS(most likely due to metabolic/toxic encephalopathy  due to UTI) - – improved back to baseline mental status with mild dementia.  Most likely due to NPH. Evaluated by neuro recommended f/u  with neurology as outpatient. ID recommended Total 7 days Meropenem and 14 days Acyclvir for possible Herpes Encephalitis.   Hypoglycemia- resolved. Appetite improved.   cholelithiasis - without  cholecystitis neither pancreatitis. Normal amylase and lipase.   hypotension- continue IV steroid and IV fluid as needed   thrombocytopenia- PLT down to 44,000. DIC panel not significantly positive. PTT mildly elevated. Otherwise PT/INR normal. D/ C Lovenox. F/u hematology.  No plan for LP at this time. Family denied   elevated LFT- Will  check RUQ ultrasound and Hepatitis panel.   NPH- - neurology does not believe acute mental status changes, could be  due to NPH. F/u neurology as outpatient  for NPH workup.   UTI with E.coli and Klebsiella as  outpatient culture. - IV abx upgraded to Vanco and Meropenem  DVT ppx- Lovenox discontinued due to thrombocytopenia   leg arthritis- Acetaminophen PRN.  b/l cataract- F/u ophthalmolog  vitamin D deficiency- continue Vitamin D supplement.  risk of malnutrition -stable, continue supplement diet. F/u dietitian. Monitor weight.  constipation- senna at bedtime.  LBP 2/2 severe spinal stenosis with out cord pressure - no surgical intervention recommended neither accepted by family, continue PT OT as needed.  Dementia - Stable. Continue Aricept .  Dysphagia - Mechanical soft diet, aspiration precautions.  Myasthenia Gravis - stable , off of medication. Monitor CBC and BMP every 2 months.

## 2024-03-04 NOTE — PROGRESS NOTE ADULT - ASSESSMENT
89 y/o F with PMHx of Myasthenia Gravis (not on medication) and dementia (A&Ox1, conversive at baseline) who presents from her nursing home with worsening lethargy and confusion. UA done as outpatient on 2/13 grossly positive and urine culture growing E. coli and Klebsiella, pansensitive. Found to be hypothermic in ED. Admitted for sepsis and metabolic encephalopathy in setting of UTI. Hospital course notable for potential seizures, empiric treatment of meningitis/?HSV (though EEG not concordant); Distended GB but HIDA negative; waxing/waning mental status; and LFT elevations (now resolved)    3/4: acyclovir stopped by renal for BOGOIE. At this point in time, especially in the absence of conclusive evidence of HSV encephalitis, I do not think that the R:B balance of ACV is favorable at this point in time. There is no utility to LP at this point in time. Prerenal state (with new hypernatremia) may have potentiated ACV toxicity.     Suggestions--  Remain cognizant of medications which are more closely associated with precipitating a myasthenic crisis (e.g. fluoroquinolones, aminoglycosides, macrolides)  Defer additional antimicrobials.  Left message for primary and renal teams    Carlos Woods MD  Attending Physician  [facility]  Division of Infectious Diseases  [phone]

## 2024-03-04 NOTE — PROGRESS NOTE ADULT - SUBJECTIVE AND OBJECTIVE BOX
Patient is a 89y old  Female who presents with a chief complaint of AMS (04 Mar 2024 12:55)      INTERVAL HPI/OVERNIGHT EVENTS: Patient has renal failure. Cre jumped to 2.5. Discussed with nephrologist and decided to increase IV fluid to 100 cc and discontinue IV Acyclovir. BP mildly elevated. Continue to monitor. Renal bladder ultrasound was ordered. Consider wheeler catheter. Started on Amlodipine for better BP control. Patient has poor appetite, failed 3 days calorie count. Will discuss with family for alternative way of nutrition.     Pain Location & Control:     MEDICATIONS  (STANDING):  acetaminophen   IVPB .. 1000 milliGRAM(s) IV Intermittent once  amLODIPine   Tablet 5 milliGRAM(s) Oral daily  chlorhexidine 2% Cloths 1 Application(s) Topical daily  donepezil 10 milliGRAM(s) Oral at bedtime  pantoprazole  Injectable 40 milliGRAM(s) IV Push two times a day  sodium chloride 0.9%. 1000 milliLiter(s) (100 mL/Hr) IV Continuous <Continuous>    MEDICATIONS  (PRN):      Allergies    sulfamethoxazole (Vomiting; Nausea)    Intolerances        REVIEW OF SYSTEMS:  CONSTITUTIONAL: No fever, weight loss, or fatigue  EYES: No eye pain, visual disturbances, or discharge  ENMT:  No difficulty hearing, tinnitus, vertigo; No sinus or throat pain  NECK: No pain or stiffness  BREASTS: No pain, masses, or nipple discharge  RESPIRATORY: No cough, wheezing, chills or hemoptysis; No shortness of breath  CARDIOVASCULAR: No chest pain, palpitations, dizziness, or leg swelling  GASTROINTESTINAL: No abdominal or epigastric pain. No nausea, vomiting, or hematemesis; No diarrhea or constipation. No melena or hematochezia.  GENITOURINARY: No dysuria, frequency, hematuria, or incontinence  NEUROLOGICAL: No headaches, memory loss, loss of strength, numbness, or tremors  SKIN: No itching, burning, rashes, or lesions   LYMPH NODES: No enlarged glands  ENDOCRINE: No heat or cold intolerance; No hair loss; No polydipsia or polyuria  MUSCULOSKELETAL: No back pain  PSYCHIATRIC: No depression, anxiety, mood swings, or difficulty sleeping  HEME/LYMPH: No easy bruising, or bleeding gums  ALLERGY AND IMMUNOLOGIC: No hives or eczema    Vital Signs Last 24 Hrs  T(C): 36.3 (04 Mar 2024 16:31), Max: 37.7 (04 Mar 2024 07:30)  T(F): 97.3 (04 Mar 2024 16:31), Max: 99.9 (04 Mar 2024 07:30)  HR: 117 (04 Mar 2024 16:31) (94 - 117)  BP: 141/82 (04 Mar 2024 16:31) (141/82 - 167/86)  BP(mean): --  RR: 18 (04 Mar 2024 16:31) (18 - 18)  SpO2: 95% (04 Mar 2024 16:31) (94% - 95%)    Parameters below as of 04 Mar 2024 16:31  Patient On (Oxygen Delivery Method): nasal cannula  O2 Flow (L/min): 4      PHYSICAL EXAM:  GENERAL: NAD, well-groomed, well-developed  HEAD:  Atraumatic, Normocephalic  EYES: EOMI, PERRLA, conjunctiva and sclera clear  ENMT: No tonsillar erythema, exudates, or enlargement; Moist mucous membranes, Good dentition, No lesions  NECK: Supple, No JVD, Normal thyroid  NERVOUS SYSTEM:  Alert & Oriented X 2  CHEST/LUNG: Clear to auscultation bilaterally; No rales, rhonchi, wheezing, or rubs  HEART: Regular rate and rhythm; No murmurs, rubs, or gallops  ABDOMEN: Soft, Nontender, Nondistended; Bowel sounds present  EXTREMITIES:  2+ Peripheral Pulses, No clubbing or cyanosis  LYMPH: No lymphadenopathy noted  SKIN: No rashes or lesions      LABS:                        9.8    10.51 )-----------( 246      ( 04 Mar 2024 07:28 )             29.5     04 Mar 2024 07:30    152    |  112    |  23     ----------------------------<  99     3.3     |  22     |  2.25     Ca    8.5        04 Mar 2024 07:30  Phos  3.8       04 Mar 2024 07:30  Mg     1.6       04 Mar 2024 07:30    TPro  5.7    /  Alb  2.8    /  TBili  1.0    /  DBili  0.4    /  AST  24     /  ALT  28     /  AlkPhos  165    04 Mar 2024 07:30      Urinalysis Basic - ( 04 Mar 2024 07:30 )    Color: x / Appearance: x / SG: x / pH: x  Gluc: 99 mg/dL / Ketone: x  / Bili: x / Urobili: x   Blood: x / Protein: x / Nitrite: x   Leuk Esterase: x / RBC: x / WBC x   Sq Epi: x / Non Sq Epi: x / Bacteria: x      CAPILLARY BLOOD GLUCOSE      POCT Blood Glucose.: 117 mg/dL (04 Mar 2024 17:58)  POCT Blood Glucose.: 109 mg/dL (04 Mar 2024 12:13)  POCT Blood Glucose.: 113 mg/dL (04 Mar 2024 06:17)  POCT Blood Glucose.: 100 mg/dL (04 Mar 2024 00:14)  POCT Blood Glucose.: 129 mg/dL (03 Mar 2024 19:40)        Cultures    Lactate, Blood: 1.0 mmol/L (03-04-24 @ 07:30)    RADIOLOGY & ADDITIONAL TESTS:    Imaging Personally Reviewed:  [X ] YES  [ ] NO    Consultant(s) Notes Reviewed:  [ X] YES  [ ] NO    Care Discussed with Consultants/Other Providers [ X] YES  [ ] NO

## 2024-03-04 NOTE — PROGRESS NOTE ADULT - SUBJECTIVE AND OBJECTIVE BOX
Patient is a 89y Female whom presented to the hospital with mak     PAST MEDICAL & SURGICAL HISTORY:  Myasthenia gravis      Dementia      Myasthenia gravis      No significant past surgical history      No significant past surgical history          MEDICATIONS  (STANDING):  acyclovir IVPB 550 milliGRAM(s) IV Intermittent every 12 hours  chlorhexidine 2% Cloths 1 Application(s) Topical daily  donepezil 10 milliGRAM(s) Oral at bedtime  pantoprazole  Injectable 40 milliGRAM(s) IV Push two times a day  sodium chloride 0.9%. 1000 milliLiter(s) (75 mL/Hr) IV Continuous <Continuous>      Allergies    sulfamethoxazole (Vomiting; Nausea)    Intolerances        SOCIAL HISTORY:  Denies ETOh,Smoking,     FAMILY HISTORY:  No pertinent family history in first degree relatives    No pertinent family history in first degree relatives        REVIEW OF SYSTEMS:    unable to obtained a good review system                                                9.8    10.51 )-----------( 246      ( 04 Mar 2024 07:28 )             29.5       CBC Full  -  ( 04 Mar 2024 07:28 )  WBC Count : 10.51 K/uL  RBC Count : 3.04 M/uL  Hemoglobin : 9.8 g/dL  Hematocrit : 29.5 %  Platelet Count - Automated : 246 K/uL  Mean Cell Volume : 97.0 fl  Mean Cell Hemoglobin : 32.2 pg  Mean Cell Hemoglobin Concentration : 33.2 gm/dL  Auto Neutrophil # : x  Auto Lymphocyte # : x  Auto Monocyte # : x  Auto Eosinophil # : x  Auto Basophil # : x  Auto Neutrophil % : x  Auto Lymphocyte % : x  Auto Monocyte % : x  Auto Eosinophil % : x  Auto Basophil % : x      03-04    152<H>  |  112<H>  |  23  ----------------------------<  99  3.3<L>   |  22  |  2.25<H>    Ca    8.5      04 Mar 2024 07:30  Phos  3.8     03-04  Mg     1.6     03-04    TPro  5.7<L>  /  Alb  2.8<L>  /  TBili  1.0  /  DBili  0.4<H>  /  AST  24  /  ALT  28  /  AlkPhos  165<H>  03-04      CAPILLARY BLOOD GLUCOSE      POCT Blood Glucose.: 109 mg/dL (04 Mar 2024 12:13)  POCT Blood Glucose.: 113 mg/dL (04 Mar 2024 06:17)  POCT Blood Glucose.: 100 mg/dL (04 Mar 2024 00:14)  POCT Blood Glucose.: 129 mg/dL (03 Mar 2024 19:40)      Vital Signs Last 24 Hrs  T(C): 37.6 (04 Mar 2024 09:38), Max: 37.7 (04 Mar 2024 07:30)  T(F): 99.7 (04 Mar 2024 09:38), Max: 99.9 (04 Mar 2024 07:30)  HR: 108 (04 Mar 2024 07:30) (88 - 108)  BP: 150/89 (04 Mar 2024 07:30) (134/80 - 167/86)  BP(mean): --  RR: 18 (04 Mar 2024 07:30) (18 - 18)  SpO2: 94% (04 Mar 2024 07:30) (94% - 95%)    Parameters below as of 04 Mar 2024 07:30  Patient On (Oxygen Delivery Method): nasal cannula        Urinalysis Basic - ( 04 Mar 2024 07:30 )    Color: x / Appearance: x / SG: x / pH: x  Gluc: 99 mg/dL / Ketone: x  / Bili: x / Urobili: x   Blood: x / Protein: x / Nitrite: x   Leuk Esterase: x / RBC: x / WBC x   Sq Epi: x / Non Sq Epi: x / Bacteria: x                  PHYSICAL EXAM:    Constitutional: NAD  HEENT: conjunctive   clear   Neck:  No JVD  Respiratory: CTAB  Cardiovascular: S1 and S2  Gastrointestinal: BS+, soft, NT/ND  Extremities: No peripheral edema

## 2024-03-04 NOTE — PROGRESS NOTE ADULT - SUBJECTIVE AND OBJECTIVE BOX
[facility]  Division of Infectious Diseases  [phone]    Name: DEISI UGALDE  Age: 89y  Gender: Female  MRN: [id number]    Interval History--  Notes reviewed.     Past Medical History--  Myasthenia gravis    Dementia    Myasthenia gravis    No significant past surgical history    No significant past surgical history        For details regarding the patient's social history, family history, and other miscellaneous elements, please refer the initial infectious diseases consultation and/or the admitting history and physical examination for this admission.    Allergies    sulfamethoxazole (Vomiting; Nausea)    Intolerances        Medications--  Antibiotics:    Immunologic:    Other:  acetaminophen   IVPB ..  amLODIPine   Tablet  chlorhexidine 2% Cloths  donepezil  pantoprazole  Injectable  sodium chloride 0.9%.      Review of Systems--  A 10-point review of systems was obtained.     Pertinent positives and negatives--  Constitutional: No fevers. No Chills. No Rigors.   Cardiovascular: No chest pain. No palpitations.  Respiratory: No shortness of breath. No cough.  Gastrointestinal: No nausea or vomiting. No diarrhea or constipation.   Psychiatric: Pleasant. Appropriate affect.    Review of systems otherwise negative except as previously noted.    Physical Examination--  Vital Signs: T(F): 99.7 (03-04-24 @ 09:38), Max: 99.9 (03-04-24 @ 07:30)  HR: 108 (03-04-24 @ 07:30)  BP: 150/89 (03-04-24 @ 07:30)  RR: 18 (03-04-24 @ 07:30)  SpO2: 94% (03-04-24 @ 07:30)  Wt(kg): --  General: Nontoxic-appearing Female in no acute distress.  HEENT: AT/NC. PERRL. EOMI. Anicteric. Conjunctiva pink and moist. Oropharynx clear. Dentition fair.  Neck: Not rigid. No sense of mass.  Nodes: None palpable.  Lungs: Clear bilaterally without rales, wheezing or rhonchi  Heart: Regular rate and rhythm. No Murmur. No rub. No gallop. No palpable thrill.  Abdomen: Bowel sounds present and normoactive. Soft. Nondistended. Nontender. No sense of mass. No organomegaly.  Back: No spinal tenderness. No costovertebral angle tenderness.   Extremities: No cyanosis or clubbing. No edema.   Skin: Warm. Dry. Good turgor. No rash. No vasculitic stigmata.  Psychiatric: Appropriate affect and mood for situation.         Laboratory Studies--  CBC                        9.8    10.51 )-----------( 246      ( 04 Mar 2024 07:28 )             29.5       Chemistries  03-04    152<H>  |  112<H>  |  23  ----------------------------<  99  3.3<L>   |  22  |  2.25<H>    Ca    8.5      04 Mar 2024 07:30  Phos  3.8     03-04  Mg     1.6     03-04    TPro  5.7<L>  /  Alb  2.8<L>  /  TBili  1.0  /  DBili  0.4<H>  /  AST  24  /  ALT  28  /  AlkPhos  165<H>  03-04      Culture Data           [facility]  Division of Infectious Diseases  [phone]    Name: DEISI UGALDE  Age: 89y  Gender: Female  MRN: [id number]    Covering Dr. KAMLA Gabriel.    Interval History--  Notes reviewed. Comfortable, noninteractive.     Past Medical History--  Myasthenia gravis    Dementia    Myasthenia gravis    No significant past surgical history    No significant past surgical history        For details regarding the patient's social history, family history, and other miscellaneous elements, please refer the initial infectious diseases consultation and/or the admitting history and physical examination for this admission.    Allergies    sulfamethoxazole (Vomiting; Nausea)    Intolerances        Medications--  Antibiotics:    Immunologic:    Other:  acetaminophen   IVPB ..  amLODIPine   Tablet  chlorhexidine 2% Cloths  donepezil  pantoprazole  Injectable  sodium chloride 0.9%.      Review of Systems--  Review of systems unable secondary to clinical condition.     Physical Examination--  Vital Signs: T(F): 99.7 (03-04-24 @ 09:38), Max: 99.9 (03-04-24 @ 07:30)  HR: 108 (03-04-24 @ 07:30)  BP: 150/89 (03-04-24 @ 07:30)  RR: 18 (03-04-24 @ 07:30)  SpO2: 94% (03-04-24 @ 07:30)  Wt(kg): --  General: Nontoxic-appearing Female in no acute distress.  HEENT: AT/NC. Pupils/EOM unable secondary to patient compliance. Oropharynx/dentition unable secondary to patient compliance. Protuberant tongue.   Neck: Not rigid. No sense of mass.  Nodes: None palpable.  Lungs: Poor effort decreased BS  Heart: Regular rate and rhythm.   Abdomen: Bowel sounds present and normoactive. Soft. Nondistended. Nontender.  Extremities: No cyanosis or clubbing. 1+ edema.   Skin: Warm. Dry. Good turgor. No rash. No vasculitic stigmata. PIV's ok.   Psychiatric: Unable      Laboratory Studies--  CBC                        9.8    10.51 )-----------( 246      ( 04 Mar 2024 07:28 )             29.5     WBC Count: 10.81 K/uL (03-03-24 @ 07:18)  WBC Count: 11.23 K/uL (03-01-24 @ 07:07)  WBC Count: 9.85 K/uL (02-29-24 @ 10:07)      Chemistries  03-04    152<H>  |  112<H>  |  23  ----------------------------<  99  3.3<L>   |  22  |  2.25<H>    Creatinine Trend  2.25 mg/dL (03-04-24 @ 07:30)  2.59 mg/dL (03-03-24 @ 07:25)  1.22 mg/dL (03-01-24 @ 07:06)  0.65 mg/dL (02-29-24 @ 10:07)  0.61 mg/dL (02-28-24 @ 06:57)  0.62 mg/dL (02-27-24 @ 13:50)      Ca    8.5      04 Mar 2024 07:30  Phos  3.8     03-04  Mg     1.6     03-04    TPro  5.7<L>  /  Alb  2.8<L>  /  TBili  1.0  /  DBili  0.4<H>  /  AST  24  /  ALT  28  /  AlkPhos  165<H>  03-04      Culture Data  No new data    < from: US Kidney and Bladder (03.04.24 @ 08:27) >  IMPRESSION:    1. No hydronephrosis.  2. Increased cortical echogenicity compatible with renal parenchymal   disease.    < end of copied text >

## 2024-03-05 LAB
ALBUMIN SERPL ELPH-MCNC: 2.8 G/DL — LOW (ref 3.3–5)
ALP SERPL-CCNC: 158 U/L — HIGH (ref 40–120)
ALT FLD-CCNC: 20 U/L — SIGNIFICANT CHANGE UP (ref 10–45)
ANION GAP SERPL CALC-SCNC: 16 MMOL/L — SIGNIFICANT CHANGE UP (ref 5–17)
AST SERPL-CCNC: 22 U/L — SIGNIFICANT CHANGE UP (ref 10–40)
BILIRUB SERPL-MCNC: 1 MG/DL — SIGNIFICANT CHANGE UP (ref 0.2–1.2)
BUN SERPL-MCNC: 23 MG/DL — SIGNIFICANT CHANGE UP (ref 7–23)
CALCIUM SERPL-MCNC: 8.5 MG/DL — SIGNIFICANT CHANGE UP (ref 8.4–10.5)
CHLORIDE SERPL-SCNC: 110 MMOL/L — HIGH (ref 96–108)
CO2 SERPL-SCNC: 26 MMOL/L — SIGNIFICANT CHANGE UP (ref 22–31)
CORTICOSTEROID BINDING GLOBULIN RESULT: <0.5 MG/DL — LOW
CORTIS F/TOTAL MFR SERPL: SIGNIFICANT CHANGE UP %
CORTIS SERPL-MCNC: 4.5 UG/DL — SIGNIFICANT CHANGE UP
CORTISOL, FREE RESULT: SIGNIFICANT CHANGE UP UG/DL
CREAT SERPL-MCNC: 1.64 MG/DL — HIGH (ref 0.5–1.3)
EGFR: 30 ML/MIN/1.73M2 — LOW
GLUCOSE BLDC GLUCOMTR-MCNC: 105 MG/DL — HIGH (ref 70–99)
GLUCOSE BLDC GLUCOMTR-MCNC: 106 MG/DL — HIGH (ref 70–99)
GLUCOSE BLDC GLUCOMTR-MCNC: 106 MG/DL — HIGH (ref 70–99)
GLUCOSE SERPL-MCNC: 83 MG/DL — SIGNIFICANT CHANGE UP (ref 70–99)
HCT VFR BLD CALC: 29.3 % — LOW (ref 34.5–45)
HGB BLD-MCNC: 10 G/DL — LOW (ref 11.5–15.5)
MCHC RBC-ENTMCNC: 32.8 PG — SIGNIFICANT CHANGE UP (ref 27–34)
MCHC RBC-ENTMCNC: 34.1 GM/DL — SIGNIFICANT CHANGE UP (ref 32–36)
MCV RBC AUTO: 96.1 FL — SIGNIFICANT CHANGE UP (ref 80–100)
NRBC # BLD: 0 /100 WBCS — SIGNIFICANT CHANGE UP (ref 0–0)
PLATELET # BLD AUTO: 245 K/UL — SIGNIFICANT CHANGE UP (ref 150–400)
POTASSIUM SERPL-MCNC: 3.5 MMOL/L — SIGNIFICANT CHANGE UP (ref 3.5–5.3)
POTASSIUM SERPL-SCNC: 3.5 MMOL/L — SIGNIFICANT CHANGE UP (ref 3.5–5.3)
PROT SERPL-MCNC: 5.7 G/DL — LOW (ref 6–8.3)
RBC # BLD: 3.05 M/UL — LOW (ref 3.8–5.2)
RBC # FLD: 14 % — SIGNIFICANT CHANGE UP (ref 10.3–14.5)
SODIUM SERPL-SCNC: 152 MMOL/L — HIGH (ref 135–145)
WBC # BLD: 10.77 K/UL — HIGH (ref 3.8–10.5)
WBC # FLD AUTO: 10.77 K/UL — HIGH (ref 3.8–10.5)

## 2024-03-05 PROCEDURE — 99232 SBSQ HOSP IP/OBS MODERATE 35: CPT

## 2024-03-05 RX ORDER — SODIUM CHLORIDE 9 MG/ML
1000 INJECTION, SOLUTION INTRAVENOUS
Refills: 0 | Status: DISCONTINUED | OUTPATIENT
Start: 2024-03-05 | End: 2024-03-06

## 2024-03-05 RX ADMIN — CHLORHEXIDINE GLUCONATE 1 APPLICATION(S): 213 SOLUTION TOPICAL at 15:23

## 2024-03-05 RX ADMIN — Medication 100 MILLIEQUIVALENT(S): at 02:48

## 2024-03-05 RX ADMIN — SODIUM CHLORIDE 100 MILLILITER(S): 9 INJECTION, SOLUTION INTRAVENOUS at 15:24

## 2024-03-05 RX ADMIN — PANTOPRAZOLE SODIUM 40 MILLIGRAM(S): 20 TABLET, DELAYED RELEASE ORAL at 05:15

## 2024-03-05 RX ADMIN — PANTOPRAZOLE SODIUM 40 MILLIGRAM(S): 20 TABLET, DELAYED RELEASE ORAL at 17:18

## 2024-03-05 RX ADMIN — Medication 100 MILLIEQUIVALENT(S): at 00:44

## 2024-03-05 RX ADMIN — AMLODIPINE BESYLATE 5 MILLIGRAM(S): 2.5 TABLET ORAL at 05:15

## 2024-03-05 RX ADMIN — DONEPEZIL HYDROCHLORIDE 10 MILLIGRAM(S): 10 TABLET, FILM COATED ORAL at 21:36

## 2024-03-05 NOTE — PROGRESS NOTE ADULT - PROBLEM SELECTOR PROBLEM 5
Coagulation defect, unspecified
Coagulation defect, unspecified
BOOGIE (acute kidney injury)
Coagulation defect, unspecified
Coagulation defect, unspecified
Elevated LFTs

## 2024-03-05 NOTE — PROGRESS NOTE ADULT - ASSESSMENT
89 y/o F with PMHx of Myasthenia Gravis (not on medication) and dementia (A&Ox1, conversive at baseline) who presents from her nursing home with worsening lethargy and confusion. UA done as outpatient on 2/13 grossly positive and urine culture growing E. coli and Klebsiella, pansensitive. Found to be hypothermic in ED. Admitted for sepsis and metabolic encephalopathy in setting of UTI. Hospital course notable for potential seizures, empiric treatment of meningitis/?HSV (though EEG not concordant); Distended GB but HIDA negative; waxing/waning mental status; and LFT elevations (now resolved)    3/4: acyclovir stopped by renal for BOOGIE. At this point in time, especially in the absence of conclusive evidence of HSV encephalitis, I do not think that the R:B balance of ACV is favorable at this point in time. There is no utility to LP at this point in time. Prerenal state (with new hypernatremia) may have potentiated ACV toxicity.   3/5: Renal failure improved, otherwise essentially no change.     Suggestions--  Defer additional antimicrobials.  Remain cognizant of medications which are more closely associated with precipitating a myasthenic crisis (e.g. fluoroquinolones, aminoglycosides, macrolides)    Carlos Woods MD  Attending Physician  [facility]  Division of Infectious Diseases  [phone]

## 2024-03-05 NOTE — PROGRESS NOTE ADULT - ASSESSMENT
The patient is a 89F with dementia and Myasthenia Gravis admitted for AMS.     1.Dysphagia  appreciate speech pathology  consider appetite stimulant such as Remeron vs Marinol  GOC, ?PEG     2. AMS  improving  ID workup per primary team     3. Cholelithiasis, distended GB   no e/o acute cholecystitis or biliary obstruction on HIDA scan    4. peripancreatic fat stranding   low suspicion for acute pancreatitis   ppi daily while inpatient, then can d/c    5. abnormal LFTs, downtrending    likely a byproduct of systemic infection   hepatitis panel neg  trend LFTs

## 2024-03-05 NOTE — PROGRESS NOTE ADULT - ASSESSMENT
88 YO F with PMHx of Myasthenia Gravis (not on medication for past 9 years) and MHx of dementia     Assessment/Plan:  hypernatremia- change IV fluid to 1/2 NS  100 cc/hr. Monitor sodium level.   HTN- mildly better. Continue Amlodipine  5 mg daily. Family refused higher dose of BP meds.   BOOGIE due to IV Acyclovir- improving.  Continue IV fluid. Renal bladder  ultrasound ordered. Monitor urine output. D/C Acyclovir. F/u nephrologist.   FTT - due to severe anorexia and poor appetite- not eating well. Will  discuss with family for alternative way of feeding, NG vs peg tube. Continue IV fluid.   AMS(most likely due to metabolic/toxic encephalopathy  due to UTI) - – improved  awake and responsive. Off of Acyclovir.   Hypoglycemia- resolved. Appetite improved.   cholelithiasis - without  cholecystitis neither pancreatitis. Normal amylase and lipase.   hypotension- continue IV steroid and IV fluid as needed   thrombocytopenia- PLT down to 44,000. DIC panel not significantly positive. PTT mildly elevated. Otherwise PT/INR normal. D/ C Lovenox. F/u hematology.  No plan for LP at this time. Family denied   elevated LFT- Will  check RUQ ultrasound and Hepatitis panel.   NPH- - neurology does not believe acute mental status changes, could be  due to NPH. F/u neurology as outpatient  for NPH workup.   UTI with E.coli and Klebsiella as  outpatient culture. - IV abx upgraded to Vanco and Meropenem  DVT ppx- Lovenox discontinued due to thrombocytopenia   leg arthritis- Acetaminophen PRN.  b/l cataract- F/u ophthalmolog  vitamin D deficiency- continue Vitamin D supplement.  risk of malnutrition -stable, continue supplement diet. F/u dietitian. Monitor weight.  constipation- senna at bedtime.  LBP 2/2 severe spinal stenosis with out cord pressure - no surgical intervention recommended neither accepted by family, continue PT OT as needed.  Dementia - Stable. Continue Aricept .  Dysphagia - Mechanical soft diet, aspiration precautions.  Myasthenia Gravis - stable , off of medication. Monitor CBC and BMP every 2 months.

## 2024-03-05 NOTE — PROGRESS NOTE ADULT - SUBJECTIVE AND OBJECTIVE BOX
confused, expressive dysphasia    REVIEW OF SYSTEMS:    CONSTITUTIONAL: No weakness, fevers or chills, weight loss  EYES/ENT: No visual changes, no throat pain   RESPIRATORY: No cough, wheezing, hemoptysis; No shortness of breath  CARDIOVASCULAR: No chest pain or palpitations  GASTROINTESTINAL: No abdominal, nausea, vomiting, or hematemesis; No diarrhea or constipation. No melena or hematochezia.      VITAL SIGNS:    T97.6 p74 /75 rr18    PHYSICAL EXAM:     GENERAL: no acute distress  HEENT: NC/AT, EOMI, neck supple, MMM  RESPIRATORY: LCTAB/L, no rhonchi, rales, or wheezing  CARDIOVASCULAR: RRR, no murmurs, gallops, rubs  ABDOMINAL: soft, non-tender, non-distended, positive bowel sounds   EXTREMITIES: no clubbing, cyanosis, or edema  NEUROLOGICAL: alert and disoriented x 3, non-focal                          10.0   10.77 )-----------( 245      ( 05 Mar 2024 07:27 )             29.3     03-05    152<H>  |  110<H>  |  23  ----------------------------<  83  3.5   |  26  |  1.64<H>    Ca    8.5      05 Mar 2024 07:28  Phos  3.8     03-04  Mg     1.6     03-04    TPro  5.7<L>  /  Alb  2.8<L>  /  TBili  1.0  /  DBili  x   /  AST  22  /  ALT  20  /  AlkPhos  158<H>  03-05      MEDICATIONS  (STANDING):  acetaminophen   IVPB .. 1000 milliGRAM(s) IV Intermittent once  amLODIPine   Tablet 5 milliGRAM(s) Oral daily  chlorhexidine 2% Cloths 1 Application(s) Topical daily  donepezil 10 milliGRAM(s) Oral at bedtime  pantoprazole  Injectable 40 milliGRAM(s) IV Push two times a day  sodium chloride 0.9%. 1000 milliLiter(s) (100 mL/Hr) IV Continuous <Continuous>

## 2024-03-05 NOTE — PROGRESS NOTE ADULT - SUBJECTIVE AND OBJECTIVE BOX
[facility]  Division of Infectious Diseases  [phone]    Name: DEISI UGALDE  Age: 89y  Gender: Female  MRN: [id number]    Interval History--  Notes reviewed.     Past Medical History--  Myasthenia gravis    Dementia    Myasthenia gravis    No significant past surgical history    No significant past surgical history        For details regarding the patient's social history, family history, and other miscellaneous elements, please refer the initial infectious diseases consultation and/or the admitting history and physical examination for this admission.    Allergies    sulfamethoxazole (Vomiting; Nausea)    Intolerances        Medications--  Antibiotics:    Immunologic:    Other:  acetaminophen   IVPB ..  amLODIPine   Tablet  chlorhexidine 2% Cloths  donepezil  pantoprazole  Injectable  sodium chloride 0.9%.      Review of Systems--  A 10-point review of systems was obtained.     Pertinent positives and negatives--  Constitutional: No fevers. No Chills. No Rigors.   Cardiovascular: No chest pain. No palpitations.  Respiratory: No shortness of breath. No cough.  Gastrointestinal: No nausea or vomiting. No diarrhea or constipation.   Psychiatric: Pleasant. Appropriate affect.    Review of systems otherwise negative except as previously noted.    Physical Examination--  Vital Signs: T(F): 98.2 (03-05-24 @ 00:00), Max: 98.2 (03-05-24 @ 00:00)  HR: 82 (03-05-24 @ 00:00)  BP: 159/81 (03-05-24 @ 00:00)  RR: 18 (03-05-24 @ 00:00)  SpO2: 92% (03-05-24 @ 00:00)  Wt(kg): --  General: Nontoxic-appearing Female in no acute distress.  HEENT: AT/NC. PERRL. EOMI. Anicteric. Conjunctiva pink and moist. Oropharynx clear. Dentition fair.  Neck: Not rigid. No sense of mass.  Nodes: None palpable.  Lungs: Clear bilaterally without rales, wheezing or rhonchi  Heart: Regular rate and rhythm. No Murmur. No rub. No gallop. No palpable thrill.  Abdomen: Bowel sounds present and normoactive. Soft. Nondistended. Nontender. No sense of mass. No organomegaly.  Back: No spinal tenderness. No costovertebral angle tenderness.   Extremities: No cyanosis or clubbing. No edema.   Skin: Warm. Dry. Good turgor. No rash. No vasculitic stigmata.  Psychiatric: Appropriate affect and mood for situation.         Laboratory Studies--  CBC                        10.0   10.77 )-----------( 245      ( 05 Mar 2024 07:27 )             29.3       Chemistries  03-05    152<H>  |  110<H>  |  23  ----------------------------<  83  3.5   |  26  |  1.64<H>    Ca    8.5      05 Mar 2024 07:28  Phos  3.8     03-04  Mg     1.6     03-04    TPro  5.7<L>  /  Alb  2.8<L>  /  TBili  1.0  /  DBili  x   /  AST  22  /  ALT  20  /  AlkPhos  158<H>  03-05      Culture Data           [facility]  Division of Infectious Diseases  [phone]    Name: DEISI UGALDE  Age: 89y  Gender: Female  MRN: [id number]    Interval History--  Notes reviewed. Noninteractive no change.     Past Medical History--  Myasthenia gravis    Dementia    Myasthenia gravis    No significant past surgical history    No significant past surgical history        For details regarding the patient's social history, family history, and other miscellaneous elements, please refer the initial infectious diseases consultation and/or the admitting history and physical examination for this admission.    Allergies    sulfamethoxazole (Vomiting; Nausea)    Intolerances        Medications--  Antibiotics:    Immunologic:    Other:  acetaminophen   IVPB ..  amLODIPine   Tablet  chlorhexidine 2% Cloths  donepezil  pantoprazole  Injectable  sodium chloride 0.9%.      Review of Systems--  Review of systems unable secondary to clinical condition.     Physical Examination--  Vital Signs: T(F): 98.2 (03-05-24 @ 00:00), Max: 98.2 (03-05-24 @ 00:00)  HR: 82 (03-05-24 @ 00:00)  BP: 159/81 (03-05-24 @ 00:00)  RR: 18 (03-05-24 @ 00:00)  SpO2: 92% (03-05-24 @ 00:00)  Wt(kg): --  General: Nontoxic-appearing Female in no acute distress.  HEENT: AT/NC. Pupils/EOM unable secondary to patient compliance. Oropharynx/dentition unable secondary to patient compliance. Protuberant tongue.   Neck: Not rigid. No sense of mass.  Nodes: None palpable.  Lungs: Poor effort decreased BS  Heart: Regular rate and rhythm.   Abdomen: Bowel sounds present and normoactive. Soft. Nondistended. Nontender.  Extremities: No cyanosis or clubbing. 1+ edema.   Skin: Warm. Dry. Good turgor. No rash. No vasculitic stigmata. PIV's ok.   Psychiatric: Unable        Laboratory Studies--  CBC                        10.0   10.77 )-----------( 245      ( 05 Mar 2024 07:27 )             29.3       Chemistries  03-05    152<H>  |  110<H>  |  23  ----------------------------<  83  3.5   |  26  |  1.64<H>    Ca    8.5      05 Mar 2024 07:28  Phos  3.8     03-04  Mg     1.6     03-04    TPro  5.7<L>  /  Alb  2.8<L>  /  TBili  1.0  /  DBili  x   /  AST  22  /  ALT  20  /  AlkPhos  158<H>  03-05      Culture Data  No new data

## 2024-03-05 NOTE — PROGRESS NOTE ADULT - SUBJECTIVE AND OBJECTIVE BOX
Patient is a 89y Female whom presented to the hospital with mak     PAST MEDICAL & SURGICAL HISTORY:  Myasthenia gravis      Dementia      Myasthenia gravis      No significant past surgical history      No significant past surgical history          MEDICATIONS  (STANDING):  acyclovir IVPB 550 milliGRAM(s) IV Intermittent every 12 hours  chlorhexidine 2% Cloths 1 Application(s) Topical daily  donepezil 10 milliGRAM(s) Oral at bedtime  pantoprazole  Injectable 40 milliGRAM(s) IV Push two times a day  sodium chloride 0.9%. 1000 milliLiter(s) (75 mL/Hr) IV Continuous <Continuous>      Allergies    sulfamethoxazole (Vomiting; Nausea)    Intolerances        SOCIAL HISTORY:  Denies ETOh,Smoking,     FAMILY HISTORY:  No pertinent family history in first degree relatives    No pertinent family history in first degree relatives        REVIEW OF SYSTEMS:    unable to obtained a good review system                                                                      10.0   10.77 )-----------( 245      ( 05 Mar 2024 07:27 )             29.3       CBC Full  -  ( 05 Mar 2024 07:27 )  WBC Count : 10.77 K/uL  RBC Count : 3.05 M/uL  Hemoglobin : 10.0 g/dL  Hematocrit : 29.3 %  Platelet Count - Automated : 245 K/uL  Mean Cell Volume : 96.1 fl  Mean Cell Hemoglobin : 32.8 pg  Mean Cell Hemoglobin Concentration : 34.1 gm/dL  Auto Neutrophil # : x  Auto Lymphocyte # : x  Auto Monocyte # : x  Auto Eosinophil # : x  Auto Basophil # : x  Auto Neutrophil % : x  Auto Lymphocyte % : x  Auto Monocyte % : x  Auto Eosinophil % : x  Auto Basophil % : x      03-05    152<H>  |  110<H>  |  23  ----------------------------<  83  3.5   |  26  |  1.64<H>    Ca    8.5      05 Mar 2024 07:28  Phos  3.8     03-04  Mg     1.6     03-04    TPro  5.7<L>  /  Alb  2.8<L>  /  TBili  1.0  /  DBili  x   /  AST  22  /  ALT  20  /  AlkPhos  158<H>  03-05      CAPILLARY BLOOD GLUCOSE      POCT Blood Glucose.: 106 mg/dL (05 Mar 2024 12:00)  POCT Blood Glucose.: 102 mg/dL (04 Mar 2024 23:53)  POCT Blood Glucose.: 117 mg/dL (04 Mar 2024 17:58)      Vital Signs Last 24 Hrs  T(C): 36.4 (05 Mar 2024 11:34), Max: 36.8 (05 Mar 2024 00:00)  T(F): 97.6 (05 Mar 2024 11:34), Max: 98.2 (05 Mar 2024 00:00)  HR: 74 (05 Mar 2024 11:47) (74 - 117)  BP: 155/75 (05 Mar 2024 11:34) (141/82 - 159/81)  BP(mean): --  RR: 18 (05 Mar 2024 11:47) (18 - 18)  SpO2: 96% (05 Mar 2024 11:47) (90% - 96%)    Parameters below as of 05 Mar 2024 11:47  Patient On (Oxygen Delivery Method): nasal cannula  O2 Flow (L/min): 4      Urinalysis Basic - ( 05 Mar 2024 07:28 )    Color: x / Appearance: x / SG: x / pH: x  Gluc: 83 mg/dL / Ketone: x  / Bili: x / Urobili: x   Blood: x / Protein: x / Nitrite: x   Leuk Esterase: x / RBC: x / WBC x   Sq Epi: x / Non Sq Epi: x / Bacteria: x                    PHYSICAL EXAM:    Constitutional: NAD  HEENT: conjunctive   clear   Neck:  No JVD  Respiratory: CTAB  Cardiovascular: S1 and S2  Gastrointestinal: BS+, soft, NT/ND  Extremities: No peripheral edema

## 2024-03-05 NOTE — PROGRESS NOTE ADULT - ASSESSMENT
89 YO F with PMHx of Myasthenia Gravis (not on medication for past 9 years) and Hx of dementia .   Patient was confused for a few days . Urine culture was checked  and showed positive with Klebsiella and E.coli both sensitive to Cipro. Treated with Cipro for 3 days but confusion got worse. She has slurred  speech today so transferred to hospital for neurological evaluation and CT head.  (19 Feb 2024 18:09)      ACUTE RENAL FAILURE: increase iv fluid   Serum creatinine is improved   There is no progression . No uremic symptoms  No evidence of anemia .  Fluid status stable.    BP monitoring,continue current antihypertensive meds, low salt diet,followup with PMD in 1-2 weeks  amLODIPine   Tablet 5 milliGRAM(s) Oral daily      Will continue to avoid nephrotoxic drugs.  Patient remains asymptomatic.   Continue current therapy.  hold  diuretic.  hold   ACE inhibitor.  hold   ARB.        Admit for septic workup and ID evaluation,send blood and urine cx,serial lactate levels,monitor vitals closley,ivfs hydration,monitor urine output and renal profile,iv abx as per id cons

## 2024-03-05 NOTE — PROVIDER CONTACT NOTE (OTHER) - ASSESSMENT
pt is AOx0, nonverbal. Pt is not following commands but is arousal to voice and stimulation. Pt is cold to touch. VS as charted. No shows no s/s of distress at this time.
Pt A&Ox0, alert but nonverbal. NAD.
Pt currently resting in bed, appears to be sleeping, accusable w/ touch & voice. Pt tachycardic ranging from 83-130sbpm. No s/s of chest pain. Safety measures intact. Hourly rounding in prog.
pt axo0 nonverbal baseline, slightly cold to touch
pt cold to touch
Pt currently resting In bed. Pt is confused A&Ox1. Pt connected to [employer] pulse ox. VSS except HR irregular jumping from 86 - 130s bpm at times. Pt appears in no distress except slightly labored breathing. Pt denies chest pain. Safety measures intact. Family at bedside. Hourly rounding in progress.
PT A&Ox0, VSS except for temperature. no reading for temperature. Pt skin feels cool.
Repeat VH=210/86 HR=80

## 2024-03-05 NOTE — PROGRESS NOTE ADULT - SUBJECTIVE AND OBJECTIVE BOX
Patient is a 89y old  Female who presents with a chief complaint of AMS (05 Mar 2024 14:10)      INTERVAL HPI/OVERNIGHT EVENTS: Cre improved down to 1.5. Her mental status  is much better. She is awake and again and eating and drinking much better. BP still 150, continue Amlodipine. Family does not want higher dose of Amlodipine. She is off of abx. NA elevated  152, change IV fluid to 1/2  cc/hr. Monitor sodium.     Pain Location & Control:     MEDICATIONS  (STANDING):  acetaminophen   IVPB .. 1000 milliGRAM(s) IV Intermittent once  amLODIPine   Tablet 5 milliGRAM(s) Oral daily  chlorhexidine 2% Cloths 1 Application(s) Topical daily  donepezil 10 milliGRAM(s) Oral at bedtime  pantoprazole  Injectable 40 milliGRAM(s) IV Push two times a day  sodium chloride 0.45%. 1000 milliLiter(s) (100 mL/Hr) IV Continuous <Continuous>    MEDICATIONS  (PRN):      Allergies    sulfamethoxazole (Vomiting; Nausea)    Intolerances        REVIEW OF SYSTEMS:  CONSTITUTIONAL: No fever, weight loss, or fatigue  EYES: No eye pain, visual disturbances, or discharge  ENMT:  No difficulty hearing, tinnitus, vertigo; No sinus or throat pain  NECK: No pain or stiffness  BREASTS: No pain, masses, or nipple discharge  RESPIRATORY: No cough, wheezing, chills or hemoptysis; No shortness of breath  CARDIOVASCULAR: No chest pain, palpitations, dizziness, or leg swelling  GASTROINTESTINAL: No abdominal or epigastric pain. No nausea, vomiting, or hematemesis; No diarrhea or constipation. No melena or hematochezia.  GENITOURINARY: No dysuria, frequency, hematuria, or incontinence  NEUROLOGICAL: No headaches, memory loss, loss of strength, numbness, or tremors  SKIN: No itching, burning, rashes, or lesions   LYMPH NODES: No enlarged glands  ENDOCRINE: No heat or cold intolerance; No hair loss; No polydipsia or polyuria  MUSCULOSKELETAL: No back pain  PSYCHIATRIC: No depression, anxiety, mood swings, or difficulty sleeping  HEME/LYMPH: No easy bruising, or bleeding gums  ALLERGY AND IMMUNOLOGIC: No hives or eczema    Vital Signs Last 24 Hrs  T(C): 37.3 (05 Mar 2024 16:24), Max: 37.3 (05 Mar 2024 16:24)  T(F): 99.1 (05 Mar 2024 16:24), Max: 99.1 (05 Mar 2024 16:24)  HR: 89 (05 Mar 2024 16:24) (74 - 89)  BP: 160/95 (05 Mar 2024 16:24) (155/75 - 160/95)  BP(mean): --  RR: 18 (05 Mar 2024 16:24) (18 - 18)  SpO2: 98% (05 Mar 2024 16:24) (90% - 98%)    Parameters below as of 05 Mar 2024 16:24  Patient On (Oxygen Delivery Method): nasal cannula  O2 Flow (L/min): 4      PHYSICAL EXAM:  GENERAL: NAD, well-groomed, well-developed  HEAD:  Atraumatic, Normocephalic  EYES: EOMI, PERRLA, conjunctiva and sclera clear  ENMT: No tonsillar erythema, exudates, or enlargement; Moist mucous membranes, Good dentition, No lesions  NECK: Supple, No JVD, Normal thyroid  NERVOUS SYSTEM:  Alert & Oriented X 2   CHEST/LUNG: Clear to auscultation bilaterally; No rales, rhonchi, wheezing, or rubs  HEART: Regular rate and rhythm; No murmurs, rubs, or gallops  ABDOMEN: Soft, Nontender, Nondistended; Bowel sounds present  EXTREMITIES:  2+ Peripheral Pulses, No clubbing or cyanosis  LYMPH: No lymphadenopathy noted  SKIN: No rashes or lesions      LABS:                        10.0   10.77 )-----------( 245      ( 05 Mar 2024 07:27 )             29.3     05 Mar 2024 07:28    152    |  110    |  23     ----------------------------<  83     3.5     |  26     |  1.64     Ca    8.5        05 Mar 2024 07:28    TPro  5.7    /  Alb  2.8    /  TBili  1.0    /  DBili  x      /  AST  22     /  ALT  20     /  AlkPhos  158    05 Mar 2024 07:28      Urinalysis Basic - ( 05 Mar 2024 07:28 )    Color: x / Appearance: x / SG: x / pH: x  Gluc: 83 mg/dL / Ketone: x  / Bili: x / Urobili: x   Blood: x / Protein: x / Nitrite: x   Leuk Esterase: x / RBC: x / WBC x   Sq Epi: x / Non Sq Epi: x / Bacteria: x      CAPILLARY BLOOD GLUCOSE      POCT Blood Glucose.: 106 mg/dL (05 Mar 2024 12:00)  POCT Blood Glucose.: 102 mg/dL (04 Mar 2024 23:53)  POCT Blood Glucose.: 117 mg/dL (04 Mar 2024 17:58)        Cultures      RADIOLOGY & ADDITIONAL TESTS:    Imaging Personally Reviewed:  [X ] YES  [ ] NO    Consultant(s) Notes Reviewed:  [ X] YES  [ ] NO    Care Discussed with Consultants/Other Providers [X ] YES  [ ] NO

## 2024-03-05 NOTE — PROGRESS NOTE ADULT - SUBJECTIVE AND OBJECTIVE BOX
INTERVAL HPI/OVERNIGHT EVENTS:    events noted   low po intake    MEDICATIONS  (STANDING):  acetaminophen   IVPB .. 1000 milliGRAM(s) IV Intermittent once  amLODIPine   Tablet 5 milliGRAM(s) Oral daily  chlorhexidine 2% Cloths 1 Application(s) Topical daily  donepezil 10 milliGRAM(s) Oral at bedtime  pantoprazole  Injectable 40 milliGRAM(s) IV Push two times a day  sodium chloride 0.9%. 1000 milliLiter(s) (100 mL/Hr) IV Continuous <Continuous>    MEDICATIONS  (PRN):      Allergies    sulfamethoxazole (Vomiting; Nausea)    Intolerances        Review of Systems:    General:  No wt loss, fevers, chills, night sweats, fatigue   Eyes:  Good vision, no reported pain  ENT:  No sore throat, pain, runny nose, dysphagia  CV:  No pain, palpitations, hypo/hypertension  Resp:  No dyspnea, cough, tachypnea, wheezing  GI:  No pain, No nausea, No vomiting, No diarrhea, No constipation, No weight loss, No fever, No pruritis, No rectal bleeding, No melena, No dysphagia  :  No pain, bleeding, incontinence, nocturia  Muscle:  No pain, weakness  Neuro:  No weakness, tingling, memory problems  Psych:  No fatigue, insomnia, mood problems, depression  Endocrine:  No polyuria, polydypsia, cold/heat intolerance  Heme:  No petechiae, ecchymosis, easy bruisability  Skin:  No rash, tattoos, scars, edema      Vital Signs Last 24 Hrs  T(C): 36.4 (05 Mar 2024 11:34), Max: 36.8 (05 Mar 2024 00:00)  T(F): 97.6 (05 Mar 2024 11:34), Max: 98.2 (05 Mar 2024 00:00)  HR: 74 (05 Mar 2024 11:47) (74 - 117)  BP: 155/75 (05 Mar 2024 11:34) (141/82 - 159/81)  BP(mean): --  RR: 18 (05 Mar 2024 11:47) (18 - 18)  SpO2: 96% (05 Mar 2024 11:47) (90% - 96%)    Parameters below as of 05 Mar 2024 11:47  Patient On (Oxygen Delivery Method): nasal cannula  O2 Flow (L/min): 4      PHYSICAL EXAM:    Constitutional: NAD  HEENT: EOMI, throat clear  Neck: No LAD, supple  Respiratory: CTA and P  Cardiovascular: S1 and S2, RRR, no M  Gastrointestinal: BS+, soft, NT/ND, neg HSM,  Extremities: No peripheral edema, neg clubbing, cyanosis  Vascular: 2+ peripheral pulses  Neurological: A/O x 3, no focal deficits  Psychiatric: Normal mood, normal affect  Skin: No rashes      LABS:                        10.0   10.77 )-----------( 245      ( 05 Mar 2024 07:27 )             29.3     03-05    152<H>  |  110<H>  |  23  ----------------------------<  83  3.5   |  26  |  1.64<H>    Ca    8.5      05 Mar 2024 07:28  Phos  3.8     03-04  Mg     1.6     03-04    TPro  5.7<L>  /  Alb  2.8<L>  /  TBili  1.0  /  DBili  x   /  AST  22  /  ALT  20  /  AlkPhos  158<H>  03-05      Urinalysis Basic - ( 05 Mar 2024 07:28 )    Color: x / Appearance: x / SG: x / pH: x  Gluc: 83 mg/dL / Ketone: x  / Bili: x / Urobili: x   Blood: x / Protein: x / Nitrite: x   Leuk Esterase: x / RBC: x / WBC x   Sq Epi: x / Non Sq Epi: x / Bacteria: x        RADIOLOGY & ADDITIONAL TESTS:

## 2024-03-05 NOTE — PROGRESS NOTE ADULT - PROBLEM SELECTOR PLAN 5
continue to monitor
hypernatremia  continue to monitor  increase hydration
continue to monitor
continue to monitor (prob related to underlying disease/low grade DIC)

## 2024-03-06 LAB
ALBUMIN SERPL ELPH-MCNC: 2.8 G/DL — LOW (ref 3.3–5)
ALP SERPL-CCNC: 142 U/L — HIGH (ref 40–120)
ALT FLD-CCNC: 16 U/L — SIGNIFICANT CHANGE UP (ref 10–45)
ANION GAP SERPL CALC-SCNC: 13 MMOL/L — SIGNIFICANT CHANGE UP (ref 5–17)
AST SERPL-CCNC: 19 U/L — SIGNIFICANT CHANGE UP (ref 10–40)
BILIRUB SERPL-MCNC: 1.1 MG/DL — SIGNIFICANT CHANGE UP (ref 0.2–1.2)
BUN SERPL-MCNC: 20 MG/DL — SIGNIFICANT CHANGE UP (ref 7–23)
CALCIUM SERPL-MCNC: 8.1 MG/DL — LOW (ref 8.4–10.5)
CHLORIDE SERPL-SCNC: 108 MMOL/L — SIGNIFICANT CHANGE UP (ref 96–108)
CO2 SERPL-SCNC: 31 MMOL/L — SIGNIFICANT CHANGE UP (ref 22–31)
CREAT SERPL-MCNC: 1.28 MG/DL — SIGNIFICANT CHANGE UP (ref 0.5–1.3)
EGFR: 40 ML/MIN/1.73M2 — LOW
GLUCOSE BLDC GLUCOMTR-MCNC: 101 MG/DL — HIGH (ref 70–99)
GLUCOSE BLDC GLUCOMTR-MCNC: 124 MG/DL — HIGH (ref 70–99)
GLUCOSE BLDC GLUCOMTR-MCNC: 152 MG/DL — HIGH (ref 70–99)
GLUCOSE BLDC GLUCOMTR-MCNC: 89 MG/DL — SIGNIFICANT CHANGE UP (ref 70–99)
GLUCOSE SERPL-MCNC: 94 MG/DL — SIGNIFICANT CHANGE UP (ref 70–99)
HCT VFR BLD CALC: 30.3 % — LOW (ref 34.5–45)
HGB BLD-MCNC: 10.6 G/DL — LOW (ref 11.5–15.5)
MCHC RBC-ENTMCNC: 32.9 PG — SIGNIFICANT CHANGE UP (ref 27–34)
MCHC RBC-ENTMCNC: 35 GM/DL — SIGNIFICANT CHANGE UP (ref 32–36)
MCV RBC AUTO: 94.1 FL — SIGNIFICANT CHANGE UP (ref 80–100)
NRBC # BLD: 0 /100 WBCS — SIGNIFICANT CHANGE UP (ref 0–0)
PLATELET # BLD AUTO: 227 K/UL — SIGNIFICANT CHANGE UP (ref 150–400)
POTASSIUM SERPL-MCNC: 3 MMOL/L — LOW (ref 3.5–5.3)
POTASSIUM SERPL-SCNC: 3 MMOL/L — LOW (ref 3.5–5.3)
PROT SERPL-MCNC: 5.7 G/DL — LOW (ref 6–8.3)
RBC # BLD: 3.22 M/UL — LOW (ref 3.8–5.2)
RBC # FLD: 13.9 % — SIGNIFICANT CHANGE UP (ref 10.3–14.5)
SODIUM SERPL-SCNC: 152 MMOL/L — HIGH (ref 135–145)
WBC # BLD: 7.9 K/UL — SIGNIFICANT CHANGE UP (ref 3.8–10.5)
WBC # FLD AUTO: 7.9 K/UL — SIGNIFICANT CHANGE UP (ref 3.8–10.5)

## 2024-03-06 PROCEDURE — 99232 SBSQ HOSP IP/OBS MODERATE 35: CPT

## 2024-03-06 RX ORDER — SODIUM CHLORIDE 9 MG/ML
1000 INJECTION, SOLUTION INTRAVENOUS
Refills: 0 | Status: DISCONTINUED | OUTPATIENT
Start: 2024-03-06 | End: 2024-03-07

## 2024-03-06 RX ORDER — POTASSIUM CHLORIDE 20 MEQ
10 PACKET (EA) ORAL
Refills: 0 | Status: COMPLETED | OUTPATIENT
Start: 2024-03-06 | End: 2024-03-06

## 2024-03-06 RX ORDER — POTASSIUM CHLORIDE 20 MEQ
20 PACKET (EA) ORAL DAILY
Refills: 0 | Status: DISCONTINUED | OUTPATIENT
Start: 2024-03-06 | End: 2024-03-08

## 2024-03-06 RX ORDER — ACETAMINOPHEN 500 MG
650 TABLET ORAL ONCE
Refills: 0 | Status: COMPLETED | OUTPATIENT
Start: 2024-03-06 | End: 2024-03-06

## 2024-03-06 RX ORDER — MIRTAZAPINE 45 MG/1
7.5 TABLET, ORALLY DISINTEGRATING ORAL AT BEDTIME
Refills: 0 | Status: DISCONTINUED | OUTPATIENT
Start: 2024-03-06 | End: 2024-03-08

## 2024-03-06 RX ADMIN — AMLODIPINE BESYLATE 5 MILLIGRAM(S): 2.5 TABLET ORAL at 05:19

## 2024-03-06 RX ADMIN — Medication 100 MILLIEQUIVALENT(S): at 15:05

## 2024-03-06 RX ADMIN — PANTOPRAZOLE SODIUM 40 MILLIGRAM(S): 20 TABLET, DELAYED RELEASE ORAL at 05:19

## 2024-03-06 RX ADMIN — Medication 100 MILLIEQUIVALENT(S): at 11:11

## 2024-03-06 RX ADMIN — SODIUM CHLORIDE 70 MILLILITER(S): 9 INJECTION, SOLUTION INTRAVENOUS at 11:11

## 2024-03-06 RX ADMIN — CHLORHEXIDINE GLUCONATE 1 APPLICATION(S): 213 SOLUTION TOPICAL at 13:06

## 2024-03-06 RX ADMIN — MIRTAZAPINE 7.5 MILLIGRAM(S): 45 TABLET, ORALLY DISINTEGRATING ORAL at 21:14

## 2024-03-06 RX ADMIN — Medication 100 MILLIEQUIVALENT(S): at 18:24

## 2024-03-06 RX ADMIN — DONEPEZIL HYDROCHLORIDE 10 MILLIGRAM(S): 10 TABLET, FILM COATED ORAL at 21:13

## 2024-03-06 RX ADMIN — PANTOPRAZOLE SODIUM 40 MILLIGRAM(S): 20 TABLET, DELAYED RELEASE ORAL at 18:25

## 2024-03-06 NOTE — PROGRESS NOTE ADULT - ASSESSMENT
89 YO F with PMHx of Myasthenia Gravis (not on medication for past 9 years) and Hx of dementia .   Patient was confused for a few days . Urine culture was checked  and showed positive with Klebsiella and E.coli both sensitive to Cipro. Treated with Cipro for 3 days but confusion got worse. She has slurred  speech today so transferred to hospital for neurological evaluation and CT head.  (19 Feb 2024 18:09)      ACUTE RENAL FAILURE:  Serum creatinine is improved   There is no progression . No uremic symptoms  No evidence of anemia .  Fluid status stable.    BP monitoring,continue current antihypertensive meds, low salt diet,followup with PMD in 1-2 weeks  amLODIPine   Tablet 5 milliGRAM(s) Oral daily      Will continue to avoid nephrotoxic drugs.  Patient remains asymptomatic.   Continue current therapy.  hold  diuretic.  hold   ACE inhibitor.  hold   ARB.    hypokalemia potassium chloride   Powder 20 milliEquivalent(s) Oral daily    hypernatremia dextrose 5% + sodium chloride 0.45%. 1000 milliLiter(s) (70 mL/Hr) IV Continuous     blood and urine cx,serial lactate levels,monitor vitals umm mondragon hydration,monitor urine output and renal profile,iv abx as per id cons

## 2024-03-06 NOTE — PROGRESS NOTE ADULT - ASSESSMENT
The patient is a 89F with dementia and Myasthenia Gravis admitted for AMS.     1.Dysphagia  appreciate speech pathology  consider appetite stimulant such as Remeron vs Marinol  GOC, ?PEG , I think patient is high risk for anesthesia    2. AMS  improving  ID workup per primary team     3. Cholelithiasis, distended GB   no e/o acute cholecystitis or biliary obstruction on HIDA scan    4. peripancreatic fat stranding   low suspicion for acute pancreatitis   ppi daily while inpatient, then can d/c    5. abnormal LFTs, downtrending    likely a byproduct of systemic infection   hepatitis panel neg  trend LFTs

## 2024-03-06 NOTE — PROGRESS NOTE ADULT - ASSESSMENT
88 y/o F with PMHx of Myasthenia Gravis (not on medication) and dementia (A&Ox1, conversive at baseline) who presents from her nursing home with worsening lethargy and confusion. UA done as outpatient on 2/13 grossly positive and urine culture growing E. coli and Klebsiella, pansensitive. Found to be hypothermic in ED. Admitted for sepsis and metabolic encephalopathy in setting of UTI. Hospital course notable for potential seizures, empiric treatment of meningitis/?HSV (though EEG not concordant); Distended GB but HIDA negative; waxing/waning mental status; and LFT elevations (now resolved)    3/4: acyclovir stopped by renal for BOOGIE. At this point in time, especially in the absence of conclusive evidence of HSV encephalitis, I do not think that the R:B balance of ACV is favorable at this point in time. There is no utility to LP at this point in time. Prerenal state (with new hypernatremia) may have potentiated ACV toxicity.   3/5: Renal failure improved, otherwise essentially no change.   3/6: BOOGIE improving, mental status appears the same. No clear or convincing evidence of infection at this time    Suggestions--  Defer additional antimicrobials.  Remain cognizant of medications which are more closely associated with precipitating a myasthenic crisis (e.g. fluoroquinolones, aminoglycosides, macrolides)  I'll sign off at this time    Thank you for the courtesy of this referral.    Carlos Woods MD  Attending Physician  [facility]  Division of Infectious Diseases  [phone]

## 2024-03-06 NOTE — PROGRESS NOTE ADULT - SUBJECTIVE AND OBJECTIVE BOX
Patient is a 89y old  Female who presents with a chief complaint of AMS (06 Mar 2024 19:14)      INTERVAL HPI/OVERNIGHT EVENTS: Stable. Cre improving. NA still the same. Adjust IV fluid  to Dextrose ½ NS. Potassium was replaced.  Speech and swallow reconsulted.      Pain Location & Control:     MEDICATIONS  (STANDING):  acetaminophen   IVPB .. 1000 milliGRAM(s) IV Intermittent once  amLODIPine   Tablet 5 milliGRAM(s) Oral daily  chlorhexidine 2% Cloths 1 Application(s) Topical daily  dextrose 5% + sodium chloride 0.45%. 1000 milliLiter(s) (70 mL/Hr) IV Continuous <Continuous>  donepezil 10 milliGRAM(s) Oral at bedtime  mirtazapine 7.5 milliGRAM(s) Oral at bedtime  pantoprazole  Injectable 40 milliGRAM(s) IV Push two times a day  potassium chloride   Powder 20 milliEquivalent(s) Oral daily    MEDICATIONS  (PRN):      Allergies    sulfamethoxazole (Vomiting; Nausea)    Intolerances        REVIEW OF SYSTEMS:  CONSTITUTIONAL: No fever, weight loss, or fatigue  EYES: No eye pain, visual disturbances, or discharge  ENMT:  No difficulty hearing, tinnitus, vertigo; No sinus or throat pain  NECK: No pain or stiffness  BREASTS: No pain, masses, or nipple discharge  RESPIRATORY: No cough, wheezing, chills or hemoptysis; No shortness of breath  CARDIOVASCULAR: No chest pain, palpitations, dizziness, or leg swelling  GASTROINTESTINAL: No abdominal or epigastric pain. No nausea, vomiting, or hematemesis; No diarrhea or constipation. No melena or hematochezia.  GENITOURINARY: No dysuria, frequency, hematuria, or incontinence  NEUROLOGICAL: No headaches, memory loss, loss of strength, numbness, or tremors  SKIN: No itching, burning, rashes, or lesions   LYMPH NODES: No enlarged glands  ENDOCRINE: No heat or cold intolerance; No hair loss; No polydipsia or polyuria  MUSCULOSKELETAL: No back pain  PSYCHIATRIC: No depression, anxiety, mood swings, or difficulty sleeping  HEME/LYMPH: No easy bruising, or bleeding gums  ALLERGY AND IMMUNOLOGIC: No hives or eczema    Vital Signs Last 24 Hrs  T(C): 37.1 (06 Mar 2024 20:47), Max: 37.5 (05 Mar 2024 22:49)  T(F): 98.8 (06 Mar 2024 20:47), Max: 99.5 (05 Mar 2024 22:49)  HR: 77 (06 Mar 2024 20:47) (77 - 117)  BP: 148/84 (06 Mar 2024 20:47) (123/79 - 160/81)  BP(mean): --  RR: 18 (06 Mar 2024 20:47) (18 - 18)  SpO2: 97% (06 Mar 2024 20:47) (97% - 100%)    Parameters below as of 06 Mar 2024 20:47  Patient On (Oxygen Delivery Method): nasal cannula  O2 Flow (L/min): 4      PHYSICAL EXAM:  GENERAL: NAD, well-groomed, well-developed  HEAD:  Atraumatic, Normocephalic  EYES: EOMI, PERRLA, conjunctiva and sclera clear  ENMT: No tonsillar erythema, exudates, or enlargement; Moist mucous membranes, Good dentition, No lesions  NECK: Supple, No JVD, Normal thyroid  NERVOUS SYSTEM:  Alert & Oriented X3, Good concentration; Motor Strength 5/5 B/L upper and lower extremities; DTRs 2+ intact and symmetric  CHEST/LUNG: Clear to auscultation bilaterally; No rales, rhonchi, wheezing, or rubs  HEART: Regular rate and rhythm; No murmurs, rubs, or gallops  ABDOMEN: Soft, Nontender, Nondistended; Bowel sounds present  EXTREMITIES:  2+ Peripheral Pulses, No clubbing or cyanosis  LYMPH: No lymphadenopathy noted  SKIN: No rashes or lesions      LABS:                        10.6   7.90  )-----------( 227      ( 06 Mar 2024 07:14 )             30.3     06 Mar 2024 07:14    152    |  108    |  20     ----------------------------<  94     3.0     |  31     |  1.28     Ca    8.1        06 Mar 2024 07:14    TPro  5.7    /  Alb  2.8    /  TBili  1.1    /  DBili  x      /  AST  19     /  ALT  16     /  AlkPhos  142    06 Mar 2024 07:14      Urinalysis Basic - ( 06 Mar 2024 07:14 )    Color: x / Appearance: x / SG: x / pH: x  Gluc: 94 mg/dL / Ketone: x  / Bili: x / Urobili: x   Blood: x / Protein: x / Nitrite: x   Leuk Esterase: x / RBC: x / WBC x   Sq Epi: x / Non Sq Epi: x / Bacteria: x      CAPILLARY BLOOD GLUCOSE      POCT Blood Glucose.: 124 mg/dL (06 Mar 2024 17:57)  POCT Blood Glucose.: 89 mg/dL (06 Mar 2024 11:59)  POCT Blood Glucose.: 101 mg/dL (06 Mar 2024 06:12)  POCT Blood Glucose.: 106 mg/dL (05 Mar 2024 23:43)        Cultures      RADIOLOGY & ADDITIONAL TESTS:    Imaging Personally Reviewed:  [ X] YES  [ ] NO    Consultant(s) Notes Reviewed:  [ X] YES  [ ] NO    Care Discussed with Consultants/Other Providers [ X] YES  [ ] NO

## 2024-03-06 NOTE — PROGRESS NOTE ADULT - SUBJECTIVE AND OBJECTIVE BOX
[facility]  Division of Infectious Diseases  [phone]    Name: DEISI UGALDE  Age: 89y  Gender: Female  MRN: [id number]    Interval History--  Notes reviewed.     Past Medical History--  Myasthenia gravis    Dementia    Myasthenia gravis    No significant past surgical history    No significant past surgical history        For details regarding the patient's social history, family history, and other miscellaneous elements, please refer the initial infectious diseases consultation and/or the admitting history and physical examination for this admission.    Allergies    sulfamethoxazole (Vomiting; Nausea)    Intolerances        Medications--  Antibiotics:    Immunologic:    Other:  acetaminophen   IVPB ..  amLODIPine   Tablet  chlorhexidine 2% Cloths  dextrose 5% + sodium chloride 0.45%.  donepezil  mirtazapine  pantoprazole  Injectable  potassium chloride   Powder  potassium chloride  10 mEq/100 mL IVPB      Review of Systems--  A 10-point review of systems was obtained.     Pertinent positives and negatives--  Constitutional: No fevers. No Chills. No Rigors.   Cardiovascular: No chest pain. No palpitations.  Respiratory: No shortness of breath. No cough.  Gastrointestinal: No nausea or vomiting. No diarrhea or constipation.   Psychiatric: Pleasant. Appropriate affect.    Review of systems otherwise negative except as previously noted.    Physical Examination--  Vital Signs: T(F): 99.3 (03-06-24 @ 09:22), Max: 99.5 (03-05-24 @ 22:49)  HR: 79 (03-06-24 @ 09:22)  BP: 155/87 (03-06-24 @ 09:22)  RR: 18 (03-06-24 @ 09:22)  SpO2: 99% (03-06-24 @ 09:22)  Wt(kg): --  General: Nontoxic-appearing Female in no acute distress.  HEENT: AT/NC. PERRL. EOMI. Anicteric. Conjunctiva pink and moist. Oropharynx clear. Dentition fair.  Neck: Not rigid. No sense of mass.  Nodes: None palpable.  Lungs: Clear bilaterally without rales, wheezing or rhonchi  Heart: Regular rate and rhythm. No Murmur. No rub. No gallop. No palpable thrill.  Abdomen: Bowel sounds present and normoactive. Soft. Nondistended. Nontender. No sense of mass. No organomegaly.  Back: No spinal tenderness. No costovertebral angle tenderness.   Extremities: No cyanosis or clubbing. No edema.   Skin: Warm. Dry. Good turgor. No rash. No vasculitic stigmata.  Psychiatric: Appropriate affect and mood for situation.         Laboratory Studies--  CBC                        10.6   7.90  )-----------( 227      ( 06 Mar 2024 07:14 )             30.3       Chemistries  03-06    152<H>  |  108  |  20  ----------------------------<  94  3.0<L>   |  31  |  1.28    Ca    8.1<L>      06 Mar 2024 07:14    TPro  5.7<L>  /  Alb  2.8<L>  /  TBili  1.1  /  DBili  x   /  AST  19  /  ALT  16  /  AlkPhos  142<H>  03-06      Culture Data           [facility]  Division of Infectious Diseases  [phone]    Name: DEISI UGALDE  Age: 89y  Gender: Female  MRN: [id number]    Interval History--  Notes reviewed. Seen earlier today. Poorly responsive, no change.     Past Medical History--  Myasthenia gravis    Dementia    Myasthenia gravis    No significant past surgical history    No significant past surgical history        For details regarding the patient's social history, family history, and other miscellaneous elements, please refer the initial infectious diseases consultation and/or the admitting history and physical examination for this admission.    Allergies    sulfamethoxazole (Vomiting; Nausea)    Intolerances        Medications--  Antibiotics:    Immunologic:    Other:  acetaminophen   IVPB ..  amLODIPine   Tablet  chlorhexidine 2% Cloths  dextrose 5% + sodium chloride 0.45%.  donepezil  mirtazapine  pantoprazole  Injectable  potassium chloride   Powder  potassium chloride  10 mEq/100 mL IVPB      Review of systems unable secondary to clinical condition.     Physical Examination--  Vital Signs: T(F): 99.3 (03-06-24 @ 09:22), Max: 99.5 (03-05-24 @ 22:49)  HR: 79 (03-06-24 @ 09:22)  BP: 155/87 (03-06-24 @ 09:22)  RR: 18 (03-06-24 @ 09:22)  SpO2: 99% (03-06-24 @ 09:22)  Wt(kg): --  General: Nontoxic-appearing Female in no acute distress.  HEENT: AT/NC. Pupils/EOM unable secondary to patient compliance. Oropharynx/dentition unable secondary to patient compliance. Protuberant tongue.   Neck: Not rigid. No sense of mass.  Nodes: None palpable.  Lungs: Poor effort decreased BS  Heart: Regular rate and rhythm.   Abdomen: Bowel sounds present and normoactive. Soft. Nondistended. Nontender.  Extremities: No cyanosis or clubbing. 1+ edema.   Skin: Warm. Dry. Good turgor. No rash. No vasculitic stigmata. PIV's ok.   Psychiatric: Unable        Laboratory Studies--  CBC                        10.6   7.90  )-----------( 227      ( 06 Mar 2024 07:14 )             30.3       Chemistries  03-06    152<H>  |  108  |  20  ----------------------------<  94  3.0<L>   |  31  |  1.28    Ca    8.1<L>      06 Mar 2024 07:14    TPro  5.7<L>  /  Alb  2.8<L>  /  TBili  1.1  /  DBili  x   /  AST  19  /  ALT  16  /  AlkPhos  142<H>  03-06      Culture Data

## 2024-03-06 NOTE — PROGRESS NOTE ADULT - SUBJECTIVE AND OBJECTIVE BOX
Patient is a 89y Female whom presented to the hospital with mak     PAST MEDICAL & SURGICAL HISTORY:  Myasthenia gravis      Dementia      Myasthenia gravis      No significant past surgical history      No significant past surgical history          MEDICATIONS  (STANDING):  acyclovir IVPB 550 milliGRAM(s) IV Intermittent every 12 hours  chlorhexidine 2% Cloths 1 Application(s) Topical daily  donepezil 10 milliGRAM(s) Oral at bedtime  pantoprazole  Injectable 40 milliGRAM(s) IV Push two times a day  sodium chloride 0.9%. 1000 milliLiter(s) (75 mL/Hr) IV Continuous <Continuous>      Allergies    sulfamethoxazole (Vomiting; Nausea)    Intolerances        SOCIAL HISTORY:  Denies ETOh,Smoking,     FAMILY HISTORY:  No pertinent family history in first degree relatives    No pertinent family history in first degree relatives        REVIEW OF SYSTEMS:    unable to obtained a good review system                                                                                       10.6   7.90  )-----------( 227      ( 06 Mar 2024 07:14 )             30.3       CBC Full  -  ( 06 Mar 2024 07:14 )  WBC Count : 7.90 K/uL  RBC Count : 3.22 M/uL  Hemoglobin : 10.6 g/dL  Hematocrit : 30.3 %  Platelet Count - Automated : 227 K/uL  Mean Cell Volume : 94.1 fl  Mean Cell Hemoglobin : 32.9 pg  Mean Cell Hemoglobin Concentration : 35.0 gm/dL  Auto Neutrophil # : x  Auto Lymphocyte # : x  Auto Monocyte # : x  Auto Eosinophil # : x  Auto Basophil # : x  Auto Neutrophil % : x  Auto Lymphocyte % : x  Auto Monocyte % : x  Auto Eosinophil % : x  Auto Basophil % : x      03-06    152<H>  |  108  |  20  ----------------------------<  94  3.0<L>   |  31  |  1.28    Ca    8.1<L>      06 Mar 2024 07:14    TPro  5.7<L>  /  Alb  2.8<L>  /  TBili  1.1  /  DBili  x   /  AST  19  /  ALT  16  /  AlkPhos  142<H>  03-06      CAPILLARY BLOOD GLUCOSE      POCT Blood Glucose.: 124 mg/dL (06 Mar 2024 17:57)  POCT Blood Glucose.: 89 mg/dL (06 Mar 2024 11:59)  POCT Blood Glucose.: 101 mg/dL (06 Mar 2024 06:12)  POCT Blood Glucose.: 106 mg/dL (05 Mar 2024 23:43)      Vital Signs Last 24 Hrs  T(C): 36.9 (06 Mar 2024 16:16), Max: 37.5 (05 Mar 2024 22:49)  T(F): 98.5 (06 Mar 2024 16:16), Max: 99.5 (05 Mar 2024 22:49)  HR: 94 (06 Mar 2024 16:16) (79 - 117)  BP: 150/78 (06 Mar 2024 16:16) (123/79 - 160/81)  BP(mean): --  RR: 18 (06 Mar 2024 16:16) (18 - 18)  SpO2: 97% (06 Mar 2024 16:16) (97% - 100%)    Parameters below as of 06 Mar 2024 16:16  Patient On (Oxygen Delivery Method): nasal cannula  O2 Flow (L/min): 4      Urinalysis Basic - ( 06 Mar 2024 07:14 )    Color: x / Appearance: x / SG: x / pH: x  Gluc: 94 mg/dL / Ketone: x  / Bili: x / Urobili: x   Blood: x / Protein: x / Nitrite: x   Leuk Esterase: x / RBC: x / WBC x   Sq Epi: x / Non Sq Epi: x / Bacteria: x                    PHYSICAL EXAM:    Constitutional: NAD  HEENT: conjunctive   clear   Neck:  No JVD  Respiratory: CTAB  Cardiovascular: S1 and S2  Gastrointestinal: BS+, soft, NT/ND  Extremities: No peripheral edema

## 2024-03-07 LAB
ALBUMIN SERPL ELPH-MCNC: 2.9 G/DL — LOW (ref 3.3–5)
ALP SERPL-CCNC: 132 U/L — HIGH (ref 40–120)
ALT FLD-CCNC: 16 U/L — SIGNIFICANT CHANGE UP (ref 10–45)
ANION GAP SERPL CALC-SCNC: 11 MMOL/L — SIGNIFICANT CHANGE UP (ref 5–17)
ANION GAP SERPL CALC-SCNC: 12 MMOL/L — SIGNIFICANT CHANGE UP (ref 5–17)
ANION GAP SERPL CALC-SCNC: 6 MMOL/L — SIGNIFICANT CHANGE UP (ref 5–17)
AST SERPL-CCNC: 20 U/L — SIGNIFICANT CHANGE UP (ref 10–40)
BILIRUB SERPL-MCNC: 1.3 MG/DL — HIGH (ref 0.2–1.2)
BUN SERPL-MCNC: 13 MG/DL — SIGNIFICANT CHANGE UP (ref 7–23)
BUN SERPL-MCNC: 14 MG/DL — SIGNIFICANT CHANGE UP (ref 7–23)
BUN SERPL-MCNC: 15 MG/DL — SIGNIFICANT CHANGE UP (ref 7–23)
CALCIUM SERPL-MCNC: 6.5 MG/DL — CRITICAL LOW (ref 8.4–10.5)
CALCIUM SERPL-MCNC: 7.7 MG/DL — LOW (ref 8.4–10.5)
CALCIUM SERPL-MCNC: 7.9 MG/DL — LOW (ref 8.4–10.5)
CHLORIDE SERPL-SCNC: 103 MMOL/L — SIGNIFICANT CHANGE UP (ref 96–108)
CHLORIDE SERPL-SCNC: 104 MMOL/L — SIGNIFICANT CHANGE UP (ref 96–108)
CHLORIDE SERPL-SCNC: 105 MMOL/L — SIGNIFICANT CHANGE UP (ref 96–108)
CO2 SERPL-SCNC: 27 MMOL/L — SIGNIFICANT CHANGE UP (ref 22–31)
CO2 SERPL-SCNC: 31 MMOL/L — SIGNIFICANT CHANGE UP (ref 22–31)
CO2 SERPL-SCNC: 31 MMOL/L — SIGNIFICANT CHANGE UP (ref 22–31)
CREAT SERPL-MCNC: 0.88 MG/DL — SIGNIFICANT CHANGE UP (ref 0.5–1.3)
CREAT SERPL-MCNC: 1.08 MG/DL — SIGNIFICANT CHANGE UP (ref 0.5–1.3)
CREAT SERPL-MCNC: 1.11 MG/DL — SIGNIFICANT CHANGE UP (ref 0.5–1.3)
EGFR: 48 ML/MIN/1.73M2 — LOW
EGFR: 49 ML/MIN/1.73M2 — LOW
EGFR: 63 ML/MIN/1.73M2 — SIGNIFICANT CHANGE UP
GLUCOSE BLDC GLUCOMTR-MCNC: 115 MG/DL — HIGH (ref 70–99)
GLUCOSE BLDC GLUCOMTR-MCNC: 125 MG/DL — HIGH (ref 70–99)
GLUCOSE BLDC GLUCOMTR-MCNC: 128 MG/DL — HIGH (ref 70–99)
GLUCOSE BLDC GLUCOMTR-MCNC: 131 MG/DL — HIGH (ref 70–99)
GLUCOSE BLDC GLUCOMTR-MCNC: 132 MG/DL — HIGH (ref 70–99)
GLUCOSE SERPL-MCNC: 127 MG/DL — HIGH (ref 70–99)
GLUCOSE SERPL-MCNC: 134 MG/DL — HIGH (ref 70–99)
GLUCOSE SERPL-MCNC: 630 MG/DL — CRITICAL HIGH (ref 70–99)
MAGNESIUM SERPL-MCNC: 1 MG/DL — CRITICAL LOW (ref 1.6–2.6)
PHOSPHATE SERPL-MCNC: 2.2 MG/DL — LOW (ref 2.5–4.5)
POTASSIUM SERPL-MCNC: 2.6 MMOL/L — CRITICAL LOW (ref 3.5–5.3)
POTASSIUM SERPL-MCNC: 3.3 MMOL/L — LOW (ref 3.5–5.3)
POTASSIUM SERPL-MCNC: 5.6 MMOL/L — HIGH (ref 3.5–5.3)
POTASSIUM SERPL-SCNC: 2.6 MMOL/L — CRITICAL LOW (ref 3.5–5.3)
POTASSIUM SERPL-SCNC: 3.3 MMOL/L — LOW (ref 3.5–5.3)
POTASSIUM SERPL-SCNC: 5.6 MMOL/L — HIGH (ref 3.5–5.3)
PROT SERPL-MCNC: 5.9 G/DL — LOW (ref 6–8.3)
SODIUM SERPL-SCNC: 136 MMOL/L — SIGNIFICANT CHANGE UP (ref 135–145)
SODIUM SERPL-SCNC: 147 MMOL/L — HIGH (ref 135–145)
SODIUM SERPL-SCNC: 147 MMOL/L — HIGH (ref 135–145)

## 2024-03-07 RX ORDER — POTASSIUM CHLORIDE 20 MEQ
10 PACKET (EA) ORAL
Refills: 0 | Status: COMPLETED | OUTPATIENT
Start: 2024-03-07 | End: 2024-03-07

## 2024-03-07 RX ORDER — DEXTROSE MONOHYDRATE, SODIUM CHLORIDE, AND POTASSIUM CHLORIDE 50; .745; 4.5 G/1000ML; G/1000ML; G/1000ML
1000 INJECTION, SOLUTION INTRAVENOUS
Refills: 0 | Status: DISCONTINUED | OUTPATIENT
Start: 2024-03-07 | End: 2024-03-08

## 2024-03-07 RX ORDER — POTASSIUM PHOSPHATE, MONOBASIC POTASSIUM PHOSPHATE, DIBASIC 236; 224 MG/ML; MG/ML
15 INJECTION, SOLUTION INTRAVENOUS ONCE
Refills: 0 | Status: COMPLETED | OUTPATIENT
Start: 2024-03-07 | End: 2024-03-08

## 2024-03-07 RX ORDER — MAGNESIUM SULFATE 500 MG/ML
2 VIAL (ML) INJECTION ONCE
Refills: 0 | Status: COMPLETED | OUTPATIENT
Start: 2024-03-07 | End: 2024-03-08

## 2024-03-07 RX ADMIN — DONEPEZIL HYDROCHLORIDE 10 MILLIGRAM(S): 10 TABLET, FILM COATED ORAL at 22:03

## 2024-03-07 RX ADMIN — AMLODIPINE BESYLATE 5 MILLIGRAM(S): 2.5 TABLET ORAL at 06:19

## 2024-03-07 RX ADMIN — PANTOPRAZOLE SODIUM 40 MILLIGRAM(S): 20 TABLET, DELAYED RELEASE ORAL at 06:19

## 2024-03-07 RX ADMIN — PANTOPRAZOLE SODIUM 40 MILLIGRAM(S): 20 TABLET, DELAYED RELEASE ORAL at 18:07

## 2024-03-07 RX ADMIN — CHLORHEXIDINE GLUCONATE 1 APPLICATION(S): 213 SOLUTION TOPICAL at 11:22

## 2024-03-07 RX ADMIN — SODIUM CHLORIDE 70 MILLILITER(S): 9 INJECTION, SOLUTION INTRAVENOUS at 16:00

## 2024-03-07 RX ADMIN — Medication 100 MILLIEQUIVALENT(S): at 19:03

## 2024-03-07 RX ADMIN — Medication 100 MILLIEQUIVALENT(S): at 18:00

## 2024-03-07 RX ADMIN — MIRTAZAPINE 7.5 MILLIGRAM(S): 45 TABLET, ORALLY DISINTEGRATING ORAL at 22:04

## 2024-03-07 RX ADMIN — Medication 20 MILLIEQUIVALENT(S): at 11:22

## 2024-03-07 RX ADMIN — DEXTROSE MONOHYDRATE, SODIUM CHLORIDE, AND POTASSIUM CHLORIDE 70 MILLILITER(S): 50; .745; 4.5 INJECTION, SOLUTION INTRAVENOUS at 16:20

## 2024-03-07 RX ADMIN — Medication 100 MILLIEQUIVALENT(S): at 16:26

## 2024-03-07 NOTE — PROVIDER CONTACT NOTE (CRITICAL VALUE NOTIFICATION) - BACKGROUND
Admitted for altered mental status. Decreased po intake, difficult to administer pO meds Admitted for altered mental status.

## 2024-03-07 NOTE — PROGRESS NOTE ADULT - SUBJECTIVE AND OBJECTIVE BOX
No complaints  Having swallow study    REVIEW OF SYSTEMS:    CONSTITUTIONAL: No weakness, fevers or chills, weight loss  RESPIRATORY: No cough, wheezing, hemoptysis; No shortness of breath  CARDIOVASCULAR: No chest pain or palpitations    VITAL SIGNS:    T98.9 P75 /83 RR18    PHYSICAL EXAM:     GENERAL: no acute distress  HEENT: NC/AT, EOMI, neck supple, MMM  RESPIRATORY: LCTAB/L, no rhonchi, rales, or wheezing  CARDIOVASCULAR: RRR, no murmurs, gallops, rubs  ABDOMINAL: soft, non-tender, non-distended, positive bowel sounds   EXTREMITIES: no clubbing, cyanosis, or edema  NEUROLOGICAL: mostly expressive aphasic  LYMPHATIC: lymphatic: cervical, supraclavicular, axilla, inguinal  SKIN: no rashes or lesions   MUSCULOSKELETAL: no gross joint deformity                          10.6   7.90  )-----------( 227      ( 06 Mar 2024 07:14 )             30.3     03-07    147<H>  |  104  |  15  ----------------------------<  127<H>  2.6<LL>   |  31  |  1.08    Ca    7.9<L>      07 Mar 2024 07:09    TPro  5.9<L>  /  Alb  2.9<L>  /  TBili  1.3<H>  /  DBili  x   /  AST  20  /  ALT  16  /  AlkPhos  132<H>  03-07      MEDICATIONS  (STANDING):  acetaminophen   IVPB .. 1000 milliGRAM(s) IV Intermittent once  amLODIPine   Tablet 5 milliGRAM(s) Oral daily  chlorhexidine 2% Cloths 1 Application(s) Topical daily  dextrose 5% + sodium chloride 0.45%. 1000 milliLiter(s) (70 mL/Hr) IV Continuous <Continuous>  donepezil 10 milliGRAM(s) Oral at bedtime  mirtazapine 7.5 milliGRAM(s) Oral at bedtime  pantoprazole  Injectable 40 milliGRAM(s) IV Push two times a day  potassium chloride   Powder 20 milliEquivalent(s) Oral daily

## 2024-03-07 NOTE — PROGRESS NOTE ADULT - PROBLEM SELECTOR PROBLEM 4
BOOGIE (acute kidney injury)
Thrombocytopenia
Elevated LFTs
Thrombocytopenia

## 2024-03-07 NOTE — PROGRESS NOTE ADULT - PROBLEM SELECTOR PLAN 2
continue antibiotic
continue antibiotic
continue acyclovir & meropenem
continue present treatment
continues to improve
as above
completed antibiotic

## 2024-03-07 NOTE — SWALLOW BEDSIDE ASSESSMENT ADULT - SLP PERTINENT HISTORY OF CURRENT PROBLEM
88 y/o F with PMHx of Myasthenia Gravis (not on medication) and dementia (A&Ox1, conversive at baseline) who presents from her nursing home with worsening lethargy and confusion. UA done as outpatient grossly positive and urine culture growing E. coli and Klebsiella. Patient was started on ciprofloxacin 3 days prior to presentation but has not improved. In the ED, patient found to be hypothermic (89 F). On admission, labs significant for WBC 3.98, Plt 58, Alk Phos 220, , , pH 7.28, CO2 , lactate 1.5, TSH, B12 normal. RPR negative. CTH with moderate ventriculomegaly with findings suggestive of normal pressure hydrocephalus. CTA head and neck unremarkable. CXR clear. UA negative for bacteria, LE, and nitrite, WBC 2.  Patient given CTX for UTI. Outpatient UA from 2/13 with small LE, moderate bacteria, and WBC 84. Urine culture 2/13 positive for E. coli and Klebsiella.
88 y/o F with PMHx of Myasthenia Gravis (not on medication) and dementia (A&Ox1, conversive at baseline) who presents from her nursing home with worsening lethargy and confusion. UA done as outpatient grossly positive and urine culture growing E. coli and Klebsiella. Patient was started on ciprofloxacin 3 days prior to presentation but has not improved. In the ED, patient found to be hypothermic (89 F). On admission, labs significant for WBC 3.98, Plt 58, Alk Phos 220, , , pH 7.28, CO2 , lactate 1.5, TSH, B12 normal. RPR negative. CTH with moderate ventriculomegaly with findings suggestive of normal pressure hydrocephalus. CTA head and neck unremarkable. CXR clear. UA negative for bacteria, LE, and nitrite, WBC 2.  Patient given CTX for UTI. Outpatient UA from 2/13 with small LE, moderate bacteria, and WBC 84. Urine culture 2/13 positive for E. coli and Klebsiella.

## 2024-03-07 NOTE — SWALLOW BEDSIDE ASSESSMENT ADULT - ADDITIONAL RECOMMENDATIONS
Restorative swallow therapy f/u 1 x week; Pt will tolerate diet with no s/s of aspiration. Will continue to follow while patient is in-house; S swallowing therapy post d/c Restorative swallow therapy f/u 1 x week; Pt will tolerate diet with no s/s of aspiration. Will continue to follow while patient is in-house; swallowing therapy post d/c.

## 2024-03-07 NOTE — SWALLOW BEDSIDE ASSESSMENT ADULT - SLP GENERAL OBSERVATIONS
Patient encountered asleep in bed; +4L/NC; roused to auditory and tactile stimuli. Per daughter at bedside pt's mentation is much improved from admission and pt was fully awake for most of today. Pt did not follow commands for OME or participate in orientation questions. Vocal quality mildly hoarse with reduced volume. Mild drooling at baseline which appeared to have occurred during sleep. Tongue noted to be mildly protruding at labial margin.
Pt awake, alert and oriented 1-2; c/o dislike of taste of Potassium prior to assessment with persistent. Pt required encouragement to accept a few PO trials.; head in flexed position at baseline and Pt wished to remain semi reclined.

## 2024-03-07 NOTE — PROGRESS NOTE ADULT - PROBLEM SELECTOR PLAN 3
see above
continue to monitor
improves
see above
improves  continue to monitor
continue to monitor  supportive
supportive

## 2024-03-07 NOTE — PROVIDER CONTACT NOTE (OTHER) - DATE AND TIME:
04-Mar-2024 15:55
07-Mar-2024 18:10
21-Feb-2024 01:20
22-Feb-2024 04:21
20-Feb-2024 01:40
04-Mar-2024 18:48
22-Feb-2024 21:45
05-Mar-2024 18:53
19-Feb-2024 21:00
24-Feb-2024 01:02
19-Feb-2024 23:50

## 2024-03-07 NOTE — PROGRESS NOTE ADULT - TIME BILLING
Discussed with PA and daughter at bedside.
Discussed with daughter  and PA
Discussed with daughter at bedside and PA
Discussed with  PA
Discussed with  PA and daughter at bedside
Discussed with NP and family.
Discussed with PA
Discussed with PA
Discussed with PA and ID and GI.
Discussed with two daughters at bedside, hematology, ID, and neuro radiologist.
Discussed with NP
Discussed with daughter at bedside and NPs.
Discussed with PA

## 2024-03-07 NOTE — PROVIDER CONTACT NOTE (OTHER) - REASON
Pt remains tachycardic
Pt's ZH=535/95, HR=89
Hypothermia
Pt temperature is not reading in any routes
Pt's BP=92/64, HO=153
pt hypothermic
Hypothermia
Pt uncooperative, refusing medication and vital sign
pt hypothermic
Pts HR jumping from 83-130s bpm at times
Unable to read temperature

## 2024-03-07 NOTE — PROGRESS NOTE ADULT - PROBLEM SELECTOR PROBLEM 3
Altered mental state
Elevated LFTs
Altered mental state
Thrombocytopenia
Altered mental state

## 2024-03-07 NOTE — PROGRESS NOTE ADULT - SUBJECTIVE AND OBJECTIVE BOX
INTERVAL HPI/OVERNIGHT EVENTS:    pending S&S    MEDICATIONS  (STANDING):  acetaminophen   IVPB .. 1000 milliGRAM(s) IV Intermittent once  amLODIPine   Tablet 5 milliGRAM(s) Oral daily  chlorhexidine 2% Cloths 1 Application(s) Topical daily  dextrose 5% + sodium chloride 0.45%. 1000 milliLiter(s) (70 mL/Hr) IV Continuous <Continuous>  donepezil 10 milliGRAM(s) Oral at bedtime  mirtazapine 7.5 milliGRAM(s) Oral at bedtime  pantoprazole  Injectable 40 milliGRAM(s) IV Push two times a day  potassium chloride   Powder 20 milliEquivalent(s) Oral daily    MEDICATIONS  (PRN):      Allergies    sulfamethoxazole (Vomiting; Nausea)    Intolerances        Review of Systems:    General:  No wt loss, fevers, chills, night sweats, fatigue   Eyes:  Good vision, no reported pain  ENT:  No sore throat, pain, runny nose, dysphagia  CV:  No pain, palpitations, hypo/hypertension  Resp:  No dyspnea, cough, tachypnea, wheezing  GI:  No pain, No nausea, No vomiting, No diarrhea, No constipation, No weight loss, No fever, No pruritis, No rectal bleeding, No melena, No dysphagia  :  No pain, bleeding, incontinence, nocturia  Muscle:  No pain, weakness  Neuro:  No weakness, tingling, memory problems  Psych:  No fatigue, insomnia, mood problems, depression  Endocrine:  No polyuria, polydypsia, cold/heat intolerance  Heme:  No petechiae, ecchymosis, easy bruisability  Skin:  No rash, tattoos, scars, edema      Vital Signs Last 24 Hrs  T(C): 37.2 (07 Mar 2024 09:44), Max: 37.2 (07 Mar 2024 09:44)  T(F): 98.9 (07 Mar 2024 09:44), Max: 98.9 (07 Mar 2024 09:44)  HR: 75 (07 Mar 2024 09:44) (75 - 94)  BP: 140/83 (07 Mar 2024 09:44) (133/73 - 150/78)  BP(mean): --  RR: 18 (07 Mar 2024 09:44) (18 - 18)  SpO2: 95% (07 Mar 2024 09:44) (95% - 100%)    Parameters below as of 07 Mar 2024 09:44  Patient On (Oxygen Delivery Method): nasal cannula  O2 Flow (L/min): 4      PHYSICAL EXAM:    Constitutional: NAD  HEENT: EOMI, throat clear  Neck: No LAD, supple  Respiratory: CTA and P  Cardiovascular: S1 and S2, RRR, no M  Gastrointestinal: BS+, soft, NT/ND, neg HSM,  Extremities: No peripheral edema, neg clubbing, cyanosis  Vascular: 2+ peripheral pulses  Neurological: A/O x 3, no focal deficits  Psychiatric: Normal mood, normal affect  Skin: No rashes      LABS:                        10.6   7.90  )-----------( 227      ( 06 Mar 2024 07:14 )             30.3     03-07    147<H>  |  104  |  15  ----------------------------<  127<H>  2.6<LL>   |  31  |  1.08    Ca    7.9<L>      07 Mar 2024 07:09    TPro  5.9<L>  /  Alb  2.9<L>  /  TBili  1.3<H>  /  DBili  x   /  AST  20  /  ALT  16  /  AlkPhos  132<H>  03-07      Urinalysis Basic - ( 07 Mar 2024 07:09 )    Color: x / Appearance: x / SG: x / pH: x  Gluc: 127 mg/dL / Ketone: x  / Bili: x / Urobili: x   Blood: x / Protein: x / Nitrite: x   Leuk Esterase: x / RBC: x / WBC x   Sq Epi: x / Non Sq Epi: x / Bacteria: x        RADIOLOGY & ADDITIONAL TESTS:   INTERVAL HPI/OVERNIGHT EVENTS:    passed swallow eval    MEDICATIONS  (STANDING):  acetaminophen   IVPB .. 1000 milliGRAM(s) IV Intermittent once  amLODIPine   Tablet 5 milliGRAM(s) Oral daily  chlorhexidine 2% Cloths 1 Application(s) Topical daily  dextrose 5% + sodium chloride 0.45%. 1000 milliLiter(s) (70 mL/Hr) IV Continuous <Continuous>  donepezil 10 milliGRAM(s) Oral at bedtime  mirtazapine 7.5 milliGRAM(s) Oral at bedtime  pantoprazole  Injectable 40 milliGRAM(s) IV Push two times a day  potassium chloride   Powder 20 milliEquivalent(s) Oral daily    MEDICATIONS  (PRN):      Allergies    sulfamethoxazole (Vomiting; Nausea)    Intolerances        Review of Systems:    General:  No wt loss, fevers, chills, night sweats, fatigue   Eyes:  Good vision, no reported pain  ENT:  No sore throat, pain, runny nose, dysphagia  CV:  No pain, palpitations, hypo/hypertension  Resp:  No dyspnea, cough, tachypnea, wheezing  GI:  No pain, No nausea, No vomiting, No diarrhea, No constipation, No weight loss, No fever, No pruritis, No rectal bleeding, No melena, No dysphagia  :  No pain, bleeding, incontinence, nocturia  Muscle:  No pain, weakness  Neuro:  No weakness, tingling, memory problems  Psych:  No fatigue, insomnia, mood problems, depression  Endocrine:  No polyuria, polydypsia, cold/heat intolerance  Heme:  No petechiae, ecchymosis, easy bruisability  Skin:  No rash, tattoos, scars, edema      Vital Signs Last 24 Hrs  T(C): 37.2 (07 Mar 2024 09:44), Max: 37.2 (07 Mar 2024 09:44)  T(F): 98.9 (07 Mar 2024 09:44), Max: 98.9 (07 Mar 2024 09:44)  HR: 75 (07 Mar 2024 09:44) (75 - 94)  BP: 140/83 (07 Mar 2024 09:44) (133/73 - 150/78)  BP(mean): --  RR: 18 (07 Mar 2024 09:44) (18 - 18)  SpO2: 95% (07 Mar 2024 09:44) (95% - 100%)    Parameters below as of 07 Mar 2024 09:44  Patient On (Oxygen Delivery Method): nasal cannula  O2 Flow (L/min): 4      PHYSICAL EXAM:    Constitutional: NAD  HEENT: EOMI, throat clear  Neck: No LAD, supple  Respiratory: CTA and P  Cardiovascular: S1 and S2, RRR, no M  Gastrointestinal: BS+, soft, NT/ND, neg HSM,  Extremities: No peripheral edema, neg clubbing, cyanosis  Vascular: 2+ peripheral pulses  Neurological: A/O x 3, no focal deficits  Psychiatric: Normal mood, normal affect  Skin: No rashes      LABS:                        10.6   7.90  )-----------( 227      ( 06 Mar 2024 07:14 )             30.3     03-07    147<H>  |  104  |  15  ----------------------------<  127<H>  2.6<LL>   |  31  |  1.08    Ca    7.9<L>      07 Mar 2024 07:09    TPro  5.9<L>  /  Alb  2.9<L>  /  TBili  1.3<H>  /  DBili  x   /  AST  20  /  ALT  16  /  AlkPhos  132<H>  03-07      Urinalysis Basic - ( 07 Mar 2024 07:09 )    Color: x / Appearance: x / SG: x / pH: x  Gluc: 127 mg/dL / Ketone: x  / Bili: x / Urobili: x   Blood: x / Protein: x / Nitrite: x   Leuk Esterase: x / RBC: x / WBC x   Sq Epi: x / Non Sq Epi: x / Bacteria: x        RADIOLOGY & ADDITIONAL TESTS:

## 2024-03-07 NOTE — PROGRESS NOTE ADULT - PROBLEM SELECTOR PLAN 1
supportive
supportive  neurology f/u
supportive
supportive  swallow studies
supportive  continue thiamine
supportive, continue acyclovir
supportive  r/o herpes encephalitis- on acyclovir

## 2024-03-07 NOTE — PROVIDER CONTACT NOTE (OTHER) - NAME OF MD/NP/PA/DO NOTIFIED:
Catherine Wood, NP
Kyung Petty
JEANETTE Lake
NP Popeye Martin
Popeye Awad, NP
CORRIE Talley
JEANETTE Pickens
Kyung Petty NP
NP Popeye Martin
CORRIE Talley
JEANETTE Salazar

## 2024-03-07 NOTE — SWALLOW BEDSIDE ASSESSMENT ADULT - ORAL PHASE
Decreased anterior-posterior movement of the bolus mild/Within functional limits/Delayed oral transit time

## 2024-03-07 NOTE — PROGRESS NOTE ADULT - ASSESSMENT
89 YO F with PMHx of Myasthenia Gravis (not on medication for past 9 years) and MHx of dementia     Assessment/Plan:  ARF - resolved  with IV hydration  Hypernatremia- improved . Continue hydration.   Hypokalemia- continue to replace potassium and check magnesium level.   HTN- mildly better. Continue Amlodipine  5 mg daily. Family refused higher dose of BP meds.   FTT - due to severe anorexia and poor appetite- not eating well. Will  discuss with family for alternative way of feeding, NG vs peg tube. Continue IV fluid.   AMS(most likely due to metabolic/toxic encephalopathy  due to UTI) - – improved  awake and responsive. Off of Acyclovir.   Hypoglycemia- resolved. Appetite improved.   cholelithiasis - without  cholecystitis neither pancreatitis. Normal amylase and lipase.   hypotension- continue IV steroid and IV fluid as needed   thrombocytopenia- PLT down to 44,000. DIC panel not significantly positive. PTT mildly elevated. Otherwise PT/INR normal. D/ C Lovenox. F/u hematology.  No plan for LP at this time. Family denied   elevated LFT- Will  check RUQ ultrasound and Hepatitis panel.   NPH- - neurology does not believe acute mental status changes, could be  due to NPH. F/u neurology as outpatient  for NPH workup.   UTI with E.coli and Klebsiella as  outpatient culture. - IV abx upgraded to Vanco and Meropenem  DVT ppx- Lovenox discontinued due to thrombocytopenia   leg arthritis- Acetaminophen PRN.  b/l cataract- F/u ophthalmolog  vitamin D deficiency- continue Vitamin D supplement.  risk of malnutrition -stable, continue supplement diet. F/u dietitian. Monitor weight.  constipation- senna at bedtime.  LBP 2/2 severe spinal stenosis with out cord pressure - no surgical intervention recommended neither accepted by family, continue PT OT as needed.  Dementia - Stable. Continue Aricept .  Dysphagia - Mechanical soft diet, aspiration precautions.  Myasthenia Gravis - stable , off of medication. Monitor CBC and BMP every 2 months.

## 2024-03-07 NOTE — SWALLOW BEDSIDE ASSESSMENT ADULT - DIET PRIOR TO ADMI
mechanical soft solids with thin liquids at nursing facility per daughter's report
mechanical soft solids with thin liquids at nursing facility per daughter's report

## 2024-03-07 NOTE — SWALLOW BEDSIDE ASSESSMENT ADULT - COMMENTS
ENT consulted on 2/20 for tongue and lip swelling. On fiberoptic flexible laryngoscopy no laryngeal edema, no blood or active bleeding, no pooling of secretions in larynx, no aspiration. Patent airway. 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 also noted large glottic gap from right vocal cord paresis and left vocal cord paralysis in paramedian position during inhalations and exhalations.  Recommended decadron and PPI, as well as re-scope when pt awake to assess VF movement with phonation.    GI consulted -> no e/o acute cholecystitis or biliary obstruction on HIDA scan.    Per RD note no active diet order since 2/19.    Swallow hx: seen by this service 1/4/2020 with recommendations for soft diet 2/2 inadequate dentition for mastication. ENT consulted on 2/20 for tongue and lip swelling. On fiberoptic flexible laryngoscopy no laryngeal edema, no blood or active bleeding, no pooling of secretions in larynx, no aspiration. Patent airway. 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 also noted large glottic gap from right vocal cord paresis and left vocal cord paralysis in paramedian position during inhalations and exhalations.  Recommended decadron and PPI, as well as re-scope when pt awake to assess VF movement with phonation.    GI consulted -> no e/o acute cholecystitis or biliary obstruction on HIDA scan.    Swallow hx: seen by this service 1/4/2020 with recommendations for soft diet 2/2 inadequate dentition for mastication. Pt followed by this service with most recent recommendation for puree with moderately thickened liquids

## 2024-03-07 NOTE — PROGRESS NOTE ADULT - ASSESSMENT
The patient is a 89F with dementia and Myasthenia Gravis admitted for AMS.     1.Dysphagia  appreciate speech pathology  Remeron started as appetite stimulant, 3/6  GOC, ?PEG , I think patient is high risk for anesthesia  f/u S&S    2. AMS  improving  ID workup per primary team     3. Cholelithiasis, distended GB   no e/o acute cholecystitis or biliary obstruction on HIDA scan    4. peripancreatic fat stranding   low suspicion for acute pancreatitis   ppi daily while inpatient, then can d/c    5. abnormal LFTs, downtrending    likely a byproduct of systemic infection   hepatitis panel neg  trend LFTs         The patient is a 89F with dementia and Myasthenia Gravis admitted for AMS.     1.Dysphagia  appreciate speech pathology  Remeron started as appetite stimulant, 3/6  GOC; I think patient is high risk for anesthesia  passed swallow eval, would cont diet w/aspiration precautions     2. AMS  improving  ID workup per primary team     3. Cholelithiasis, distended GB   no e/o acute cholecystitis or biliary obstruction on HIDA scan    4. peripancreatic fat stranding   low suspicion for acute pancreatitis   ppi daily while inpatient, then can d/c    5. abnormal LFTs, downtrending    likely a byproduct of systemic infection   hepatitis panel neg  trend LFTs

## 2024-03-07 NOTE — PROGRESS NOTE ADULT - PROBLEM SELECTOR PLAN 4
improves  continue to monitor
continue to monitor
will continue to monitor  will also obtain Lamonte 13 level
continue to monitor

## 2024-03-07 NOTE — PROGRESS NOTE ADULT - SUBJECTIVE AND OBJECTIVE BOX
Patient is a 89y old  Female who presents with a chief complaint of AMS (07 Mar 2024 18:03)      INTERVAL HPI/OVERNIGHT EVENTS: Sodium is down. Potassium is low. Replaced. Continue hydration. PO intake not great. Kidney function back to normal base. Patient can be discharged tomorrow. Medically clear .     Pain Location & Control:     MEDICATIONS  (STANDING):  acetaminophen   IVPB .. 1000 milliGRAM(s) IV Intermittent once  amLODIPine   Tablet 5 milliGRAM(s) Oral daily  chlorhexidine 2% Cloths 1 Application(s) Topical daily  dextrose 5% + sodium chloride 0.45% with potassium chloride 20 mEq/L 1000 milliLiter(s) (70 mL/Hr) IV Continuous <Continuous>  donepezil 10 milliGRAM(s) Oral at bedtime  mirtazapine 7.5 milliGRAM(s) Oral at bedtime  pantoprazole  Injectable 40 milliGRAM(s) IV Push two times a day  potassium chloride   Powder 20 milliEquivalent(s) Oral daily    MEDICATIONS  (PRN):      Allergies    sulfamethoxazole (Vomiting; Nausea)    Intolerances        REVIEW OF SYSTEMS:  CONSTITUTIONAL: No fever, weight loss, or fatigue  EYES: No eye pain, visual disturbances, or discharge  ENMT:  No difficulty hearing, tinnitus, vertigo; No sinus or throat pain  NECK: No pain or stiffness  BREASTS: No pain, masses, or nipple discharge  RESPIRATORY: No cough, wheezing, chills or hemoptysis; No shortness of breath  CARDIOVASCULAR: No chest pain, palpitations, dizziness, or leg swelling  GASTROINTESTINAL: No abdominal or epigastric pain. No nausea, vomiting, or hematemesis; No diarrhea or constipation. No melena or hematochezia.  GENITOURINARY: No dysuria, frequency, hematuria, or incontinence  NEUROLOGICAL: No headaches, memory loss, loss of strength, numbness, or tremors  SKIN: No itching, burning, rashes, or lesions   LYMPH NODES: No enlarged glands  ENDOCRINE: No heat or cold intolerance; No hair loss; No polydipsia or polyuria  MUSCULOSKELETAL: No back pain  PSYCHIATRIC: No depression, anxiety, mood swings, or difficulty sleeping  HEME/LYMPH: No easy bruising, or bleeding gums  ALLERGY AND IMMUNOLOGIC: No hives or eczema    Vital Signs Last 24 Hrs  T(C): 36.8 (07 Mar 2024 16:03), Max: 37.2 (07 Mar 2024 09:44)  T(F): 98.3 (07 Mar 2024 16:03), Max: 98.9 (07 Mar 2024 09:44)  HR: 80 (07 Mar 2024 18:15) (75 - 106)  BP: 120/76 (07 Mar 2024 18:15) (92/64 - 148/84)  BP(mean): --  RR: 18 (07 Mar 2024 18:15) (18 - 18)  SpO2: 93% (07 Mar 2024 18:15) (93% - 100%)    Parameters below as of 07 Mar 2024 18:15  Patient On (Oxygen Delivery Method): room air        PHYSICAL EXAM:  GENERAL: NAD, well-groomed, well-developed  HEAD:  Atraumatic, Normocephalic  EYES: EOMI, PERRLA, conjunctiva and sclera clear  ENMT: No tonsillar erythema, exudates, or enlargement; Moist mucous membranes, Good dentition, No lesions  NECK: Supple, No JVD, Normal thyroid  NERVOUS SYSTEM:  Alert & Oriented X3, Good concentration; Motor Strength 5/5 B/L upper and lower extremities; DTRs 2+ intact and symmetric  CHEST/LUNG: Clear to auscultation bilaterally; No rales, rhonchi, wheezing, or rubs  HEART: Regular rate and rhythm; No murmurs, rubs, or gallops  ABDOMEN: Soft, Nontender, Nondistended; Bowel sounds present  EXTREMITIES:  2+ Peripheral Pulses, No clubbing or cyanosis  LYMPH: No lymphadenopathy noted  SKIN: No rashes or lesions    LABS:    07 Mar 2024 07:09    147    |  104    |  15     ----------------------------<  127    2.6     |  31     |  1.08     Ca    7.9        07 Mar 2024 07:09    TPro  5.9    /  Alb  2.9    /  TBili  1.3    /  DBili  x      /  AST  20     /  ALT  16     /  AlkPhos  132    07 Mar 2024 07:09      Urinalysis Basic - ( 07 Mar 2024 07:09 )    Color: x / Appearance: x / SG: x / pH: x  Gluc: 127 mg/dL / Ketone: x  / Bili: x / Urobili: x   Blood: x / Protein: x / Nitrite: x   Leuk Esterase: x / RBC: x / WBC x   Sq Epi: x / Non Sq Epi: x / Bacteria: x      CAPILLARY BLOOD GLUCOSE      POCT Blood Glucose.: 115 mg/dL (07 Mar 2024 17:56)  POCT Blood Glucose.: 131 mg/dL (07 Mar 2024 12:11)  POCT Blood Glucose.: 125 mg/dL (07 Mar 2024 05:56)  POCT Blood Glucose.: 152 mg/dL (06 Mar 2024 23:54)        Cultures      RADIOLOGY & ADDITIONAL TESTS:    Imaging Personally Reviewed:  [X ] YES  [ ] NO    Consultant(s) Notes Reviewed:  [ X] YES  [ ] NO    Care Discussed with Consultants/Other Providers [X ] YES  [ ] NO

## 2024-03-07 NOTE — PROGRESS NOTE ADULT - PROBLEM SELECTOR PROBLEM 2
Thrombocytopenia
Altered mental state
Sepsis secondary to UTI

## 2024-03-07 NOTE — SWALLOW BEDSIDE ASSESSMENT ADULT - SWALLOW EVAL: PATIENT/FAMILY GOALS STATEMENT
Pt's daughter stated she is on a mechanical soft diet at nursing facility 2/2 edentulous state. Denied any other difficulties with oral intake.
Pt prefers not to eat too much at this time

## 2024-03-07 NOTE — SWALLOW BEDSIDE ASSESSMENT ADULT - ASR SWALLOW RECOMMEND DIAG
n/a
will monitor clinically to determine candidacy for subjective diet advancement vs need for instrumental swallow study

## 2024-03-07 NOTE — SWALLOW BEDSIDE ASSESSMENT ADULT - SWALLOW EVAL: DIAGNOSIS
Pt presents with decreased acceptance of PO trials at this time. Oral grading and control of bolus were impaired with a few sips of thin liquids. Anterior spillage was noted with Pt's head in flexion at baseline. Pt also preferred to remain in semi reclined position. Formation, control and transfer of the bolus were judged to be WFL for purees and moderately thickened liquids. Pt refused additional trials as potassium powder in applesauce was administered prior to this assessment. Throat clearing noted prior to oral intake and persisted as Pt continually c/o dislike for Potassium taste and did not wish to accept additional PO trials. Pt is an 88 y/o who presents with oral preparatory and oral stage dysphagia. Pt also with missing dentition.  Pharyngeal stage deficits are not suspected at this time however decreased acceptance of PO trials noted. Oral grading and control of bolus were impaired with a few sips of thin liquids. Anterior spillage was noted with Pt's head in flexion at baseline. Pt also preferred to remain in semi reclined position. Formation, control and transfer of the bolus were judged to be WFL for purees and moderately thickened liquids. Pt refused additional trials as potassium powder in applesauce was administered prior to this assessment. Throat clearing noted prior to oral intake and persisted as Pt continually c/o dislike for Potassium taste and did not wish to accept additional PO trials.

## 2024-03-07 NOTE — PROVIDER CONTACT NOTE (OTHER) - SITUATION
Pt temperature is not reading in any routes
as noted above
Pt rectal temp 94.2
Pt uncooperative, refusing medication and vital sign
Pts HR jumping from 83-130s bpm at times
as noted above
pt received s/p nuclear medicine test VSS were checked
Pt rectal temp 95.1
Pt remains tachycardic
Unable to read temperature
pt returned from US/MRI and CT . VS were taken and unable to get axillary temp. got a rectal temp of 95.2 and documented

## 2024-03-08 ENCOUNTER — TRANSCRIPTION ENCOUNTER (OUTPATIENT)
Age: 89
End: 2024-03-08

## 2024-03-08 VITALS
TEMPERATURE: 98 F | SYSTOLIC BLOOD PRESSURE: 122 MMHG | OXYGEN SATURATION: 94 % | RESPIRATION RATE: 18 BRPM | HEART RATE: 78 BPM | DIASTOLIC BLOOD PRESSURE: 78 MMHG

## 2024-03-08 LAB
ANION GAP SERPL CALC-SCNC: 10 MMOL/L — SIGNIFICANT CHANGE UP (ref 5–17)
BUN SERPL-MCNC: 12 MG/DL — SIGNIFICANT CHANGE UP (ref 7–23)
CALCIUM SERPL-MCNC: 7.5 MG/DL — LOW (ref 8.4–10.5)
CHLORIDE SERPL-SCNC: 105 MMOL/L — SIGNIFICANT CHANGE UP (ref 96–108)
CO2 SERPL-SCNC: 29 MMOL/L — SIGNIFICANT CHANGE UP (ref 22–31)
CREAT SERPL-MCNC: 0.97 MG/DL — SIGNIFICANT CHANGE UP (ref 0.5–1.3)
EGFR: 56 ML/MIN/1.73M2 — LOW
GLUCOSE BLDC GLUCOMTR-MCNC: 115 MG/DL — HIGH (ref 70–99)
GLUCOSE BLDC GLUCOMTR-MCNC: 121 MG/DL — HIGH (ref 70–99)
GLUCOSE BLDC GLUCOMTR-MCNC: 90 MG/DL — SIGNIFICANT CHANGE UP (ref 70–99)
GLUCOSE SERPL-MCNC: 123 MG/DL — HIGH (ref 70–99)
HCT VFR BLD CALC: 33.9 % — LOW (ref 34.5–45)
HGB BLD-MCNC: 11.3 G/DL — LOW (ref 11.5–15.5)
MAGNESIUM SERPL-MCNC: 1.8 MG/DL — SIGNIFICANT CHANGE UP (ref 1.6–2.6)
MCHC RBC-ENTMCNC: 31.7 PG — SIGNIFICANT CHANGE UP (ref 27–34)
MCHC RBC-ENTMCNC: 33.3 GM/DL — SIGNIFICANT CHANGE UP (ref 32–36)
MCV RBC AUTO: 95.2 FL — SIGNIFICANT CHANGE UP (ref 80–100)
NRBC # BLD: 0 /100 WBCS — SIGNIFICANT CHANGE UP (ref 0–0)
PHOSPHATE SERPL-MCNC: 3.8 MG/DL — SIGNIFICANT CHANGE UP (ref 2.5–4.5)
PLATELET # BLD AUTO: 228 K/UL — SIGNIFICANT CHANGE UP (ref 150–400)
POTASSIUM SERPL-MCNC: 3 MMOL/L — LOW (ref 3.5–5.3)
POTASSIUM SERPL-SCNC: 3 MMOL/L — LOW (ref 3.5–5.3)
RBC # BLD: 3.56 M/UL — LOW (ref 3.8–5.2)
RBC # FLD: 14.2 % — SIGNIFICANT CHANGE UP (ref 10.3–14.5)
SODIUM SERPL-SCNC: 144 MMOL/L — SIGNIFICANT CHANGE UP (ref 135–145)
WBC # BLD: 7.18 K/UL — SIGNIFICANT CHANGE UP (ref 3.8–10.5)
WBC # FLD AUTO: 7.18 K/UL — SIGNIFICANT CHANGE UP (ref 3.8–10.5)

## 2024-03-08 PROCEDURE — 82962 GLUCOSE BLOOD TEST: CPT

## 2024-03-08 PROCEDURE — 92526 ORAL FUNCTION THERAPY: CPT

## 2024-03-08 PROCEDURE — 70450 CT HEAD/BRAIN W/O DYE: CPT | Mod: MA

## 2024-03-08 PROCEDURE — 83550 IRON BINDING TEST: CPT

## 2024-03-08 PROCEDURE — 76770 US EXAM ABDO BACK WALL COMP: CPT

## 2024-03-08 PROCEDURE — 82947 ASSAY GLUCOSE BLOOD QUANT: CPT

## 2024-03-08 PROCEDURE — 84484 ASSAY OF TROPONIN QUANT: CPT

## 2024-03-08 PROCEDURE — 82435 ASSAY OF BLOOD CHLORIDE: CPT

## 2024-03-08 PROCEDURE — 86366 MUSCLE-SPECIFIC KINASE ANTB: CPT

## 2024-03-08 PROCEDURE — 84132 ASSAY OF SERUM POTASSIUM: CPT

## 2024-03-08 PROCEDURE — 82150 ASSAY OF AMYLASE: CPT

## 2024-03-08 PROCEDURE — 85018 HEMOGLOBIN: CPT

## 2024-03-08 PROCEDURE — 71045 X-RAY EXAM CHEST 1 VIEW: CPT

## 2024-03-08 PROCEDURE — 82140 ASSAY OF AMMONIA: CPT

## 2024-03-08 PROCEDURE — 83690 ASSAY OF LIPASE: CPT

## 2024-03-08 PROCEDURE — 86043 ACETYLCHOLN RCPTR MODLG ANTB: CPT

## 2024-03-08 PROCEDURE — 86880 COOMBS TEST DIRECT: CPT

## 2024-03-08 PROCEDURE — 95700 EEG CONT REC W/VID EEG TECH: CPT

## 2024-03-08 PROCEDURE — 84443 ASSAY THYROID STIM HORMONE: CPT

## 2024-03-08 PROCEDURE — 97116 GAIT TRAINING THERAPY: CPT

## 2024-03-08 PROCEDURE — 95714 VEEG EA 12-26 HR UNMNTR: CPT

## 2024-03-08 PROCEDURE — 36600 WITHDRAWAL OF ARTERIAL BLOOD: CPT

## 2024-03-08 PROCEDURE — 82330 ASSAY OF CALCIUM: CPT

## 2024-03-08 PROCEDURE — 81003 URINALYSIS AUTO W/O SCOPE: CPT

## 2024-03-08 PROCEDURE — 86042 ACETYLCHOLN RCPTR BLCKG ANTB: CPT

## 2024-03-08 PROCEDURE — 80048 BASIC METABOLIC PNL TOTAL CA: CPT

## 2024-03-08 PROCEDURE — 76700 US EXAM ABDOM COMPLETE: CPT

## 2024-03-08 PROCEDURE — 85362 FIBRIN DEGRADATION PRODUCTS: CPT

## 2024-03-08 PROCEDURE — 85385 FIBRINOGEN ANTIGEN: CPT

## 2024-03-08 PROCEDURE — 70498 CT ANGIOGRAPHY NECK: CPT | Mod: MA

## 2024-03-08 PROCEDURE — 86022 PLATELET ANTIBODIES: CPT

## 2024-03-08 PROCEDURE — 86901 BLOOD TYPING SEROLOGIC RH(D): CPT

## 2024-03-08 PROCEDURE — 82746 ASSAY OF FOLIC ACID SERUM: CPT

## 2024-03-08 PROCEDURE — 97110 THERAPEUTIC EXERCISES: CPT

## 2024-03-08 PROCEDURE — 85014 HEMATOCRIT: CPT

## 2024-03-08 PROCEDURE — 82306 VITAMIN D 25 HYDROXY: CPT

## 2024-03-08 PROCEDURE — 85610 PROTHROMBIN TIME: CPT

## 2024-03-08 PROCEDURE — 84100 ASSAY OF PHOSPHORUS: CPT

## 2024-03-08 PROCEDURE — 80061 LIPID PANEL: CPT

## 2024-03-08 PROCEDURE — 85730 THROMBOPLASTIN TIME PARTIAL: CPT

## 2024-03-08 PROCEDURE — 80076 HEPATIC FUNCTION PANEL: CPT

## 2024-03-08 PROCEDURE — 82607 VITAMIN B-12: CPT

## 2024-03-08 PROCEDURE — 87086 URINE CULTURE/COLONY COUNT: CPT

## 2024-03-08 PROCEDURE — 86041 ACETYLCHOLN RCPTR BNDNG ANTB: CPT

## 2024-03-08 PROCEDURE — 85732 THROMBOPLASTIN TIME PARTIAL: CPT

## 2024-03-08 PROCEDURE — 95711 VEEG 2-12 HR UNMONITORED: CPT

## 2024-03-08 PROCEDURE — A9537: CPT

## 2024-03-08 PROCEDURE — 86255 FLUORESCENT ANTIBODY SCREEN: CPT

## 2024-03-08 PROCEDURE — 84436 ASSAY OF TOTAL THYROXINE: CPT

## 2024-03-08 PROCEDURE — 80053 COMPREHEN METABOLIC PANEL: CPT

## 2024-03-08 PROCEDURE — 87637 SARSCOV2&INF A&B&RSV AMP PRB: CPT

## 2024-03-08 PROCEDURE — 99285 EMERGENCY DEPT VISIT HI MDM: CPT

## 2024-03-08 PROCEDURE — 70551 MRI BRAIN STEM W/O DYE: CPT

## 2024-03-08 PROCEDURE — 86870 RBC ANTIBODY IDENTIFICATION: CPT

## 2024-03-08 PROCEDURE — 85045 AUTOMATED RETICULOCYTE COUNT: CPT

## 2024-03-08 PROCEDURE — 85397 CLOTTING FUNCT ACTIVITY: CPT

## 2024-03-08 PROCEDURE — 71260 CT THORAX DX C+: CPT

## 2024-03-08 PROCEDURE — 83605 ASSAY OF LACTIC ACID: CPT

## 2024-03-08 PROCEDURE — 83540 ASSAY OF IRON: CPT

## 2024-03-08 PROCEDURE — 93306 TTE W/DOPPLER COMPLETE: CPT

## 2024-03-08 PROCEDURE — 84550 ASSAY OF BLOOD/URIC ACID: CPT

## 2024-03-08 PROCEDURE — 92610 EVALUATE SWALLOWING FUNCTION: CPT

## 2024-03-08 PROCEDURE — 74177 CT ABD & PELVIS W/CONTRAST: CPT

## 2024-03-08 PROCEDURE — 85027 COMPLETE CBC AUTOMATED: CPT

## 2024-03-08 PROCEDURE — 84295 ASSAY OF SERUM SODIUM: CPT

## 2024-03-08 PROCEDURE — 87040 BLOOD CULTURE FOR BACTERIA: CPT

## 2024-03-08 PROCEDURE — 85384 FIBRINOGEN ACTIVITY: CPT

## 2024-03-08 PROCEDURE — 78227 HEPATOBIL SYST IMAGE W/DRUG: CPT | Mod: MC

## 2024-03-08 PROCEDURE — 86850 RBC ANTIBODY SCREEN: CPT

## 2024-03-08 PROCEDURE — 85670 THROMBIN TIME PLASMA: CPT

## 2024-03-08 PROCEDURE — 97162 PT EVAL MOD COMPLEX 30 MIN: CPT

## 2024-03-08 PROCEDURE — 70496 CT ANGIOGRAPHY HEAD: CPT | Mod: MA

## 2024-03-08 PROCEDURE — 80074 ACUTE HEPATITIS PANEL: CPT

## 2024-03-08 PROCEDURE — 36415 COLL VENOUS BLD VENIPUNCTURE: CPT

## 2024-03-08 PROCEDURE — 85379 FIBRIN DEGRADATION QUANT: CPT

## 2024-03-08 PROCEDURE — 93970 EXTREMITY STUDY: CPT

## 2024-03-08 PROCEDURE — 82533 TOTAL CORTISOL: CPT

## 2024-03-08 PROCEDURE — 85025 COMPLETE CBC W/AUTO DIFF WBC: CPT

## 2024-03-08 PROCEDURE — 84702 CHORIONIC GONADOTROPIN TEST: CPT

## 2024-03-08 PROCEDURE — 86922 COMPATIBILITY TEST ANTIGLOB: CPT

## 2024-03-08 PROCEDURE — 82803 BLOOD GASES ANY COMBINATION: CPT

## 2024-03-08 PROCEDURE — 86900 BLOOD TYPING SEROLOGIC ABO: CPT

## 2024-03-08 PROCEDURE — 83735 ASSAY OF MAGNESIUM: CPT

## 2024-03-08 PROCEDURE — 93005 ELECTROCARDIOGRAM TRACING: CPT

## 2024-03-08 PROCEDURE — 86905 BLOOD TYPING RBC ANTIGENS: CPT

## 2024-03-08 PROCEDURE — 84146 ASSAY OF PROLACTIN: CPT

## 2024-03-08 PROCEDURE — 86780 TREPONEMA PALLIDUM: CPT

## 2024-03-08 RX ORDER — MIRTAZAPINE 45 MG/1
1 TABLET, ORALLY DISINTEGRATING ORAL
Qty: 0 | Refills: 0 | DISCHARGE
Start: 2024-03-08

## 2024-03-08 RX ORDER — POTASSIUM CHLORIDE 20 MEQ
10 PACKET (EA) ORAL
Refills: 0 | Status: COMPLETED | OUTPATIENT
Start: 2024-03-08 | End: 2024-03-08

## 2024-03-08 RX ORDER — AMLODIPINE BESYLATE 2.5 MG/1
1 TABLET ORAL
Qty: 0 | Refills: 0 | DISCHARGE
Start: 2024-03-08

## 2024-03-08 RX ADMIN — Medication 20 MILLIEQUIVALENT(S): at 11:09

## 2024-03-08 RX ADMIN — POTASSIUM PHOSPHATE, MONOBASIC POTASSIUM PHOSPHATE, DIBASIC 62.5 MILLIMOLE(S): 236; 224 INJECTION, SOLUTION INTRAVENOUS at 02:35

## 2024-03-08 RX ADMIN — Medication 100 MILLIEQUIVALENT(S): at 12:18

## 2024-03-08 RX ADMIN — PANTOPRAZOLE SODIUM 40 MILLIGRAM(S): 20 TABLET, DELAYED RELEASE ORAL at 05:28

## 2024-03-08 RX ADMIN — CHLORHEXIDINE GLUCONATE 1 APPLICATION(S): 213 SOLUTION TOPICAL at 11:15

## 2024-03-08 RX ADMIN — Medication 25 GRAM(S): at 00:51

## 2024-03-08 RX ADMIN — Medication 100 MILLIEQUIVALENT(S): at 11:08

## 2024-03-08 RX ADMIN — AMLODIPINE BESYLATE 5 MILLIGRAM(S): 2.5 TABLET ORAL at 05:27

## 2024-03-08 RX ADMIN — PANTOPRAZOLE SODIUM 40 MILLIGRAM(S): 20 TABLET, DELAYED RELEASE ORAL at 18:14

## 2024-03-08 RX ADMIN — Medication 100 MILLIEQUIVALENT(S): at 13:22

## 2024-03-08 RX ADMIN — DEXTROSE MONOHYDRATE, SODIUM CHLORIDE, AND POTASSIUM CHLORIDE 70 MILLILITER(S): 50; .745; 4.5 INJECTION, SOLUTION INTRAVENOUS at 11:08

## 2024-03-08 NOTE — DISCHARGE NOTE PROVIDER - PROVIDER TOKENS
PROVIDER:[TOKEN:[[id number]],FOLLOWUP:[1-3 days]],PROVIDER:[TOKEN:[[id number]],FOLLOWUP:[2 weeks]]

## 2024-03-08 NOTE — CHART NOTE - NSCHARTNOTEFT_GEN_A_CORE
89 y/o F with PMHx of Myasthenia Gravis (not on medication) and dementia (A&Ox1, conversive at baseline) who presents from her nursing home with worsening lethargy and confusion. UA done as outpatient grossly positive and urine culture growing E. coli and Klebsiella. CTH with moderate ventriculomegaly with findings suggestive of normal pressure hydrocephalus. CTA head and neck unremarkable. CXR clear. UA negative for bacteria, LE, and nitrite, WBC 2.  Patient given CTX for UTI. Outpatient UA from 2/13 with small LE, moderate bacteria, and WBC 84. Urine culture 2/13 positive for E. coli and Klebsiella. GI consulted -> no e/o acute cholecystitis or biliary obstruction on HIDA scan.  ENT consulted on 2/20 for tongue and lip swelling. On fiberoptic flexible laryngoscopy no laryngeal edema, no blood or active bleeding, no pooling of secretions in larynx, no aspiration. Patent airway. 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 also noted large glottic gap from right vocal cord paresis and left vocal cord paralysis in paramedian position during inhalations and exhalations. ENT Recommended decadron and PPI, as well as re-scope when pt awake to assess VF movement with phonation.  Pt was receiving mechanical soft solids with thin liquids at nursing facility per daughter's report; Pt currently receiving puree with moderately thickened liquids and is followed by this service. Pt with limited acceptance of PO yesterday and Pt seen today for repeat assessment of swallowing skills. Blood glucose 123 this am.  Pt asleep in bed and aroused to verbal cues; Pt oriented to self. Pt repositioned upright in bed with Nsg; denies pain. Pt's head remains in flexed position and Pt required max cues to orient to PO trials. Pt required assistance with self feeding but was able to administer single sips from cup with total supervision and assistance to hold cup. Pt is missing dentition. Pt inconsistently followed commands but did verbalize needs/wants re: current situation; Pt requesting coffee and to feed herself. Even during self feeding reorientation to bolus required. Acceptance of bolus and oral grading are reduced; Pt took 6-10 administrations of tspn to accept 1/4 to 1/2 tspn of puree. Poor oral grading to minced and moist texture. Formation, control and transfer of the bolus were judged to be delayed. Cough noted x 1 on straw sip of thin liquid with a-p spillage suspected. Intermittent anterior spillage evident with cup drinking of thin liquids. Clear vocal quality post swallow on all trials. No s/s of aspiration evident on purees or mildly/moderately thickened liquids. Frequent eructation during exam and ENT noted pachydermia which is c/w LPRD.  Impressions: Overall Pt is an 89 y/o female with h/o dementia and myasthenia gravis adm with UTI who presents with significant oral preparatory stage dysphagia which may be superimposed upon by underlying cognitive impairments. Oral skills are impaired. Aspiration not r/o upon b/s exam although no s/s of aspiration evident with purees or mild/moderately thickened liquids. . Frequent eructation during exam and ENT noted pachydermia which is c/w LPRD.  Recommendations: Puree with mildly thickened liquids; total feeding assistance; Maintain aspiration precautions. Dietitian f/u pending re: calorie count; suggest MD f/u for GOC re: provision of nutrition, hydration and meds in this Pt with dementia vs possible Palliative Care consult. Maintain reflux precautions. Swallow therapy f/u 1 x week; Pt will tolerate diet with no s/s of aspiration. Will continue to follow while patient is in-house; swallowing therapy post d/c.  Berna Law MS CCC-SLP   Pgr # [phone] 88 y/o F with PMHx of Myasthenia Gravis (not on medication) and dementia (A&Ox1, conversive at baseline) who presents from her nursing home with worsening lethargy and confusion. UA done as outpatient grossly positive and urine culture growing E. coli and Klebsiella. CTH with moderate ventriculomegaly with findings suggestive of normal pressure hydrocephalus. CTA head and neck unremarkable. CXR clear. UA negative for bacteria, LE, and nitrite, WBC 2.  Patient given CTX for UTI. Outpatient UA from 2/13 with small LE, moderate bacteria, and WBC 84. Urine culture 2/13 positive for E. coli and Klebsiella. GI consulted -> no e/o acute cholecystitis or biliary obstruction on HIDA scan.  ENT consulted on 2/20 for tongue and lip swelling. On fiberoptic flexible laryngoscopy no laryngeal edema, no blood or active bleeding, no pooling of secretions in larynx, no aspiration. Patent airway. 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 also noted large glottic gap from right vocal cord paresis and left vocal cord paralysis in paramedian position during inhalations and exhalations. ENT Recommended decadron and PPI, as well as re-scope when pt awake to assess VF movement with phonation.  Pt was receiving mechanical soft solids with thin liquids at nursing facility per daughter's report; Pt currently receiving puree with moderately thickened liquids and is followed by this service. Pt with limited acceptance of PO yesterday and Pt seen today for repeat assessment of swallowing skills. Blood glucose 123 this am.    Pt asleep in bed and aroused to verbal cues; Pt oriented to self. Pt repositioned upright in bed with Nsg; denies pain. Pt's head remains in flexed position and Pt required max cues to orient to PO trials. Pt required assistance with self feeding but was able to administer single sips from cup with total supervision and assistance to hold cup. Pt is missing dentition. Pt inconsistently followed commands but did verbalize needs/wants re: current situation; Pt requesting coffee and to feed herself. Even during self feeding reorientation to bolus required. Acceptance of bolus and oral grading are reduced; Pt took 6-10 administrations of tspn to accept 1/4 to 1/2 tspn of puree. Poor oral grading to minced and moist texture. Formation, control and transfer of the bolus were judged to be delayed. Cough noted x 1 on straw sip of thin liquid with a-p spillage suspected. Intermittent anterior spillage evident with cup drinking of thin liquids. Clear vocal quality post swallow on all trials. No s/s of aspiration evident on purees or mildly/moderately thickened liquids. Frequent eructation during exam and ENT noted pachydermia which is c/w LPRD.    Impressions: Overall Pt is an 88 y/o female with h/o dementia and myasthenia gravis adm with UTI who presents with significant oral preparatory stage dysphagia which may be superimposed upon by underlying cognitive impairments. Oral skills are impaired. Aspiration not r/o upon b/s exam although no s/s of aspiration evident with purees or mild/moderately thickened liquids. . Frequent eructation during exam and ENT noted pachydermia which is c/w LPRD.  Recommendations: Puree with mildly thickened liquids; total feeding assistance; Maintain aspiration precautions. Dietitian f/u pending re: calorie count; suggest MD f/u for GOC re: provision of nutrition, hydration and meds in this Pt with dementia vs possible Palliative Care consult. Maintain reflux precautions; see ENT recommendations above. Swallow therapy f/u 1 x week; Pt will tolerate diet with no s/s of aspiration. Will continue to follow while patient is in-house; swallowing therapy post d/c.  Berna Law MS CCC-SLP   Pgr # [phone]

## 2024-03-08 NOTE — PROGRESS NOTE ADULT - ASSESSMENT
The patient is a 89F with dementia and Myasthenia Gravis admitted for AMS.     1.Dysphagia  appreciate speech pathology  Remeron started as appetite stimulant, 3/6  GOC; I think patient is high risk for anesthesia  passed swallow eval, would cont diet w/aspiration precautions     2. AMS  improving  ID workup per primary team     3. Cholelithiasis, distended GB   no e/o acute cholecystitis or biliary obstruction on HIDA scan    4. peripancreatic fat stranding   low suspicion for acute pancreatitis   ppi daily while inpatient, then can d/c    5. abnormal LFTs, downtrending    likely a byproduct of systemic infection   hepatitis panel neg  trend LFTs

## 2024-03-08 NOTE — DISCHARGE NOTE PROVIDER - DETAILS OF MALNUTRITION DIAGNOSIS/DIAGNOSES
This patient has been assessed with a concern for Malnutrition and was treated during this hospitalization for the following Nutrition diagnosis/diagnoses:     -  02/26/2024: Severe protein-calorie malnutrition

## 2024-03-08 NOTE — DISCHARGE NOTE NURSING/CASE MANAGEMENT/SOCIAL WORK - PATIENT PORTAL LINK FT
You can access the FollowMyHealth Patient Portal offered by [facility] by registering at the following website: [email or web address]. By joining [employer]’s FollowMyHealth portal, you will also be able to view your health information using other applications (apps) compatible with our system.

## 2024-03-08 NOTE — PROGRESS NOTE ADULT - SUBJECTIVE AND OBJECTIVE BOX
INTERVAL HPI/OVERNIGHT EVENTS:    passed swallow eval    MEDICATIONS  (STANDING):  acetaminophen   IVPB .. 1000 milliGRAM(s) IV Intermittent once  amLODIPine   Tablet 5 milliGRAM(s) Oral daily  chlorhexidine 2% Cloths 1 Application(s) Topical daily  dextrose 5% + sodium chloride 0.45%. 1000 milliLiter(s) (70 mL/Hr) IV Continuous <Continuous>  donepezil 10 milliGRAM(s) Oral at bedtime  mirtazapine 7.5 milliGRAM(s) Oral at bedtime  pantoprazole  Injectable 40 milliGRAM(s) IV Push two times a day  potassium chloride   Powder 20 milliEquivalent(s) Oral daily    MEDICATIONS  (PRN):      Allergies    sulfamethoxazole (Vomiting; Nausea)    Intolerances        Review of Systems:    General:  No wt loss, fevers, chills, night sweats, fatigue   Eyes:  Good vision, no reported pain  ENT:  No sore throat, pain, runny nose, dysphagia  CV:  No pain, palpitations, hypo/hypertension  Resp:  No dyspnea, cough, tachypnea, wheezing  GI:  No pain, No nausea, No vomiting, No diarrhea, No constipation, No weight loss, No fever, No pruritis, No rectal bleeding, No melena, No dysphagia  :  No pain, bleeding, incontinence, nocturia  Muscle:  No pain, weakness  Neuro:  No weakness, tingling, memory problems  Psych:  No fatigue, insomnia, mood problems, depression  Endocrine:  No polyuria, polydypsia, cold/heat intolerance  Heme:  No petechiae, ecchymosis, easy bruisability  Skin:  No rash, tattoos, scars, edema      Vital Signs Last 24 Hrs  T(C): 37.2 (07 Mar 2024 09:44), Max: 37.2 (07 Mar 2024 09:44)  T(F): 98.9 (07 Mar 2024 09:44), Max: 98.9 (07 Mar 2024 09:44)  HR: 75 (07 Mar 2024 09:44) (75 - 94)  BP: 140/83 (07 Mar 2024 09:44) (133/73 - 150/78)  BP(mean): --  RR: 18 (07 Mar 2024 09:44) (18 - 18)  SpO2: 95% (07 Mar 2024 09:44) (95% - 100%)    Parameters below as of 07 Mar 2024 09:44  Patient On (Oxygen Delivery Method): nasal cannula  O2 Flow (L/min): 4      PHYSICAL EXAM:    Constitutional: NAD  HEENT: EOMI, throat clear  Neck: No LAD, supple  Respiratory: CTA and P  Cardiovascular: S1 and S2, RRR, no M  Gastrointestinal: BS+, soft, NT/ND, neg HSM,  Extremities: No peripheral edema, neg clubbing, cyanosis  Vascular: 2+ peripheral pulses  Neurological: A/O x 3, no focal deficits  Psychiatric: Normal mood, normal affect  Skin: No rashes      LABS:                        10.6   7.90  )-----------( 227      ( 06 Mar 2024 07:14 )             30.3     03-07    147<H>  |  104  |  15  ----------------------------<  127<H>  2.6<LL>   |  31  |  1.08    Ca    7.9<L>      07 Mar 2024 07:09    TPro  5.9<L>  /  Alb  2.9<L>  /  TBili  1.3<H>  /  DBili  x   /  AST  20  /  ALT  16  /  AlkPhos  132<H>  03-07      Urinalysis Basic - ( 07 Mar 2024 07:09 )    Color: x / Appearance: x / SG: x / pH: x  Gluc: 127 mg/dL / Ketone: x  / Bili: x / Urobili: x   Blood: x / Protein: x / Nitrite: x   Leuk Esterase: x / RBC: x / WBC x   Sq Epi: x / Non Sq Epi: x / Bacteria: x        RADIOLOGY & ADDITIONAL TESTS:

## 2024-03-08 NOTE — DISCHARGE NOTE PROVIDER - NSDCMRMEDTOKEN_GEN_ALL_CORE_FT
acetaminophen 325 mg oral tablet: 2 tab(s) orally every 6 hours as needed  Aricept 10 mg oral tablet: 1 tab(s) orally once a day (at bedtime)  Artificial Tears ophthalmic solution: 1 drop(s) in each eye 2 times a day as needed for  dry eyes  cholecalciferol 1250 mcg (50,000 intl units) oral capsule: 1 cap(s) orally once a week Monday  ciprofloxacin 250 mg oral tablet: 1 tab(s) orally every 12 hours for 5 days  Multiple Vitamins oral tablet: 1 tab(s) orally once a day  PreserVision AREDS 2 oral capsule: 2 cap(s) orally once a day  senna oral tablet: 2 tab(s) orally once a day (at bedtime)   acetaminophen 325 mg oral tablet: 2 tab(s) orally every 6 hours as needed  amLODIPine 5 mg oral tablet: 1 tab(s) orally once a day  Aricept 10 mg oral tablet: 1 tab(s) orally once a day (at bedtime)  Artificial Tears ophthalmic solution: 1 drop(s) in each eye 2 times a day as needed for  dry eyes  cholecalciferol 1250 mcg (50,000 intl units) oral capsule: 1 cap(s) orally once a week Monday  mirtazapine 7.5 mg oral tablet: 1 tab(s) orally once a day (at bedtime)  Multiple Vitamins oral tablet: 1 tab(s) orally once a day  PreserVision AREDS 2 oral capsule: 2 cap(s) orally once a day  senna oral tablet: 2 tab(s) orally once a day (at bedtime)

## 2024-03-08 NOTE — DISCHARGE NOTE PROVIDER - NSDCFUADDAPPT_GEN_ALL_CORE_FT
APPTS ARE READY TO BE MADE: [ ] YES    Best Family or Patient Contact (if needed):    Additional Information about above appointments (if needed):    1: Neurology for follow up of NPH  2:   3:     Other comments or requests:  APPTS ARE READY TO BE MADE: [x ] YES    Best Family or Patient Contact (if needed):    Additional Information about above appointments (if needed):    1: Neurology for follow up of NPH  2:   3:     Other comments or requests:  APPTS ARE READY TO BE MADE: [x ] YES    Best Family or Patient Contact (if needed):    Additional Information about above appointments (if needed):    1: Neurology for follow up of NPH  2:   3:     Other comments or requests:     Patient is being discharged to SNF. Caregiver will arrange follow up.

## 2024-03-08 NOTE — CHART NOTE - NSCHARTNOTESELECT_GEN_ALL_CORE
Electrolyte Imbalance/Event Note
Event Note
Event Note
Nutrition Services
Speech and Swallow
cancelled rapid/Event Note
Nutrition Services
Nutrition Services
RRT for nonverbal and unresponsive to name/Event Note
RRT/Event Note
Speech and Hearing
Speech and Swallow

## 2024-03-08 NOTE — PROGRESS NOTE ADULT - ASSESSMENT
88 YO F with PMHx of Myasthenia Gravis (not on medication for past 9 years) and Hx of dementia .   Patient was confused for a few days . Urine culture was checked  and showed positive with Klebsiella and E.coli both sensitive to Cipro. Treated with Cipro for 3 days but confusion got worse. She has slurred  speech today so transferred to hospital for neurological evaluation and CT head.  (19 Feb 2024 18:09)      ACUTE RENAL FAILURE:  Serum creatinine is improved   There is no progression . No uremic symptoms  No evidence of anemia .  Fluid status stable.    BP monitoring,continue current antihypertensive meds, low salt diet,followup with PMD in 1-2 weeks  amLODIPine   Tablet 5 milliGRAM(s) Oral daily      Will continue to avoid nephrotoxic drugs.  Patient remains asymptomatic.   Continue current therapy.  hold  diuretic.  hold   ACE inhibitor.  hold   ARB.    hypokalemia potassium chloride   Powder 20 milliEquivalent(s) Oral daily    hypernatremia dextrose 5% + sodium chloride 0.45%. 1000 milliLiter(s) (70 mL/Hr) IV Continuous     blood and urine cx,serial lactate levels,monitor vitals umm mondragon hydration,monitor urine output and renal profile,iv abx as per id cons

## 2024-03-08 NOTE — DISCHARGE NOTE PROVIDER - CARE PROVIDER_API CALL
Drake Woo  Internal Medicine  [address] [place] [address]  Phone: [phone]  Fax: [phone]  Follow Up Time: 1-3 days    Dereck Sifuentes  Neurology  [address] [facility], [address]  [place] [address]  Phone: [phone]  Fax: [phone]  Follow Up Time: 2 weeks

## 2024-03-08 NOTE — PROGRESS NOTE ADULT - REASON FOR ADMISSION
AMS

## 2024-03-08 NOTE — DISCHARGE NOTE PROVIDER - HOSPITAL COURSE
HPI:  88 YO F with PMHx of Myasthenia Gravis (not on medication for past 9 years) and Hx of dementia . Patient was confused for a few days . Urine culture was checked  and showed positive with Klebsiella and E.coli both sensitive to Cipro. Treated with Cipro for 3 days but confusion got worse. She has slurred  speech today so transferred to hospital for neurological evaluation and CT head.  (19 Feb 2024 18:09)    Hospital Course: Presented with AMS and recent UTI s/p Cipro. Antibiotic upgraded to vancomycin and meropenem. Urine cx repeated and was negative. Blood cx were negative.  Neurological work-up completed with CTH and MRI were both negative. Pt had an episode of seizure-like activity and RRT was activated. MICU consulted. EEG completed and was negative. CT AP showed acute pancreatitis and gallstones. GI consulted. HIDA scan negative. PPI started to be d/c upon discharge. ENT consulted for LPRD, Vocal cord paralysis and Lip swelling. Decadron x 3days completed. SLP evaluated for dysphagia - Placed on pureed diet with aspiration precaution.  Hemeonc consulted to R/O r/o DIC, MDS, hypersplenism, folic def, & HIT . Placed on Vancyclovir x 7d for R/O herpes encephalitis.       Important Medication Changes and Reason:    Active or Pending Issues Requiring Follow-up:  Poor Calorie intake - Family not in agreement with peg at this time   Outpatient follow up at [facility] ENT office, located at [address] [place] [address]. Please call [phone]) to make appointment.  Swallowing therapy post d/c; Pt may require swallowing therapy post d/c. SLP communicated aforementioned information with ACP Navjot via teams.  GI - ppi daily while inpatient, then can d/c        Advanced Directives:   [ x] Full code  [ ] DNR  [ ] Hospice    Discharge Diagnoses:  AMS.  UTI  Cholelithiasis, distended GB            HPI:  89 YO F with PMHx of Myasthenia Gravis (not on medication for past 9 years) and Hx of dementia . Patient was confused for a few days . Urine culture was checked  and showed positive with Klebsiella and E.coli both sensitive to Cipro. Treated with Cipro for 3 days but confusion got worse. She has slurred  speech today so transferred to hospital for neurological evaluation and CT head.  (19 Feb 2024 18:09)    Hospital Course: Presented with AMS and recent UTI s/p Cipro. Antibiotic upgraded to vancomycin and meropenem. Urine cx repeated and was negative. Blood cx were negative.  Neurological work-up completed with CTH and MRI were both negative. Pt had an episode of seizure-like activity and RRT was activated. MICU consulted. EEG completed and was negative. CT AP showed acute pancreatitis and gallstones. GI consulted. HIDA scan negative. PPI started to be d/c upon discharge. ENT consulted for LPRD, Vocal cord paralysis and Lip swelling. Decadron x 3days completed. SLP evaluated for dysphagia - Placed on pureed diet with aspiration precaution. Hemeonc consulted d/t thrombocytopenia. Placed on Vancyclovir x 7d for R/O herpes encephalitis.       Important Medication Changes and Reason:    Active or Pending Issues Requiring Follow-up:  Poor Calorie intake - Family not in agreement with peg at this time   Outpatient follow up at [facility] ENT office, located at [address] [place] [address]. Please call [phone]) to make appointment.  Swallowing therapy post d/c; Pt may require swallowing therapy post d/c. SLP communicated aforementioned information with ACP Navjot via teams.  GI - ppi daily while inpatient, then can d/c        Advanced Directives:   [ x] Full code  [ ] DNR  [ ] Hospice    Discharge Diagnoses:  AMS.  UTI  Cholelithiasis, distended GB            HPI:  88 YO F with PMHx of Myasthenia Gravis (not on medication for past 9 years) and Hx of dementia . Patient was confused for a few days . Urine culture was checked  and showed positive with Klebsiella and E.coli both sensitive to Cipro. Treated with Cipro for 3 days but confusion got worse. She has slurred  speech today so transferred to hospital for neurological evaluation and CT head.  (19 Feb 2024 18:09)    Hospital Course: Presented with AMS and recent UTI s/p oral Cipro outpatient. Antibiotic upgraded to vancomycin and meropenem on admission. Urine cx repeated and was negative. Blood cx were negative.  Neurological work-up completed with CTH and MRI were both negative. Neurology consulted. Pt had an episode of seizure-like activity and RRT was activated. MICU consulted. EEG completed and was negative. Mental status improved. CT AP showed acute pancreatitis and gallstones. GI consulted. HIDA scan negative. PPI given. ENT consulted for LPRD, Vocal cord paralysis and Lip swelling. Decadron x 3days completed. SLP evaluated for dysphagia - Placed on pureed diet with aspiration precaution. Hemeonc consulted d/t thrombocytopenia. Placed on Vancyclovir x 7d for R/O herpes encephalitis. Pt with poor oral intake. RD followed pt. Calorie count completed. Pt failed calorie count. Remeron started to aid with improving appetitte. Pt developed BOOGIE. Treated with IV fluids. Ressolved.  Electrolytes monitored and repleted.      Important Medication Changes and Reason:    Active or Pending Issues Requiring Follow-up:  Poor Calorie intake - Family not in agreement with peg at this time   Outpatient follow up at [facility] ENT office, located at [address] [place] [address]. Please call [phone]) to make appointment.  Swallowing therapy post d/c; Pt may require swallowing therapy post d/c. SLP communicated aforementioned information with CORRIE Morfin via teams.  GI - ppi daily while inpatient, then can d/c        Advanced Directives:   [ x] Full code  [ ] DNR  [ ] Hospice    Discharge Diagnoses:  AMS.  UTI  Cholelithiasis, distended GB            HPI:  88 YO F with PMHx of Myasthenia Gravis (not on medication for past 9 years) and Hx of dementia . Patient was confused for a few days . Urine culture was checked  and showed positive with Klebsiella and E.coli both sensitive to Cipro. Treated with Cipro for 3 days but confusion got worse. She has slurred  speech today so transferred to hospital for neurological evaluation and CT head.  (19 Feb 2024 18:09)    Hospital Course: 88 y/o F with PMHx of Myasthenia Gravis (not on medication) and dementia (A&Ox1, conversive at baseline) who presents from her nursing home with worsening lethargy and confusion. UA done as outpatient on 2/13 grossly positive and urine culture growing E. coli and Klebsiella, pansensitive. Found to be hypothermic in ED. Admitted for sepsis and metabolic encephalopathy in setting of UTI. Patient was critically ill from hypotherma and obtunded ; s/p RRTx3 but rejected by MICU.  Infectious disease consulted and started patient on empiric treatment of meningitis/?HSV (though EEG not concordant) LP  was sugguested but family decline and therefore empriic treatment continued until 3/5 and was stopped 2/2 BOOGIE.  Patient was also seen by neurology because of head scan show NPH and confirmed by MRI . Patient mental status improved and advised to follow up [] For NPH workup, patient can follow up outpatient with neurologist Dr. Sifuentes or Dr. Guerra [phone], [address] [facility], [place] [address] or Dr. Castellon (dementia specialist)  at [address] [place] [address]. 1-2 weeks after discharge by calling [phone] to schedule this appointment.Distended GB but HIDA negative; waxing/waning mental status; and LFT elevations (now resolved)  During her hospitalization patient was found to have lip swelling and dysphonia, seen by ent received 3 days of decadron , her symptoms have improved however patient with poo appetite and seen by speech and swallow and recommended  Puree with mildly thickened liquids; total feeding assistance; Maintain aspiration precautions. Dietitian f/u pending re: calorie count; suggest MD f/u for GOC re: provision of nutrition, hydration and meds in this Pt with dementia vs possible Palliative Care consult.will need speech and swallow follow up at LT. PAtient was also seen by GI and recommended remeron which was started .Patient was seen by nephrology for BOOGIE , avoid nephrotoxic drugs and recieved intravneous fluids.and BOOGIE resolved. Patient medically cleared for disharge and follow up with Dr Woo , nephrology and speech and swallow.       Important Medication Changes and Reason:    Active or Pending Issues Requiring Follow-up:  Poor Calorie intake - Family not in agreement with peg at this time   Outpatient follow up at [facility] ENT office, located at [address] [place] [address]. Please call [phone]) to make appointment.  Swallowing therapy post d/c; Pt may require swallowing therapy post d/c. SLP communicated aforementioned information with ACP Navjot via teams.  GI - ppi daily while inpatient, then can d/c        Advanced Directives:   [ x] Full code  [ ] DNR  [ ] Hospice    Discharge Diagnoses:  AMS.  UTI  Cholelithiasis, distended GB            HPI:  88 YO F with PMHx of Myasthenia Gravis (not on medication for past 9 years) and Hx of dementia . Patient was confused for a few days . Urine culture was checked  and showed positive with Klebsiella and E.coli both sensitive to Cipro. Treated with Cipro for 3 days but confusion got worse. She has slurred  speech today so transferred to hospital for neurological evaluation and CT head.  (19 Feb 2024 18:09)    Hospital Course: 89 y/o F with PMHx of Myasthenia Gravis (not on medication) and dementia (A&Ox1, conversive at baseline) who presents from her nursing home with worsening lethargy and confusion. UA done as outpatient on 2/13 grossly positive and urine culture growing E. coli and Klebsiella, pansensitive. Found to be hypothermic in ED. Admitted for sepsis and metabolic encephalopathy in setting of UTI. Patient was critically ill from hypothermia and obtunded ; s/p RRTx3 but rejected by MICU.  Infectious disease consulted and started patient on empiric treatment of meningitis/?HSV (though EEG not concordant) LP  was suggested but family decline and therefore emperic treatment continued until 3/5 and was stopped 2/2 BOOGIE.  Patient was also seen by neurology because of head scan show NPH and confirmed by MRI . Patient mental status improved and advised to follow up. For NPH workup, patient can follow up outpatient with neurologist Dr. Sifuentes or Dr. Guerra [phone], [address] [facility], [place] [address] or Dr. Castellon (dementia specialist)  at [address] [place] [address]. 1-2 weeks after discharge by calling [phone] to schedule this appointment.Distended GB but HIDA negative; waxing/waning mental status; and LFT elevations (now resolved)  During her hospitalization patient was found to have lip swelling and dysphonia, seen by ent received 3 days of decadron , her symptoms have improved however patient with poo appetite and seen by speech and swallow and recommended  Puree with mildly thickened liquids; total feeding assistance; Maintain aspiration precautions. Dietitian f/u pending re: calorie count; suggest MD f/u for GOC re: provision of nutrition, hydration and meds in this Pt with dementia vs possible Palliative Care consult.will need speech and swallow follow up at LTC. Patient was also seen by GI and recommended Remeron which was started .Patient was seen by nephrology for BOOGIE , avoid nephrotoxic drugs and received intravenous fluids and BOOGIE resolved. Patient medically cleared for discharge and follow up with Dr Woo , nephrology and speech and swallow.       Important Medication Changes and Reason:    Active or Pending Issues Requiring Follow-up:  Poor Calorie intake - Family not in agreement with peg at this time   Outpatient follow up at [facility] ENT office, located at [address] [place] [address]. Please call [phone]) to make appointment.  Swallowing therapy post d/c; Pt may require swallowing therapy post d/c. SLP communicated aforementioned information with ACP Navjot via teams.  GI - ppi daily while inpatient, then can d/c        Advanced Directives:   [ x] Full code  [ ] DNR  [ ] Hospice    Discharge Diagnoses:  AMS.  UTI  Cholelithiasis, distended GB

## 2024-03-08 NOTE — CHART NOTE - NSCHARTNOTEFT_GEN_A_CORE
Notified by day provider to follow up BMP post repletion for hypokalemia for K of 2.6  Repeat BMP 21:00  with K of 5.6 but serum glucose 630, and Ca 6.5 likely drawn in arm with IVFs with dextrose running.  Stat fingerstick done - 128 @ 22:29.  Repeat BMP with Mg and Phos ordered, returned with K 3.3, Phos 2.2 and Mg 1.0  Pt with normal BMs 2 most today as per night RN, no reported diarrhea or fluid loss.     HPI 89F with dementia and Myasthenia Gravis admitted for AMS now with electrolyte imbalance.    Vital Signs Last 24 Hrs  T(C): 36.4 (08 Mar 2024 00:00), Max: 37.2 (07 Mar 2024 09:44)  T(F): 97.5 (08 Mar 2024 00:00), Max: 98.9 (07 Mar 2024 09:44)  HR: 87 (08 Mar 2024 00:00) (75 - 106)  BP: 130/85 (08 Mar 2024 00:00) (92/64 - 140/83)  BP(mean): --  RR: 18 (08 Mar 2024 00:00) (18 - 18)  SpO2: 95% (08 Mar 2024 00:00) (93% - 96%)    Parameters below as of 08 Mar 2024 00:00  Patient On (Oxygen Delivery Method): room air    Basic Metabolic Panel - STAT (03.07.24 @ 22:41)   Sodium: 147 mmol/L  Potassium: 3.3 mmol/L  Chloride: 105 mmol/L  Carbon Dioxide: 31 mmol/L  Anion Gap: 11 mmol/L  Blood Urea Nitrogen: 14 mg/dL  Creatinine: 1.11 mg/dL  Glucose: 134 mg/dL  Calcium: 7.7 mg/dL  eGFR: 48: Phosphorus (03.07.24 @ 22:41)   Phosphorus: 2.2 mg/dLMagnesium (03.07.24 @ 22:41)   Magnesium: 1.0:       A/P: 89F with dementia and Myasthenia Gravis admitted for AMS now with electrolyte imbalance of hypokalemia/ hypomagnesemia/ hypophosphatemia.  - 2 gm Mag sulfate IVSS ordered  - 15 mmol of KPhos IVSS ordered  - stat BMP/Mg/Phos activate labes ordered to draw 2 hours after electrolye repletion  - lytes ordered in AM   - discussed [place]  [id number]

## 2024-03-08 NOTE — PROGRESS NOTE ADULT - SUBJECTIVE AND OBJECTIVE BOX
Patient is a 89y Female whom presented to the hospital with mak     PAST MEDICAL & SURGICAL HISTORY:  Myasthenia gravis      Dementia      Myasthenia gravis      No significant past surgical history      No significant past surgical history          MEDICATIONS  (STANDING):  acyclovir IVPB 550 milliGRAM(s) IV Intermittent every 12 hours  chlorhexidine 2% Cloths 1 Application(s) Topical daily  donepezil 10 milliGRAM(s) Oral at bedtime  pantoprazole  Injectable 40 milliGRAM(s) IV Push two times a day  sodium chloride 0.9%. 1000 milliLiter(s) (75 mL/Hr) IV Continuous <Continuous>      Allergies    sulfamethoxazole (Vomiting; Nausea)    Intolerances        SOCIAL HISTORY:  Denies ETOh,Smoking,     FAMILY HISTORY:  No pertinent family history in first degree relatives    No pertinent family history in first degree relatives        REVIEW OF SYSTEMS:    unable to obtained a good review system                                                                                                                                   11.3   7.18  )-----------( 228      ( 08 Mar 2024 08:49 )             33.9       CBC Full  -  ( 08 Mar 2024 08:49 )  WBC Count : 7.18 K/uL  RBC Count : 3.56 M/uL  Hemoglobin : 11.3 g/dL  Hematocrit : 33.9 %  Platelet Count - Automated : 228 K/uL  Mean Cell Volume : 95.2 fl  Mean Cell Hemoglobin : 31.7 pg  Mean Cell Hemoglobin Concentration : 33.3 gm/dL  Auto Neutrophil # : x  Auto Lymphocyte # : x  Auto Monocyte # : x  Auto Eosinophil # : x  Auto Basophil # : x  Auto Neutrophil % : x  Auto Lymphocyte % : x  Auto Monocyte % : x  Auto Eosinophil % : x  Auto Basophil % : x      03-08    144  |  105  |  12  ----------------------------<  123<H>  3.0<L>   |  29  |  0.97    Ca    7.5<L>      08 Mar 2024 08:49  Phos  3.8     03-08  Mg     1.8     03-08    TPro  5.9<L>  /  Alb  2.9<L>  /  TBili  1.3<H>  /  DBili  x   /  AST  20  /  ALT  16  /  AlkPhos  132<H>  03-07      CAPILLARY BLOOD GLUCOSE      POCT Blood Glucose.: 90 mg/dL (08 Mar 2024 17:57)  POCT Blood Glucose.: 121 mg/dL (08 Mar 2024 11:46)  POCT Blood Glucose.: 115 mg/dL (08 Mar 2024 06:35)  POCT Blood Glucose.: 132 mg/dL (07 Mar 2024 23:59)  POCT Blood Glucose.: 128 mg/dL (07 Mar 2024 22:29)      Vital Signs Last 24 Hrs  T(C): 36.4 (08 Mar 2024 16:14), Max: 36.8 (07 Mar 2024 21:38)  T(F): 97.5 (08 Mar 2024 16:14), Max: 98.3 (07 Mar 2024 21:38)  HR: 78 (08 Mar 2024 16:14) (78 - 87)  BP: 122/78 (08 Mar 2024 16:14) (116/78 - 133/81)  BP(mean): --  RR: 18 (08 Mar 2024 16:14) (18 - 18)  SpO2: 94% (08 Mar 2024 16:14) (93% - 95%)    Parameters below as of 08 Mar 2024 16:14  Patient On (Oxygen Delivery Method): room air        Urinalysis Basic - ( 08 Mar 2024 08:49 )    Color: x / Appearance: x / SG: x / pH: x  Gluc: 123 mg/dL / Ketone: x  / Bili: x / Urobili: x   Blood: x / Protein: x / Nitrite: x   Leuk Esterase: x / RBC: x / WBC x   Sq Epi: x / Non Sq Epi: x / Bacteria: x                    PHYSICAL EXAM:    Constitutional: NAD  HEENT: conjunctive   clear   Neck:  No JVD  Respiratory: CTAB  Cardiovascular: S1 and S2  Gastrointestinal: BS+, soft, NT/ND  Extremities: No peripheral edema

## 2024-03-08 NOTE — DISCHARGE NOTE NURSING/CASE MANAGEMENT/SOCIAL WORK - NSDCFUADDAPPT_GEN_ALL_CORE_FT
APPTS ARE READY TO BE MADE: [x ] YES    Best Family or Patient Contact (if needed):    Additional Information about above appointments (if needed):    1: Neurology for follow up of NPH  2:   3:     Other comments or requests:

## 2024-03-08 NOTE — DISCHARGE NOTE PROVIDER - NSDCCPCAREPLAN_GEN_ALL_CORE_FT
PRINCIPAL DISCHARGE DIAGNOSIS  Diagnosis: Altered mental state  Assessment and Plan of Treatment: Infectious disease consulted. Started patient on empiric treatment of meningitis/?HSV. LP  was suggested but declined by  family declined. Continued until 3/5.  Patient was also seen by neurology because of head scan show Normal Pressure Hydrocephalus confirmed by MRI . Patient mental status improved and advised to follow up with Dr Peres      SECONDARY DISCHARGE DIAGNOSES  Diagnosis: LPRD (laryngopharyngeal reflux disease)  Assessment and Plan of Treatment: Seen by ENT. Protonix was gven. Monitor for symtoms in nursing home    Diagnosis: Lip swelling  Assessment and Plan of Treatment: You were seen by ENT. Completed a 3 day course of decadron. Resolved    Diagnosis: BOOGIE (acute kidney injury)  Assessment and Plan of Treatment: You were treated with IVF and seen by nephrologist. Ressolved    Diagnosis: Elevated LFTs  Assessment and Plan of Treatment: Your liver function tests were elevated. You were evaluated by GI. Labs monitored daily. Improved. Follow up with Dr Woo    Diagnosis: Sepsis secondary to UTI  Assessment and Plan of Treatment: You presented with sepsis due to confirmed UTI. Treated with IV antibiotics. Blood and urine cultures completed and was negative.    Diagnosis: Normal pressure hydrocephalus  Assessment and Plan of Treatment: Head scan show Normal Pressure Hydrocephalus confirmed by MRI . Patient mental status improved and advised to follow up. Patient can follow up outpatient with neurologist Dr. Sifuentes or Dr. Guerra [phone], [address] [facility], [place] [address] or Dr. Castellon (dementia specialist)  at [address] [place] [address]. 1-2 weeks after discharge by calling [phone] to schedule this appointment     PRINCIPAL DISCHARGE DIAGNOSIS  Diagnosis: Altered mental state  Assessment and Plan of Treatment: Improved back to baseline , outpatient followup with neurology for NPH found on head ct scan      SECONDARY DISCHARGE DIAGNOSES  Diagnosis: Poor appetite  Assessment and Plan of Treatment: please follow up with speech and swallow at [facility] for re-evaluation of diet . c/w pureed diet. Remeron for appetite stimulant    Diagnosis: Lip swelling  Assessment and Plan of Treatment: You were seen by ENT. Completed a 3 day course of decadron. Resolved    Diagnosis: LPRD (laryngopharyngeal reflux disease)  Assessment and Plan of Treatment: Seen by ENT. Protonix was isaiah. Monitor for symtoms in nursing home    Diagnosis: Sepsis secondary to UTI  Assessment and Plan of Treatment: You presented with sepsis due to confirmed UTI. Treated with IV antibiotics. Blood and urine cultures completed and was negative.    Diagnosis: Elevated LFTs  Assessment and Plan of Treatment: Your liver function tests were elevated. You were evaluated by GI. Labs monitored daily. Improved. Follow up with Dr Woo    Diagnosis: BOOGIE (acute kidney injury)  Assessment and Plan of Treatment: You were treated with IVF and seen by nephrologist. Ressolved    Diagnosis: Normal pressure hydrocephalus  Assessment and Plan of Treatment: Head scan show Normal Pressure Hydrocephalus confirmed by MRI . Patient mental status improved and advised to follow up. Patient can follow up outpatient with neurologist Dr. Sifuentes or Dr. Guerra [phone], [address] [facility], [place] [address] or Dr. Castellon (dementia specialist)  at [address] [place] [address]. 1-2 weeks after discharge by calling [phone] to schedule this appointment

## 2024-03-08 NOTE — PROGRESS NOTE ADULT - PROVIDER SPECIALTY LIST ADULT
Gastroenterology
Infectious Disease
Nephrology
Gastroenterology
Heme/Onc
Infectious Disease
Internal Medicine
Internal Medicine
Nephrology
Gastroenterology
Gastroenterology
Infectious Disease
Internal Medicine
MICU
Nephrology
Neurology
Neurology
Gastroenterology
Internal Medicine
Neurology
Gastroenterology
Internal Medicine
Nephrology
Nephrology
Internal Medicine
Heme/Onc
Internal Medicine
Heme/Onc
Internal Medicine
Internal Medicine
Heme/Onc

## 2024-03-08 NOTE — PROVIDER CONTACT NOTE (CRITICAL VALUE NOTIFICATION) - ACTION/TREATMENT ORDERED:
provider made aware
Magnesium IVP and Finger stick blood glucose x1 ordered, Labs post supplement
Awaiting orders at this time

## 2024-03-15 LAB
LRP4 AUTOANTIBODY TEST: SIGNIFICANT CHANGE UP
MUSK IGG SER IA-MCNC: SIGNIFICANT CHANGE UP

## 2024-05-21 RX ORDER — CEFPODOXIME PROXETIL 100 MG
1 TABLET ORAL
Refills: 0 | DISCHARGE
Start: 2024-05-21

## 2024-05-22 ENCOUNTER — INPATIENT (INPATIENT)
Facility: HOSPITAL | Age: 89
LOS: 14 days | Discharge: NURSING, REHAB OR LONG-TERM CARE | DRG: 690 | End: 2024-06-06
Attending: HOSPITALIST | Admitting: HOSPITALIST
Payer: MEDICARE

## 2024-05-22 VITALS
WEIGHT: 100.09 LBS | DIASTOLIC BLOOD PRESSURE: 65 MMHG | OXYGEN SATURATION: 100 % | HEIGHT: 61 IN | HEART RATE: 64 BPM | SYSTOLIC BLOOD PRESSURE: 125 MMHG | RESPIRATION RATE: 20 BRPM

## 2024-05-22 DIAGNOSIS — N39.0 URINARY TRACT INFECTION, SITE NOT SPECIFIED: ICD-10-CM

## 2024-05-22 LAB
ADD ON TEST-SPECIMEN IN LAB: SIGNIFICANT CHANGE UP
ALBUMIN SERPL ELPH-MCNC: 3.6 G/DL — SIGNIFICANT CHANGE UP (ref 3.3–5)
ALP SERPL-CCNC: 236 U/L — HIGH (ref 40–120)
ALT FLD-CCNC: 83 U/L — HIGH (ref 10–45)
ANION GAP SERPL CALC-SCNC: 13 MMOL/L — SIGNIFICANT CHANGE UP (ref 5–17)
ANISOCYTOSIS BLD QL: SLIGHT — SIGNIFICANT CHANGE UP
APPEARANCE UR: ABNORMAL
APTT BLD: 46.8 SEC — HIGH (ref 24.5–35.6)
AST SERPL-CCNC: 77 U/L — HIGH (ref 10–40)
BACTERIA # UR AUTO: ABNORMAL /HPF
BASE EXCESS BLDV CALC-SCNC: -1.5 MMOL/L — SIGNIFICANT CHANGE UP (ref -2–3)
BASOPHILS # BLD AUTO: 0 K/UL — SIGNIFICANT CHANGE UP (ref 0–0.2)
BASOPHILS NFR BLD AUTO: 0 % — SIGNIFICANT CHANGE UP (ref 0–2)
BILIRUB SERPL-MCNC: 0.8 MG/DL — SIGNIFICANT CHANGE UP (ref 0.2–1.2)
BILIRUB UR-MCNC: NEGATIVE — SIGNIFICANT CHANGE UP
BUN SERPL-MCNC: 35 MG/DL — HIGH (ref 7–23)
CA-I SERPL-SCNC: 1.31 MMOL/L — SIGNIFICANT CHANGE UP (ref 1.15–1.33)
CALCIUM SERPL-MCNC: 10 MG/DL — SIGNIFICANT CHANGE UP (ref 8.4–10.5)
CAST: 0 /LPF — SIGNIFICANT CHANGE UP (ref 0–4)
CHLORIDE BLDV-SCNC: 107 MMOL/L — SIGNIFICANT CHANGE UP (ref 96–108)
CHLORIDE SERPL-SCNC: 106 MMOL/L — SIGNIFICANT CHANGE UP (ref 96–108)
CO2 BLDV-SCNC: 26 MMOL/L — SIGNIFICANT CHANGE UP (ref 22–26)
CO2 SERPL-SCNC: 21 MMOL/L — LOW (ref 22–31)
COLOR SPEC: SIGNIFICANT CHANGE UP
CREAT SERPL-MCNC: 1.08 MG/DL — SIGNIFICANT CHANGE UP (ref 0.5–1.3)
DACRYOCYTES BLD QL SMEAR: SLIGHT — SIGNIFICANT CHANGE UP
DIFF PNL FLD: ABNORMAL
EGFR: 49 ML/MIN/1.73M2 — LOW
EOSINOPHIL # BLD AUTO: 0.1 K/UL — SIGNIFICANT CHANGE UP (ref 0–0.5)
EOSINOPHIL NFR BLD AUTO: 0.9 % — SIGNIFICANT CHANGE UP (ref 0–6)
FLUAV AG NPH QL: SIGNIFICANT CHANGE UP
FLUBV AG NPH QL: SIGNIFICANT CHANGE UP
GAS PNL BLDV: 137 MMOL/L — SIGNIFICANT CHANGE UP (ref 136–145)
GAS PNL BLDV: SIGNIFICANT CHANGE UP
GAS PNL BLDV: SIGNIFICANT CHANGE UP
GLUCOSE BLDC GLUCOMTR-MCNC: 72 MG/DL — SIGNIFICANT CHANGE UP (ref 70–99)
GLUCOSE BLDV-MCNC: 75 MG/DL — SIGNIFICANT CHANGE UP (ref 70–99)
GLUCOSE SERPL-MCNC: 80 MG/DL — SIGNIFICANT CHANGE UP (ref 70–99)
GLUCOSE UR QL: NEGATIVE MG/DL — SIGNIFICANT CHANGE UP
HCO3 BLDV-SCNC: 25 MMOL/L — SIGNIFICANT CHANGE UP (ref 22–29)
HCT VFR BLD CALC: 37.6 % — SIGNIFICANT CHANGE UP (ref 34.5–45)
HCT VFR BLDA CALC: 38 % — SIGNIFICANT CHANGE UP (ref 34.5–46.5)
HGB BLD CALC-MCNC: 12.7 G/DL — SIGNIFICANT CHANGE UP (ref 11.7–16.1)
HGB BLD-MCNC: 12.2 G/DL — SIGNIFICANT CHANGE UP (ref 11.5–15.5)
INR BLD: 1.09 RATIO — SIGNIFICANT CHANGE UP (ref 0.85–1.18)
KETONES UR-MCNC: ABNORMAL MG/DL
LACTATE BLDV-MCNC: 1 MMOL/L — SIGNIFICANT CHANGE UP (ref 0.5–2)
LEUKOCYTE ESTERASE UR-ACNC: ABNORMAL
LYMPHOCYTES # BLD AUTO: 0.3 K/UL — LOW (ref 1–3.3)
LYMPHOCYTES # BLD AUTO: 2.7 % — LOW (ref 13–44)
MACROCYTES BLD QL: SLIGHT — SIGNIFICANT CHANGE UP
MANUAL SMEAR VERIFICATION: SIGNIFICANT CHANGE UP
MCHC RBC-ENTMCNC: 32.1 PG — SIGNIFICANT CHANGE UP (ref 27–34)
MCHC RBC-ENTMCNC: 32.4 GM/DL — SIGNIFICANT CHANGE UP (ref 32–36)
MCV RBC AUTO: 98.9 FL — SIGNIFICANT CHANGE UP (ref 80–100)
METAMYELOCYTES # FLD: 0.9 % — HIGH (ref 0–0)
MONOCYTES # BLD AUTO: 0.62 K/UL — SIGNIFICANT CHANGE UP (ref 0–0.9)
MONOCYTES NFR BLD AUTO: 5.5 % — SIGNIFICANT CHANGE UP (ref 2–14)
NEUTROPHILS # BLD AUTO: 10.1 K/UL — HIGH (ref 1.8–7.4)
NEUTROPHILS NFR BLD AUTO: 89.1 % — HIGH (ref 43–77)
NEUTS BAND # BLD: 0.9 % — SIGNIFICANT CHANGE UP (ref 0–8)
NITRITE UR-MCNC: POSITIVE
OVALOCYTES BLD QL SMEAR: SLIGHT — SIGNIFICANT CHANGE UP
PCO2 BLDV: 47 MMHG — HIGH (ref 39–42)
PH BLDV: 7.33 — SIGNIFICANT CHANGE UP (ref 7.32–7.43)
PH UR: 5.5 — SIGNIFICANT CHANGE UP (ref 5–8)
PLAT MORPH BLD: NORMAL — SIGNIFICANT CHANGE UP
PLATELET # BLD AUTO: 62 K/UL — LOW (ref 150–400)
PO2 BLDV: 70 MMHG — HIGH (ref 25–45)
POIKILOCYTOSIS BLD QL AUTO: SLIGHT — SIGNIFICANT CHANGE UP
POTASSIUM BLDV-SCNC: 5.3 MMOL/L — HIGH (ref 3.5–5.1)
POTASSIUM SERPL-MCNC: 5 MMOL/L — SIGNIFICANT CHANGE UP (ref 3.5–5.3)
POTASSIUM SERPL-SCNC: 5 MMOL/L — SIGNIFICANT CHANGE UP (ref 3.5–5.3)
PROT SERPL-MCNC: 6.9 G/DL — SIGNIFICANT CHANGE UP (ref 6–8.3)
PROT UR-MCNC: 100 MG/DL
PROTHROM AB SERPL-ACNC: 11.4 SEC — SIGNIFICANT CHANGE UP (ref 9.5–13)
RBC # BLD: 3.8 M/UL — SIGNIFICANT CHANGE UP (ref 3.8–5.2)
RBC # FLD: 16.4 % — HIGH (ref 10.3–14.5)
RBC BLD AUTO: ABNORMAL
RBC CASTS # UR COMP ASSIST: 8 /HPF — HIGH (ref 0–4)
RSV RNA NPH QL NAA+NON-PROBE: SIGNIFICANT CHANGE UP
SAO2 % BLDV: 94.6 % — HIGH (ref 67–88)
SARS-COV-2 RNA SPEC QL NAA+PROBE: SIGNIFICANT CHANGE UP
SODIUM SERPL-SCNC: 140 MMOL/L — SIGNIFICANT CHANGE UP (ref 135–145)
SP GR SPEC: 1.02 — SIGNIFICANT CHANGE UP (ref 1–1.03)
SQUAMOUS # UR AUTO: 2 /HPF — SIGNIFICANT CHANGE UP (ref 0–5)
UROBILINOGEN FLD QL: 1 MG/DL — SIGNIFICANT CHANGE UP (ref 0.2–1)
WBC # BLD: 11.22 K/UL — HIGH (ref 3.8–10.5)
WBC # FLD AUTO: 11.22 K/UL — HIGH (ref 3.8–10.5)
WBC UR QL: 42 /HPF — HIGH (ref 0–5)

## 2024-05-22 PROCEDURE — 70450 CT HEAD/BRAIN W/O DYE: CPT | Mod: 26,MC

## 2024-05-22 PROCEDURE — 71045 X-RAY EXAM CHEST 1 VIEW: CPT | Mod: 26

## 2024-05-22 PROCEDURE — 99285 EMERGENCY DEPT VISIT HI MDM: CPT

## 2024-05-22 RX ORDER — PIPERACILLIN AND TAZOBACTAM 4; .5 G/20ML; G/20ML
3.38 INJECTION, POWDER, LYOPHILIZED, FOR SOLUTION INTRAVENOUS EVERY 8 HOURS
Refills: 0 | Status: DISCONTINUED | OUTPATIENT
Start: 2024-05-22 | End: 2024-05-23

## 2024-05-22 RX ORDER — SENNA PLUS 8.6 MG/1
2 TABLET ORAL AT BEDTIME
Refills: 0 | Status: DISCONTINUED | OUTPATIENT
Start: 2024-05-22 | End: 2024-06-06

## 2024-05-22 RX ORDER — PIPERACILLIN AND TAZOBACTAM 4; .5 G/20ML; G/20ML
3.38 INJECTION, POWDER, LYOPHILIZED, FOR SOLUTION INTRAVENOUS ONCE
Refills: 0 | Status: COMPLETED | OUTPATIENT
Start: 2024-05-22 | End: 2024-05-22

## 2024-05-22 RX ORDER — CHOLECALCIFEROL (VITAMIN D3) 125 MCG
1 CAPSULE ORAL
Refills: 0 | DISCHARGE

## 2024-05-22 RX ORDER — DONEPEZIL HYDROCHLORIDE 10 MG/1
10 TABLET, FILM COATED ORAL AT BEDTIME
Refills: 0 | Status: DISCONTINUED | OUTPATIENT
Start: 2024-05-22 | End: 2024-06-06

## 2024-05-22 RX ORDER — VANCOMYCIN HCL 1 G
1000 VIAL (EA) INTRAVENOUS ONCE
Refills: 0 | Status: COMPLETED | OUTPATIENT
Start: 2024-05-22 | End: 2024-05-22

## 2024-05-22 RX ORDER — ACETAMINOPHEN 500 MG
2 TABLET ORAL
Refills: 0 | DISCHARGE

## 2024-05-22 RX ORDER — DONEPEZIL HYDROCHLORIDE 10 MG/1
1 TABLET, FILM COATED ORAL
Qty: 0 | Refills: 0 | DISCHARGE

## 2024-05-22 RX ORDER — SODIUM CHLORIDE 9 MG/ML
1000 INJECTION, SOLUTION INTRAVENOUS
Refills: 0 | Status: DISCONTINUED | OUTPATIENT
Start: 2024-05-22 | End: 2024-05-25

## 2024-05-22 RX ORDER — SODIUM CHLORIDE 9 MG/ML
1000 INJECTION, SOLUTION INTRAVENOUS ONCE
Refills: 0 | Status: COMPLETED | OUTPATIENT
Start: 2024-05-22 | End: 2024-05-22

## 2024-05-22 RX ORDER — MULTIVIT-MIN/FERROUS GLUCONATE 9 MG/15 ML
1 LIQUID (ML) ORAL
Refills: 0 | DISCHARGE

## 2024-05-22 RX ADMIN — SODIUM CHLORIDE 75 MILLILITER(S): 9 INJECTION, SOLUTION INTRAVENOUS at 22:34

## 2024-05-22 RX ADMIN — SODIUM CHLORIDE 1000 MILLILITER(S): 9 INJECTION, SOLUTION INTRAVENOUS at 10:53

## 2024-05-22 RX ADMIN — Medication 250 MILLIGRAM(S): at 10:53

## 2024-05-22 RX ADMIN — PIPERACILLIN AND TAZOBACTAM 25 GRAM(S): 4; .5 INJECTION, POWDER, LYOPHILIZED, FOR SOLUTION INTRAVENOUS at 23:31

## 2024-05-22 RX ADMIN — PIPERACILLIN AND TAZOBACTAM 200 GRAM(S): 4; .5 INJECTION, POWDER, LYOPHILIZED, FOR SOLUTION INTRAVENOUS at 10:53

## 2024-05-22 NOTE — ED ADULT NURSE NOTE - CHIEF COMPLAINT QUOTE
AMS baseline oriented x1. Dx UTI on antibiotic from yesterday. LOw 02 sat-88. On 6 liters/mask- now 100%

## 2024-05-22 NOTE — ED PROVIDER NOTE - CARE PLAN
Principal Discharge DX:	Metabolic encephalopathy   1 Principal Discharge DX:	UTI (urinary tract infection)  Secondary Diagnosis:	Other encephalopathy

## 2024-05-22 NOTE — PROVIDER CONTACT NOTE (OTHER) - ACTION/TREATMENT ORDERED:
CORRIE Stallings notified. Wound consult ordered, registered dietitian consult ordered, z-flow boots placed  on patient. Continue to turn and reposition patient q2 hours.

## 2024-05-22 NOTE — DISCHARGE NOTE PROVIDER - NSDCCAREPROVSEEN_GEN_ALL_CORE_FT
Name: Gisell Burgess  YOB: 1952  Gender: female  MRN: [id number]  Age: 71 y.o.    Chief Complaint: Neck pain  History of present illness:    This is a very pleasant 71 y.o. female who presents with acute history of neck pain and right scapular pain.  She also has numbness along the  medial aspect of her forearm on the right.  She states that this all started after Mother's Day.  She states the pain is 5/10 in severity and constant.  She just started PT.  She denies any issues with balance or issues with fine motor movements of her hand.  She does complain that her right hand  feels weaker than her left.      Medications:     Prior to Visit Medications    Medication Sig Taking? Authorizing Provider   gabapentin (NEURONTIN) 100 MG capsule Take 1-3 capsules by mouth nightly for 30 days. Yes Josh Araujo MD   methylPREDNISolone (MEDROL DOSEPACK) 4 MG tablet Take as directed  Posta, Alvarez CRUZ Jr., MD   FENOFIBRATE PO Take by mouth  Automatic Reconciliation, Ar   butalbital-APAP-caffeine -40 MG CAPS per capsule Take 1 tablet by mouth as needed  Automatic Reconciliation, Ar   vitamin D (CHOLECALCIFEROL) 25 MCG (1000 UT) TABS tablet Take 1,000 Units by mouth daily  Automatic Reconciliation, Ar   ezetimibe (ZETIA) 10 MG tablet Take 10 mg by mouth daily  Automatic Reconciliation, Ar   fenofibrate (TRICOR) 145 MG tablet Take 145 mg by mouth daily  Automatic Reconciliation, Ar   meloxicam (MOBIC) 15 MG tablet Take 15 mg by mouth daily  Automatic Reconciliation, Ar   ZOLMitriptan (ZOMIG) 2.5 MG tablet Take 2.5 mg by mouth as needed  Automatic Reconciliation, Ar       Allergies:     Allergies   Allergen Reactions    Midazolam Other (See Comments)     Hyper  Hyper  Hyper  Hyper    Sulfa Antibiotics Other (See Comments)    Codeine Nausea And Vomiting and Nausea Only        Physical Exam:     This is a well developed well nourished adult female in no acute distress.     Oriented to person,  [facility] Medicine, 5M Care Model Team A

## 2024-05-22 NOTE — CONSULT NOTE ADULT - SUBJECTIVE AND OBJECTIVE BOX
HPI:  pt was seen and examined and note to follow  (22 May 2024 14:16)    REVIEW OF SYSTEMS:    CONSTITUTIONAL: No weakness, fevers or chills, weight loss  EYES/ENT: No visual changes, no throat pain   RESPIRATORY: No cough, wheezing, hemoptysis; No shortness of breath  CARDIOVASCULAR: No chest pain or palpitations  GASTROINTESTINAL: No abdominal, nausea, vomiting, or hematemesis; No diarrhea or constipation. No melena or hematochezia.  GENITOURINARY: No dysuria, frequency or hematuria  NEUROLOGICAL: No dizziness, numbness, or weakness  SKIN: No itching, burning, rashes, or lesions   All other review of systems is negative unless indicated above.    VITAL SIGNS:        PHYSICAL EXAM:     GENERAL: no acute distress  HEENT: NC/AT, EOMI, neck supple, MMM  RESPIRATORY: LCTAB/L, no rhonchi, rales, or wheezing  CARDIOVASCULAR: RRR, no murmurs, gallops, rubs  ABDOMINAL: soft, non-tender, non-distended, positive bowel sounds   EXTREMITIES: no clubbing, cyanosis, or edema  NEUROLOGICAL: alert and oriented x 3, non-focal  LYMPHATIC: lymphatic: cervical, supraclavicular, axilla, inguinal  SKIN: no rashes or lesions   MUSCULOSKELETAL: no gross joint deformity                          12.2   11.22 )-----------( 62       ( 22 May 2024 10:58 )             37.6     05-22    140  |  106  |  35<H>  ----------------------------<  80  5.0   |  21<L>  |  1.08    Ca    10.0      22 May 2024 10:58    TPro  6.9  /  Alb  3.6  /  TBili  0.8  /  DBili  x   /  AST  77<H>  /  ALT  83<H>  /  AlkPhos  236<H>  05-22      MEDICATIONS  (STANDING):       HPI:  88 YO F with PMHx of Myasthenia Gravis (not on medication for past 9 years) and MHx of dementia and chronic low back pain. Patient recently hospitalized after being found with slurred speech and very confused. Most likely due to UTI which not responded to Cipro. Patient transferred to hospital due to seizure activity and was found with UTI.   She is well known to me for thrombocytopenia from the last admission.           Review of Systems:  Unable to obtain due to: Dementia      Allergies and Intolerances:        Allergies:  	sulfamethoxazole: Drug, Vomiting, Nausea, Sulfa drugs    Home Medications:   * Patient Currently Takes Medications as of 08-Mar-2024 17:18 documented in Structured Notes  · 	amLODIPine 5 mg oral tablet: 1 tab(s) orally once a day  · 	mirtazapine 7.5 mg oral tablet: 1 tab(s) orally once a day (at bedtime)  · 	senna oral tablet: 2 tab(s) orally once a day (at bedtime)  · 	acetaminophen 325 mg oral tablet: 2 tab(s) orally every 6 hours as needed  · 	cholecalciferol 1250 mcg (50,000 intl units) oral capsule: 1 cap(s) orally once a week Monday  · 	Multiple Vitamins oral tablet: 1 tab(s) orally once a day  · 	PreserVision AREDS 2 oral capsule: 2 cap(s) orally once a day  · 	Artificial Tears ophthalmic solution: 1 drop(s) in each eye 2 times a day as needed for  dry eyes  · 	Aricept 10 mg oral tablet: 1 tab(s) orally once a day (at bedtime)      Patient History:    Past Medical, Past Surgical, and Family History:  PAST MEDICAL HISTORY:  Dementia     Myasthenia gravis     Myasthenia gravis.     PAST SURGICAL HISTORY:  No significant past surgical history     No significant past surgical history.     FAMILY HISTORY:  No pertinent family history in first degree relatives  No pertinent family history in first degree relatives.     Social History:  · Substance use	No     Tobacco Screening:  · Core Measure Site	No                VITAL SIGNS:        PHYSICAL EXAM:     GENERAL: no acute distress  HEENT: NC/AT, EOMI, neck supple, MMM  RESPIRATORY: LCTAB/L, no rhonchi, rales, or wheezing  CARDIOVASCULAR: RRR, no murmurs, gallops, rubs  ABDOMINAL: soft, non-tender, non-distended, positive bowel sounds   EXTREMITIES: no clubbing, cyanosis, or edema; contracted  NEUROLOGICAL:  non-focal; seizure like activity  LYMPHATIC: lymphatic: cervical, supraclavicular, axilla, inguinal  SKIN: no rashes or lesions   MUSCULOSKELETAL: unable to evaluate                          12.2   11.22 )-----------( 62       ( 22 May 2024 10:58 )             37.6     05-22    140  |  106  |  35<H>  ----------------------------<  80  5.0   |  21<L>  |  1.08    Ca    10.0      22 May 2024 10:58    TPro  6.9  /  Alb  3.6  /  TBili  0.8  /  DBili  x   /  AST  77<H>  /  ALT  83<H>  /  AlkPhos  236<H>  05-22      MEDICATIONS  (STANDING):

## 2024-05-22 NOTE — H&P ADULT - NSICDXNOPASTSURGICALHX_GEN_ALL_CORE
Pt left message at 0853am today requesting to speak to nurse about testing. Attempted to call pt back and left message to call Dr. Leidy Winters office. <-- Click to add NO significant Past Surgical History

## 2024-05-22 NOTE — ED ADULT NURSE NOTE - BREATHING, MLM
Patient verified name and . Order for consent NOT found in EHR at time of PAT visit. Unable to verify case posting against order; surgery verified by patient. Type 1B surgery, phone assessment complete. Orders not received. Labs per surgeon: none ordered  Labs per anesthesia protocol: not indicated    Patient answered medical/surgical history questions at their best of ability. All prior to admission medications documented in EPIC. Patient instructed to take the following medications the day of surgery according to anesthesia guidelines with a small sip of water: baclofen if needed. On the day before surgery please take Acetaminophen 1000mg in the morning and then again before bed. You may substitute for Tylenol 650 mg. Hold all vitamins, supplements and herbals now for surgery and NSAIDS (ibuprofen, aleve) now for surgery. Prescription meds to hold: none    Patient instructed on the following:    > Arrive at [facility], time of arrival to be called the day before by 1700  > NPO after midnight, unless otherwise indicated, including gum, mints, and ice chips  > Responsible adult must drive patient to the hospital, stay during surgery, and patient will need supervision 24 hours after anesthesia  > Use antibacterial soap in shower the night before surgery and on the morning of surgery  > All piercings must be removed prior to arrival.    > Leave all valuables (money and jewelry) at home but bring insurance card and ID on DOS.   > You may be required to pay a deductible or co-pay on the day of your procedure. You can pre-pay by calling [phone] if your surgery is at the [facility] or [phone] if your surgery is at the [facility]. > Do not wear make-up, nail polish, lotions, cologne, perfumes, powders, or oil on skin. Artificial nails are not permitted.      Patient verbalized understandng
Preop department called to notify patient of arrival time for scheduled procedure. Instructions given to   - Arrive at [address] - Remain NPO after midnight, unless otherwise indicated, including gum, mints, and ice chips. - Have a responsible adult to drive patient to the hospital, stay during surgery, and patient will need supervision 24 hours after anesthesia. - Use antibacterial soap in shower the night before surgery and on the morning of surgery.        Was patient contacted: yes-pt called back confirming time  Voicemail left: yes on pts phone   Numbers contacted: [phone]   Arrival time: 0800
Spontaneous, unlabored and symmetrical

## 2024-05-22 NOTE — PROVIDER CONTACT NOTE (OTHER) - ACTION/TREATMENT ORDERED:
As per Luann REID, will continue to monitor with the ordered D5 and .45% NS 75 ml/hr. FSq6 ordered since pt NPO. A1C AM lab ordered. Plan of care ongoing

## 2024-05-22 NOTE — PROVIDER CONTACT NOTE (OTHER) - ASSESSMENT
Pt blood glucose 72 Pt blood glucose 72. Pt A/Ox0, not verbal (baseline). No S&S of pain/discomfort.

## 2024-05-22 NOTE — ED PROVIDER NOTE - NSICDXFAMHXNEG_GEN_ED
Patient ambulated self to lobby at this time, stating that she is leaving the ED and being driven to [facility] by private vehicle.      Kenneth Martines RN  09/04/22 [id number]
asthma

## 2024-05-22 NOTE — ED PROVIDER NOTE - CLINICAL SUMMARY MEDICAL DECISION MAKING FREE TEXT BOX
89-year-old female with past medical history of dementia, myasthenia gravis (not on medications for the past 9 years) who presents to the ED for evaluation of altered mental status from nursing home.  As per report, the patient has been confused over the past several days.  As per daughter, the patient was diagnosed with a UTI yesterday and received 1 dose of cefpodoxime last night.,  The patient had an hypoxia at 88% and was transferred to the ED for further evaluation.  Limited HPI due to baseline dementia.    On arrival to the ED, the patient is ANO x 0.  She is normotensive, nontachycardic, afebrile, and satting % on room air.  Poor respiratory effort.  Abdomen soft mildly distended.  In the setting of altered mental status and possible infection will initiate septic workup.

## 2024-05-22 NOTE — PATIENT PROFILE ADULT - FALL HARM RISK - HARM RISK INTERVENTIONS

## 2024-05-22 NOTE — PATIENT PROFILE ADULT - FALL HARM RISK - FACTORS NURSING JUDGEMENT
The patient is Watcher - Medium risk of patient condition declining or worsening    Shift Goals  Clinical Goals: Infant will remain stable on LFNC and tlerate increasing feeds  Patient Goals: N/A  Family Goals: POB will remain updated on POC    Progress made toward(s) clinical / shift goals:      Problem: Oxygenation / Respiratory Function  Goal: Patient will achieve/maintain optimum respiratory ventilation/gas exchange  Outcome: Progressing  Note: Infant with consistent oxygen desaturations to low 80s% after 1930 care time & intermittent tachypnea. Infant repositioned and oral & nasal suctioning performed. Infant continued to consistently desaturate. Infant LFNC increased from 20cc to 40cc. Following this, infant with occasional oxygen desaturations but greatly improved from prior in duration and depth.     Problem: Nutrition / Feeding  Goal: Patient will tolerate transition to enteral feedings  Outcome: Progressing  Note: Infant tolerating increase from 36mL to 40mL of Enfamil Term Q3 this shift. Infant showing hunger cues x1 this shift; nippled 22mL. All other feedings gavaged. Infant with stable abdominal girths, + stool, and no emesis this shift.      Yes

## 2024-05-22 NOTE — CONSULT NOTE ADULT - ASSESSMENT
88 YO F with PMHx of Myasthenia Gravis (not on medication for past 9 years) and MHx of dementia and chronic low back pain. Patient recently hospitalized after being found with slurred speech and very confused. Most likely due to UTI which not responded to Cipro. Patient transferred to hospital due to seizure activity and was found with UTI.

## 2024-05-22 NOTE — ED PROVIDER NOTE - ATTENDING CONTRIBUTION TO CARE
I was the supervising attending. I have independently seen face-to-face and examined the patient. I have reviewed the history and physical and discussed the MDM with the resident, fellow, RY and/or student. I agree with the assessment and plan as presented unless otherwise documented as follows:    89F hx dementia, myasthenia gravis, prior UTI, presenting from NH for AMS and c/f seizure. This AM, patient had witnessed episodes of upper body/arm shaking, eyes not focusing, brief O2 desaturation. Was given IM Ativan. Was additionally started on cefpodoxime yesterday due to concern for UTI. Of note, patient had admission here in 2-3/2024 for toxic/metabolic encephalopathy and UTI sepsis, had EEG w/ no epileptiform discharges, CTH with mild ventriculomegaly/possible NPH. AOx0 here, no acute distress, withdrawing to pain. Mildly hypothermic here, otherwise hemodynamically stable. Abdomen soft/non-tender, clear lungs B/L although with poor effort. DDx metabolic encephalopathy 2/2 recurrent UTI vs. electrolyte abnormalities vs. ICH vs. NPH vs. questionable new seizure. Will obtain sepsis workup, CTH. -Doris Delgadillo MD (Attending)

## 2024-05-22 NOTE — H&P ADULT - HISTORY OF PRESENT ILLNESS
pt was seen and examined and note to follow  89 YO F with PMHx of Myasthenia Gravis (not on medication for past 9 years) and MHx of dementia and chronic low back pain. Patient recently hospitalized after being found with slurred speech and very confused. Most likely due to UTI which not responded to Cipro. Patient transferred to hospital due to seizure activity and was found with UTI.

## 2024-05-22 NOTE — H&P ADULT - ASSESSMENT
UTI   Siezure activity ?? 89 YO F with PMHx of Myasthenia Gravis (not on medication for past 9 years) and MHx of dementia and chronic low back pain. Patient recently hospitalized after being found with slurred speech and very confused. Most likely due to UTI which not responded to Cipro. Patient transferred to hospital due to seizure activity and was found with UTI. She was given 1 mg IM.         Sepsis - with hypothermia, leukocytosis, continue IV abx.   AMS - with seizure activity, improved, continue IV Zosyn, f/u panculture,   UTI - as  above   Liver enzyme elevation- improving and trending down. RUQ ultrasound negative. Hepatitis panel negative.  weight loss- due to recent hospitalization and severe sickness on hospital. .Appetite improving Encourage PO intake and supplement diet. Monitor weight. F/u dietitian. Family is aware and refused peg tube.  Vitamin D deficiency- Vitamin D 50,000 weekly. Monitor Vitamin D.  Hypoglycemia- resolved. Appetite improved.  cholelithiasis - without cholecystitis neither pancreatitis. Normal amylase and lipase.  hypotension- continue IV steroid and IV fluid as needed  thrombocytopenia- PLT down, monitor PLT, f/u with Hematology.   NPH- - neurology does not believe acute mental status changes, could be due to NPH. F/u neurology as outpatient for NPH workup.  DVT ppx- Lovenox discontinued due to thrombocytopenia  leg arthritis- Acetaminophen PRN.  b/l cataract- F/u ophthalmology  vitamin D deficiency- continue Vitamin D supplement.  constipation- senna at bedtime.  LBP 2/2 severe spinal stenosis with out cord pressure - no surgical intervention recommended neither accepted by family, continue PT OT as needed.  Dementia - Stable. Continue Aricept .  Dysphagia - Mechanical soft diet, aspiration precautions.  Myasthenia Gravis - stable , off of medication. Monitor CBC and BMP every 2 months  VT ppx - on SCD, no blood thinner due to low PLT.

## 2024-05-22 NOTE — ED ADULT NURSE NOTE - OBJECTIVE STATEMENT
Patient BIBEMS from NH c/o hypoxia, UTI, and seizure this morning. Patient is being treated with vancomycin for UTI. As per EMS, this morning she had a seizure and was given ativan and then NH staff noticed she was saturating in 80s. Patient arrived on NRB on 6L. Patient saturating well on arrival, breathing spontaneous and unlabored. Patient confused and not speaking, which EMS states is worse than baseline. Patient A&Ox0. Patient placed on cardiac monitor with continuous pulse ox. Patient hypothermic to 92F, placed on bear hugger. Plan of care in progress.

## 2024-05-22 NOTE — ED PROVIDER NOTE - IV ALTEPLASE EXCL ABS HIDDEN
R3 ANTEPARTUM PROGRESS NOTE    HD#5    SUBJECTIVE:  Patient seen and examined at bedside, no acute overnight events.  Pt reports soreness in her right outer thigh, thinks she needs to stretch or walk and it will go away.    She otherwise is w/o complaints and denies CP, SOB, N/V, fevers, chills, HA, VB, LOF, CTX.  She reports good FM.  NST yesterday was reactive.    OBJECTIVE:  Vital Signs Last 24 Hours  T(C): 37.2 (04-13-20 @ 06:24), Max: 38.4 (04-12-20 @ 14:22)  HR: 123 (04-13-20 @ 07:10) (92 - 244)  BP: 107/66 (04-13-20 @ 06:24) (106/58 - 121/79)  RR: 20 (04-13-20 @ 06:24) (20 - 24)  SpO2: 92% (04-13-20 @ 07:10) (86% - 100%)    Physical Exam:  General: NAD  Abdomen: Soft, non-tender, gravid  Ext: nontender, wwp    Labs:                        12.5   7.58  )-----------( 112      ( 11 Apr 2020 06:40 )             36.6   baso 0.5    eos 0.1    imm gran 5.8    lymph 16.2   mono 3.8    poly 73.6                         12.2   7.37  )-----------( 112      ( 10 Apr 2020 10:08 )             35.9   baso 0.8    eos 0.4    imm gran 5.8    lymph 14.4   mono 8.3    poly 70.3       Blood Type: O Positive      MEDICATIONS  (STANDING):  azithromycin   Tablet 500 milliGRAM(s) Oral daily  cefTRIAXone   IVPB 1000 milliGRAM(s) IV Intermittent every 24 hours  folic acid 1 milliGRAM(s) Oral daily  heparin  Injectable 5000 Unit(s) SubCutaneous every 12 hours  prenatal multivitamin 1 Tablet(s) Oral daily  progesterone Vaginal Insert 200 milliGRAM(s) Vaginal daily  senna 2 Tablet(s) Oral at bedtime    MEDICATIONS  (PRN):  acetaminophen   Tablet .. 975 milliGRAM(s) Oral every 6 hours PRN Temp greater or equal to 38C (100.4F) show

## 2024-05-22 NOTE — H&P ADULT - NSHPPHYSICALEXAM_GEN_ALL_CORE
PHYSICAL EXAM    Constitutional: NAD, well-groomed, well-developed  HEENT: PERRLA, EOMI, Normal Hearing, MMM  Neck: No LAD, No JVD  Back: Normal spine flexure, No CVA tenderness  Respiratory: CTAB/L   Cardiovascular: S1 and S2, RRR, no M/G/R  Gastrointestinal: BS+, soft, NT/ND  Extremities: No peripheral edema  Vascular: 2+ peripheral pulses  Neurological: A/O x 0, no focal deficits  Skin: No rashes

## 2024-05-22 NOTE — ED PROVIDER NOTE - PHYSICAL EXAMINATION
Constitutional: Elderly, obese, in no acute distress.   Head: Normocephalic, atraumatic.   Eyes: PERRL. EOMI. No conjunctival pallor.   ENT: Dry mucous membranes. Uvula midline. No pharyngeal erythema or exudates.  Neck: No LAD. Supple.  CVS: Regular rate, regular rhythm. Normal S1, S2. No murmurs, rubs, or gallops. No peripheral edema noted.   Respiratory: No respiratory distress. Clear to auscultation bilaterally. No wheezing, rales, or rhonchi.   Abdomen: Abd is soft and mildly distended. No tenderness. No rebound, guarding, or rigidity.   MSK: No CVA tenderness bilaterally.   Neuro: Alert and oriented x 0. Follows commands. Moves all extremities.   Skin: Warm and dry. No rashes.

## 2024-05-22 NOTE — PROVIDER CONTACT NOTE (OTHER) - ASSESSMENT
Patient A&OxO, on room air. No nonverbal indicators of pain/ discomfort. Patient SR 70s on tele, HR 80, spO2 94 % on room air, temp 98.1

## 2024-05-23 DIAGNOSIS — N39.0 URINARY TRACT INFECTION, SITE NOT SPECIFIED: ICD-10-CM

## 2024-05-23 DIAGNOSIS — F03.90 UNSPECIFIED DEMENTIA, UNSPECIFIED SEVERITY, WITHOUT BEHAVIORAL DISTURBANCE, PSYCHOTIC DISTURBANCE, MOOD DISTURBANCE, AND ANXIETY: ICD-10-CM

## 2024-05-23 DIAGNOSIS — G70.00 MYASTHENIA GRAVIS WITHOUT (ACUTE) EXACERBATION: ICD-10-CM

## 2024-05-23 DIAGNOSIS — D69.6 THROMBOCYTOPENIA, UNSPECIFIED: ICD-10-CM

## 2024-05-23 LAB
-  CTX-M RESISTANCE MARKER: SIGNIFICANT CHANGE UP
-  ESBL: SIGNIFICANT CHANGE UP
A1C WITH ESTIMATED AVERAGE GLUCOSE RESULT: 4.2 % — SIGNIFICANT CHANGE UP (ref 4–5.6)
A1C WITH ESTIMATED AVERAGE GLUCOSE RESULT: 4.4 % — SIGNIFICANT CHANGE UP (ref 4–5.6)
ALBUMIN SERPL ELPH-MCNC: 3.2 G/DL — LOW (ref 3.3–5)
ALP SERPL-CCNC: 215 U/L — HIGH (ref 40–120)
ALT FLD-CCNC: 65 U/L — HIGH (ref 10–45)
ANION GAP SERPL CALC-SCNC: 12 MMOL/L — SIGNIFICANT CHANGE UP (ref 5–17)
AST SERPL-CCNC: 46 U/L — HIGH (ref 10–40)
BILIRUB SERPL-MCNC: 1.1 MG/DL — SIGNIFICANT CHANGE UP (ref 0.2–1.2)
BUN SERPL-MCNC: 31 MG/DL — HIGH (ref 7–23)
CALCIUM SERPL-MCNC: 9.4 MG/DL — SIGNIFICANT CHANGE UP (ref 8.4–10.5)
CHLORIDE SERPL-SCNC: 105 MMOL/L — SIGNIFICANT CHANGE UP (ref 96–108)
CO2 SERPL-SCNC: 24 MMOL/L — SIGNIFICANT CHANGE UP (ref 22–31)
CREAT SERPL-MCNC: 1.14 MG/DL — SIGNIFICANT CHANGE UP (ref 0.5–1.3)
D DIMER BLD IA.RAPID-MCNC: 201 NG/ML DDU — SIGNIFICANT CHANGE UP
E COLI DNA BLD POS QL NAA+NON-PROBE: SIGNIFICANT CHANGE UP
EGFR: 46 ML/MIN/1.73M2 — LOW
ESTIMATED AVERAGE GLUCOSE: 74 MG/DL — SIGNIFICANT CHANGE UP (ref 68–114)
ESTIMATED AVERAGE GLUCOSE: 80 MG/DL — SIGNIFICANT CHANGE UP (ref 68–114)
GLUCOSE BLDC GLUCOMTR-MCNC: 104 MG/DL — HIGH (ref 70–99)
GLUCOSE BLDC GLUCOMTR-MCNC: 109 MG/DL — HIGH (ref 70–99)
GLUCOSE BLDC GLUCOMTR-MCNC: 91 MG/DL — SIGNIFICANT CHANGE UP (ref 70–99)
GLUCOSE BLDC GLUCOMTR-MCNC: 97 MG/DL — SIGNIFICANT CHANGE UP (ref 70–99)
GLUCOSE SERPL-MCNC: 88 MG/DL — SIGNIFICANT CHANGE UP (ref 70–99)
GRAM STN FLD: ABNORMAL
GRAM STN FLD: ABNORMAL
HCT VFR BLD CALC: 34.4 % — LOW (ref 34.5–45)
HEPARIN-PF4 AB RESULT: <0.6 U/ML — SIGNIFICANT CHANGE UP (ref 0–0.9)
HGB BLD-MCNC: 11.1 G/DL — LOW (ref 11.5–15.5)
MCHC RBC-ENTMCNC: 31.5 PG — SIGNIFICANT CHANGE UP (ref 27–34)
MCHC RBC-ENTMCNC: 32.3 GM/DL — SIGNIFICANT CHANGE UP (ref 32–36)
MCV RBC AUTO: 97.7 FL — SIGNIFICANT CHANGE UP (ref 80–100)
METHOD TYPE: SIGNIFICANT CHANGE UP
NRBC # BLD: 0 /100 WBCS — SIGNIFICANT CHANGE UP (ref 0–0)
PF4 HEPARIN CMPLX AB SER-ACNC: NEGATIVE — SIGNIFICANT CHANGE UP
PLATELET # BLD AUTO: 62 K/UL — LOW (ref 150–400)
POTASSIUM SERPL-MCNC: 4.1 MMOL/L — SIGNIFICANT CHANGE UP (ref 3.5–5.3)
POTASSIUM SERPL-SCNC: 4.1 MMOL/L — SIGNIFICANT CHANGE UP (ref 3.5–5.3)
PROT SERPL-MCNC: 6.2 G/DL — SIGNIFICANT CHANGE UP (ref 6–8.3)
RBC # BLD: 3.52 M/UL — LOW (ref 3.8–5.2)
RBC # FLD: 16.8 % — HIGH (ref 10.3–14.5)
SODIUM SERPL-SCNC: 141 MMOL/L — SIGNIFICANT CHANGE UP (ref 135–145)
SPECIMEN SOURCE: SIGNIFICANT CHANGE UP
SPECIMEN SOURCE: SIGNIFICANT CHANGE UP
TSH SERPL-MCNC: 2.21 UIU/ML — SIGNIFICANT CHANGE UP (ref 0.27–4.2)
WBC # BLD: 9.43 K/UL — SIGNIFICANT CHANGE UP (ref 3.8–10.5)
WBC # FLD AUTO: 9.43 K/UL — SIGNIFICANT CHANGE UP (ref 3.8–10.5)

## 2024-05-23 PROCEDURE — 99223 1ST HOSP IP/OBS HIGH 75: CPT

## 2024-05-23 RX ORDER — ERTAPENEM SODIUM 1 G/1
INJECTION, POWDER, LYOPHILIZED, FOR SOLUTION INTRAMUSCULAR; INTRAVENOUS
Refills: 0 | Status: DISCONTINUED | OUTPATIENT
Start: 2024-05-23 | End: 2024-05-23

## 2024-05-23 RX ORDER — CHLORHEXIDINE GLUCONATE 213 G/1000ML
1 SOLUTION TOPICAL DAILY
Refills: 0 | Status: DISCONTINUED | OUTPATIENT
Start: 2024-05-23 | End: 2024-06-06

## 2024-05-23 RX ORDER — MEROPENEM 1 G/30ML
500 INJECTION INTRAVENOUS EVERY 12 HOURS
Refills: 0 | Status: DISCONTINUED | OUTPATIENT
Start: 2024-05-24 | End: 2024-05-24

## 2024-05-23 RX ORDER — ERTAPENEM SODIUM 1 G/1
1000 INJECTION, POWDER, LYOPHILIZED, FOR SOLUTION INTRAMUSCULAR; INTRAVENOUS ONCE
Refills: 0 | Status: COMPLETED | OUTPATIENT
Start: 2024-05-23 | End: 2024-05-23

## 2024-05-23 RX ADMIN — SODIUM CHLORIDE 100 MILLILITER(S): 9 INJECTION, SOLUTION INTRAVENOUS at 10:42

## 2024-05-23 RX ADMIN — SODIUM CHLORIDE 100 MILLILITER(S): 9 INJECTION, SOLUTION INTRAVENOUS at 10:36

## 2024-05-23 RX ADMIN — ERTAPENEM SODIUM 120 MILLIGRAM(S): 1 INJECTION, POWDER, LYOPHILIZED, FOR SOLUTION INTRAMUSCULAR; INTRAVENOUS at 05:59

## 2024-05-23 NOTE — DIETITIAN INITIAL EVALUATION ADULT - PERTINENT MEDS FT
MEDICATIONS  (STANDING):  dextrose 5% + sodium chloride 0.45%. 1000 milliLiter(s) (100 mL/Hr) IV Continuous <Continuous>  donepezil 10 milliGRAM(s) Oral at bedtime  ertapenem  IVPB      multivitamin 1 Tablet(s) Oral daily  senna 2 Tablet(s) Oral at bedtime    MEDICATIONS  (PRN):

## 2024-05-23 NOTE — PROGRESS NOTE ADULT - SUBJECTIVE AND OBJECTIVE BOX
Patient is a 89y old  Female who presents with a chief complaint of Seizure activity (23 May 2024 16:39)      INTERVAL HPI/OVERNIGHT EVENTS: still drowsy, B?C positive, ESBL, changed iv abx to Ertapenem.     Pain Location & Control: ok     MEDICATIONS  (STANDING):  chlorhexidine 2% Cloths 1 Application(s) Topical daily  dextrose 5% + sodium chloride 0.45%. 1000 milliLiter(s) (100 mL/Hr) IV Continuous <Continuous>  donepezil 10 milliGRAM(s) Oral at bedtime  multivitamin 1 Tablet(s) Oral daily  senna 2 Tablet(s) Oral at bedtime    MEDICATIONS  (PRN):      Allergies    sulfamethoxazole (Vomiting; Nausea)    Intolerances        REVIEW OF SYSTEMS:  UTO due to dementia     Vital Signs Last 24 Hrs  T(C): 36.6 (23 May 2024 20:51), Max: 36.8 (23 May 2024 04:40)  T(F): 97.8 (23 May 2024 20:51), Max: 98.2 (23 May 2024 04:40)  HR: 68 (23 May 2024 20:51) (56 - 81)  BP: 150/79 (23 May 2024 20:51) (98/54 - 150/79)  BP(mean): --  RR: 18 (23 May 2024 20:51) (18 - 18)  SpO2: 95% (23 May 2024 20:51) (93% - 95%)    Parameters below as of 23 May 2024 20:51  Patient On (Oxygen Delivery Method): room air        PHYSICAL EXAM:  GENERAL: NAD, well-groomed, well-developed  HEAD:  Atraumatic, Normocephalic  EYES: EOMI, PERRLA, conjunctiva and sclera clear  ENMT: No tonsillar erythema, exudates, or enlargement; Moist mucous membranes, Good dentition, No lesions  NECK: Supple, No JVD, Normal thyroid  NERVOUS SYSTEM:  Alert & Oriented X1,  CHEST/LUNG: Clear to auscultation bilaterally; No rales, rhonchi, wheezing, or rubs  HEART: Regular rate and rhythm; No murmurs, rubs, or gallops  ABDOMEN: Soft, Nontender, Nondistended; Bowel sounds present  EXTREMITIES:  2+ Peripheral Pulses, No clubbing or cyanosis  LYMPH: No lymphadenopathy noted  SKIN: No rashes or lesions  I  LABS:                        11.1   9.43  )-----------( 62       ( 23 May 2024 06:38 )             34.4     23 May 2024 06:38    141    |  105    |  31     ----------------------------<  88     4.1     |  24     |  1.14     Ca    9.4        23 May 2024 06:38    TPro  6.2    /  Alb  3.2    /  TBili  1.1    /  DBili  x      /  AST  46     /  ALT  65     /  AlkPhos  215    23 May 2024 06:38    PT/INR - ( 22 May 2024 10:58 )   PT: 11.4 sec;   INR: 1.09 ratio         PTT - ( 22 May 2024 10:58 )  PTT:46.8 sec  Urinalysis Basic - ( 23 May 2024 06:38 )    Color: x / Appearance: x / SG: x / pH: x  Gluc: 88 mg/dL / Ketone: x  / Bili: x / Urobili: x   Blood: x / Protein: x / Nitrite: x   Leuk Esterase: x / RBC: x / WBC x   Sq Epi: x / Non Sq Epi: x / Bacteria: x      CAPILLARY BLOOD GLUCOSE      POCT Blood Glucose.: 104 mg/dL (23 May 2024 17:07)  POCT Blood Glucose.: 109 mg/dL (23 May 2024 11:33)  POCT Blood Glucose.: 91 mg/dL (23 May 2024 07:27)  POCT Blood Glucose.: 97 mg/dL (23 May 2024 03:03)        Cultures  Culture Results:   10,000 - 49,000 CFU/mL Escherichia coli (05-22-24 @ 13:08)  Culture Results:   Growth in anaerobic bottle: Escherichia coli  Direct identification is available within approximately 3-5  hours either by Blood Panel Multiplexed PCR or Direct  MALDI-TOF. Details: [email or web address] (05-22-24 @ 10:40)  Culture Results:   Growth in anaerobic bottle: Gram Negative Rods (05-22-24 @ 10:30)      RADIOLOGY & ADDITIONAL TESTS:    Imaging Personally Reviewed:  [ ] YES  [ ] NO    Consultant(s) Notes Reviewed:  [ ] YES  [ ] NO    Care Discussed with Consultants/Other Providers [ ] YES  [ ] NO

## 2024-05-23 NOTE — DIETITIAN INITIAL EVALUATION ADULT - PERTINENT LABORATORY DATA
05-23    141  |  105  |  31<H>  ----------------------------<  88  4.1   |  24  |  1.14    Ca    9.4      23 May 2024 06:38    TPro  6.2  /  Alb  3.2<L>  /  TBili  1.1  /  DBili  x   /  AST  46<H>  /  ALT  65<H>  /  AlkPhos  215<H>  05-23  POCT Blood Glucose.: 109 mg/dL (05-23-24 @ 11:33)  A1C with Estimated Average Glucose Result: 4.4 % (05-23-24 @ 06:38)  A1C with Estimated Average Glucose Result: 4.2 % (05-22-24 @ 10:57)

## 2024-05-23 NOTE — DIETITIAN INITIAL EVALUATION ADULT - REASON FOR ADMISSION
Per chart, Pt is a 89 y/o F with PMH: "Myasthenia Gravis (not on medication for past 9 years) and MHx of dementia and chronic low back pain. Patient recently hospitalized after being found with slurred speech and very confused. Most likely due to UTI which not responded to Cipro. Patient transferred to hospital due to seizure activity and was found with UTI." On IV Abx.

## 2024-05-23 NOTE — DIETITIAN INITIAL EVALUATION ADULT - ORAL INTAKE PTA/DIET HISTORY
Chart reviewed, events noted. Pt unable to participate with interview 2/2 dementia. Per previous RD note from 2/2024 admission, Pt was on a puree, moderately thickened liquid diet with poor PO and family was not interested in a PEG. Chart reviewed, events noted. Pt unable to participate with interview 2/2 dementia. Per previous RD note from 2/2024 admission, Pt was on a puree, moderately thickened liquid diet with poor PO and family was not interested in a PEG. Per SNF paperwork, Pt was ordered for a mechanical soft diet (allowing lettuce and tomato) with a power pudding 1x/day and confirms poor PO intake/FTT.

## 2024-05-23 NOTE — PROGRESS NOTE ADULT - ASSESSMENT
88 YO F with PMHx of Myasthenia Gravis (not on medication for past 9 years) and MHx of dementia and chronic low back pain. Patient recently hospitalized after being found with slurred speech and very confused. Most likely due to UTI which not responded to Cipro. Patient transferred to hospital due to seizure activity and was found with UTI. She was given 1 mg IM.         Sepsis with bacteremia  - with hypothermia, leukocytosis, continue IV abx.   Bacteremia - with ESBL, on Ertapenem. f/u with ID.   AMS - with seizure activity, improved, continue IV Zosyn, f/u panculture,   UTI - as  above   Liver enzyme elevation- improving and trending down. RUQ ultrasound negative. Hepatitis panel negative.  weight loss- due to recent hospitalization and severe sickness on hospital. .Appetite improving Encourage PO intake and supplement diet. Monitor weight. F/u dietitian. Family is aware and refused peg tube.  Vitamin D deficiency- Vitamin D 50,000 weekly. Monitor Vitamin D.  Hypoglycemia- resolved. Appetite improved.  cholelithiasis - without cholecystitis neither pancreatitis. Normal amylase and lipase.  hypotension- continue IV steroid and IV fluid as needed  thrombocytopenia- PLT down, monitor PLT, f/u with Hematology.   NPH- - neurology does not believe acute mental status changes, could be due to NPH. F/u neurology as outpatient for NPH workup.  DVT ppx- Lovenox discontinued due to thrombocytopenia  leg arthritis- Acetaminophen PRN.  b/l cataract- F/u ophthalmology  vitamin D deficiency- continue Vitamin D supplement.  constipation- senna at bedtime.  LBP 2/2 severe spinal stenosis with out cord pressure - no surgical intervention recommended neither accepted by family, continue PT OT as needed.  Dementia - Stable. Continue Aricept .  Dysphagia - Mechanical soft diet, aspiration precautions.  Myasthenia Gravis - stable , off of medication. Monitor CBC and BMP every 2 months  VT ppx - on SCD, no blood thinner due to low PLT.

## 2024-05-23 NOTE — CONSULT NOTE ADULT - SUBJECTIVE AND OBJECTIVE BOX
Chief Complaint:  Patient is a 89y old  Female who presents with a chief complaint of Seizure activity (23 May 2024 11:07)    Myasthenia gravis  Dementia    No significant past surgical history         HPI:  89yo F h/o Myasthenia Gravis (not on medication for past 9 years), Dementia and Chronic low back pain, recently hospitalized for UTI and now p/w sepsis related to recurrent UTI. GI Consulted for increased LFTs. The patient is nonverbal and unable to participate in HPI, chart reviewed and discussed events with covering teams. The patient is resting comfortably in bed, does not appear to be in any distress.        (22 May 2024 14:16)      sulfamethoxazole (Vomiting; Nausea)      dextrose 5% + sodium chloride 0.45%. 1000 milliLiter(s) IV Continuous <Continuous>  donepezil 10 milliGRAM(s) Oral at bedtime  ertapenem  IVPB      multivitamin 1 Tablet(s) Oral daily  senna 2 Tablet(s) Oral at bedtime        FAMILY HISTORY:  No pertinent family history in first degree relatives    No pertinent family history in first degree relatives          Review of Systems:    General:  No wt loss, fevers, chills, night sweats, fatigue  Eyes:  Good vision, no reported pain  ENT:  No sore throat, pain, runny nose, dysphagia  CV:  No pain, palpitations, no lightheadedness  Resp:  No dyspnea, cough, tachypnea, wheezing  GI: .  :  No pain, bleeding, incontinence, nocturia  Muscle:  No pain, weakness  Neuro:  No weakness, tingling, memory problems  Psych:  No fatigue, insomnia, mood problems, depression  Endocrine:  No polyuria, polydypsia, cold/heat intolerance  Heme:  No petechiae, ecchymosis, easy bruisability  Skin:  No rash, tattoos, scars, edema    Relevant Family History:   n/c    Relevant Social History: n/c      Physical Exam:    Vital Signs:  Vital Signs Last 24 Hrs  T(C): 36.7 (23 May 2024 11:54), Max: 36.8 (23 May 2024 04:40)  T(F): 98 (23 May 2024 11:54), Max: 98.2 (23 May 2024 04:40)  HR: 66 (23 May 2024 11:54) (66 - 99)  BP: 126/83 (23 May 2024 11:54) (96/57 - 126/83)  BP(mean): 97 (22 May 2024 16:13) (72 - 97)  RR: 18 (23 May 2024 11:54) (17 - 19)  SpO2: 93% (23 May 2024 11:54) (93% - 98%)    Parameters below as of 23 May 2024 11:54  Patient On (Oxygen Delivery Method): room air      Daily     Daily     General:  Appears stated age, well-groomed, nad  HEENT:  NC/AT,  conjunctivae clear and pink, no thyromegaly, nodules, adenopathy, no JVD  Chest:  Full & symmetric excursion, no increased effort, breath sounds clear  Cardiovascular:  Regular rhythm, S1, S2, no murmur/rub/S3/S4, no abdominal bruit, no edema  Abdomen:  Soft, non-tender, non-distended, normoactive bowel sounds,  no masses ,no hepatosplenomeagaly, no signs of chronic liver disease  Extremities:  no cyanosis,clubbing or edema  Skin:  No rash/erythema/ecchymoses/petechiae/wounds/abscess/warm/dry  Neuro/Psych:  A&O  , no asterixis, no tremor, no encephalopathy    Laboratory:                            11.1   9.43  )-----------( 62       ( 23 May 2024 06:38 )             34.4     05-23    141  |  105  |  31<H>  ----------------------------<  88  4.1   |  24  |  1.14    Ca    9.4      23 May 2024 06:38    TPro  6.2  /  Alb  3.2<L>  /  TBili  1.1  /  DBili  x   /  AST  46<H>  /  ALT  65<H>  /  AlkPhos  215<H>  05-23    LIVER FUNCTIONS - ( 23 May 2024 06:38 )  Alb: 3.2 g/dL / Pro: 6.2 g/dL / ALK PHOS: 215 U/L / ALT: 65 U/L / AST: 46 U/L / GGT: x           PT/INR - ( 22 May 2024 10:58 )   PT: 11.4 sec;   INR: 1.09 ratio         PTT - ( 22 May 2024 10:58 )  PTT:46.8 sec  Urinalysis Basic - ( 23 May 2024 06:38 )    Color: x / Appearance: x / SG: x / pH: x  Gluc: 88 mg/dL / Ketone: x  / Bili: x / Urobili: x   Blood: x / Protein: x / Nitrite: x   Leuk Esterase: x / RBC: x / WBC x   Sq Epi: x / Non Sq Epi: x / Bacteria: x        Imaging:

## 2024-05-23 NOTE — DIETITIAN INITIAL EVALUATION ADULT - ADD RECOMMEND
1) defer to SLP recs for least restrictive diet/liquid consistency; no therapeutic recommendation for diet  2) continue IVF per MD team until diet able to be initiated  3) continue MVI, recommend Vit C for additional wound healing  4)   5)  1) defer to SLP recs for least restrictive diet/liquid consistency; no therapeutic recommendation for diet  2) continue IVF per MD team until diet able to be initiated  3) continue MVI, recommend Vit C for additional wound healing  4) Pt at risk for Malnutrition - RD to obtain accurate wt hx and nutrition-focused physical examination as able to assess   5) RD to make oral nutrition supplement recommendations once Pt on diet  6) Monitor PO intake, weight, labs, skin, GI status, diet

## 2024-05-23 NOTE — DIETITIAN INITIAL EVALUATION ADULT - REASON INDICATOR FOR ASSESSMENT
nutrition consults received for pressure injury St 2 or >, MST 2 or > (unsure wt loss) and "suspected DTI to b/l"

## 2024-05-23 NOTE — CONSULT NOTE ADULT - ASSESSMENT
88yo F h/o Myasthenia Gravis (not on medication for past 9 years), Dementia and Chronic low back pain, recently hospitalized for UTI and now p/w sepsis related to recurrent UTI. GI Consulted for increased LFTs.    1. Increased LFTs  trending down since admission, IF begin to rise, check US Abd at that time  favor related to systemic process (uti)  hepatitis panel ordered  avoid unnecessary hepatotoxins   trend LFTs     2. Recurrent UTI w/AMS  on IV abx   cont management per ID    3. Myasthenia Gravis  per primary       The plan of care was discussed with the physician assistant and modifications were made to the notation where appropriate.   Differential diagnosis and plan of care discussed with patient after the evaluation  35 minutes spent on total encounter of which more than fifty percent of the encounter was spent counseling and/or coordinating care by the attending physician.    [facility]  Jj Loza PA-C  [address] [place] [address]  Office: [phone]  Cell: [phone]  89yo F h/o Myasthenia Gravis (not on medication for past 9 years), Dementia and Chronic low back pain, recently hospitalized for UTI and now p/w sepsis related to recurrent UTI. GI Consulted for increased LFTs.    1. Increased LFTs  trending down since admission  favor related to systemic process (uti)  hepatitis panel ordered  avoid unnecessary hepatotoxins   trend LFTs   f/u US Abd    2. Recurrent UTI w/AMS  on IV abx   cont management per ID    3. Myasthenia Gravis  per primary       The plan of care was discussed with the physician assistant and modifications were made to the notation where appropriate.   Differential diagnosis and plan of care discussed with patient after the evaluation  35 minutes spent on total encounter of which more than fifty percent of the encounter was spent counseling and/or coordinating care by the attending physician.    [facility]  Jj Loza PA-C  [address], [place]  Office: [phone]  Cell: [phone]

## 2024-05-23 NOTE — CONSULT NOTE ADULT - SUBJECTIVE AND OBJECTIVE BOX
Patient is a 89y old  Female who presents with a chief complaint of Per chart, Pt is a 88 y/o F with PMH: "Myasthenia Gravis (not on medication for past 9 years) and MHx of dementia and chronic low back pain. Patient recently hospitalized after being found with slurred speech and very confused. Most likely due to UTI which not responded to Cipro. Patient transferred to hospital due to seizure activity and was found with UTI." On IV Abx. (23 May 2024 13:42)      HPI:  88 YO F with PMHx of Myasthenia Gravis (not on medication for past 9 years) and MHx of dementia and chronic low back pain. Patient recently hospitalized after being found with slurred speech and very confused. Most likely due to UTI which not responded to Cipro. Patient transferred to hospital due to seizure activity and was found with UTI.     Pt was initially started on vancomycin and zosyn but switched to ertapenem when found to have an ESBL E Coli in her blood  ID is asked to evaluate       During the last admission in  -PT presented from her nursing home with worsening lethargy and confusion. UA done as outpatient on  grossly positive and urine culture growing E. coli and Klebsiella, pansensitive. Found to be hypothermic in ED. Admitted for sepsis and metabolic encephalopathy in setting of UTI. Multiple RRTs called today. First RRT called for seizure-like activity. Patient loaded with Keppra and placed on EEG. Second RRT called for hypotension. Patient was given 4 L IVF with resolution of hypotension. pt changed to meropenem to cover for possible meningitis- ? Normal pressure hydrocephalous on CT- discussed with neuro- this wouldn't account for her acute change  on meropenem to cover for possible meningitis and /or to cover biliary sepsis   EEG not c/w HSV encephalitis but added  acyclovir until more definitive diagnosis available, Course significant for a distended GB but neg HIDA scan , renal insufficiency and thrombocytopenia ( at presentation) and hypernatremia     PAST MEDICAL & SURGICAL HISTORY:  Myasthenia gravis      Dementia      Myasthenia gravis      No significant past surgical history      No significant past surgical history      Social history:   Marital Status:   Lives with: in a facilty    Substance Use : denies  Tobacco Usage:  (   ) never smoked   (  x ) former smoker- in her youth   (   ) current smoker  (     ) pack year  (        ) last tobacco use date  Alcohol Usage:denies  Travel: none      FAMILY HISTORY:  mother lived to 109  father  at  63 of an aneurysm        REVIEW OF SYSTEMS  Pt demented and unable to provide a history  according to nurse ,pt is much more responsive today     Allergies    sulfamethoxazole (Vomiting; Nausea)    Intolerances        Antimicrobials:       MEDICATIONS  (prior antimicrobials ):    ertapenem  IVPB   120 mL/Hr IV Intermittent (24 @ 05:59)    piperacillin/tazobactam IVPB..   25 mL/Hr IV Intermittent (24 @ 23:31)    piperacillin/tazobactam IVPB...   200 mL/Hr IV Intermittent (24 @ 10:53)    vancomycin  IVPB.   250 mL/Hr IV Intermittent (24 @ 10:53)             ertapenem  IVPB          MEDICATIONS  (STANDING):  dextrose 5% + sodium chloride 0.45%. 1000 milliLiter(s) (100 mL/Hr) IV Continuous <Continuous>  donepezil 10 milliGRAM(s) Oral at bedtime  ertapenem  IVPB      multivitamin 1 Tablet(s) Oral daily  senna 2 Tablet(s) Oral at bedtime    MEDICATIONS  (PRN):        Vital Signs Last 24 Hrs  T(C): 36.7 (23 May 2024 11:54), Max: 36.8 (23 May 2024 04:40)  T(F): 98 (23 May 2024 11:54), Max: 98.2 (23 May 2024 04:40)  HR: 66 (23 May 2024 11:54) (66 - 99)  BP: 126/83 (23 May 2024 11:54) (96/57 - 126/83)  BP(mean): 97 (22 May 2024 16:13) (97 - 97)  RR: 18 (23 May 2024 11:54) (18 - 19)  SpO2: 93% (23 May 2024 11:54) (93% - 98%)    Parameters below as of 23 May 2024 11:54  Patient On (Oxygen Delivery Method): room air        PHYSICAL EXAM:Pt awake , interactive but confused and hard to understand    Eyes:PERRL EOMI.NO discharge or conjunctival injection    ENMT:No sinus tenderness.No thrush.No pharyngeal exudate or erythema.Fair dental hygiene    Neck:Supple,No LN,no JVD      Respiratory:Good air entry bilaterally,CTA    Cardiovascular:S1 S2     Gastrointestinal:Soft BS(+) no tenderness     Extremities:No cyanosis,clubbing or edema.    Vascular:peripheral pulses felt      Skin:No rash     Lymph Nodes:No palpable LNs    Musculoskeletal:No joint swelling or LOM                                    11.1   9.43  )-----------( 62       ( 23 May 2024 06:38 )             34.4     LIVER FUNCTIONS - ( 23 May 2024 06:38 )  Alb: 3.2 g/dL / Pro: 6.2 g/dL / ALK PHOS: 215 U/L / ALT: 65 U/L / AST: 46 U/L / GGT: x                 141  |  105  |  31<H>  ----------------------------<  88  4.1   |  24  |  1.14    Ca    9.4      23 May 2024 06:38    TPro  6.2  /  Alb  3.2<L>  /  TBili  1.1  /  DBili  x   /  AST  46<H>  /  ALT  65<H>  /  AlkPhos  215<H>          Urinalysis (24 @ 13:08)    Glucose Qualitative, Urine: Negative mg/dL   Blood, Urine: Moderate   pH Urine: 5.5   Color: Dark Yellow   Urine Appearance: Cloudy   Bilirubin: Negative   Ketone - Urine: Trace mg/dL   Specific Gravity: 1.022   Protein, Urine: 100 mg/dL   Urobilinogen: 1.0 mg/dL   Nitrite: Positive   Leukocyte Esterase Concentration: Moderate  Urine Microscopic-Add On (NC) (24 @ 13:08)    Cast: 0 /LPF   Red Blood Cell - Urine: 8 /HPF   White Blood Cell - Urine: 42 /HPF   Bacteria: Occasional /HPF   Epithelial Cells: 2 /HPF      Culture - Urine (24 @ 13:08)    Specimen Source: Clean Catch Clean Catch (Midstream)   Culture Results:   10,000 - 49,000 CFU/mL Escherichia coli      Culture - Blood (24 @ 10:40)    -  ESBL: Detec   -  Escherichia coli: Detec   -  CTX-M Resistance Marker: Detec   Gram Stain:   Growth in anaerobic bottle: Gram Negative Rods   Specimen Source: .Blood Blood-Peripheral   Organism: Blood Culture PCR   Culture Results:   Growth in anaerobic bottle: Gram Negative Rods  Direct identification is available within approximately 3-5  hours either by Blood Panel Multiplexed PCR or Direct  MALDI-TOF. Details: [email or web address]   Organism Identification: Blood Culture PCR   Method Type: PCR      < from: CT Abdomen and Pelvis w/ IV Cont (24 @ 19:29) >  ACC: [id number] EXAM:  CT CHEST IC   ORDERED BY: MARIANNA DAVIS     ACC: [id number] EXAM:  CT ABDOMEN AND PELVIS IC   ORDERED BY: MARIANNA DAVIS     PROCEDURE DATE:  2024          INTERPRETATION:  CLINICAL INFORMATION: Evaluate for infection. Altered   mental status    COMPARISON: CT chest 2020. Abdominal ultrasound 2024.    CONTRAST/COMPLICATIONS:  IV Contrast: Omnipaque 350  90 cc administered   10 cc discarded  Oral Contrast: NONE  Complications: None reported at time of study completion    PROCEDURE:  CT of the Chest, Abdomen and Pelvis was performed.  Sagittal and coronal reformats were performed.    FINDINGS:  Motion degraded examination.    CHEST:  LUNGS, PLEURA, AND LARGE AIRWAYS: Patent central airways. Biapical   pleuroparenchymal scarring. Small bilateral pleural effusions and   bilateral lower lobe atelectasis. Groundglass opacities within the   bilateral lung apices.  VESSELS: Atherosclerotic changes. Coronary artery calcifications.  HEART: Cardiomegaly. No pericardial effusion.  MEDIASTINUM AND CARLO: No lymphadenopathy.  CHEST WALL AND LOWER NECK: Partially imaged mild right maxillary sinus   mucosal thickening.    ABDOMEN AND PELVIS:  LIVER: Subcentimeter hypodense focus in the right lobe that is too small   to characterize. Punctate calcification.  BILE DUCTS: Normal caliber.  GALLBLADDER: Distended. Cholelithiasis. Trace pericholecystic fluid.  SPLEEN: Within normal limits.  PANCREAS: Fatty atrophy. Peripancreatic fat stranding.  ADRENALS: Within normallimits.  KIDNEYS/URETERS: No hydronephrosis. Right subcentimeter hypodense focus   too small to characterize.    BLADDER: Within normal limits.  REPRODUCTIVE ORGANS: Uterus and adnexa within normal limits.    BOWEL: No bowel obstruction. Mild proximal periduodenal fat infiltration.   Sigmoid diverticulosis. No evidence for acute diverticulitis. Appendix is   normal.  PERITONEUM: Trace fluid.  VESSELS: Atherosclerotic changes.  RETROPERITONEUM/LYMPH NODES: No lymphadenopathy.  ABDOMINAL WALL: Small fat-containing umbilical hernia. Question prior   bilateral inguinal hernia repair; correlate with surgical history.   Subcutaneous edema.  BONES: Chronic right rib fractures. Degenerative changes. Grade 1-2   anterolisthesis of L4 on L5. Central canal narrowing at L4-L5. T7   hemangioma.    IMPRESSION:  *  Groundglass opacities in the bilateral lung apices, potentially   pulmonary edema or an infectious/inflammatory process.  *  Small bilateral pleural effusions.  *  Partially imaged mild rightmaxillary sinus mucosal thickening.  *  Distended gallbladder with cholelithiasis. Trace pericholecystic   fluid. Consider HIDA scan for further evaluation.  *  Peripancreatic fat stranding, potentially representing acute   pancreatitis. Recommend correlation with lipase.  *  Mild periduodenal fat infiltration, which may be reactive to   pancreatitis rather than representing duodenitis.        --- End of Report ---    < end of copied text >

## 2024-05-23 NOTE — CONSULT NOTE ADULT - ASSESSMENT
89 YO F with PMHx of Myasthenia Gravis (not on medication for past 9 years) and MHx of dementia and chronic low back pain. Patient recently hospitalized after being found with slurred speech and very confused. Most likely due to UTI which not responded to Cipro. Patient transferred to hospital due to seizure activity and was found with UTI.     Pt was initially started on vancomycin and zosyn but switched to ertapenem when found to have an ESBL E Coli in her blood  ID is asked to evaluate       During the last admission in feb/March -PT presented from her nursing home with worsening lethargy and confusion. UA done as outpatient on 2/13 grossly positive and urine culture growing E. coli and Klebsiella, pansensitive. Found to be hypothermic in ED. Admitted for sepsis and metabolic encephalopathy in setting of UTI. Multiple RRTs called today. First RRT called for seizure-like activity. Patient loaded with Keppra and placed on EEG. Second RRT called for hypotension. Patient was given 4 L IVF with resolution of hypotension. pt changed to meropenem to cover for possible meningitis- ? Normal pressure hydrocephalous on CT- discussed with neuro- this wouldn't account for her acute change  on meropenem to cover for possible meningitis and /or to cover biliary sepsis   EEG not c/w HSV encephalitis but added  acyclovir until more definitive diagnosis available, Course significant for a distended GB but neg HIDA scan , renal insufficiency and thrombocytopenia ( at presentation) and hypernatremia     A/P    #ESBL BActeremia  agree with change to carbapenem  would either go down on the ertapenem to 500 mg or meropenem 500 mg IVSS Q 12 hours.  LIkely urinary source  check Post void residual   check renal ultrasound      #Thrombocytopenia  likely from sepsis  check DIC screen  hematology has seen   Pt did this last admission as well    #AMS  pt with baseline dementia, and additionally here likely metabolic encephalopathy  at some point there was also a question of Normal pressure hydrocephalous - neuro to address  ? seizure - would lower dose of the ertapenem or switch to meropenem    #elevated LFTs  last admission there was concern for a possible passed biliary stone  monitor  could also be from sepsis   negative Hepatitis screen last admission    # Myasthenia Gravis  not on treatment for years   would avoid certain abs- fluoroquinolones , aminoglycosides and macrolides  avoid certain antibiotics       Yeni Gabriel M.D. ,   please reach via teams   If no answer, or after 5PM/ weekends,  then please call  [phone]    Assessment and plan discussed with the primary team .    I will be away as of tomorrow. My associates will be covering. Please call [phone] with acute issues, questions.

## 2024-05-23 NOTE — PHARMACOTHERAPY INTERVENTION NOTE - COMMENTS
Recommended escalating from piperacillin-tazobactam to ertapenem 1g every 24 hours in the setting of ESBL E. coli bacteremia based on a eGFR of 47.         Remedios Johnston PharmD   Clinical Pharmacy Specialist, Infectious Diseases  Tele-Antimicrobial Stewardship Program (Tele-ASP)  Tele-ASP Phone: [phone]

## 2024-05-23 NOTE — DIETITIAN INITIAL EVALUATION ADULT - OTHER INFO
dosing wt: 45.4 kg (5/22)  wt hx per [facility] HIE: 54.4 kg (2/19), 68 kg (1/2/20)  UBW: unknown dosing wt: 45.4 kg (5/22) *question accuracy of dosing wt  bed wt obtained by RD 67.6 kg (5/23) *bed wt more c/w SNF wt  wt hx per [facility] HIE: 54.4 kg (2/19), 68 kg (1/2/20)  UBW: unknown  SNF paperwork: ht 160 cm and wt 64.8 kg

## 2024-05-23 NOTE — DIETITIAN INITIAL EVALUATION ADULT - REASON
unable to obtain consent unable to obtain consent and question accuracy of BMI, Pt does not appear to be underweight

## 2024-05-23 NOTE — PROGRESS NOTE ADULT - SUBJECTIVE AND OBJECTIVE BOX
Above noted  Started on Keppra because of 2-3 seizure activities were observed  she is more alert & responsive today      VITAL SIGNS:    T98 /86 RR18    PHYSICAL EXAM:     GENERAL: no acute distress  HEENT: NC/AT, EOMI, neck supple, MMM  RESPIRATORY: LCTAB/L, no rhonchi, rales, or wheezing  CARDIOVASCULAR: RRR, no murmurs, gallops, rubs  ABDOMINAL: soft, non-tender, non-distended, positive bowel sounds   EXTREMITIES: no clubbing, cyanosis, or edema  NEUROLOGICAL: alert , non-focal                          11.1   9.43  )-----------( 62       ( 23 May 2024 06:38 )             34.4     05-23    141  |  105  |  31<H>  ----------------------------<  88  4.1   |  24  |  1.14    Ca    9.4      23 May 2024 06:38    TPro  6.2  /  Alb  3.2<L>  /  TBili  1.1  /  DBili  x   /  AST  46<H>  /  ALT  65<H>  /  AlkPhos  215<H>  05-23      MEDICATIONS  (STANDING):  chlorhexidine 2% Cloths 1 Application(s) Topical daily  dextrose 5% + sodium chloride 0.45%. 1000 milliLiter(s) (100 mL/Hr) IV Continuous <Continuous>  donepezil 10 milliGRAM(s) Oral at bedtime  multivitamin 1 Tablet(s) Oral daily  senna 2 Tablet(s) Oral at bedtime

## 2024-05-23 NOTE — PROVIDER CONTACT NOTE (OTHER) - ASSESSMENT
Pt had PAT 2.3 seconds HR up to 131 (first time). Pt A/Ox0, not verbal (baseline). No S&S of pain/discomfort. VSS

## 2024-05-23 NOTE — DIETITIAN INITIAL EVALUATION ADULT - NSFNSPHYEXAMSKINFT_GEN_A_CORE
Pressure Injury 1: Right:, heel, Suspected deep tissue injury  Pressure Injury 2: Left:, heel, Suspected deep tissue injury  WOCN consult pending

## 2024-05-23 NOTE — DIETITIAN INITIAL EVALUATION ADULT - WEIGHT IN KG
Kelly with [facility] lab called yesterday to report her 3 most recent sputum AFB cultures are positive.  She is faxing the reports.  I received a fax with preliminary AFB cultures collected 4/23/24, 4/24/24, and 4/25/24.  I was under the assumption patient brought in 3 new specimens and the AFB smear were positive.    
54.4

## 2024-05-23 NOTE — DIETITIAN INITIAL EVALUATION ADULT - ENERGY INTAKE
NPO Pt NPO, pending dysphagia eval. Pt receiving D51/2NS IVF @ 100 mL/hr. Pt NPO, pending dysphagia eval. Pt receiving D51/2NS IVF @ 100 mL/hr. Labs WNL. Recommend advancing diet per SLP recs. Aggressive nutrition support is not inline with Pt's GOC.

## 2024-05-24 LAB
-  AMPICILLIN/SULBACTAM: SIGNIFICANT CHANGE UP
-  AMPICILLIN/SULBACTAM: SIGNIFICANT CHANGE UP
-  AMPICILLIN: SIGNIFICANT CHANGE UP
-  AMPICILLIN: SIGNIFICANT CHANGE UP
-  AZTREONAM: SIGNIFICANT CHANGE UP
-  AZTREONAM: SIGNIFICANT CHANGE UP
-  CEFAZOLIN: SIGNIFICANT CHANGE UP
-  CEFAZOLIN: SIGNIFICANT CHANGE UP
-  CEFEPIME: SIGNIFICANT CHANGE UP
-  CEFEPIME: SIGNIFICANT CHANGE UP
-  CEFTRIAXONE: SIGNIFICANT CHANGE UP
-  CEFTRIAXONE: SIGNIFICANT CHANGE UP
-  CEFUROXIME: SIGNIFICANT CHANGE UP
-  CIPROFLOXACIN: SIGNIFICANT CHANGE UP
-  CIPROFLOXACIN: SIGNIFICANT CHANGE UP
-  ERTAPENEM: SIGNIFICANT CHANGE UP
-  ERTAPENEM: SIGNIFICANT CHANGE UP
-  GENTAMICIN: SIGNIFICANT CHANGE UP
-  GENTAMICIN: SIGNIFICANT CHANGE UP
-  IMIPENEM: SIGNIFICANT CHANGE UP
-  IMIPENEM: SIGNIFICANT CHANGE UP
-  LEVOFLOXACIN: SIGNIFICANT CHANGE UP
-  LEVOFLOXACIN: SIGNIFICANT CHANGE UP
-  MEROPENEM: SIGNIFICANT CHANGE UP
-  MEROPENEM: SIGNIFICANT CHANGE UP
-  NITROFURANTOIN: SIGNIFICANT CHANGE UP
-  PIPERACILLIN/TAZOBACTAM: SIGNIFICANT CHANGE UP
-  PIPERACILLIN/TAZOBACTAM: SIGNIFICANT CHANGE UP
-  TOBRAMYCIN: SIGNIFICANT CHANGE UP
-  TOBRAMYCIN: SIGNIFICANT CHANGE UP
-  TRIMETHOPRIM/SULFAMETHOXAZOLE: SIGNIFICANT CHANGE UP
-  TRIMETHOPRIM/SULFAMETHOXAZOLE: SIGNIFICANT CHANGE UP
ALBUMIN SERPL ELPH-MCNC: 3 G/DL — LOW (ref 3.3–5)
ALP SERPL-CCNC: 201 U/L — HIGH (ref 40–120)
ALT FLD-CCNC: 58 U/L — HIGH (ref 10–45)
ANION GAP SERPL CALC-SCNC: 10 MMOL/L — SIGNIFICANT CHANGE UP (ref 5–17)
AST SERPL-CCNC: 44 U/L — HIGH (ref 10–40)
BILIRUB SERPL-MCNC: 0.7 MG/DL — SIGNIFICANT CHANGE UP (ref 0.2–1.2)
BUN SERPL-MCNC: 19 MG/DL — SIGNIFICANT CHANGE UP (ref 7–23)
CALCIUM SERPL-MCNC: 8.9 MG/DL — SIGNIFICANT CHANGE UP (ref 8.4–10.5)
CHLORIDE SERPL-SCNC: 106 MMOL/L — SIGNIFICANT CHANGE UP (ref 96–108)
CO2 SERPL-SCNC: 23 MMOL/L — SIGNIFICANT CHANGE UP (ref 22–31)
CREAT SERPL-MCNC: 0.82 MG/DL — SIGNIFICANT CHANGE UP (ref 0.5–1.3)
CULTURE RESULTS: ABNORMAL
EGFR: 68 ML/MIN/1.73M2 — SIGNIFICANT CHANGE UP
FIBRINOGEN PPP-MCNC: 663 MG/DL — HIGH (ref 200–445)
GLUCOSE BLDC GLUCOMTR-MCNC: 103 MG/DL — HIGH (ref 70–99)
GLUCOSE BLDC GLUCOMTR-MCNC: 108 MG/DL — HIGH (ref 70–99)
GLUCOSE BLDC GLUCOMTR-MCNC: 95 MG/DL — SIGNIFICANT CHANGE UP (ref 70–99)
GLUCOSE BLDC GLUCOMTR-MCNC: 98 MG/DL — SIGNIFICANT CHANGE UP (ref 70–99)
GLUCOSE BLDC GLUCOMTR-MCNC: 99 MG/DL — SIGNIFICANT CHANGE UP (ref 70–99)
GLUCOSE SERPL-MCNC: 102 MG/DL — HIGH (ref 70–99)
HCT VFR BLD CALC: 33.1 % — LOW (ref 34.5–45)
HGB BLD-MCNC: 10.8 G/DL — LOW (ref 11.5–15.5)
MCHC RBC-ENTMCNC: 31.9 PG — SIGNIFICANT CHANGE UP (ref 27–34)
MCHC RBC-ENTMCNC: 32.6 GM/DL — SIGNIFICANT CHANGE UP (ref 32–36)
MCV RBC AUTO: 97.6 FL — SIGNIFICANT CHANGE UP (ref 80–100)
METHOD TYPE: SIGNIFICANT CHANGE UP
METHOD TYPE: SIGNIFICANT CHANGE UP
MRSA PCR RESULT.: SIGNIFICANT CHANGE UP
NRBC # BLD: 0 /100 WBCS — SIGNIFICANT CHANGE UP (ref 0–0)
ORGANISM # SPEC MICROSCOPIC CNT: ABNORMAL
PLATELET # BLD AUTO: 56 K/UL — LOW (ref 150–400)
POTASSIUM SERPL-MCNC: 3.7 MMOL/L — SIGNIFICANT CHANGE UP (ref 3.5–5.3)
POTASSIUM SERPL-SCNC: 3.7 MMOL/L — SIGNIFICANT CHANGE UP (ref 3.5–5.3)
PROT SERPL-MCNC: 5.7 G/DL — LOW (ref 6–8.3)
RBC # BLD: 3.39 M/UL — LOW (ref 3.8–5.2)
RBC # FLD: 16.1 % — HIGH (ref 10.3–14.5)
S AUREUS DNA NOSE QL NAA+PROBE: SIGNIFICANT CHANGE UP
SODIUM SERPL-SCNC: 139 MMOL/L — SIGNIFICANT CHANGE UP (ref 135–145)
SPECIMEN SOURCE: SIGNIFICANT CHANGE UP
WBC # BLD: 6.9 K/UL — SIGNIFICANT CHANGE UP (ref 3.8–10.5)
WBC # FLD AUTO: 6.9 K/UL — SIGNIFICANT CHANGE UP (ref 3.8–10.5)

## 2024-05-24 PROCEDURE — 99232 SBSQ HOSP IP/OBS MODERATE 35: CPT

## 2024-05-24 PROCEDURE — 76700 US EXAM ABDOM COMPLETE: CPT | Mod: 26

## 2024-05-24 RX ORDER — MEROPENEM 1 G/30ML
1000 INJECTION INTRAVENOUS EVERY 12 HOURS
Refills: 0 | Status: COMPLETED | OUTPATIENT
Start: 2024-05-24 | End: 2024-06-01

## 2024-05-24 RX ADMIN — CHLORHEXIDINE GLUCONATE 1 APPLICATION(S): 213 SOLUTION TOPICAL at 11:42

## 2024-05-24 RX ADMIN — MEROPENEM 100 MILLIGRAM(S): 1 INJECTION INTRAVENOUS at 17:52

## 2024-05-24 RX ADMIN — MEROPENEM 100 MILLIGRAM(S): 1 INJECTION INTRAVENOUS at 05:14

## 2024-05-24 RX ADMIN — SODIUM CHLORIDE 100 MILLILITER(S): 9 INJECTION, SOLUTION INTRAVENOUS at 15:39

## 2024-05-24 NOTE — CHART NOTE - NSCHARTNOTEFT_GEN_A_CORE
Wound Care Team Note:    Request for wound care consult for foot/toe wound received and referred to podiatry. Will defer to podiatry for management. Please contact Podiatry for questions/concerns. Will not follow.    Becka Lizama NP-C, CWOCN via TEAMS

## 2024-05-24 NOTE — PROGRESS NOTE ADULT - SUBJECTIVE AND OBJECTIVE BOX
Patient is a 89y old  Female who presents with a chief complaint of Seizure activity (24 May 2024 18:27)      INTERVAL HPI/OVERNIGHT EVENTS: pt feels better ,more awake, but still delirium, resume diet if passes swallow test,      Pain Location & Control: ok     MEDICATIONS  (STANDING):  chlorhexidine 2% Cloths 1 Application(s) Topical daily  dextrose 5% + sodium chloride 0.45%. 1000 milliLiter(s) (100 mL/Hr) IV Continuous <Continuous>  donepezil 10 milliGRAM(s) Oral at bedtime  meropenem  IVPB 1000 milliGRAM(s) IV Intermittent every 12 hours  multivitamin 1 Tablet(s) Oral daily  senna 2 Tablet(s) Oral at bedtime    MEDICATIONS  (PRN):      Allergies    sulfamethoxazole (Vomiting; Nausea)    Intolerances        REVIEW OF SYSTEMS:  UTO confusion, dementia.     Vital Signs Last 24 Hrs  T(C): 36.6 (24 May 2024 20:30), Max: 36.6 (24 May 2024 04:31)  T(F): 97.8 (24 May 2024 20:30), Max: 97.8 (24 May 2024 04:31)  HR: 59 (24 May 2024 20:30) (56 - 60)  BP: 153/90 (24 May 2024 20:30) (134/73 - 153/90)  BP(mean): --  RR: 18 (24 May 2024 20:30) (18 - 18)  SpO2: 97% (24 May 2024 20:30) (95% - 97%)    Parameters below as of 24 May 2024 20:30  Patient On (Oxygen Delivery Method): room air        PHYSICAL EXAM:  GENERAL: NAD, well-groomed, well-developed  HEAD:  Atraumatic, Normocephalic  EYES: EOMI, PERRLA, conjunctiva and sclera clear  ENMT: No tonsillar erythema, exudates, or enlargement; Moist mucous membranes, Good dentition, No lesions  NECK: Supple, No JVD, Normal thyroid  NERVOUS SYSTEM:  Alert & Oriented X0, Motor Strength 5/5 B/L upper and lower extremities; DTRs 2+ intact and symmetric  CHEST/LUNG: Clear to auscultation bilaterally; No rales, rhonchi, wheezing, or rubs  HEART: Regular rate and rhythm; No murmurs, rubs, or gallops  ABDOMEN: Soft, Nontender, Nondistended; Bowel sounds present  EXTREMITIES:  2+ Peripheral Pulses, No clubbing or cyanosis  LYMPH: No lymphadenopathy noted  SKIN: No rashes or lesions    LABS:                        10.8   6.90  )-----------( 56       ( 24 May 2024 07:19 )             33.1     24 May 2024 07:19    139    |  106    |  19     ----------------------------<  102    3.7     |  23     |  0.82     Ca    8.9        24 May 2024 07:19    TPro  5.7    /  Alb  3.0    /  TBili  0.7    /  DBili  x      /  AST  44     /  ALT  58     /  AlkPhos  201    24 May 2024 07:19      Urinalysis Basic - ( 24 May 2024 07:19 )    Color: x / Appearance: x / SG: x / pH: x  Gluc: 102 mg/dL / Ketone: x  / Bili: x / Urobili: x   Blood: x / Protein: x / Nitrite: x   Leuk Esterase: x / RBC: x / WBC x   Sq Epi: x / Non Sq Epi: x / Bacteria: x      CAPILLARY BLOOD GLUCOSE      POCT Blood Glucose.: 103 mg/dL (24 May 2024 17:12)  POCT Blood Glucose.: 95 mg/dL (24 May 2024 11:35)  POCT Blood Glucose.: 98 mg/dL (24 May 2024 07:33)  POCT Blood Glucose.: 108 mg/dL (24 May 2024 00:16)        Cultures  Culture Results:   10,000 - 49,000 CFU/mL Escherichia coli ESBL (05-22-24 @ 13:08)  Culture Results:   Growth in anaerobic bottle: Escherichia coli ESBL  Direct identification is available within approximately 3-5  hours either by Blood Panel Multiplexed PCR or Direct  MALDI-TOF. Details: [email or web address] (05-22-24 @ 10:40)  Culture Results:   Growth in anaerobic bottle: Escherichia coli ESBL  See previous culture [id number] (05-22-24 @ 10:30)      RADIOLOGY & ADDITIONAL TESTS:    Imaging Personally Reviewed:  [x ] YES  [ ] NO    Consultant(s) Notes Reviewed:  [x ] YES  [ ] NO    Care Discussed with Consultants/Other Providers [x ] YES  [ ] NO

## 2024-05-24 NOTE — PROGRESS NOTE ADULT - ASSESSMENT
88 YO F with PMHx of Myasthenia Gravis (not on medication for past 9 years) and MHx of dementia and chronic low back pain. Patient recently hospitalized after being found with slurred speech and very confused. Most likely due to UTI which not responded to Cipro. Patient transferred to hospital due to seizure activity and was found with UTI.     Pt was initially started on vancomycin and zosyn but switched to ertapenem when found to have an ESBL E Coli in her blood  ID is asked to evaluate     During the last admission in feb/March -PT presented from her nursing home with worsening lethargy and confusion. UA done as outpatient on 2/13 grossly positive and urine culture growing E. coli and Klebsiella, pansensitive. Found to be hypothermic in ED. Admitted for sepsis and metabolic encephalopathy in setting of UTI. Multiple RRTs called today. First RRT called for seizure-like activity. Patient loaded with Keppra and placed on EEG. Second RRT called for hypotension. Patient was given 4 L IVF with resolution of hypotension. pt changed to meropenem to cover for possible meningitis- ? Normal pressure hydrocephalous on CT- discussed with neuro- this wouldn't account for her acute change  on meropenem to cover for possible meningitis and /or to cover biliary sepsis   EEG not c/w HSV encephalitis but added  acyclovir until more definitive diagnosis available, Course significant for a distended GB but neg HIDA scan , renal insufficiency and thrombocytopenia ( at presentation) and hypernatremia     Antibiotics  Vanco 5/22  Zosyn %/22 --> 23  Eapenem 5/23  Meropenem 5/24 -->     A/P    #ESBL E coli Bacteremia   5/22 +BC X2 +ESBL E coli;    5/22 Urine 10 - 49K E coli  agree with change to carbapenem  Increase meropenem 1000 mg IVSS Q 12 hours.   urinary source      #Thrombocytopenia  likely from sepsis    #AMS  pt with baseline dementia, and additionally here likely metabolic encephalopathy  at some point there was also a question of Normal pressure hydrocephalous - neuro to address  ? seizure -     #elevated LFTs  last admission there was concern for a possible passed biliary stone    # Myasthenia Gravis  not on treatment for years   would avoid certain abs- fluoroquinolones , aminoglycosides and macrolides  avoid certain antibiotics    89 YO F with PMHx of Myasthenia Gravis (not on medication for past 9 years) and MHx of dementia and chronic low back pain. Patient recently hospitalized after being found with slurred speech and very confused. Most likely due to UTI which not responded to Cipro. Patient transferred to hospital due to seizure activity and was found with UTI.     Pt was initially started on vancomycin and zosyn but switched to ertapenem when found to have an ESBL E Coli in her blood  ID is asked to evaluate     During the last admission in feb/March -PT presented from her nursing home with worsening lethargy and confusion. UA done as outpatient on 2/13 grossly positive and urine culture growing E. coli and Klebsiella, pansensitive. Found to be hypothermic in ED. Admitted for sepsis and metabolic encephalopathy in setting of UTI. Multiple RRTs called today. First RRT called for seizure-like activity. Patient loaded with Keppra and placed on EEG. Second RRT called for hypotension. Patient was given 4 L IVF with resolution of hypotension. pt changed to meropenem to cover for possible meningitis- ? Normal pressure hydrocephalous on CT- discussed with neuro- this wouldn't account for her acute change  on meropenem to cover for possible meningitis and /or to cover biliary sepsis   EEG not c/w HSV encephalitis but added  acyclovir until more definitive diagnosis available, Course significant for a distended GB but neg HIDA scan , renal insufficiency and thrombocytopenia ( at presentation) and hypernatremia     Antibiotics  Vanco 5/22  Zosyn %/22 --> 23  Eapenem 5/23  Meropenem 5/24 -->     5/24  renal function improved    A/P  #ESBL E coli Bacteremia   5/22 +BC X2 +ESBL E coli;    5/22 Urine 10 - 49K E coli  agree with change to carbapenem  Increase meropenem 1000 mg IVSS Q 12 hours.   urinary source      #Thrombocytopenia  likely from sepsis    #AMS  pt with baseline dementia, and additionally here likely metabolic encephalopathy  at some point there was also a question of Normal pressure hydrocephalous - neuro to address  ? seizure -     #elevated LFTs  last admission there was concern for a possible passed biliary stone    # Myasthenia Gravis  not on treatment for years   would avoid certain abs- fluoroquinolones , aminoglycosides and macrolides      Suggest  monitor repeat blood cultures  increase Meropenem 1000 every 12 hours

## 2024-05-24 NOTE — PATIENT PROFILE ADULT. - HAS THE PATIENT HAD A SIGNIFICANT CHANGE IN FUNCTIONAL STATUS DUE TO CVA, HEAD TRAUMA, ORTHOPEDIC TRAUMA/SURGERY, OR FALL, WITH THE WEEK PRIOR TO ADMISSION
Health Maintenance       Diabetes Eye Exam (Yearly)  Overdue since 6/3/2023    COVID-19 Vaccine (4 - 2023-24 season)  Overdue since 9/1/2023    Diabetes Foot Exam (Yearly)  Due since 5/12/2024           Following review of the above:  Patient is not proceeding with: COVID-19    Note: Refer to final orders and clinician documentation.          Review Flowsheet  More data exists         5/24/2024   PHQ 2/9 Score   Adult PHQ 2 Score 0   Adult PHQ 2 Interpretation No further screening needed   Little interest or pleasure in activity? Not at all   Feeling down, depressed or hopeless? Not at all      Details                    no

## 2024-05-24 NOTE — PROGRESS NOTE ADULT - SUBJECTIVE AND OBJECTIVE BOX
Chief Complaint:  Patient is a 89y old  Female who presents with a chief complaint of Seizure activity (24 May 2024 09:00)      Date of service 05-24-24 @ 12:54      Interval Events:   no GI Events  Hospital Medications:  chlorhexidine 2% Cloths 1 Application(s) Topical daily  dextrose 5% + sodium chloride 0.45%. 1000 milliLiter(s) IV Continuous <Continuous>  donepezil 10 milliGRAM(s) Oral at bedtime  meropenem  IVPB 500 milliGRAM(s) IV Intermittent every 12 hours  multivitamin 1 Tablet(s) Oral daily  senna 2 Tablet(s) Oral at bedtime        Review of Systems:  General:  No wt loss, fevers, chills, night sweats, fatigue,   Eyes:  Good vision, no reported pain  ENT:  No sore throat, pain, runny nose, dysphagia  CV:  No pain, palpitations, hypo/hypertension  Resp:  No dyspnea, cough, tachypnea, wheezing  GI:  See HPI  :  No pain, bleeding, incontinence, nocturia  Muscle:  No pain, weakness  Neuro:  No weakness, tingling, memory problems  Psych:  No fatigue, insomnia, mood problems, depression  Endocrine:  No polyuria, polydipsia, cold/heat intolerance  Heme:  No petechiae, ecchymosis, easy bruisability  Integumentary:  No rash, edema    PHYSICAL EXAM:   Vital Signs:  Vital Signs Last 24 Hrs  T(C): 36.4 (24 May 2024 09:04), Max: 36.6 (23 May 2024 20:51)  T(F): 97.6 (24 May 2024 09:04), Max: 97.8 (23 May 2024 20:51)  HR: 57 (24 May 2024 09:04) (56 - 68)  BP: 138/63 (24 May 2024 09:04) (134/73 - 150/79)  BP(mean): --  RR: 18 (24 May 2024 09:04) (18 - 18)  SpO2: 96% (24 May 2024 09:04) (95% - 96%)    Parameters below as of 24 May 2024 09:04  Patient On (Oxygen Delivery Method): room air      Daily     Daily       PHYSICAL EXAM:     GENERAL:  Appears stated age, well-groomed, well-nourished, no distress  HEENT:  NC/AT,  conjunctivae anicteric, clear and pink,   NECK: supple, trachea midline  CHEST:  Full & symmetric excursion, no increased effort, breath sounds clear  HEART:  Regular rhythm, no JVD  ABDOMEN:  Soft, non-tender, non-distended, normoactive bowel sounds,  no masses , no hepatosplenomegaly  EXTREMITIES:  no cyanosis,clubbing or edema  SKIN:  No rash, erythema, or, ecchymoses, no jaundice  NEURO:  Alert, non-focal, no asterixis  PSYCH: Appropriate affect, oriented to place and time  RECTAL: Deferred      LABS Personally reviewed by me:                        10.8   6.90  )-----------( 56       ( 24 May 2024 07:19 )             33.1     Mean Cell Volume: 97.6 fl (05-24-24 @ 07:19)    05-24    139  |  106  |  19  ----------------------------<  102<H>  3.7   |  23  |  0.82    Ca    8.9      24 May 2024 07:19    TPro  5.7<L>  /  Alb  3.0<L>  /  TBili  0.7  /  DBili  x   /  AST  44<H>  /  ALT  58<H>  /  AlkPhos  201<H>  05-24    LIVER FUNCTIONS - ( 24 May 2024 07:19 )  Alb: 3.0 g/dL / Pro: 5.7 g/dL / ALK PHOS: 201 U/L / ALT: 58 U/L / AST: 44 U/L / GGT: x             Urinalysis Basic - ( 24 May 2024 07:19 )    Color: x / Appearance: x / SG: x / pH: x  Gluc: 102 mg/dL / Ketone: x  / Bili: x / Urobili: x   Blood: x / Protein: x / Nitrite: x   Leuk Esterase: x / RBC: x / WBC x   Sq Epi: x / Non Sq Epi: x / Bacteria: x                              10.8   6.90  )-----------( 56       ( 24 May 2024 07:19 )             33.1                         11.1   9.43  )-----------( 62       ( 23 May 2024 06:38 )             34.4                         12.2   11.22 )-----------( 62       ( 22 May 2024 10:58 )             37.6       Imaging personally reviewed by me:

## 2024-05-24 NOTE — PROVIDER CONTACT NOTE (OTHER) - ASSESSMENT
Pt sinus lorena 45-50s on tele. Pt A/Ox0, alert but confused. No s&s of pain/discomfort noted; pt sleeping. All other VSS.

## 2024-05-24 NOTE — PROGRESS NOTE ADULT - ASSESSMENT
89 YO F with PMHx of Myasthenia Gravis (not on medication for past 9 years) and MHx of dementia and chronic low back pain. Patient recently hospitalized after being found with slurred speech and very confused. Most likely due to UTI which not responded to Cipro. Patient transferred to hospital due to seizure activity and was found with UTI. She was given 1 mg IM.         Sepsis with bacteremia  - with hypothermia, leukocytosis, continue IV abx.   Bacteremia - with ESBL, on Ertapenem. f/u with ID.   AMS - with seizure activity, improved, continue IV Zosyn, f/u panculture,   UTI - as  above   Liver enzyme elevation- improving and trending down. RUQ ultrasound negative. Hepatitis panel negative.  weight loss- due to recent hospitalization and severe sickness on hospital. .Appetite improving Encourage PO intake and supplement diet. Monitor weight. F/u dietitian. Family is aware and refused peg tube.  Vitamin D deficiency- Vitamin D 50,000 weekly. Monitor Vitamin D.  Hypoglycemia- resolved. Appetite improved.  cholelithiasis - without cholecystitis neither pancreatitis. Normal amylase and lipase.  hypotension- continue IV steroid and IV fluid as needed  thrombocytopenia- PLT down, monitor PLT, f/u with Hematology.   NPH- - neurology does not believe acute mental status changes, could be due to NPH. F/u neurology as outpatient for NPH workup.  DVT ppx- Lovenox discontinued due to thrombocytopenia  leg arthritis- Acetaminophen PRN.  b/l cataract- F/u ophthalmology  vitamin D deficiency- continue Vitamin D supplement.  constipation- senna at bedtime.  LBP 2/2 severe spinal stenosis with out cord pressure - no surgical intervention recommended neither accepted by family, continue PT OT as needed.  Dementia - Stable. Continue Aricept .  Dysphagia - Mechanical soft diet, aspiration precautions.  Myasthenia Gravis - stable , off of medication. Monitor CBC and BMP every 2 months  VT ppx - on SCD, no blood thinner due to low PLT.

## 2024-05-24 NOTE — PROGRESS NOTE ADULT - SUBJECTIVE AND OBJECTIVE BOX
more responsive  more verbal        VITAL SIGNS:    VSS    PHYSICAL EXAM:     GENERAL: no acute distress  HEENT: NC/AT, EOMI, neck supple, MMM  RESPIRATORY: LCTAB/L, no rhonchi, rales, or wheezing  CARDIOVASCULAR: RRR, no murmurs, gallops, rubs                          10.8   6.90  )-----------( 56       ( 24 May 2024 07:19 )             33.1     05-24    139  |  106  |  19  ----------------------------<  102<H>  3.7   |  23  |  0.82    Ca    8.9      24 May 2024 07:19    TPro  5.7<L>  /  Alb  3.0<L>  /  TBili  0.7  /  DBili  x   /  AST  44<H>  /  ALT  58<H>  /  AlkPhos  201<H>  05-24      MEDICATIONS  (STANDING):  chlorhexidine 2% Cloths 1 Application(s) Topical daily  dextrose 5% + sodium chloride 0.45%. 1000 milliLiter(s) (100 mL/Hr) IV Continuous <Continuous>  donepezil 10 milliGRAM(s) Oral at bedtime  meropenem  IVPB 1000 milliGRAM(s) IV Intermittent every 12 hours  multivitamin 1 Tablet(s) Oral daily  senna 2 Tablet(s) Oral at bedtime

## 2024-05-24 NOTE — CONSULT NOTE ADULT - SUBJECTIVE AND OBJECTIVE BOX
Patient is a 89y old  Female who presents with a chief complaint of Seizure activity (23 May 2024 22:11)      HPI:  88 YO F with PMHx of Myasthenia Gravis (not on medication for past 9 years) and MHx of dementia and chronic low back pain. Patient recently hospitalized after being found with slurred speech and very confused. Most likely due to UTI which not responded to Cipro. Patient transferred to hospital due to seizure activity and was found with UTI.        (22 May 2024 14:16)      PAST MEDICAL & SURGICAL HISTORY:  Myasthenia gravis      Dementia      Myasthenia gravis      No significant past surgical history      No significant past surgical history          MEDICATIONS  (STANDING):  chlorhexidine 2% Cloths 1 Application(s) Topical daily  dextrose 5% + sodium chloride 0.45%. 1000 milliLiter(s) (100 mL/Hr) IV Continuous <Continuous>  donepezil 10 milliGRAM(s) Oral at bedtime  meropenem  IVPB 500 milliGRAM(s) IV Intermittent every 12 hours  multivitamin 1 Tablet(s) Oral daily  senna 2 Tablet(s) Oral at bedtime    MEDICATIONS  (PRN):      Allergies    sulfamethoxazole (Vomiting; Nausea)    Intolerances        VITALS:    Vital Signs Last 24 Hrs  T(C): 36.4 (24 May 2024 09:04), Max: 36.6 (23 May 2024 20:51)  T(F): 97.6 (24 May 2024 09:04), Max: 97.8 (23 May 2024 20:51)  HR: 57 (24 May 2024 09:04) (56 - 68)  BP: 138/63 (24 May 2024 09:04) (134/73 - 150/79)  BP(mean): --  RR: 18 (24 May 2024 09:04) (18 - 18)  SpO2: 96% (24 May 2024 09:04) (95% - 96%)    Parameters below as of 24 May 2024 09:04  Patient On (Oxygen Delivery Method): room air        LABS:                          10.8   6.90  )-----------( 56       ( 24 May 2024 07:19 )             33.1       05-24    139  |  106  |  19  ----------------------------<  102<H>  3.7   |  23  |  0.82    Ca    8.9      24 May 2024 07:19    TPro  5.7<L>  /  Alb  3.0<L>  /  TBili  0.7  /  DBili  x   /  AST  44<H>  /  ALT  58<H>  /  AlkPhos  201<H>  05-24      CAPILLARY BLOOD GLUCOSE      POCT Blood Glucose.: 95 mg/dL (24 May 2024 11:35)  POCT Blood Glucose.: 98 mg/dL (24 May 2024 07:33)  POCT Blood Glucose.: 108 mg/dL (24 May 2024 00:16)  POCT Blood Glucose.: 104 mg/dL (23 May 2024 17:07)          LOWER EXTREMITY PHYSICAL EXAM:    Vasular: DP/PT 0/4, B/L, CFT <3 seconds B/L, Temperature gradient WNL, B/L.   Neuro: unable to assess  Skin: right heel DTI secondary to pressure present on admission, no signs of infection noted

## 2024-05-24 NOTE — PROGRESS NOTE ADULT - ASSESSMENT
88yo F h/o Myasthenia Gravis (not on medication for past 9 years), Dementia and Chronic low back pain, recently hospitalized for UTI and now p/w sepsis related to recurrent UTI. GI Consulted for increased LFTs.    1. Increased LFTs  trending down since admission  favor related to systemic process (uti)  US abdomen with cholelithiasis no further intervention needed  trend LFTs    2. Recurrent UTI w/AMS  on IV abx   cont management per ID    3. Myasthenia Gravis  per primary

## 2024-05-24 NOTE — PROGRESS NOTE ADULT - SUBJECTIVE AND OBJECTIVE BOX
Follow Up:  +ESBL E coli bacteremic uti    Interval History/ROS:  lethargic, responds to light touch    Allergies  sulfamethoxazole (Vomiting; Nausea)    ANTIMICROBIALS:  meropenem  IVPB 500 every 12 hours      OTHER MEDS:  MEDICATIONS  (STANDING):  donepezil 10 at bedtime  senna 2 at bedtime      Vital Signs Last 24 Hrs  T(C): 36.4 (24 May 2024 09:04), Max: 36.6 (23 May 2024 20:51)  T(F): 97.6 (24 May 2024 09:04), Max: 97.8 (23 May 2024 20:51)  HR: 57 (24 May 2024 09:04) (56 - 68)  BP: 138/63 (24 May 2024 09:04) (134/73 - 150/79)  BP(mean): --  RR: 18 (24 May 2024 09:04) (18 - 18)  SpO2: 96% (24 May 2024 09:04) (95% - 96%)    Parameters below as of 24 May 2024 09:04  Patient On (Oxygen Delivery Method): room air        PHYSICAL EXAM:  General:  NAD, Non-toxic  Neurology:  lethargic, responds to light touch  Respiratory: Clear to auscultation bilaterally  CV: RRR, S1S2, no murmurs, rubs or gallops  Abdominal: Soft, Non-tender, non-distended  Extremities: No edema,   Line Sites: Clear  Skin: No rash                          10.8   6.90  )-----------( 56       ( 24 May 2024 07:19 )             33.1   WBC Count: 6.90 (05-24 @ 07:19)  WBC Count: 9.43 (05-23 @ 06:38)  WBC Count: 11.22 (05-22 @ 10:58)      05-24    139  |  106  |  19  ----------------------------<  102<H>  3.7   |  23  |  0.82  Creatinine: 0.82 (05-24 @ 07:19)    Calc Cr Cl by Cockcroft Gault = 27   Creatinine: 1.14 (05-23 @ 06:38)    Creatinine: 1.08 (05-22 @ 10:58)    Ca    8.9      24 May 2024 07:19    TPro  5.7<L>  /  Alb  3.0<L>  /  TBili  0.7  /  DBili  x   /  AST  44<H>  /  ALT  58<H>  /  AlkPhos  201<H>  05-24      MICROBIOLOGY:  Clean Catch Clean Catch (Midstream)  05-22-24   10,000 - 49,000 CFU/mL Escherichia coli  --  --      .Blood Blood-Peripheral  05-22-24   Growth in anaerobic bottle: Escherichia coli ESBL  Direct identification is available within approximately 3-5  hours either by Blood Panel Multiplexed PCR or Direct  MALDI-TOF. Details: [email or web address]  --  Blood Culture PCR  Escherichia coli ESBL      .Blood Blood-Peripheral  05-22-24   Growth in anaerobic bottle: Escherichia coli ESBL  See previous culture [id number]  --    Growth in anaerobic bottle: Gram Negative Rods      RADIOLOGY:  < from: US Abdomen Complete (US Abdomen Complete .) (05.24.24 @ 10:27) >  Exam partially limited secondary to patient positioning.  Liver: Within normal limits.  Bile ducts: Normal caliber. Common bile duct measures 4 mm.  Gallbladder: Cholelithiasis. Layering sludge. Negative sonographic Diana   sign.  Pancreas: Visualized portions are within normal limits.  Spleen: 10.1 cm. Within normal limits.  Right kidney: 8.4 cm. No hydronephrosis.  Left kidney: 9.1 cm. No hydronephrosis.  Ascites: None.  Aorta and IVC: Visualized portions are within normal limits.    IMPRESSION:  Cholelithiasis without cholecystitis.    < end of copied text >  < from: CT Head No Cont (05.22.24 @ 11:37) >  IMPRESSION:  1.  No acute intracranial hemorrhage or mass effect.  2.  Redemonstration of diffuse ventricular dilatation out of proportion   to the sulci, suggesting normal pressure hydrocephalus in the appropriate   clinical setting.    < end of copied text >      Efren Navarro MD; Division of Infectious Disease; Pager: [phone]; nights and weekends: [phone]   Follow Up:  +ESBL E coli bacteremic uti    Interval History/ROS:  lethargic, responds to light touch    Allergies  sulfamethoxazole (Vomiting; Nausea)    ANTIMICROBIALS:  meropenem  IVPB 500 every 12 hours      OTHER MEDS:  MEDICATIONS  (STANDING):  donepezil 10 at bedtime  senna 2 at bedtime      Vital Signs Last 24 Hrs  T(C): 36.4 (24 May 2024 09:04), Max: 36.6 (23 May 2024 20:51)  T(F): 97.6 (24 May 2024 09:04), Max: 97.8 (23 May 2024 20:51)  HR: 57 (24 May 2024 09:04) (56 - 68)  BP: 138/63 (24 May 2024 09:04) (134/73 - 150/79)  BP(mean): --  RR: 18 (24 May 2024 09:04) (18 - 18)  SpO2: 96% (24 May 2024 09:04) (95% - 96%)    Parameters below as of 24 May 2024 09:04  Patient On (Oxygen Delivery Method): room air        PHYSICAL EXAM:  General:  NAD, Non-toxic  Neurology:  lethargic, responds to light touch  Respiratory: Clear to auscultation bilaterally  CV: RRR, S1S2, no murmurs, rubs or gallops  Abdominal: Soft, Non-tender, non-distended   Line Sites: Clear  Skin: No rash                          10.8   6.90  )-----------( 56       ( 24 May 2024 07:19 )             33.1   WBC Count: 6.90 (05-24 @ 07:19)  WBC Count: 9.43 (05-23 @ 06:38)  WBC Count: 11.22 (05-22 @ 10:58)      05-24    139  |  106  |  19  ----------------------------<  102<H>  3.7   |  23  |  0.82  Creatinine: 0.82 (05-24 @ 07:19)    Calc Cr Cl by Cockcroft Gault = 27   Creatinine: 1.14 (05-23 @ 06:38)    Creatinine: 1.08 (05-22 @ 10:58)    Ca    8.9      24 May 2024 07:19    TPro  5.7<L>  /  Alb  3.0<L>  /  TBili  0.7  /  DBili  x   /  AST  44<H>  /  ALT  58<H>  /  AlkPhos  201<H>  05-24      MICROBIOLOGY:  Clean Catch Clean Catch (Midstream)  05-22-24   10,000 - 49,000 CFU/mL Escherichia coli  --  --      .Blood Blood-Peripheral  05-22-24   Growth in anaerobic bottle: Escherichia coli ESBL  Direct identification is available within approximately 3-5  hours either by Blood Panel Multiplexed PCR or Direct  MALDI-TOF. Details: [email or web address]  --  Blood Culture PCR  Escherichia coli ESBL      .Blood Blood-Peripheral  05-22-24   Growth in anaerobic bottle: Escherichia coli ESBL  See previous culture [id number]  --    Growth in anaerobic bottle: Gram Negative Rods      RADIOLOGY:  < from: US Abdomen Complete (US Abdomen Complete .) (05.24.24 @ 10:27) >  Exam partially limited secondary to patient positioning.  Liver: Within normal limits.  Bile ducts: Normal caliber. Common bile duct measures 4 mm.  Gallbladder: Cholelithiasis. Layering sludge. Negative sonographic Diana   sign.  Pancreas: Visualized portions are within normal limits.  Spleen: 10.1 cm. Within normal limits.  Right kidney: 8.4 cm. No hydronephrosis.  Left kidney: 9.1 cm. No hydronephrosis.  Ascites: None.  Aorta and IVC: Visualized portions are within normal limits.    IMPRESSION:  Cholelithiasis without cholecystitis.    < end of copied text >  < from: CT Head No Cont (05.22.24 @ 11:37) >  IMPRESSION:  1.  No acute intracranial hemorrhage or mass effect.  2.  Redemonstration of diffuse ventricular dilatation out of proportion   to the sulci, suggesting normal pressure hydrocephalus in the appropriate   clinical setting.    < end of copied text >      Efren Navarro MD; Division of Infectious Disease; Pager: [phone]; nights and weekends: [phone]

## 2024-05-24 NOTE — CONSULT NOTE ADULT - ASSESSMENT
89 year old patient with right heel DTI  - Patient seen and evaluated  - Heel DTI noted with no open lesions, no signs of infection in feet  - Recommend z flow boots in bed at all times  - Recommend painting area with betadine and covering with allevyn foam, change every other day  - Podiatry signing off at this time, reconsult as needed

## 2024-05-25 LAB
ALBUMIN SERPL ELPH-MCNC: 3.1 G/DL — LOW (ref 3.3–5)
ALP SERPL-CCNC: 210 U/L — HIGH (ref 40–120)
ALT FLD-CCNC: 58 U/L — HIGH (ref 10–45)
ANION GAP SERPL CALC-SCNC: 8 MMOL/L — SIGNIFICANT CHANGE UP (ref 5–17)
AST SERPL-CCNC: 44 U/L — HIGH (ref 10–40)
BILIRUB SERPL-MCNC: 0.8 MG/DL — SIGNIFICANT CHANGE UP (ref 0.2–1.2)
BUN SERPL-MCNC: 12 MG/DL — SIGNIFICANT CHANGE UP (ref 7–23)
CALCIUM SERPL-MCNC: 9 MG/DL — SIGNIFICANT CHANGE UP (ref 8.4–10.5)
CHLORIDE SERPL-SCNC: 109 MMOL/L — HIGH (ref 96–108)
CO2 SERPL-SCNC: 23 MMOL/L — SIGNIFICANT CHANGE UP (ref 22–31)
CREAT SERPL-MCNC: 0.71 MG/DL — SIGNIFICANT CHANGE UP (ref 0.5–1.3)
EGFR: 81 ML/MIN/1.73M2 — SIGNIFICANT CHANGE UP
GLUCOSE BLDC GLUCOMTR-MCNC: 106 MG/DL — HIGH (ref 70–99)
GLUCOSE BLDC GLUCOMTR-MCNC: 167 MG/DL — HIGH (ref 70–99)
GLUCOSE BLDC GLUCOMTR-MCNC: 68 MG/DL — LOW (ref 70–99)
GLUCOSE BLDC GLUCOMTR-MCNC: 70 MG/DL — SIGNIFICANT CHANGE UP (ref 70–99)
GLUCOSE SERPL-MCNC: 106 MG/DL — HIGH (ref 70–99)
POTASSIUM SERPL-MCNC: 3.6 MMOL/L — SIGNIFICANT CHANGE UP (ref 3.5–5.3)
POTASSIUM SERPL-SCNC: 3.6 MMOL/L — SIGNIFICANT CHANGE UP (ref 3.5–5.3)
PROT SERPL-MCNC: 5.9 G/DL — LOW (ref 6–8.3)
SODIUM SERPL-SCNC: 140 MMOL/L — SIGNIFICANT CHANGE UP (ref 135–145)

## 2024-05-25 RX ORDER — DEXTROSE 50 % IN WATER 50 %
15 SYRINGE (ML) INTRAVENOUS ONCE
Refills: 0 | Status: DISCONTINUED | OUTPATIENT
Start: 2024-05-25 | End: 2024-06-06

## 2024-05-25 RX ORDER — DEXTROSE 50 % IN WATER 50 %
25 SYRINGE (ML) INTRAVENOUS ONCE
Refills: 0 | Status: DISCONTINUED | OUTPATIENT
Start: 2024-05-25 | End: 2024-06-06

## 2024-05-25 RX ORDER — DEXTROSE 50 % IN WATER 50 %
12.5 SYRINGE (ML) INTRAVENOUS ONCE
Refills: 0 | Status: COMPLETED | OUTPATIENT
Start: 2024-05-25 | End: 2024-05-25

## 2024-05-25 RX ORDER — GLUCAGON INJECTION, SOLUTION 0.5 MG/.1ML
1 INJECTION, SOLUTION SUBCUTANEOUS ONCE
Refills: 0 | Status: DISCONTINUED | OUTPATIENT
Start: 2024-05-25 | End: 2024-06-06

## 2024-05-25 RX ORDER — DEXTROSE 50 % IN WATER 50 %
12.5 SYRINGE (ML) INTRAVENOUS ONCE
Refills: 0 | Status: DISCONTINUED | OUTPATIENT
Start: 2024-05-25 | End: 2024-06-06

## 2024-05-25 RX ADMIN — CHLORHEXIDINE GLUCONATE 1 APPLICATION(S): 213 SOLUTION TOPICAL at 11:30

## 2024-05-25 RX ADMIN — Medication 12.5 GRAM(S): at 07:22

## 2024-05-25 RX ADMIN — SODIUM CHLORIDE 100 MILLILITER(S): 9 INJECTION, SOLUTION INTRAVENOUS at 12:10

## 2024-05-25 RX ADMIN — MEROPENEM 100 MILLIGRAM(S): 1 INJECTION INTRAVENOUS at 17:04

## 2024-05-25 RX ADMIN — DONEPEZIL HYDROCHLORIDE 10 MILLIGRAM(S): 10 TABLET, FILM COATED ORAL at 22:00

## 2024-05-25 RX ADMIN — SENNA PLUS 2 TABLET(S): 8.6 TABLET ORAL at 22:00

## 2024-05-25 RX ADMIN — MEROPENEM 100 MILLIGRAM(S): 1 INJECTION INTRAVENOUS at 05:47

## 2024-05-25 NOTE — PROVIDER CONTACT NOTE (OTHER) - ACTION/TREATMENT ORDERED:
Tele called in to set low parameters of HR to 47bpm per JEANETTE Antonio. Safety checks and cardiac monitoring ongoing.

## 2024-05-25 NOTE — SWALLOW BEDSIDE ASSESSMENT ADULT - SLP GENERAL OBSERVATIONS
Pt received upright and alert, Aox0, on room air, on x1 IV and wheeler cath. Pt Aox0, selectively participative in directives (did not participate in majority of directives for this examination), verbally making wants/needs known but noted to be confused and tangential.

## 2024-05-25 NOTE — SWALLOW BEDSIDE ASSESSMENT ADULT - SWALLOW EVAL: MANDIBULAR STRENGTH AND MOBILITY
unable to assess due to poor participation; pt did demonstrate adequate oral aperture when cued x1, but did not follow further directives

## 2024-05-25 NOTE — PROVIDER CONTACT NOTE (HYPOGLYCEMIA EVENT) - NS PROVIDER CONTACT RECOMMEND-HYPO
Estephanie Adamson NP made aware. Hypoglycemic orders placed. Dextrose 50% 12.5g IVP given. Gave report to day team, they will f/u with repeat fingerstick

## 2024-05-25 NOTE — PROVIDER CONTACT NOTE (HYPOGLYCEMIA EVENT) - NS PROVIDER CONTACT ASSESS-HYPO
Pt A/Ox0, alert but confused (baseline). Pt NPO and FSq6. Pt on D5 and .45NS 100 ml/hr. VSS but HR 42-50s, especially while sleeping. No s&s of pain or discomfort noted.

## 2024-05-25 NOTE — SWALLOW BEDSIDE ASSESSMENT ADULT - H & P REVIEW
89 YO F with PMHx of Myasthenia Gravis (not on medication for past 9 years) and MHx of dementia and chronic low back pain. Patient recently hospitalized after being found with slurred speech and very confused. Most likely due to UTI which not responded to Cipro. Patient transferred to hospital due to seizure activity and was found with sepsis i/s/o UTI. Also w/ AMS w/ seizure activity, improved, c/w IV abx. NPH- neurology does not believe acute mental status changes, could be due to NPH. F/u neurology as outpatient for NPH workup. Hypotensive, c/w IV steroid and fluids. Dysphagia - Mechanical soft diet, aspiration precautions, however, pt then made NPO. Heme/Onc following for thrombocytopenia; monitoring. GI c/s for transaminitis. LFT's trending down since admission, favor related to UTI. US abdomen with cholelithiasis no further intervention needed. LBP 2/2 severe spinal stenosis with out cord pressure - no surgical intervention recommended/yes 89 YO F with PMHx of Myasthenia Gravis (not on medication for past 9 years) and MHx of dementia and chronic low back pain. Patient recently hospitalized after being found with slurred speech and very confused. Most likely due to UTI which not responded to Cipro. Patient transferred to hospital due to seizure activity and was found with sepsis i/s/o UTI. Also w/ AMS w/ seizure activity, improved, c/w IV abx. NPH- neurology does not believe acute mental status changes, could be due to NPH. F/u neurology as outpatient for NPH workup. Hypotensive, c/w IV steroid and fluids. Dysphagia - Mechanical soft diet, aspiration precautions (however, diet order remains NPO since admission).. Heme/Onc following for thrombocytopenia; monitoring. GI c/s for transaminitis. LFT's trending down since admission, favor related to UTI. US abdomen with cholelithiasis no further intervention needed. LBP 2/2 severe spinal stenosis with out cord pressure - no surgical intervention recommended/yes

## 2024-05-25 NOTE — PROVIDER CONTACT NOTE (OTHER) - ASSESSMENT
Pt alert and oriented x 1.  VSS. No distress noted. Sleeping on rounds but easily arousable to verbal stimulus.

## 2024-05-25 NOTE — CHART NOTE - NSCHARTNOTEFT_GEN_A_CORE
Medicine PA Note    Per speech and swallow note, their team is unable to make official diet recommendation given pt declination of PO. Speech and swallow service defers to primary team for start of diet. Will start with pureed and moderately thick liquid. This was discussed with pt's daughter at bedside who is requesting to start with conservative diet and advance as tolerated. She was able to explain what aspiration pneumonia is and requests to start pt on a diet this evening.    KAREN ChoudharyC  [id number]

## 2024-05-25 NOTE — SWALLOW BEDSIDE ASSESSMENT ADULT - SWALLOW EVAL: DIAGNOSIS
Pt is a 80yoF with pmhx of Myasthenia Gravis (not on meds for past 9 years), dementia, presenting w/ sepsis with c/f seizure like activity i/s/o UTI. Multiple p.o. trials attempted w/ maximum encouragement (puree, moderately thick liquids, ice, water), however, pt verbally declining all trials and using hands to push away all presented bolus trials. Unable to make official dysphagia diagnosis given behavioral component, however, as pt w/ no aspiration concern at this time, team may consider initiation prior diet at SNF vs most conservative oral diet (see recommended consistencies for additional details). Pt is a 80yoF with pmhx of Myasthenia Gravis (not on meds for past 9 years), dementia, presenting w/ sepsis with c/f seizure like activity i/s/o UTI. Multiple p.o. trials attempted w/ maximum encouragement (puree, moderately thick liquids, ice, water), however, pt verbally declining all trials and using hands to push away all presented bolus trials. Unable to make official recommendations given behavioral component, however, as pt w/ no aspiration concern at this time, team may consider initiation prior diet at SNF vs most conservative oral diet (see recommended consistencies for additional details).

## 2024-05-25 NOTE — SWALLOW BEDSIDE ASSESSMENT ADULT - ADDITIONAL RECOMMENDATIONS
Swallow: Pt will obtain safe and adequate means of nutrition/hydration/medication via least restrictive means

## 2024-05-25 NOTE — PROGRESS NOTE ADULT - SUBJECTIVE AND OBJECTIVE BOX
Chief Complaint:  Patient is a 89y old  Female who presents with a chief complaint of Seizure activity (24 May 2024 09:00)      Date of service 05-25-24         chlorhexidine 2% Cloths 1 Application(s) Topical daily  dextrose 5% + sodium chloride 0.45%. 1000 milliLiter(s) IV Continuous <Continuous>  dextrose 50% Injectable 25 Gram(s) IV Push once  dextrose 50% Injectable 12.5 Gram(s) IV Push once  dextrose 50% Injectable 25 Gram(s) IV Push once  dextrose Oral Gel 15 Gram(s) Oral once  donepezil 10 milliGRAM(s) Oral at bedtime  glucagon  Injectable 1 milliGRAM(s) IntraMuscular once  meropenem  IVPB 1000 milliGRAM(s) IV Intermittent every 12 hours  multivitamin 1 Tablet(s) Oral daily  senna 2 Tablet(s) Oral at bedtime                            10.8   6.90  )-----------( 56       ( 24 May 2024 07:19 )             33.1       Hemoglobin: 10.8 g/dL (05-24 @ 07:19)  Hemoglobin: 11.1 g/dL (05-23 @ 06:38)  Hemoglobin: 12.2 g/dL (05-22 @ 10:58)      05-25    140  |  109<H>  |  12  ----------------------------<  106<H>  3.6   |  23  |  0.71    Ca    9.0      25 May 2024 05:33    TPro  5.9<L>  /  Alb  3.1<L>  /  TBili  0.8  /  DBili  x   /  AST  44<H>  /  ALT  58<H>  /  AlkPhos  210<H>  05-25    Creatinine Trend: 0.71<--, 0.82<--, 1.14<--, 1.08<--    COAGS:           T(C): 36.7 (05-25-24 @ 08:19), Max: 36.7 (05-24-24 @ 23:47)  HR: 76 (05-25-24 @ 08:19) (55 - 76)  BP: 132/66 (05-25-24 @ 08:19) (132/66 - 153/90)  RR: 18 (05-25-24 @ 08:19) (17 - 18)  SpO2: 96% (05-25-24 @ 08:19) (94% - 98%)  Wt(kg): --    I&O's Summary    24 May 2024 07:01  -  25 May 2024 07:00  --------------------------------------------------------  IN: 0 mL / OUT: 1300 mL / NET: -1300 mL      Interval Events:   no GI Events  Hospital Medications:  chlorhexidine 2% Cloths 1 Application(s) Topical daily  dextrose 5% + sodium chloride 0.45%. 1000 milliLiter(s) IV Continuous <Continuous>  donepezil 10 milliGRAM(s) Oral at bedtime  meropenem  IVPB 500 milliGRAM(s) IV Intermittent every 12 hours  multivitamin 1 Tablet(s) Oral daily  senna 2 Tablet(s) Oral at bedtime        Review of Systems:  General:  No wt loss, fevers, chills, night sweats, fatigue,   Eyes:  Good vision, no reported pain  ENT:  No sore throat, pain, runny nose, dysphagia  CV:  No pain, palpitations, hypo/hypertension  Resp:  No dyspnea, cough, tachypnea, wheezing  GI:  See HPI  :  No pain, bleeding, incontinence, nocturia  Muscle:  No pain, weakness  Neuro:  No weakness, tingling, memory problems  Psych:  No fatigue, insomnia, mood problems, depression  Endocrine:  No polyuria, polydipsia, cold/heat intolerance  Heme:  No petechiae, ecchymosis, easy bruisability  Integumentary:  No rash, edema           PHYSICAL EXAM:     GENERAL:  Appears stated age, well-groomed, well-nourished, no distress  HEENT:  NC/AT,  conjunctivae anicteric, clear and pink,   NECK: supple, trachea midline  CHEST:  Full & symmetric excursion, no increased effort, breath sounds clear  HEART:  Regular rhythm, no JVD  ABDOMEN:  Soft, non-tender, non-distended, normoactive bowel sounds,  no masses , no hepatosplenomegaly  EXTREMITIES:  no cyanosis,clubbing or edema  SKIN:  No rash, erythema, or, ecchymoses, no jaundice  NEURO:  Alert, non-focal, no asterixis  PSYCH: Appropriate affect, oriented to place and time  RECTAL: Deferred

## 2024-05-25 NOTE — PROGRESS NOTE ADULT - ASSESSMENT
89yo F h/o Myasthenia Gravis (not on medication for past 9 years), Dementia and Chronic low back pain, recently hospitalized for UTI and now p/w sepsis related to recurrent UTI. GI Consulted for increased LFTs.    1. Increased LFTs  trending down since admission  favor related to systemic process (uti)  US abdomen with cholelithiasis no further intervention needed  trend LFTs    2. Recurrent UTI w/AMS  on IV abx   cont management per ID    3. Myasthenia Gravis  per primary

## 2024-05-25 NOTE — SWALLOW BEDSIDE ASSESSMENT ADULT - COMMENTS
hx con't  dietician> Pt unable to participate with interview 2/2 dementia. Per previous RD note from 2/2024 admission, Pt was on a puree, moderately thickened liquid diet with poor PO and family was not interested in a PEG. Per SNF paperwork, Pt was ordered for a mechanical soft diet (allowing lettuce and tomato) with a power pudding 1x/day and confirms poor PO intake/FTT.    ID c/s for bacteremia, suspected to be urinary source; on abx. Also on meropenem to cover for possible meningitis and /or to cover biliary sepsis. pt with baseline dementia, and additionally here likely metabolic encephalopathy.    5/23 started on keppra because 2-3 seizure activities noted per heme/onc note    wound> R heel DTI    CXR 5/22 IMPRESSION: Trace right pleural effusion with adjacent passive atelectasis. Small left pleural effusion with adjacent passive atelectasis.  CTH 5/22 IMPRESSION: 1.  No acute intracranial hemorrhage or mass effect.  2.  Redemonstration of diffuse ventricular dilatation out of proportion to the sulci, suggesting normal pressure hydrocephalus in the appropriate clinical setting.    Dysphagia hx: known to this service from Jan 2020 w/ multiple bedside swallow exams. No MBS in PACS. Most recent bedside swallow exam on March 8, 2024 recommended puree and mildly thick liquids w/ total feeding assistance. However, as pt has been here for prior UTI, prior bedside swallow exams have fluctuated in recommendation for puree with thin liquids to moderately thick liquids. hx con't  dietician> Pt unable to participate with interview 2/2 dementia. Per previous RD note from 2/2024 admission, Pt was on a puree, moderately thickened liquid diet with poor PO and family was not interested in a PEG. Per SNF paperwork, Pt was ordered for a mechanical soft diet (allowing lettuce and tomato) with a power pudding 1x/day and confirms poor PO intake/FTT.    ID c/s for bacteremia, suspected to be urinary source; on abx. Also on meropenem to cover for possible meningitis and /or to cover biliary sepsis. pt with baseline dementia, and additionally here likely metabolic encephalopathy.  wound> R heel DTI    CXR 5/22 IMPRESSION: Trace right pleural effusion with adjacent passive atelectasis. Small left pleural effusion with adjacent passive atelectasis.  CTH 5/22 IMPRESSION: 1.  No acute intracranial hemorrhage or mass effect.  2.  Redemonstration of diffuse ventricular dilatation out of proportion to the sulci, suggesting normal pressure hydrocephalus in the appropriate clinical setting.  5/25 per d/w RN Deanna, pt not on keppra and w/ no seizure activity this admission. Pt w/ poor p.o. intake in general. Per d/w provider Marisela, no c/f MG exacerbation this admission.    Dysphagia hx: known to this service from Jan 2020 w/ multiple bedside swallow exams. No MBS in PACS. Most recent bedside swallow exam on March 8, 2024 recommended puree and mildly thick liquids w/ total feeding assistance. However, as most recently, as pt has been here for prior UTI, prior bedside swallow exams have fluctuated in recommendations for puree with thin liquids to moderately thick liquids.

## 2024-05-25 NOTE — SWALLOW BEDSIDE ASSESSMENT ADULT - SWALLOW EVAL: RECOMMENDED DIET
Unable to make official diet recommendation given pt declination of p.o., however, given no aspiration concern, team may consider diet at SNF of minced-moist and thin liquids or most conservative texture of puree and moderately thick liquids

## 2024-05-25 NOTE — SWALLOW BEDSIDE ASSESSMENT ADULT - NS SPL SWALLOW CLINIC TRIAL FT
Multiple p.o. trials offered and attempted (applesauce, pudding, moderately thick liquids, ice chips, water), however, pt verbally declining and using hands to push bolus away when clinician presented them. Maximum verbal encouragement provided, however, pt continued to decline po. Pt reporting that she is neither hunger or thirsty.

## 2024-05-25 NOTE — PROGRESS NOTE ADULT - SUBJECTIVE AND OBJECTIVE BOX
Date of Service: 05-25-24 @ 11:32           CARDIOLOGY     PROGRESS  NOTE   ________________________________________________    CHIEF COMPLAINT:Patient is a 89y old  Female who presents with a chief complaint of Seizure activity (25 May 2024 09:10)  no complain  	  REVIEW OF SYSTEMS:  CONSTITUTIONAL: No fever, weight loss, or fatigue  EYES: No eye pain, visual disturbances, or discharge  ENT:  No difficulty hearing, tinnitus, vertigo; No sinus or throat pain  NECK: No pain or stiffness  RESPIRATORY: No cough, wheezing, chills or hemoptysis; No Shortness of Breath  CARDIOVASCULAR: No chest pain, palpitations, passing out, dizziness, or leg swelling  GASTROINTESTINAL: No abdominal or epigastric pain. No nausea, vomiting, or hematemesis; No diarrhea or constipation. No melena or hematochezia.  GENITOURINARY: No dysuria, frequency, hematuria, or incontinence  NEUROLOGICAL: No headaches, memory loss, loss of strength, numbness, or tremors  SKIN: No itching, burning, rashes, or lesions   LYMPH Nodes: No enlarged glands  ENDOCRINE: No heat or cold intolerance; No hair loss  MUSCULOSKELETAL: No joint pain or swelling; No muscle, back, or extremity pain  PSYCHIATRIC: No depression, anxiety, mood swings, or difficulty sleeping  HEME/LYMPH: No easy bruising, or bleeding gums  ALLERGY AND IMMUNOLOGIC: No hives or eczema	    [ ] All others negative	  [ x] Unable to obtain    PHYSICAL EXAM:  T(C): 36.7 (05-25-24 @ 08:19), Max: 36.7 (05-24-24 @ 23:47)  HR: 76 (05-25-24 @ 08:19) (55 - 76)  BP: 132/66 (05-25-24 @ 08:19) (132/66 - 153/90)  RR: 18 (05-25-24 @ 08:19) (17 - 18)  SpO2: 96% (05-25-24 @ 08:19) (94% - 98%)  Wt(kg): --  I&O's Summary    24 May 2024 07:01  -  25 May 2024 07:00  --------------------------------------------------------  IN: 0 mL / OUT: 1300 mL / NET: -1300 mL    25 May 2024 07:01  -  25 May 2024 11:32  --------------------------------------------------------  IN: 0 mL / OUT: 0 mL / NET: 0 mL        Appearance: Normal	  HEENT:   Normal oral mucosa, PERRL, EOMI	  Lymphatic: No lymphadenopathy  Cardiovascular: Normal S1 S2, No JVD, + murmurs, No edema  Respiratory:rhonchi  Gastrointestinal:  Soft, Non-tender, + BS	  Skin: No rashes, No ecchymoses, No cyanosis	  Neurologic: can not asses  Extremities: Normal range of motion, No clubbing, cyanosis or edema  Vascular: Peripheral pulses palpable 2+ bilaterally    MEDICATIONS  (STANDING):  chlorhexidine 2% Cloths 1 Application(s) Topical daily  dextrose 5% + sodium chloride 0.45%. 1000 milliLiter(s) (100 mL/Hr) IV Continuous <Continuous>  dextrose 50% Injectable 25 Gram(s) IV Push once  dextrose 50% Injectable 12.5 Gram(s) IV Push once  dextrose 50% Injectable 25 Gram(s) IV Push once  dextrose Oral Gel 15 Gram(s) Oral once  donepezil 10 milliGRAM(s) Oral at bedtime  glucagon  Injectable 1 milliGRAM(s) IntraMuscular once  meropenem  IVPB 1000 milliGRAM(s) IV Intermittent every 12 hours  multivitamin 1 Tablet(s) Oral daily  senna 2 Tablet(s) Oral at bedtime      TELEMETRY: 	    ECG:  	  RADIOLOGY:  OTHER: 	  	  LABS:	 	    CARDIAC MARKERS:                        10.8   6.90  )-----------( 56       ( 24 May 2024 07:19 )             33.1     05-25    140  |  109<H>  |  12  ----------------------------<  106<H>  3.6   |  23  |  0.71    Ca    9.0      25 May 2024 05:33    TPro  5.9<L>  /  Alb  3.1<L>  /  TBili  0.8  /  DBili  x   /  AST  44<H>  /  ALT  58<H>  /  AlkPhos  210<H>  05-25    proBNP:   Lipid Profile:   HgA1c:   TSH: Thyroid Stimulating Hormone, Serum: 2.21 uIU/mL (05-23 @ 06:38)    Culture - Urine (05.22.24 @ 13:08)    -  Ampicillin: R >16 These ampicillin results predict results for amoxicillin   -  Ertapenem: S <=0.5   -  Gentamicin: S <=2   -  Imipenem: S <=1   -  Levofloxacin: R >4   -  Meropenem: S <=1   -  Nitrofurantoin: S <=32 Should not be used to treat pyelonephritis   -  Piperacillin/Tazobactam: SDD 16   -  Tobramycin: R >8   -  Trimethoprim/Sulfamethoxazole: S <=0.5/9.5   -  Ampicillin/Sulbactam: R >16/8   -  Aztreonam: R >16   -  Cefazolin: R >16 For uncomplicated UTI with K. pneumoniae, E. coli, or P. mirablis: MORRO <=16 is sensitive and MORRO >=32 is resistant. This also predicts results for oral agents cefaclor, cefdinir, cefpodoxime, cefprozil, cefuroxime axetil, cephalexin and locarbef for uncomplicated UTI. Note that some isolates may be susceptible to these agents while testing resistant to cefazolin.   -  Cefepime: R >16   -  Ceftriaxone: R >32   -  Cefuroxime: R >16   -  Ciprofloxacin: R >2   Specimen Source: Clean Catch Clean Catch (Midstream)   Culture Results:   10,000 - 49,000 CFU/mL Escherichia coli ESBL   Organism Identification: Escherichia coli ESBL   Organism: Escherichia coli ESBL   Method Type: MORRO      #ESBL E coli Bacteremia   5/22 +BC X2 +ESBL E coli;    5/22 Urine 10 - 49K E coli  agree with change to carbapenem  Increase meropenem 1000 mg IVSS Q 12 hours.   urinary source      #Thrombocytopenia  likely from sepsis    #AMS  pt with baseline dementia, and additionally here likely metabolic encephalopathy  at some point there was also a question of Normal pressure hydrocephalous - neuro to address  ? seizure -     #elevated LFTs  last admission there was concern for a possible passed biliary stone    # Myasthenia Gravis  not on treatment for years   would avoid certain abs- fluoroquinolones , aminoglycosides and macrolides      Suggest  monitor repeat blood cultures  increase Meropenem 1000 every 12 hours    Assessment and plan  ---------------------------  89 YO F with PMHx of Myasthenia Gravis (not on medication for past 9 years) and MHx of dementia and chronic low back pain. Patient recently hospitalized after being found with slurred speech and very confused. Most likely due to UTI which not responded to Cipro. Patient transferred to hospital due to seizure activity and was found with UTI. She was given 1 mg IM.  Sepsis with bacteremia  - with hypothermia, leukocytosis, continue IV abx.   Bacteremia - with ESBL, on Ertapenem. f/u with ID.   AMS - with seizure activity, improved, continue IV Zosyn, f/u panculture,   UTI - as  above   Liver enzyme elevation- improving and trending down. RUQ ultrasound negative. Hepatitis panel negative.  weight loss- due to recent hospitalization and severe sickness on hospital. .Appetite improving Encourage PO intake and supplement diet. Monitor weight. F/u dietitian. Family is aware and refused peg tube.  Vitamin D deficiency- Vitamin D 50,000 weekly. Monitor Vitamin D.  Hypoglycemia- resolved. Appetite improved.  cholelithiasis - without cholecystitis neither pancreatitis. Normal amylase and lipase.  hypotension- continue IV steroid and IV fluid as needed  thrombocytopenia- PLT down, monitor PLT, f/u with Hematology.   NPH- - neurology does not believe acute mental status changes, could be due to NPH. F/u neurology as outpatient for NPH workup.  DVT ppx- Lovenox discontinued due to thrombocytopenia  leg arthritis- Acetaminophen PRN.  b/l cataract- F/u ophthalmology  vitamin D deficiency- continue Vitamin D supplement.  constipation- senna at bedtime.  LBP 2/2 severe spinal stenosis with out cord pressure - no surgical intervention recommended neither accepted by family, continue PT OT as needed.  Dementia - Stable. Continue Aricept .  Dysphagia - Mechanical soft diet, aspiration precautions.  Myasthenia Gravis - stable , off of medication. Monitor CBC and BMP every 2 months  VT ppx - on SCD, no blood thinner due to low PLT.   ID appreciated continue Leilani  not on dvt prophylaxis sec to low platelets

## 2024-05-25 NOTE — PROGRESS NOTE ADULT - SUBJECTIVE AND OBJECTIVE BOX
expressive aphasic  responsive to her name       VITAL SIGNS:    VSS    PHYSICAL EXAM:     GENERAL: no acute distress  HEENT: NC/AT, EOMI, neck supple, MMM  RESPIRATORY: LCTAB/L, no rhonchi, rales, or wheezing  CARDIOVASCULAR: RRR, no murmurs, gallops, rubs  ABDOMINAL: soft, non-tender, non-distended, positive bowel sounds   EXTREMITIES: no clubbing, cyanosis, or edema  NEUROLOGICAL: alert and oriented x 3, non-focal  LYMPHATIC: lymphatic: cervical, supraclavicular, axilla, inguinal  SKIN: no rashes or lesions   MUSCULOSKELETAL: no gross joint deformity                          10.8   6.90  )-----------( 56       ( 24 May 2024 07:19 )             33.1     05-25    140  |  109<H>  |  12  ----------------------------<  106<H>  3.6   |  23  |  0.71    Ca    9.0      25 May 2024 05:33    TPro  5.9<L>  /  Alb  3.1<L>  /  TBili  0.8  /  DBili  x   /  AST  44<H>  /  ALT  58<H>  /  AlkPhos  210<H>  05-25      MEDICATIONS  (STANDING):  chlorhexidine 2% Cloths 1 Application(s) Topical daily  dextrose 50% Injectable 25 Gram(s) IV Push once  dextrose 50% Injectable 12.5 Gram(s) IV Push once  dextrose 50% Injectable 25 Gram(s) IV Push once  dextrose Oral Gel 15 Gram(s) Oral once  donepezil 10 milliGRAM(s) Oral at bedtime  glucagon  Injectable 1 milliGRAM(s) IntraMuscular once  meropenem  IVPB 1000 milliGRAM(s) IV Intermittent every 12 hours  multivitamin 1 Tablet(s) Oral daily  senna 2 Tablet(s) Oral at bedtime

## 2024-05-25 NOTE — PROVIDER CONTACT NOTE (HYPOGLYCEMIA EVENT) - NS PROVIDER CONTACT BACKGROUND-HYPO
Age: 89y    Gender: Female    POCT Blood Glucose:  70 mg/dL (05-25-24 @ 06:48)  68 mg/dL (05-25-24 @ 06:45)  99 mg/dL (05-24-24 @ 23:27)  103 mg/dL (05-24-24 @ 17:12)  95 mg/dL (05-24-24 @ 11:35)  98 mg/dL (05-24-24 @ 07:33)      eMAR:

## 2024-05-26 LAB
ALBUMIN SERPL ELPH-MCNC: 3.1 G/DL — LOW (ref 3.3–5)
ALP SERPL-CCNC: 211 U/L — HIGH (ref 40–120)
ALT FLD-CCNC: 64 U/L — HIGH (ref 10–45)
ANION GAP SERPL CALC-SCNC: 10 MMOL/L — SIGNIFICANT CHANGE UP (ref 5–17)
AST SERPL-CCNC: 49 U/L — HIGH (ref 10–40)
BILIRUB SERPL-MCNC: 0.9 MG/DL — SIGNIFICANT CHANGE UP (ref 0.2–1.2)
BUN SERPL-MCNC: 10 MG/DL — SIGNIFICANT CHANGE UP (ref 7–23)
CALCIUM SERPL-MCNC: 9.2 MG/DL — SIGNIFICANT CHANGE UP (ref 8.4–10.5)
CHLORIDE SERPL-SCNC: 111 MMOL/L — HIGH (ref 96–108)
CO2 SERPL-SCNC: 22 MMOL/L — SIGNIFICANT CHANGE UP (ref 22–31)
CREAT SERPL-MCNC: 0.67 MG/DL — SIGNIFICANT CHANGE UP (ref 0.5–1.3)
EGFR: 84 ML/MIN/1.73M2 — SIGNIFICANT CHANGE UP
GLUCOSE BLDC GLUCOMTR-MCNC: 63 MG/DL — LOW (ref 70–99)
GLUCOSE BLDC GLUCOMTR-MCNC: 71 MG/DL — SIGNIFICANT CHANGE UP (ref 70–99)
GLUCOSE BLDC GLUCOMTR-MCNC: 75 MG/DL — SIGNIFICANT CHANGE UP (ref 70–99)
GLUCOSE BLDC GLUCOMTR-MCNC: 75 MG/DL — SIGNIFICANT CHANGE UP (ref 70–99)
GLUCOSE BLDC GLUCOMTR-MCNC: 90 MG/DL — SIGNIFICANT CHANGE UP (ref 70–99)
GLUCOSE SERPL-MCNC: 69 MG/DL — LOW (ref 70–99)
HCT VFR BLD CALC: 32.3 % — LOW (ref 34.5–45)
HGB BLD-MCNC: 10.9 G/DL — LOW (ref 11.5–15.5)
MCHC RBC-ENTMCNC: 31.6 PG — SIGNIFICANT CHANGE UP (ref 27–34)
MCHC RBC-ENTMCNC: 33.7 GM/DL — SIGNIFICANT CHANGE UP (ref 32–36)
MCV RBC AUTO: 93.6 FL — SIGNIFICANT CHANGE UP (ref 80–100)
NRBC # BLD: 0 /100 WBCS — SIGNIFICANT CHANGE UP (ref 0–0)
PLATELET # BLD AUTO: 42 K/UL — LOW (ref 150–400)
POTASSIUM SERPL-MCNC: 3.5 MMOL/L — SIGNIFICANT CHANGE UP (ref 3.5–5.3)
POTASSIUM SERPL-SCNC: 3.5 MMOL/L — SIGNIFICANT CHANGE UP (ref 3.5–5.3)
PROT SERPL-MCNC: 6 G/DL — SIGNIFICANT CHANGE UP (ref 6–8.3)
RBC # BLD: 3.45 M/UL — LOW (ref 3.8–5.2)
RBC # FLD: 16 % — HIGH (ref 10.3–14.5)
SODIUM SERPL-SCNC: 143 MMOL/L — SIGNIFICANT CHANGE UP (ref 135–145)
WBC # BLD: 4.73 K/UL — SIGNIFICANT CHANGE UP (ref 3.8–10.5)
WBC # FLD AUTO: 4.73 K/UL — SIGNIFICANT CHANGE UP (ref 3.8–10.5)

## 2024-05-26 RX ORDER — SODIUM CHLORIDE 9 MG/ML
1000 INJECTION, SOLUTION INTRAVENOUS
Refills: 0 | Status: DISCONTINUED | OUTPATIENT
Start: 2024-05-26 | End: 2024-06-01

## 2024-05-26 RX ADMIN — MEROPENEM 100 MILLIGRAM(S): 1 INJECTION INTRAVENOUS at 05:18

## 2024-05-26 RX ADMIN — SODIUM CHLORIDE 100 MILLILITER(S): 9 INJECTION, SOLUTION INTRAVENOUS at 23:16

## 2024-05-26 RX ADMIN — SODIUM CHLORIDE 100 MILLILITER(S): 9 INJECTION, SOLUTION INTRAVENOUS at 12:50

## 2024-05-26 RX ADMIN — MEROPENEM 100 MILLIGRAM(S): 1 INJECTION INTRAVENOUS at 17:09

## 2024-05-26 RX ADMIN — Medication 1 TABLET(S): at 12:11

## 2024-05-26 RX ADMIN — CHLORHEXIDINE GLUCONATE 1 APPLICATION(S): 213 SOLUTION TOPICAL at 12:12

## 2024-05-26 NOTE — PROVIDER CONTACT NOTE (OTHER) - ASSESSMENT
Pt alert and oriented x 1. HR at baseline 45-60's.  Jass's to 45 -47 while asleep. Arouses to verbal and tactile stimulus.

## 2024-05-26 NOTE — PROVIDER CONTACT NOTE (OTHER) - ASSESSMENT
Pt alert and oriented x1. Monitoring ongoing for hypothermia with Axillary temp 91.4 F now.  Bear Hugger in use. Pt arouses to her name and is responsive with stable B/P and O2 sat on room air. PO intake remains poor. Tolerated few spoons of thickened juice. FS 69/ repeat FS 71mg/dl. No change in pt's mentation

## 2024-05-26 NOTE — PROVIDER CONTACT NOTE (OTHER) - ACTION/TREATMENT ORDERED:
ACP Jimena Quiroga notified and to order hypothermia blanket. RN to place extra blankets and hot compresses STAT.

## 2024-05-26 NOTE — PROVIDER CONTACT NOTE (OTHER) - ACTION/TREATMENT ORDERED:
Orders received to set the low parameters to HR 40. And continue cardiac montioring. Tele tech Iveth Serrato notified of the changes.

## 2024-05-26 NOTE — PROGRESS NOTE ADULT - SUBJECTIVE AND OBJECTIVE BOX
Date of Service: 05-26-24 @ 13:00           CARDIOLOGY     PROGRESS  NOTE   ________________________________________________    CHIEF COMPLAINT:Patient is a 89y old  Female who presents with a chief complaint of Seizure activity (26 May 2024 11:22)  no complain  	  REVIEW OF SYSTEMS:  CONSTITUTIONAL: No fever, weight loss, or fatigue  EYES: No eye pain, visual disturbances, or discharge  ENT:  No difficulty hearing, tinnitus, vertigo; No sinus or throat pain  NECK: No pain or stiffness  RESPIRATORY: No cough, wheezing, chills or hemoptysis; No Shortness of Breath  CARDIOVASCULAR: No chest pain, palpitations, passing out, dizziness, or leg swelling  GASTROINTESTINAL: No abdominal or epigastric pain. No nausea, vomiting, or hematemesis; No diarrhea or constipation. No melena or hematochezia.  GENITOURINARY: No dysuria, frequency, hematuria, or incontinence  NEUROLOGICAL: No headaches, memory loss, loss of strength, numbness, or tremors  SKIN: No itching, burning, rashes, or lesions   LYMPH Nodes: No enlarged glands  ENDOCRINE: No heat or cold intolerance; No hair loss  MUSCULOSKELETAL: No joint pain or swelling; No muscle, back, or extremity pain  PSYCHIATRIC: No depression, anxiety, mood swings, or difficulty sleeping  HEME/LYMPH: No easy bruising, or bleeding gums  ALLERGY AND IMMUNOLOGIC: No hives or eczema	    [ x] All others negative	  [ ] Unable to obtain    PHYSICAL EXAM:  T(C): 33 (05-26-24 @ 12:17), Max: 36.7 (05-25-24 @ 20:35)  HR: 66 (05-26-24 @ 12:17) (50 - 68)  BP: 120/56 (05-26-24 @ 12:17) (120/56 - 157/92)  RR: 18 (05-26-24 @ 12:17) (17 - 18)  SpO2: 93% (05-26-24 @ 12:17) (93% - 98%)  Wt(kg): --  I&O's Summary    25 May 2024 07:01  -  26 May 2024 07:00  --------------------------------------------------------  IN: 0 mL / OUT: 600 mL / NET: -600 mL    26 May 2024 07:01  -  26 May 2024 13:00  --------------------------------------------------------  IN: 50 mL / OUT: 0 mL / NET: 50 mL    Appearance: lethargic  HEENT:   Normal oral mucosa, PERRL, EOMI	  Lymphatic: No lymphadenopathy  Cardiovascular: Normal S1 S2, No JVD, + murmurs, No edema  Respiratory: rhonchi  Gastrointestinal:  Soft, Non-tender, + BS	  Skin: No rashes, No ecchymoses, No cyanosis	  Extremities: Normal range of motion, No clubbing, cyanosis or edema  Vascular: Peripheral pulses palpable 2+ bilaterally    MEDICATIONS  (STANDING):  chlorhexidine 2% Cloths 1 Application(s) Topical daily  dextrose 5% + sodium chloride 0.45%. 1000 milliLiter(s) (100 mL/Hr) IV Continuous <Continuous>  dextrose 50% Injectable 12.5 Gram(s) IV Push once  dextrose 50% Injectable 25 Gram(s) IV Push once  dextrose 50% Injectable 25 Gram(s) IV Push once  dextrose Oral Gel 15 Gram(s) Oral once  donepezil 10 milliGRAM(s) Oral at bedtime  glucagon  Injectable 1 milliGRAM(s) IntraMuscular once  meropenem  IVPB 1000 milliGRAM(s) IV Intermittent every 12 hours  multivitamin 1 Tablet(s) Oral daily  senna 2 Tablet(s) Oral at bedtime      TELEMETRY: 	    ECG:  	  RADIOLOGY:  OTHER: 	  	  LABS:	 	    CARDIAC MARKERS:                        10.9   4.73  )-----------( 42       ( 26 May 2024 05:56 )             32.3     05-26    143  |  111<H>  |  10  ----------------------------<  69<L>  3.5   |  22  |  0.67    Ca    9.2      26 May 2024 05:56    TPro  6.0  /  Alb  3.1<L>  /  TBili  0.9  /  DBili  x   /  AST  49<H>  /  ALT  64<H>  /  AlkPhos  211<H>  05-26    proBNP:   Lipid Profile:   HgA1c:   TSH: Thyroid Stimulating Hormone, Serum: 2.21 uIU/mL (05-23 @ 06:38)    < from: 12 Lead ECG (05.22.24 @ 10:37) >  Diagnosis Line SINUS RHYTHM WITHPREMATURE ATRIAL COMPLEXES  LEFT AXIS DEVIATION  LEFT VENTRICULAR HYPERTROPHY WITH QRS WIDENING ( R in aVL , Eulogio product )  CANNOT RULE OUT SEPTAL INFARCT , AGE UNDETERMINED  ABNORMAL ECG  WHEN COMPARED WITH ECG OF 04-MAR-2024 22:29,  SIGNIFICANTCHANGES HAVE OCCURRED        Assessment and plan  ---------------------------  88 YO F with PMHx of Myasthenia Gravis (not on medication for past 9 years) and MHx of dementia and chronic low back pain. Patient recently hospitalized after being found with slurred speech and very confused. Most likely due to UTI which not responded to Cipro. Patient transferred to hospital due to seizure activity and was found with UTI. She was given 1 mg IM.  Sepsis with bacteremia  - with hypothermia, leukocytosis, continue IV abx.   Bacteremia - with ESBL, on Ertapenem. f/u with ID.   AMS - with seizure activity, improved, continue IV Zosyn, f/u panculture,   UTI - as  above   Liver enzyme elevation- improving and trending down. RUQ ultrasound negative. Hepatitis panel negative.  weight loss- due to recent hospitalization and severe sickness on hospital. .Appetite improving Encourage PO intake and supplement diet. Monitor weight. F/u dietitian. Family is aware and refused peg tube.  Vitamin D deficiency- Vitamin D 50,000 weekly. Monitor Vitamin D.  Hypoglycemia- resolved. Appetite improved.  cholelithiasis - without cholecystitis neither pancreatitis. Normal amylase and lipase.  hypotension- continue IV steroid and IV fluid as needed  thrombocytopenia- PLT down, monitor PLT, f/u with Hematology.   NPH- - neurology does not believe acute mental status changes, could be due to NPH. F/u neurology as outpatient for NPH workup.  DVT ppx- Lovenox discontinued due to thrombocytopenia  leg arthritis- Acetaminophen PRN.  b/l cataract- F/u ophthalmology  vitamin D deficiency- continue Vitamin D supplement.  constipation- senna at bedtime.  LBP 2/2 severe spinal stenosis with out cord pressure - no surgical intervention recommended neither accepted by family, continue PT OT as needed.  Dementia - Stable. Continue Aricept .  Dysphagia - Mechanical soft diet, aspiration precautions.  Myasthenia Gravis - stable , off of medication. Monitor CBC and BMP every 2 months  VT ppx - on SCD, no blood thinner due to low PLT.   ID appreciated continue Leilani  not on dvt prophylaxis sec to low platelets

## 2024-05-27 LAB
ALBUMIN SERPL ELPH-MCNC: 2.8 G/DL — LOW (ref 3.3–5)
ALP SERPL-CCNC: 195 U/L — HIGH (ref 40–120)
ALT FLD-CCNC: 52 U/L — HIGH (ref 10–45)
ANION GAP SERPL CALC-SCNC: 8 MMOL/L — SIGNIFICANT CHANGE UP (ref 5–17)
AST SERPL-CCNC: 41 U/L — HIGH (ref 10–40)
BILIRUB SERPL-MCNC: 0.6 MG/DL — SIGNIFICANT CHANGE UP (ref 0.2–1.2)
BUN SERPL-MCNC: 12 MG/DL — SIGNIFICANT CHANGE UP (ref 7–23)
CALCIUM SERPL-MCNC: 8.5 MG/DL — SIGNIFICANT CHANGE UP (ref 8.4–10.5)
CHLORIDE SERPL-SCNC: 110 MMOL/L — HIGH (ref 96–108)
CO2 SERPL-SCNC: 24 MMOL/L — SIGNIFICANT CHANGE UP (ref 22–31)
CREAT SERPL-MCNC: 0.98 MG/DL — SIGNIFICANT CHANGE UP (ref 0.5–1.3)
EGFR: 55 ML/MIN/1.73M2 — LOW
GLUCOSE BLDC GLUCOMTR-MCNC: 89 MG/DL — SIGNIFICANT CHANGE UP (ref 70–99)
GLUCOSE BLDC GLUCOMTR-MCNC: 89 MG/DL — SIGNIFICANT CHANGE UP (ref 70–99)
GLUCOSE BLDC GLUCOMTR-MCNC: 90 MG/DL — SIGNIFICANT CHANGE UP (ref 70–99)
GLUCOSE SERPL-MCNC: 80 MG/DL — SIGNIFICANT CHANGE UP (ref 70–99)
HCT VFR BLD CALC: 29.6 % — LOW (ref 34.5–45)
HGB BLD-MCNC: 9.9 G/DL — LOW (ref 11.5–15.5)
MCHC RBC-ENTMCNC: 31.8 PG — SIGNIFICANT CHANGE UP (ref 27–34)
MCHC RBC-ENTMCNC: 33.4 GM/DL — SIGNIFICANT CHANGE UP (ref 32–36)
MCV RBC AUTO: 95.2 FL — SIGNIFICANT CHANGE UP (ref 80–100)
NRBC # BLD: 0 /100 WBCS — SIGNIFICANT CHANGE UP (ref 0–0)
PLATELET # BLD AUTO: 54 K/UL — LOW (ref 150–400)
POTASSIUM SERPL-MCNC: 4.1 MMOL/L — SIGNIFICANT CHANGE UP (ref 3.5–5.3)
POTASSIUM SERPL-SCNC: 4.1 MMOL/L — SIGNIFICANT CHANGE UP (ref 3.5–5.3)
PROT SERPL-MCNC: 5.2 G/DL — LOW (ref 6–8.3)
RBC # BLD: 3.11 M/UL — LOW (ref 3.8–5.2)
RBC # FLD: 16.5 % — HIGH (ref 10.3–14.5)
SODIUM SERPL-SCNC: 142 MMOL/L — SIGNIFICANT CHANGE UP (ref 135–145)
WBC # BLD: 5.41 K/UL — SIGNIFICANT CHANGE UP (ref 3.8–10.5)
WBC # FLD AUTO: 5.41 K/UL — SIGNIFICANT CHANGE UP (ref 3.8–10.5)

## 2024-05-27 RX ADMIN — MEROPENEM 100 MILLIGRAM(S): 1 INJECTION INTRAVENOUS at 17:08

## 2024-05-27 RX ADMIN — SODIUM CHLORIDE 100 MILLILITER(S): 9 INJECTION, SOLUTION INTRAVENOUS at 10:39

## 2024-05-27 RX ADMIN — MEROPENEM 100 MILLIGRAM(S): 1 INJECTION INTRAVENOUS at 05:28

## 2024-05-27 RX ADMIN — SODIUM CHLORIDE 100 MILLILITER(S): 9 INJECTION, SOLUTION INTRAVENOUS at 21:07

## 2024-05-27 RX ADMIN — CHLORHEXIDINE GLUCONATE 1 APPLICATION(S): 213 SOLUTION TOPICAL at 11:09

## 2024-05-27 NOTE — PROGRESS NOTE ADULT - SUBJECTIVE AND OBJECTIVE BOX
Patient is a 89y old  Female who presents with a chief complaint of Seizure activity (27 May 2024 10:59)      INTERVAL HPI/OVERNIGHT EVENTS: Medically stable w but with significant AMS. Very lethargic.  Bacteremia under controlled. Repeat  blood cx negative. Continue IV abx with Meropenem. F/u ID for more recommendation.  Consult neurology for worsening mental status.     Pain Location & Control:     MEDICATIONS  (STANDING):  chlorhexidine 2% Cloths 1 Application(s) Topical daily  dextrose 5% + sodium chloride 0.45%. 1000 milliLiter(s) (100 mL/Hr) IV Continuous <Continuous>  dextrose 50% Injectable 12.5 Gram(s) IV Push once  dextrose 50% Injectable 25 Gram(s) IV Push once  dextrose 50% Injectable 25 Gram(s) IV Push once  dextrose Oral Gel 15 Gram(s) Oral once  donepezil 10 milliGRAM(s) Oral at bedtime  glucagon  Injectable 1 milliGRAM(s) IntraMuscular once  meropenem  IVPB 1000 milliGRAM(s) IV Intermittent every 12 hours  multivitamin 1 Tablet(s) Oral daily  senna 2 Tablet(s) Oral at bedtime    MEDICATIONS  (PRN):      Allergies    sulfamethoxazole (Vomiting; Nausea)    Intolerances        REVIEW OF SYSTEMS:  UTO due to lethargy     Vital Signs Last 24 Hrs  T(C): 36.3 (27 May 2024 20:33), Max: 37.1 (27 May 2024 08:17)  T(F): 97.3 (27 May 2024 20:33), Max: 98.7 (27 May 2024 08:17)  HR: 72 (27 May 2024 20:33) (72 - 117)  BP: 154/70 (27 May 2024 20:33) (102/68 - 154/70)  BP(mean): --  RR: 18 (27 May 2024 20:33) (17 - 18)  SpO2: 93% (27 May 2024 20:33) (91% - 95%)    Parameters below as of 27 May 2024 20:33  Patient On (Oxygen Delivery Method): room air        PHYSICAL EXAM:  GENERAL: NAD, well-groomed, well-developed  HEAD:  Atraumatic, Normocephalic  EYES: EOMI, PERRLA, conjunctiva and sclera clear  ENMT: No tonsillar erythema, exudates, or enlargement; Moist mucous membranes, Good dentition, No lesions  NECK: Supple, No JVD, Normal thyroid  NERVOUS SYSTEM:  Alert & Oriented X0  CHEST/LUNG: Clear to auscultation bilaterally; No rales, rhonchi, wheezing, or rubs  HEART: Regular rate and rhythm; No murmurs, rubs, or gallops  ABDOMEN: Soft, Nontender, Nondistended; Bowel sounds present  EXTREMITIES:  2+ Peripheral Pulses, No clubbing or cyanosis  LYMPH: No lymphadenopathy noted  SKIN: No rashes or lesions      LABS:                        9.9    5.41  )-----------( 54       ( 27 May 2024 05:32 )             29.6     27 May 2024 05:32    142    |  110    |  12     ----------------------------<  80     4.1     |  24     |  0.98     Ca    8.5        27 May 2024 05:32    TPro  5.2    /  Alb  2.8    /  TBili  0.6    /  DBili  x      /  AST  41     /  ALT  52     /  AlkPhos  195    27 May 2024 05:32      Urinalysis Basic - ( 27 May 2024 05:32 )    Color: x / Appearance: x / SG: x / pH: x  Gluc: 80 mg/dL / Ketone: x  / Bili: x / Urobili: x   Blood: x / Protein: x / Nitrite: x   Leuk Esterase: x / RBC: x / WBC x   Sq Epi: x / Non Sq Epi: x / Bacteria: x      CAPILLARY BLOOD GLUCOSE      POCT Blood Glucose.: 89 mg/dL (27 May 2024 17:55)  POCT Blood Glucose.: 90 mg/dL (27 May 2024 11:54)  POCT Blood Glucose.: 89 mg/dL (27 May 2024 06:04)  POCT Blood Glucose.: 75 mg/dL (26 May 2024 23:56)  POCT Blood Glucose.: 75 mg/dL (26 May 2024 23:38)        Cultures  Culture Results:   No growth at 48 Hours (05-25-24 @ 05:33)  Culture Results:   No growth at 48 Hours (05-25-24 @ 05:15)  Culture Results:   10,000 - 49,000 CFU/mL Escherichia coli ESBL (05-22-24 @ 13:08)  Culture Results:   Growth in anaerobic bottle: Escherichia coli ESBL  Direct identification is available within approximately 3-5  hours either by Blood Panel Multiplexed PCR or Direct  MALDI-TOF. Details: [email or web address] (05-22-24 @ 10:40)  Culture Results:   Growth in anaerobic bottle: Escherichia coli ESBL  See previous culture [id number] (05-22-24 @ 10:30)      RADIOLOGY & ADDITIONAL TESTS:    Imaging Personally Reviewed:  [X ] YES  [ ] NO    Consultant(s) Notes Reviewed:  [ X] YES  [ ] NO    Care Discussed with Consultants/Other Providers [X ] YES  [ ] NO

## 2024-05-27 NOTE — PROGRESS NOTE ADULT - SUBJECTIVE AND OBJECTIVE BOX
expressive aphasic    VITAL SIGNS:    VSS    PHYSICAL EXAM:     GENERAL: no acute distress  HEENT: NC/AT, EOMI, neck supple, MMM  RESPIRATORY: LCTAB/L, no rhonchi, rales, or wheezing  CARDIOVASCULAR: RRR, no murmurs, gallops, rubs  ABDOMINAL: soft, non-tender, non-distended, positive bowel sounds                           9.9    5.41  )-----------( 54       ( 27 May 2024 05:32 )             29.6     05-27    142  |  110<H>  |  12  ----------------------------<  80  4.1   |  24  |  0.98    Ca    8.5      27 May 2024 05:32    TPro  5.2<L>  /  Alb  2.8<L>  /  TBili  0.6  /  DBili  x   /  AST  41<H>  /  ALT  52<H>  /  AlkPhos  195<H>  05-27      MEDICATIONS  (STANDING):  chlorhexidine 2% Cloths 1 Application(s) Topical daily  dextrose 5% + sodium chloride 0.45%. 1000 milliLiter(s) (100 mL/Hr) IV Continuous <Continuous>  dextrose 50% Injectable 12.5 Gram(s) IV Push once  dextrose 50% Injectable 25 Gram(s) IV Push once  dextrose 50% Injectable 25 Gram(s) IV Push once  dextrose Oral Gel 15 Gram(s) Oral once  donepezil 10 milliGRAM(s) Oral at bedtime  glucagon  Injectable 1 milliGRAM(s) IntraMuscular once  meropenem  IVPB 1000 milliGRAM(s) IV Intermittent every 12 hours  multivitamin 1 Tablet(s) Oral daily  senna 2 Tablet(s) Oral at bedtime

## 2024-05-27 NOTE — PROGRESS NOTE ADULT - ASSESSMENT
89 YO F with PMHx of Myasthenia Gravis (not on medication for past 9 years) and MHx of dementia and chronic low back pain. Patient recently hospitalized after being found with slurred speech and very confused. Most likely due to UTI which not responded to Cipro. Patient transferred to hospital due to seizure activity and was found with UTI. She was given 1 mg IM.       Bacteremia/sepsis- resolved. Blood cx negative. Continue IV Meropenem. F/u ID.   AMS- not improved with treating bacteremia. Consult neurology for more workup  UTI - as  above   Liver enzyme elevation- improving and trending down. RUQ ultrasound negative. Hepatitis panel negative.  weight loss- due to recent hospitalization and severe sickness on hospital. .Appetite improving Encourage PO intake and supplement diet. Monitor weight. F/u dietitian. Family is aware and refused peg tube.  Vitamin D deficiency- Vitamin D 50,000 weekly. Monitor Vitamin D.  Hypoglycemia- resolved. Appetite improved.  cholelithiasis - without cholecystitis neither pancreatitis. Normal amylase and lipase.  hypotension- continue IV steroid and IV fluid as needed  thrombocytopenia- PLT down, monitor PLT, f/u with Hematology.   NPH- - neurology does not believe acute mental status changes, could be due to NPH. F/u neurology as outpatient for NPH workup.  DVT ppx- Lovenox discontinued due to thrombocytopenia  leg arthritis- Acetaminophen PRN.  b/l cataract- F/u ophthalmology  vitamin D deficiency- continue Vitamin D supplement.  constipation- senna at bedtime.  LBP 2/2 severe spinal stenosis with out cord pressure - no surgical intervention recommended neither accepted by family, continue PT OT as needed.  Dementia - Stable. Continue Aricept .  Dysphagia - Mechanical soft diet, aspiration precautions.  Myasthenia Gravis - stable , off of medication. Monitor CBC and BMP every 2 months  VT ppx - on SCD, no blood thinner due to low PLT.

## 2024-05-27 NOTE — PROVIDER CONTACT NOTE (OTHER) - ACTION/TREATMENT ORDERED:
CORRIE Quiroga notified. Pt safety maintained. CORRIE Quiroga notified. AM labs of CMP and CBC to be drawn during morning labs. Pt safety maintained.

## 2024-05-28 LAB
ALBUMIN SERPL ELPH-MCNC: 2.4 G/DL — LOW (ref 3.3–5)
ALP SERPL-CCNC: 172 U/L — HIGH (ref 40–120)
ALT FLD-CCNC: 41 U/L — SIGNIFICANT CHANGE UP (ref 10–45)
ANION GAP SERPL CALC-SCNC: 10 MMOL/L — SIGNIFICANT CHANGE UP (ref 5–17)
ANION GAP SERPL CALC-SCNC: 11 MMOL/L — SIGNIFICANT CHANGE UP (ref 5–17)
AST SERPL-CCNC: 36 U/L — SIGNIFICANT CHANGE UP (ref 10–40)
BILIRUB SERPL-MCNC: 0.6 MG/DL — SIGNIFICANT CHANGE UP (ref 0.2–1.2)
BUN SERPL-MCNC: 10 MG/DL — SIGNIFICANT CHANGE UP (ref 7–23)
BUN SERPL-MCNC: 10 MG/DL — SIGNIFICANT CHANGE UP (ref 7–23)
CALCIUM SERPL-MCNC: 7.7 MG/DL — LOW (ref 8.4–10.5)
CALCIUM SERPL-MCNC: 8.7 MG/DL — SIGNIFICANT CHANGE UP (ref 8.4–10.5)
CHLORIDE SERPL-SCNC: 104 MMOL/L — SIGNIFICANT CHANGE UP (ref 96–108)
CHLORIDE SERPL-SCNC: 107 MMOL/L — SIGNIFICANT CHANGE UP (ref 96–108)
CO2 SERPL-SCNC: 20 MMOL/L — LOW (ref 22–31)
CO2 SERPL-SCNC: 22 MMOL/L — SIGNIFICANT CHANGE UP (ref 22–31)
CREAT SERPL-MCNC: 0.83 MG/DL — SIGNIFICANT CHANGE UP (ref 0.5–1.3)
CREAT SERPL-MCNC: 0.93 MG/DL — SIGNIFICANT CHANGE UP (ref 0.5–1.3)
EGFR: 59 ML/MIN/1.73M2 — LOW
EGFR: 67 ML/MIN/1.73M2 — SIGNIFICANT CHANGE UP
GLUCOSE BLDC GLUCOMTR-MCNC: 103 MG/DL — HIGH (ref 70–99)
GLUCOSE BLDC GLUCOMTR-MCNC: 104 MG/DL — HIGH (ref 70–99)
GLUCOSE BLDC GLUCOMTR-MCNC: 106 MG/DL — HIGH (ref 70–99)
GLUCOSE BLDC GLUCOMTR-MCNC: 75 MG/DL — SIGNIFICANT CHANGE UP (ref 70–99)
GLUCOSE BLDC GLUCOMTR-MCNC: 95 MG/DL — SIGNIFICANT CHANGE UP (ref 70–99)
GLUCOSE SERPL-MCNC: 479 MG/DL — CRITICAL HIGH (ref 70–99)
GLUCOSE SERPL-MCNC: 97 MG/DL — SIGNIFICANT CHANGE UP (ref 70–99)
HCT VFR BLD CALC: 29 % — LOW (ref 34.5–45)
HGB BLD-MCNC: 9.6 G/DL — LOW (ref 11.5–15.5)
MCHC RBC-ENTMCNC: 31.9 PG — SIGNIFICANT CHANGE UP (ref 27–34)
MCHC RBC-ENTMCNC: 33.1 GM/DL — SIGNIFICANT CHANGE UP (ref 32–36)
MCV RBC AUTO: 96.3 FL — SIGNIFICANT CHANGE UP (ref 80–100)
NRBC # BLD: 0 /100 WBCS — SIGNIFICANT CHANGE UP (ref 0–0)
PLATELET # BLD AUTO: 56 K/UL — LOW (ref 150–400)
POTASSIUM SERPL-MCNC: 3.3 MMOL/L — LOW (ref 3.5–5.3)
POTASSIUM SERPL-MCNC: 3.7 MMOL/L — SIGNIFICANT CHANGE UP (ref 3.5–5.3)
POTASSIUM SERPL-SCNC: 3.3 MMOL/L — LOW (ref 3.5–5.3)
POTASSIUM SERPL-SCNC: 3.7 MMOL/L — SIGNIFICANT CHANGE UP (ref 3.5–5.3)
PROT SERPL-MCNC: 4.9 G/DL — LOW (ref 6–8.3)
RBC # BLD: 3.01 M/UL — LOW (ref 3.8–5.2)
RBC # FLD: 16.1 % — HIGH (ref 10.3–14.5)
SODIUM SERPL-SCNC: 135 MMOL/L — SIGNIFICANT CHANGE UP (ref 135–145)
SODIUM SERPL-SCNC: 139 MMOL/L — SIGNIFICANT CHANGE UP (ref 135–145)
WBC # BLD: 5.29 K/UL — SIGNIFICANT CHANGE UP (ref 3.8–10.5)
WBC # FLD AUTO: 5.29 K/UL — SIGNIFICANT CHANGE UP (ref 3.8–10.5)

## 2024-05-28 PROCEDURE — 99232 SBSQ HOSP IP/OBS MODERATE 35: CPT

## 2024-05-28 RX ADMIN — MEROPENEM 100 MILLIGRAM(S): 1 INJECTION INTRAVENOUS at 17:23

## 2024-05-28 RX ADMIN — MEROPENEM 100 MILLIGRAM(S): 1 INJECTION INTRAVENOUS at 06:14

## 2024-05-28 RX ADMIN — SODIUM CHLORIDE 100 MILLILITER(S): 9 INJECTION, SOLUTION INTRAVENOUS at 06:38

## 2024-05-28 RX ADMIN — CHLORHEXIDINE GLUCONATE 1 APPLICATION(S): 213 SOLUTION TOPICAL at 11:50

## 2024-05-28 RX ADMIN — SODIUM CHLORIDE 100 MILLILITER(S): 9 INJECTION, SOLUTION INTRAVENOUS at 17:23

## 2024-05-28 NOTE — PROGRESS NOTE ADULT - SUBJECTIVE AND OBJECTIVE BOX
expressive aphasic        VITAL SIGNS:    VSA    PHYSICAL EXAM:     GENERAL: no acute distress  HEENT: NC/AT, EOMI, neck supple, MMM  RESPIRATORY: LCTAB/L, no rhonchi, rales, or wheezing  CARDIOVASCULAR: RRR, no murmurs, gallops, rubs  ABDOMINAL: soft, non-tender, non-distended, positive bowel sounds   EXTREMITIES: no clubbing, cyanosis, or edema  NEUROLOGICAL: aphasic, non-focal                        9.6    5.29  )-----------( 56       ( 28 May 2024 07:02 )             29.0     05-28    139  |  107  |  10  ----------------------------<  97  3.7   |  22  |  0.93    Ca    8.7      28 May 2024 09:18    TPro  4.9<L>  /  Alb  2.4<L>  /  TBili  0.6  /  DBili  x   /  AST  36  /  ALT  41  /  AlkPhos  172<H>  05-28      MEDICATIONS  (STANDING):  chlorhexidine 2% Cloths 1 Application(s) Topical daily  dextrose 5% + sodium chloride 0.45%. 1000 milliLiter(s) (100 mL/Hr) IV Continuous <Continuous>  dextrose 50% Injectable 12.5 Gram(s) IV Push once  dextrose 50% Injectable 25 Gram(s) IV Push once  dextrose 50% Injectable 25 Gram(s) IV Push once  dextrose Oral Gel 15 Gram(s) Oral once  donepezil 10 milliGRAM(s) Oral at bedtime  glucagon  Injectable 1 milliGRAM(s) IntraMuscular once  meropenem  IVPB 1000 milliGRAM(s) IV Intermittent every 12 hours  multivitamin 1 Tablet(s) Oral daily  senna 2 Tablet(s) Oral at bedtime

## 2024-05-28 NOTE — PROVIDER CONTACT NOTE (MEDICATION) - BACKGROUND
(Admit Diagnosis) Urinary tract infection. UTI (urinary tract infection). Problem/DX: Thrombocytopenia, Dementia, Myasthenia gravis; Acute UTI Secondary Other encephalopathy. Hx of poor PO intake.

## 2024-05-28 NOTE — CONSULT NOTE ADULT - ASSESSMENT
[] antibiotics to avoid or use with caution in MG:  Aminoglycosides? (eg, amikacin, gentamicin, neomycin, tobramycin)  Fluoroquinolones? (eg, ciprofloxacin, levofloxacin, moxifloxacin, ofloxacin)  Macrolides (eg, azithromycin, clarithromycin, erythromycin)  89 yo F, Hx dementia, on Aricept, ?MG, not on treatment for past 9 years or so, who presented to [facility]/22/24 after being found down with slurred speech and confusion, thought to be due to UTI. Patient has become lethargic, was noted to be unable to swallow PO. VSS, asymptomatic. UA 5/22 with occasial bacteria, pos nitrite, mod LE.  Temp 97.9. HR 84. /86. RR 18. 95% RA.WBC 5.29. HGB 9.6. PLT 56. Cr 0.93. BUN 10. EGFR 59. Patient was seen at bedside by neurology. Patient not cooperative with exam. Per nursing at bedside, patient AO0-1 at baseline, not really verbal and bedbound, was more interactive 3 days ago, and then since 2 days ago has been appearing more lethargic, sleeping all day, seems less active and more listless, but moving all extremities symmetrically (has contractures of the UE).    mRS: 4+  LKN: 3 days ago  NIHSS: 16    Impression: Elderly female patient with altered mental status, encephalopathy, differentials include possibly toxic metabolic infectious in setting of UTI and e coli ESBL bacteria; vs subclinical seizures, vs recrudescence/diminished physiologic reserve in the setting of acute illness in patient with dementia/potential NPH cognitive deficits, vs delirium due to prolonged hospital stay    Recommendations:  [] toxic metabolic infectious workup and management per primary team  [] video EEG  [] vitamin B1, B6, B9, B12, WNV, Lyme  [] MRI brain without contrast   [] Lipid panel, HbA1c, CBC, CMP, coagulation panel, Mg, Ph, TFT  [] DVT prophylaxis per primary team  [] NPO unless passes dysphagia screen; swallow eval if fails  [] Telemonitoring  [] Neurological checks and vital signs per unit protocol  [] BGM goals 140-180  [] PT/OT  [] S/S evaluation  [] antibiotics to avoid or use with caution in MG: Aminoglycosides, (eg, amikacin, gentamicin, neomycin, tobramycin) Fluoroquinolones, (eg, ciprofloxacin, levofloxacin, moxifloxacin, ofloxacin)  Macrolides (eg, azithromycin, clarithromycin, erythromycin)  [] Monitor NIF/VC q8h   [] Fall and aspiration precautions     * Case and plan not final until Attending attestation *

## 2024-05-28 NOTE — PROGRESS NOTE ADULT - ASSESSMENT
88 YO F with PMHx of Myasthenia Gravis (not on medication for past 9 years) and MHx of dementia and chronic low back pain. Patient recently hospitalized after being found with slurred speech and very confused. Most likely due to UTI which not responded to Cipro. Patient transferred to hospital due to seizure activity and was found with UTI.     Pt was initially started on vancomycin and zosyn but switched to ertapenem when found to have an ESBL E Coli in her blood  ID is asked to evaluate     During the last admission in feb/March -PT presented from her nursing home with worsening lethargy and confusion. UA done as outpatient on 2/13 grossly positive and urine culture growing E. coli and Klebsiella, pansensitive. Found to be hypothermic in ED. Admitted for sepsis and metabolic encephalopathy in setting of UTI. Multiple RRTs called today. First RRT called for seizure-like activity. Patient loaded with Keppra and placed on EEG. Second RRT called for hypotension. Patient was given 4 L IVF with resolution of hypotension. pt changed to meropenem to cover for possible meningitis- ? Normal pressure hydrocephalous on CT- discussed with neuro- this wouldn't account for her acute change  on meropenem to cover for possible meningitis and /or to cover biliary sepsis   EEG not c/w HSV encephalitis but added  acyclovir until more definitive diagnosis available, Course significant for a distended GB but neg HIDA scan , renal insufficiency and thrombocytopenia ( at presentation) and hypernatremia     Antibiotics  Vanco 5/22  Zosyn %/22 --> 23  Ertapenem 5/23  Meropenem 5/24 -->     5/24  renal function improved    A/P  #ESBL E coli Bacteremia   5/22 +BC X2 +ESBL E coli;    5/22 Urine 10 - 49K E coli  agree with change to carbapenem  Increase meropenem 1000 mg IVSS Q 12 hours.   urinary source      #Thrombocytopenia  likely from sepsis  trend  still off  check HIT abs    #AMS  pt with baseline dementia, and additionally here likely metabolic encephalopathy  at some point there was also a question of Normal pressure hydrocephalous - neuro to address  ? seizure -   for MRI and EEG    #elevated LFTs  last admission there was concern for a possible passed biliary stone    # Myasthenia Gravis  not on treatment for years   would avoid certain abs- fluoroquinolones , aminoglycosides and macrolides      Yeni Gabriel M.D. ,   please reach via teams   If no answer, or after 5PM/ weekends,  then please call  [phone]    Assessment and plan discussed with the primary team .    I will be away tomorrow. My associates will be covering. Please call [phone] with acute issues, questions.

## 2024-05-28 NOTE — PROVIDER CONTACT NOTE (CRITICAL VALUE NOTIFICATION) - BACKGROUND
Pt admitted for AMS 2/2 UTI. PMH of dementia, myasthenia gravis.
Patient admitted for AMS, UTI. PMH of dementia, myasthenia gravis.
Dx UTI/AMS  Hx Dementia, Myasthenia Gravia
no

## 2024-05-28 NOTE — PROVIDER CONTACT NOTE (CRITICAL VALUE NOTIFICATION) - ASSESSMENT
Pt aox0-1, responsive, no s/s of distress observed/reported. VSS/ asymptomatic.
BCx 5/22/24 growth in anaerobic bottle : gram negative rods. A/Ox0, not verbal (baseline). No S&S of pain/discomfort. VSS
Patient A&Oxo, nonverbal. No s/s of pain/discomfort.

## 2024-05-28 NOTE — PROVIDER CONTACT NOTE (CRITICAL VALUE NOTIFICATION) - PERSON GIVING RESULT:
Yuliya Jung
[facility] lab,Val
Myranda Lantigua (outpatient [facility])

## 2024-05-28 NOTE — PROVIDER CONTACT NOTE (CRITICAL VALUE NOTIFICATION) - SITUATION
Preliminary growth in anaerobic bottle for gram (-) rods.
BCx 5/22/24 growth in anaerobic bottle : gram negative rods
Serum glucose 479

## 2024-05-28 NOTE — PROVIDER CONTACT NOTE (CRITICAL VALUE NOTIFICATION) - ACTION/TREATMENT ORDERED:
As per Luann REID, plan of care ongoing (c/w current zosyn).
Iveth Navas, ACP notified. As per  Iveth Navas plan of care on going ( continue with ertapenem ).
Repeat BMP ordered. Will continue to monitor.

## 2024-05-28 NOTE — PROGRESS NOTE ADULT - SUBJECTIVE AND OBJECTIVE BOX
Patient is a 89y old  Female who presents with a chief complaint of Seizure activity (28 May 2024 11:52)    Being followed by ID for        Interval history:  No other acute events      ROS:  No cough,SOB,CP  No N/V/D  No abd pain  No urinary complaints  No HA  No joint or limb pain  No other complaints    PAST MEDICAL & SURGICAL HISTORY:  Myasthenia gravis      Dementia      Myasthenia gravis      No significant past surgical history      No significant past surgical history        Allergies    sulfamethoxazole (Vomiting; Nausea)    Intolerances      Antimicrobials:    meropenem  IVPB 1000 milliGRAM(s) IV Intermittent every 12 hours    MEDICATIONS  (STANDING):  chlorhexidine 2% Cloths 1 Application(s) Topical daily  dextrose 5% + sodium chloride 0.45%. 1000 milliLiter(s) (100 mL/Hr) IV Continuous <Continuous>  dextrose 50% Injectable 12.5 Gram(s) IV Push once  dextrose 50% Injectable 25 Gram(s) IV Push once  dextrose 50% Injectable 25 Gram(s) IV Push once  dextrose Oral Gel 15 Gram(s) Oral once  donepezil 10 milliGRAM(s) Oral at bedtime  glucagon  Injectable 1 milliGRAM(s) IntraMuscular once  meropenem  IVPB 1000 milliGRAM(s) IV Intermittent every 12 hours  multivitamin 1 Tablet(s) Oral daily  senna 2 Tablet(s) Oral at bedtime      Vital Signs Last 24 Hrs  T(C): 36.6 (05-28-24 @ 11:05), Max: 36.9 (05-28-24 @ 03:26)  T(F): 97.9 (05-28-24 @ 11:05), Max: 98.4 (05-28-24 @ 03:26)  HR: 84 (05-28-24 @ 11:05) (60 - 117)  BP: 147/86 (05-28-24 @ 11:05) (123/79 - 154/70)  BP(mean): --  RR: 18 (05-28-24 @ 11:05) (18 - 18)  SpO2: 95% (05-28-24 @ 11:05) (91% - 95%)    Physical Exam:    Constitutional well preserved,comfortable,pleasant    HEENT PERRLA EOMI,No pallor or icterus    No oral exudate or erythema    Neck supple no JVD or LN    Chest Good AE,CTA    CVS RRR S1 S2 WNl No murmur or rub or gallop    Abd soft BS normal No tenderness no masses    Ext No cyanosis clubbing or edema    IV site no erythema tenderness or discharge    Joints no swelling or LOM    CNS AAO X 3 no focal    Lab Data:                          9.6    5.29  )-----------( 56       ( 28 May 2024 07:02 )             29.0       05-28    139  |  107  |  10  ----------------------------<  97  3.7   |  22  |  0.93    Ca    8.7      28 May 2024 09:18    TPro  4.9<L>  /  Alb  2.4<L>  /  TBili  0.6  /  DBili  x   /  AST  36  /  ALT  41  /  AlkPhos  172<H>  05-28      Urinalysis (05.22.24 @ 13:08)    Glucose Qualitative, Urine: Negative mg/dL   Blood, Urine: Moderate   pH Urine: 5.5   Color: Dark Yellow   Urine Appearance: Cloudy   Bilirubin: Negative   Ketone - Urine: Trace mg/dL   Specific Gravity: 1.022   Protein, Urine: 100 mg/dL   Urobilinogen: 1.0 mg/dL   Nitrite: Positive   Leukocyte Esterase Concentration: Moderate  Urine Microscopic-Add On (NC) (05.22.24 @ 13:08)    Cast: 0 /LPF   Red Blood Cell - Urine: 8 /HPF   White Blood Cell - Urine: 42 /HPF   Bacteria: Occasional /HPF   Epithelial Cells: 2 /HPF          .Blood Blood  05-25-24   No growth at 72 Hours  --  --      .Blood Blood-Peripheral  05-25-24   No growth at 72 Hours  --  --      Clean Catch Clean Catch (Midstream)  05-22-24   10,000 - 49,000 CFU/mL Escherichia coli ESBL  --  Escherichia coli ESBL      .Blood Blood-Peripheral  05-22-24   Growth in anaerobic bottle: Escherichia coli ESBL  Direct identification is available within approximately 3-5  hours either by Blood Panel Multiplexed PCR or Direct  MALDI-TOF. Details: [email or web address]  --  Blood Culture PCR  Escherichia coli ESBL      .Blood Blood-Peripheral  05-22-24   Growth in anaerobic bottle: Escherichia coli ESBL  See previous culture [id number]  --    Growth in anaerobic bottle: Gram Negative Rods                    WBC Count: 5.29 (05-28-24 @ 07:02)  WBC Count: 5.41 (05-27-24 @ 05:32)  WBC Count: 4.73 (05-26-24 @ 05:56)  WBC Count: 6.90 (05-24-24 @ 07:19)  WBC Count: 9.43 (05-23-24 @ 06:38)  WBC Count: 11.22 (05-22-24 @ 10:58)       Bilirubin Total: 0.6 mg/dL (05-28-24 @ 06:53)  Aspartate Aminotransferase (AST/SGOT): 36 U/L (05-28-24 @ 06:53)  Alanine Aminotransferase (ALT/SGPT): 41 U/L (05-28-24 @ 06:53)  Alkaline Phosphatase: 172 U/L (05-28-24 @ 06:53)    Bilirubin Total: 0.6 mg/dL (05-27-24 @ 05:32)  Aspartate Aminotransferase (AST/SGOT): 41 U/L (05-27-24 @ 05:32)  Alanine Aminotransferase (ALT/SGPT): 52 U/L (05-27-24 @ 05:32)  Alkaline Phosphatase: 195 U/L (05-27-24 @ 05:32)    Bilirubin Total: 0.9 mg/dL (05-26-24 @ 05:56)  Aspartate Aminotransferase (AST/SGOT): 49 U/L (05-26-24 @ 05:56)  Alanine Aminotransferase (ALT/SGPT): 64 U/L (05-26-24 @ 05:56)  Alkaline Phosphatase: 211 U/L (05-26-24 @ 05:56)             Patient is a 89y old  Female who presents with a chief complaint of Seizure activity (28 May 2024 11:52)    Being followed by ID for    Interval history:  pt sleeping   unable to give history  No other acute events    PAST MEDICAL & SURGICAL HISTORY:  Myasthenia gravis      Dementia    Myasthenia gravis    No significant past surgical history        Allergies    sulfamethoxazole (Vomiting; Nausea)    Intolerances      Antimicrobials:    meropenem  IVPB 1000 milliGRAM(s) IV Intermittent every 12 hours    MEDICATIONS  (STANDING):  chlorhexidine 2% Cloths 1 Application(s) Topical daily  dextrose 5% + sodium chloride 0.45%. 1000 milliLiter(s) (100 mL/Hr) IV Continuous <Continuous>  dextrose 50% Injectable 12.5 Gram(s) IV Push once  dextrose 50% Injectable 25 Gram(s) IV Push once  dextrose 50% Injectable 25 Gram(s) IV Push once  dextrose Oral Gel 15 Gram(s) Oral once  donepezil 10 milliGRAM(s) Oral at bedtime  glucagon  Injectable 1 milliGRAM(s) IntraMuscular once  meropenem  IVPB 1000 milliGRAM(s) IV Intermittent every 12 hours  multivitamin 1 Tablet(s) Oral daily  senna 2 Tablet(s) Oral at bedtime      Vital Signs Last 24 Hrs  T(C): 36.6 (05-28-24 @ 11:05), Max: 36.9 (05-28-24 @ 03:26)  T(F): 97.9 (05-28-24 @ 11:05), Max: 98.4 (05-28-24 @ 03:26)  HR: 84 (05-28-24 @ 11:05) (60 - 117)  BP: 147/86 (05-28-24 @ 11:05) (123/79 - 154/70)  BP(mean): --  RR: 18 (05-28-24 @ 11:05) (18 - 18)  SpO2: 95% (05-28-24 @ 11:05) (91% - 95%)    Physical Exam:    Constitutional well preserved,comfortable,pleasant    HEENT PERRLA EOMI,No pallor or icterus    No oral exudate or erythema    Neck supple no JVD or LN    Chest Good AE,CTA    CVS  S1 S2     Abd soft BS normal No tenderness     Ext No cyanosis clubbing or edema    IV site no erythema tenderness or discharge    Joints no swelling or LOM    CNS AAO X 3 no focal    Lab Data:                          9.6    5.29  )-----------( 56       ( 28 May 2024 07:02 )             29.0       05-28    139  |  107  |  10  ----------------------------<  97  3.7   |  22  |  0.93    Ca    8.7      28 May 2024 09:18    TPro  4.9<L>  /  Alb  2.4<L>  /  TBili  0.6  /  DBili  x   /  AST  36  /  ALT  41  /  AlkPhos  172<H>  05-28      Urinalysis (05.22.24 @ 13:08)    Glucose Qualitative, Urine: Negative mg/dL   Blood, Urine: Moderate   pH Urine: 5.5   Color: Dark Yellow   Urine Appearance: Cloudy   Bilirubin: Negative   Ketone - Urine: Trace mg/dL   Specific Gravity: 1.022   Protein, Urine: 100 mg/dL   Urobilinogen: 1.0 mg/dL   Nitrite: Positive   Leukocyte Esterase Concentration: Moderate  Urine Microscopic-Add On (NC) (05.22.24 @ 13:08)    Cast: 0 /LPF   Red Blood Cell - Urine: 8 /HPF   White Blood Cell - Urine: 42 /HPF   Bacteria: Occasional /HPF   Epithelial Cells: 2 /HPF    .Blood Blood  05-25-24   No growth at 72 Hours  --  --    .Blood Blood-Peripheral  05-25-24   No growth at 72 Hours  --  --    Clean Catch Clean Catch (Midstream)  05-22-24   10,000 - 49,000 CFU/mL Escherichia coli ESBL  --  Escherichia coli ESBL      .Blood Blood-Peripheral  05-22-24   Growth in anaerobic bottle: Escherichia coli ESBL  Direct identification is available within approximately 3-5  hours either by Blood Panel Multiplexed PCR or Direct  MALDI-TOF. Details: [email or web address]  --  Blood Culture PCR  Escherichia coli ESBL      .Blood Blood-Peripheral  05-22-24   Growth in anaerobic bottle: Escherichia coli ESBL  See previous culture [id number]  --    Growth in anaerobic bottle: Gram Negative Rods          WBC Count: 5.29 (05-28-24 @ 07:02)  WBC Count: 5.41 (05-27-24 @ 05:32)  WBC Count: 4.73 (05-26-24 @ 05:56)  WBC Count: 6.90 (05-24-24 @ 07:19)  WBC Count: 9.43 (05-23-24 @ 06:38)  WBC Count: 11.22 (05-22-24 @ 10:58)       Bilirubin Total: 0.6 mg/dL (05-28-24 @ 06:53)  Aspartate Aminotransferase (AST/SGOT): 36 U/L (05-28-24 @ 06:53)  Alanine Aminotransferase (ALT/SGPT): 41 U/L (05-28-24 @ 06:53)  Alkaline Phosphatase: 172 U/L (05-28-24 @ 06:53)    Bilirubin Total: 0.6 mg/dL (05-27-24 @ 05:32)  Aspartate Aminotransferase (AST/SGOT): 41 U/L (05-27-24 @ 05:32)  Alanine Aminotransferase (ALT/SGPT): 52 U/L (05-27-24 @ 05:32)  Alkaline Phosphatase: 195 U/L (05-27-24 @ 05:32)    Bilirubin Total: 0.9 mg/dL (05-26-24 @ 05:56)  Aspartate Aminotransferase (AST/SGOT): 49 U/L (05-26-24 @ 05:56)  Alanine Aminotransferase (ALT/SGPT): 64 U/L (05-26-24 @ 05:56)  Alkaline Phosphatase: 211 U/L (05-26-24 @ 05:56)        < from: US Abdomen Complete (US Abdomen Complete .) (05.24.24 @ 10:27) >  INTERPRETATION:  CLINICAL INFORMATION: Transaminitis.    COMPARISON: Abdominal ultrasound 3/4/2024.    TECHNIQUE: Sonography of the abdomen.    FINDINGS:    Exam partially limited secondary to patient positioning.  Liver: Within normal limits.  Bile ducts: Normal caliber. Common bile duct measures 4 mm.  Gallbladder: Cholelithiasis. Layering sludge. Negative sonographic Diana   sign.  Pancreas: Visualized portions are within normal limits.  Spleen: 10.1 cm. Within normal limits.  Right kidney: 8.4 cm. No hydronephrosis.  Left kidney: 9.1 cm. No hydronephrosis.  Ascites: None.  Aorta and IVC: Visualized portions are within normal limits.    IMPRESSION:  Cholelithiasis without cholecystitis.    --- End of Report ---    < end of copied text >

## 2024-05-28 NOTE — PROGRESS NOTE ADULT - ASSESSMENT
89yo F h/o Myasthenia Gravis (not on medication for past 9 years), Dementia and Chronic low back pain, recently hospitalized for UTI and now p/w sepsis related to recurrent UTI. GI Consulted for increased LFTs.    1. Increased LFTs  now improved   related to systemic process (uti)  US abdomen with cholelithiasis no further intervention needed  trend LFTs    2. Recurrent UTI w/AMS  cont abx management per ID    3. Myasthenia Gravis  per primary

## 2024-05-28 NOTE — PROVIDER CONTACT NOTE (CRITICAL VALUE NOTIFICATION) - TEST AND RESULT REPORTED:
Preliminary growth in anaerobic bottle for gram (-) rods.
Serum glucose 479
BCx 5/22/24 growth in anaerobic bottle : gram negative rods

## 2024-05-28 NOTE — PROGRESS NOTE ADULT - ASSESSMENT
88 YO F with PMHx of Myasthenia Gravis (not on medication for past 9 years) and MHx of dementia and chronic low back pain. Patient recently hospitalized after being found with slurred speech and very confused. Most likely due to UTI which not responded to Cipro. Patient transferred to hospital due to seizure activity and was found with UTI. She was given 1 mg IM.       Bacteremia/sepsis- resolved. Blood cx negative. Continue IV Meropenem. F/u ID.   AMS- most likely due to toxic encephalopathy. Not improved with treating bacteremia. F/u neurologist.   UTI - as  above   Liver enzyme elevation- improving and trending down. RUQ ultrasound negative. Hepatitis panel negative.  weight loss- due to recent hospitalization and severe sickness on hospital. .Appetite improving Encourage PO intake and supplement diet. Monitor weight. F/u dietitian. Family is aware and refused peg tube.  Vitamin D deficiency- Vitamin D 50,000 weekly. Monitor Vitamin D.  Hypoglycemia- resolved. Appetite improved.  cholelithiasis - without cholecystitis neither pancreatitis. Normal amylase and lipase.  hypotension- continue IV steroid and IV fluid as needed  thrombocytopenia- PLT down, monitor PLT, f/u with Hematology.   NPH- - neurology does not believe acute mental status changes, could be due to NPH. F/u neurology as outpatient for NPH workup.  DVT ppx- Lovenox discontinued due to thrombocytopenia  leg arthritis- Acetaminophen PRN.  b/l cataract- F/u ophthalmology  vitamin D deficiency- continue Vitamin D supplement.  constipation- senna at bedtime.  LBP 2/2 severe spinal stenosis with out cord pressure - no surgical intervention recommended neither accepted by family, continue PT OT as needed.  Dementia - Stable. Continue Aricept .  Dysphagia - Mechanical soft diet, aspiration precautions.  Myasthenia Gravis - stable , off of medication. Monitor CBC and BMP every 2 months  VT ppx - on SCD, no blood thinner due to low PLT.

## 2024-05-28 NOTE — PROVIDER CONTACT NOTE (MEDICATION) - ASSESSMENT
Pt A&Ox0-1, resting comfortably. VSS. Pt on continuous gtt of 5% dextrose + 0.45% sodium chloride at 100 mL/hr. 1.71

## 2024-05-28 NOTE — PROGRESS NOTE ADULT - SUBJECTIVE AND OBJECTIVE BOX
Patient is a 89y old  Female who presents with a chief complaint of Seizure activity (28 May 2024 15:19)      INTERVAL HPI/OVERNIGHT EVENTS: Patient lethargic. Continue IV abx. F/u   neuro due to AMS.     Pain Location & Control:     MEDICATIONS  (STANDING):  chlorhexidine 2% Cloths 1 Application(s) Topical daily  dextrose 5% + sodium chloride 0.45%. 1000 milliLiter(s) (100 mL/Hr) IV Continuous <Continuous>  dextrose 50% Injectable 25 Gram(s) IV Push once  dextrose 50% Injectable 12.5 Gram(s) IV Push once  dextrose 50% Injectable 25 Gram(s) IV Push once  dextrose Oral Gel 15 Gram(s) Oral once  donepezil 10 milliGRAM(s) Oral at bedtime  glucagon  Injectable 1 milliGRAM(s) IntraMuscular once  meropenem  IVPB 1000 milliGRAM(s) IV Intermittent every 12 hours  multivitamin 1 Tablet(s) Oral daily  senna 2 Tablet(s) Oral at bedtime    MEDICATIONS  (PRN):      Allergies    sulfamethoxazole (Vomiting; Nausea)    Intolerances        REVIEW OF SYSTEMS:  UTO due to lethargy    Vital Signs Last 24 Hrs  T(C): 34.8 (28 May 2024 20:51), Max: 36.9 (28 May 2024 03:26)  T(F): 94.7 (28 May 2024 20:51), Max: 98.4 (28 May 2024 03:26)  HR: 60 (28 May 2024 20:51) (60 - 84)  BP: 130/86 (28 May 2024 20:51) (123/79 - 155/84)  BP(mean): --  RR: 18 (28 May 2024 20:51) (18 - 18)  SpO2: 97% (28 May 2024 20:51) (91% - 97%)    Parameters below as of 28 May 2024 20:51  Patient On (Oxygen Delivery Method): room air        PHYSICAL EXAM:  GENERAL: NAD, well-groomed, well-developed  HEAD:  Atraumatic, Normocephalic  EYES: EOMI, PERRLA, conjunctiva and sclera clear  ENMT: No tonsillar erythema, exudates, or enlargement; Moist mucous membranes, Good dentition, No lesions  NECK: Supple, No JVD, Normal thyroid  NERVOUS SYSTEM:  Alert & Oriented X 0 lethargic, not following commands   CHEST/LUNG: Clear to auscultation bilaterally; No rales, rhonchi, wheezing, or rubs  HEART: Regular rate and rhythm; No murmurs, rubs, or gallops  ABDOMEN: Soft, Nontender, Nondistended; Bowel sounds present  EXTREMITIES:  2+ Peripheral Pulses, No clubbing or cyanosis  LYMPH: No lymphadenopathy noted  SKIN: No rashes or lesions      LABS:                        9.6    5.29  )-----------( 56       ( 28 May 2024 07:02 )             29.0     28 May 2024 09:18    139    |  107    |  10     ----------------------------<  97     3.7     |  22     |  0.93     Ca    8.7        28 May 2024 09:18    TPro  4.9    /  Alb  2.4    /  TBili  0.6    /  DBili  x      /  AST  36     /  ALT  41     /  AlkPhos  172    28 May 2024 06:53      Urinalysis Basic - ( 28 May 2024 09:18 )    Color: x / Appearance: x / SG: x / pH: x  Gluc: 97 mg/dL / Ketone: x  / Bili: x / Urobili: x   Blood: x / Protein: x / Nitrite: x   Leuk Esterase: x / RBC: x / WBC x   Sq Epi: x / Non Sq Epi: x / Bacteria: x      CAPILLARY BLOOD GLUCOSE      POCT Blood Glucose.: 106 mg/dL (28 May 2024 19:36)  POCT Blood Glucose.: 103 mg/dL (28 May 2024 11:48)  POCT Blood Glucose.: 75 mg/dL (28 May 2024 06:34)  POCT Blood Glucose.: 95 mg/dL (28 May 2024 00:16)        Cultures  Culture Results:   No growth at 72 Hours (05-25-24 @ 05:33)  Culture Results:   No growth at 72 Hours (05-25-24 @ 05:15)  Culture Results:   10,000 - 49,000 CFU/mL Escherichia coli ESBL (05-22-24 @ 13:08)  Culture Results:   Growth in anaerobic bottle: Escherichia coli ESBL  Direct identification is available within approximately 3-5  hours either by Blood Panel Multiplexed PCR or Direct  MALDI-TOF. Details: [email or web address] (05-22-24 @ 10:40)  Culture Results:   Growth in anaerobic bottle: Escherichia coli ESBL  See previous culture [id number] (05-22-24 @ 10:30)      RADIOLOGY & ADDITIONAL TESTS:    Imaging Personally Reviewed:  [X ] YES  [ ] NO    Consultant(s) Notes Reviewed:  [ X] YES  [ ] NO    Care Discussed with Consultants/Other Providers [X ] YES  [ ] NO

## 2024-05-28 NOTE — CONSULT NOTE ADULT - SUBJECTIVE AND OBJECTIVE BOX
Neurology - Consult Note    -  Spectra: [phone] ([facility]), [phone] ([facility])  -    HPI: Patient DEISI UGALDE is a 88 yo F, Hx dementia, on Aricept, ?MG, not on treatment for past 9 years or so, who presented to [facility] 5/22/24 after being found down with slurred speech and confusion, thought to be due to UTI. Patient has become lethargic, was noted to be unable to swallow PO. VSS, asymptomatic. UA 5/22 with occasial bacteria, pos nitrite, mod LE.  Temp 97.9. HR 84. /86. RR 18. 95% RA.WBC 5.29. HGB 9.6. PLT 56. Cr 0.93. BUN 10. EGFR 59. Patient was seen at bedside by neurology. Patient not cooperative with exam. Per nursing at bedside, patient AO0-1 at baseline, not really verbal and bedbound, was more interactive 3 days ago, and then since 2 days ago has been appearing more lethargic, sleeping all day, seems less active and more listless, but moving all extremities symmetrically (has contractures of the UE).    Patient was recently admitted in Feb 2024 for AMS and found to have UTI at the time.   Impression at the time was toxic/ metabolic encephalopathy in the setting of acute medical illnesses with components of hospital delirium.    Patient was also noted to have ventriculomegaly on CT/MR. Per daughter, patient did have chronic decline of cognitive status but no persistent urinary incontinence or magnetic gait. Patient was recommended for further NPH workup with outpatient neurology.    MR brain non con 2/22: No acute intracranial hemorrhage or evidence of acute ischemia. Unchanged imaging findings for which normal pressure hydrocephalus can be considered.   VEEG 2/20- 2/22: Frequent generalized periodic discharges with triphasic morphology at a rate of 1-1.5 Hz typically seen in toxic/metabolic encephalopathy. No epileptic discharges recorded. No seizures recorded.  [] pending labs: acetylcholine blocking, modulating, binding antibodies, LRP4, Musk antibodies. & vitamin D25-hydroxy.     Review of Systems: unable to obtain given mentation    Allergies:  sulfamethoxazole (Vomiting; Nausea)      PMHx/PSHx/Family Hx: As above, otherwise see below   Myasthenia gravis    Dementia    Myasthenia gravis        Social Hx:  No current use of tobacco, alcohol, or illicit drugs  Lives with ***    Medications:  MEDICATIONS  (STANDING):  chlorhexidine 2% Cloths 1 Application(s) Topical daily  dextrose 5% + sodium chloride 0.45%. 1000 milliLiter(s) (100 mL/Hr) IV Continuous <Continuous>  dextrose 50% Injectable 12.5 Gram(s) IV Push once  dextrose 50% Injectable 25 Gram(s) IV Push once  dextrose 50% Injectable 25 Gram(s) IV Push once  dextrose Oral Gel 15 Gram(s) Oral once  donepezil 10 milliGRAM(s) Oral at bedtime  glucagon  Injectable 1 milliGRAM(s) IntraMuscular once  meropenem  IVPB 1000 milliGRAM(s) IV Intermittent every 12 hours  multivitamin 1 Tablet(s) Oral daily  senna 2 Tablet(s) Oral at bedtime    MEDICATIONS  (PRN):      Vitals:  T(C): 36.6 (05-28-24 @ 11:05), Max: 36.9 (05-28-24 @ 03:26)  HR: 84 (05-28-24 @ 11:05) (60 - 117)  BP: 147/86 (05-28-24 @ 11:05) (123/79 - 154/70)  RR: 18 (05-28-24 @ 11:05) (18 - 18)  SpO2: 95% (05-28-24 @ 11:05) (91% - 95%)    Physical Examination: INCOMPLETE  General - Female patient, hunched over in bed, face down and turned more towards her L side, tongue sticks out past lips (nursing says baseline for patient), no respiratory distress    Neurologic Exam:  Mental status - Eyes open, awake, but does not attend to examiner, not following any commands. no verbal output. patient pulls away if any extremity touched.  Cranial nerves - unable to assess pupils, VF, EOM as patient turns face away, closes eyes, and avoids examination     Motor - decreased bulk and tone throughout. No pronator drift.  Strength testing            Deltoid      Biceps      Triceps     Wrist Extension    Wrist Flexion     Interossei         R            5                 5               5                     5                              5                        5                 5  L             5                 5               5                     5                              5                        5                 5              Hip Flexion    Hip Extension    Knee Flexion    Knee Extension    Dorsiflexion    Plantar Flexion  R              5                           5                       5                           5                            5                          5  L              5                           5                        5                           5                            5                          5    Sensation - Light touch/temperature OR pain/vibration intact throughout    DTR's -             Biceps      Triceps     Brachioradialis      Patellar    Ankle    Toes/plantar response  R             2+             2+                  2+                       2+            2+                 Down  L              2+             2+                 2+                        2+           2+                 Down    Coordination - Finger to Nose intact b/l. No tremors appreciated    Gait and station - Normal casual gait. Romberg (-)    Labs:                        9.6    5.29  )-----------( 56       ( 28 May 2024 07:02 )             29.0     05-28    139  |  107  |  10  ----------------------------<  97  3.7   |  22  |  0.93    Ca    8.7      28 May 2024 09:18    TPro  4.9<L>  /  Alb  2.4<L>  /  TBili  0.6  /  DBili  x   /  AST  36  /  ALT  41  /  AlkPhos  172<H>  05-28    CAPILLARY BLOOD GLUCOSE      POCT Blood Glucose.: 103 mg/dL (28 May 2024 11:48)    LIVER FUNCTIONS - ( 28 May 2024 06:53 )  Alb: 2.4 g/dL / Pro: 4.9 g/dL / ALK PHOS: 172 U/L / ALT: 41 U/L / AST: 36 U/L / GGT: x               CSF:                  Radiology:  CT Head No Cont:  (22 May 2024 11:37)     Neurology - Consult Note    -  Spectra: [phone] ([facility]), [phone] ([facility])  -    HPI: Patient DEISI UGALDE is a 88 yo F, Hx dementia, on Aricept, ?MG, not on treatment for past 9 years or so, who presented to [facility] 5/22/24 after being found down with slurred speech and confusion, thought to be due to UTI. Patient has become lethargic, was noted to be unable to swallow PO. VSS, asymptomatic. UA 5/22 with occasial bacteria, pos nitrite, mod LE.  Temp 97.9. HR 84. /86. RR 18. 95% RA.WBC 5.29. HGB 9.6. PLT 56. Cr 0.93. BUN 10. EGFR 59. Patient was seen at bedside by neurology. Patient not cooperative with exam. Per nursing at bedside, patient AO0-1 at baseline, not really verbal and bedbound, was more interactive 3 days ago, and then since 2 days ago has been appearing more lethargic, sleeping all day, seems less active and more listless, but moving all extremities symmetrically (has contractures of the UE).    Patient was recently admitted in Feb 2024 for AMS and found to have UTI at the time.   Impression at the time was toxic/ metabolic encephalopathy in the setting of acute medical illnesses with components of hospital delirium.    Patient was also noted to have ventriculomegaly on CT/MR. Per daughter, patient did have chronic decline of cognitive status but no persistent urinary incontinence or magnetic gait. Patient was recommended for further NPH workup with outpatient neurology.    MR brain non con 2/22: No acute intracranial hemorrhage or evidence of acute ischemia. Unchanged imaging findings for which normal pressure hydrocephalus can be considered.   VEEG 2/20- 2/22: Frequent generalized periodic discharges with triphasic morphology at a rate of 1-1.5 Hz typically seen in toxic/metabolic encephalopathy. No epileptic discharges recorded. No seizures recorded. [] pending labs: acetylcholine blocking, modulating, binding antibodies, LRP4, Musk antibodies. & vitamin D25-hydroxy.     Review of Systems: unable to obtain given mentation    Allergies:  sulfamethoxazole (Vomiting; Nausea)      PMHx/PSHx/Family Hx: As above, otherwise see below   Myasthenia gravis    Dementia    Myasthenia gravis        Social Hx:  No current use of tobacco, alcohol, or illicit drugs      Medications:  MEDICATIONS  (STANDING):  chlorhexidine 2% Cloths 1 Application(s) Topical daily  dextrose 5% + sodium chloride 0.45%. 1000 milliLiter(s) (100 mL/Hr) IV Continuous <Continuous>  dextrose 50% Injectable 12.5 Gram(s) IV Push once  dextrose 50% Injectable 25 Gram(s) IV Push once  dextrose 50% Injectable 25 Gram(s) IV Push once  dextrose Oral Gel 15 Gram(s) Oral once  donepezil 10 milliGRAM(s) Oral at bedtime  glucagon  Injectable 1 milliGRAM(s) IntraMuscular once  meropenem  IVPB 1000 milliGRAM(s) IV Intermittent every 12 hours  multivitamin 1 Tablet(s) Oral daily  senna 2 Tablet(s) Oral at bedtime    MEDICATIONS  (PRN):      Vitals:  T(C): 36.6 (05-28-24 @ 11:05), Max: 36.9 (05-28-24 @ 03:26)  HR: 84 (05-28-24 @ 11:05) (60 - 117)  BP: 147/86 (05-28-24 @ 11:05) (123/79 - 154/70)  RR: 18 (05-28-24 @ 11:05) (18 - 18)  SpO2: 95% (05-28-24 @ 11:05) (91% - 95%)    Physical Examination:    General - Female patient, hunched over in bed, face down and turned more towards her L side, tongue sticks out past lips (nursing says baseline for patient),   no respiratory distress    Neurologic Exam:  Mental status - Eyes open, awake, but does not attend to examiner, not following any commands. no verbal output. patient pulls away if any extremity touched.  Cranial nerves - unable to assess pupils, VF, EOM as patient turns face away, closes eyes, and avoids examination  Motor - decreased bulk and increased tone throughout. exam limited by patient inability/unwillingness to cooperate. Patient is contracted in the arms b/l, is able to pull away with some force when examiner holds her hands, is able to wiggle her toes to command, and moves purposefully away (moves legs at hip in plane of bed, and moves at the ankles) during reflex examination  Sensation - Light touch intact throughout  DTR's - hyperreflexic 3+ UE, patellars 0+ b/l, clonus at the ankles b/l, mute toes  Coordination - unable to assess given mentation  Gait and station - deferred for patient safety    Labs:                        9.6    5.29  )-----------( 56       ( 28 May 2024 07:02 )             29.0     05-28    139  |  107  |  10  ----------------------------<  97  3.7   |  22  |  0.93    Ca    8.7      28 May 2024 09:18    TPro  4.9<L>  /  Alb  2.4<L>  /  TBili  0.6  /  DBili  x   /  AST  36  /  ALT  41  /  AlkPhos  172<H>  05-28    CAPILLARY BLOOD GLUCOSE      POCT Blood Glucose.: 103 mg/dL (28 May 2024 11:48)    LIVER FUNCTIONS - ( 28 May 2024 06:53 )  Alb: 2.4 g/dL / Pro: 4.9 g/dL / ALK PHOS: 172 U/L / ALT: 41 U/L / AST: 36 U/L / GGT: x               CSF:           Radiology:  5/22 CT Head No Cont:      FINDINGS:  Redemonstration of diffuse ventricular dilatation out of proportion to the sulci, suggesting normal pressure hydrocephalus in the appropriate clinical setting.  There is no acute intracranial hemorrhage or mass effect. There are areas of hypodensity in the bilateral hemispheric white matter suggesting white matter microvascular ischemic change.  There is no extraaxial fluid collection.  There is no displaced calvarial fracture. The visualized orbits are within normal limits. Left maxillary sinus air-fluid level and bubbly secretions, suggesting acute sinusitis. Right maxillary sinus polyp or retention cyst. Mild bilateral ethmoid sinus mucosal thickening. The mastoid air cells are well aerated.  IMPRESSION:  1. No acute intracranial hemorrhage or mass effect.  2. Redemonstration of diffuse ventricular dilatation out of proportion to the sulci, suggesting normal pressure hydrocephalus in the appropriate clinical setting.  --- End of Report ---

## 2024-05-28 NOTE — CHART NOTE - NSCHARTNOTEFT_GEN_A_CORE
Pt is a 80yoF with pmhx of Myasthenia Gravis (not on meds for past 9 years), dementia, presenting w/ sepsis with c/f seizure like activity i/s/o UTI. GI following for increased LFT; improved, related to UTI. ID on board for abx mgmt for UTI. Neuro consulted 5/28 for AMS; NIH 16; Impression: Elderly female patient with altered mental status, encephalopathy, differentials include possibly toxic metabolic infectious in setting of UTI and e coli ESBL bacteria; vs subclinical seizures, vs recrudescence/diminished physiologic reserve in the setting of acute illness in patient with dementia/potential NPH cognitive deficits, vs delirium due to prolonged hospital stay. W/U ongoing.    5/25 initial bedside evaluation completed; pt declining all p.o. As there are no aspiration PNA concerns, recommendations for team to consider most conservative diet of puree and moderately thick liquids vs diet at SNF of minced-moist and thin liquids. See document for full details.  5/27 pt began to be lethargic and unable to tolerate PO meds  5/28 flow-sheet: pt more responsive than yesterday at 15:12    TODAY, re-evaluation of swallow completed as pt reportedly beginning to be more alert and diet order remains for puree and moderately thick liquids. Pt received upright at bedside, on room air, daughters Katrin and Crystal present. Pt with new open mouth posture with lingual protrusion, noted reduced secretion management w/ moderate anterior loss of secretions; speech now unintelligible as compared to initial exam. Per d/w ROEL Avina, pt now awake for approximately 30 minutes, engaging with daughters at bedside. Per daughters report, pt begins to have oral weakness w/ "her tongue sticking out like that every time she has an infection", and that it eventually resolves; they also report pt has very long episodes (days at a time) of sleeping when she has an infection.    Pt eyes open and pt alert for approximately 30 minutes, however, subsequently deeply asleep after P.O. trials. Pt was administered puree and moderately thick liquids via tsp. Oral Phase: Reduced labial seal and stripping. Min-moderate anterior loss of material. Lingual protrusion noted during oral manipulation and swallow. Reduced oral transport and A-P transit. Pharyngeal Phase: delayed initiation of swallow with intermittent cough; cough suggestive of overt s/sx of aspiration/penetration. Discussion held with daughters regarding recommendations for NPO, with non-oral nutrition/hydration/medications, however, recommendations do not align with their GOC at this time. Daughters also decline NGT. Daughters requesting for pt to remain in most conservative diet in the interim and that they would like to feed patient when she is fully awake and alert, verbally accepting aspiration risk.    IMPRESSION: Pt w/ oropharyngeal dysphagia and does not appear to be a safe candidate for oral diet at this time given swallow profile w/ reduced secretion management and overt s/sx of aspiration/penetration on puree and moderately thick liquids as well as waxing/waning mentation. However, NPO, with non-oral nutrition/hydration/medications is not within family GOC at this time. Family would like to opt for ongoing conservative oral diet for pt to be fed when she is fully awake, with understanding of aspiration risk.     D/W RN WILLIAM AND ACP LIZETH  RECOMMENDATIONS  > NPO, with non-oral nutrition/hydration/medications , however, family opting to c/w conservative oral diet of puree and moderately thick liquids and for them to feed pt when she is awake and alert  >Maintain strict aspiration precautions  >Maintain oral hygiene  >D/C rehab    SPEECH PATHOLOGY  Mac Díaz CCC-SLP *Teams preferred* ([phone]) Pt is a 80yoF with pmhx of Myasthenia Gravis (not on meds for past 9 years), dementia, presenting w/ sepsis with c/f seizure like activity i/s/o UTI. GI following for increased LFT; improved, related to UTI. ID on board for abx mgmt for UTI. Neuro consulted 5/28 for AMS; NIH 16; Impression: Elderly female patient with altered mental status, encephalopathy, differentials include possibly toxic metabolic infectious in setting of UTI and e coli ESBL bacteria; vs subclinical seizures, vs recrudescence/diminished physiologic reserve in the setting of acute illness in patient with dementia/potential NPH cognitive deficits, vs delirium due to prolonged hospital stay. W/U ongoing.  5/25 initial bedside evaluation completed; pt declining all p.o. As there are no aspiration PNA concerns, recommendations for team to consider most conservative diet of puree and moderately thick liquids vs diet at SNF of minced-moist and thin liquids. See document for full details.  5/27 pt began to be lethargic and unable to tolerate PO meds  5/28 flow-sheet: pt more responsive than yesterday at 15:12    TODAY, re-evaluation of swallow completed as pt reportedly beginning to be more alert and diet order remains for puree and moderately thick liquids. Pt received upright at bedside, on room air, daughters Katrin and Crystal present. Pt with new open mouth posture with lingual protrusion, noted reduced secretion management w/ moderate anterior loss of secretions; speech now unintelligible as compared to initial exam. Per d/w ROEL Avina, pt now awake for approximately 30 minutes, engaging with daughters at bedside. Per daughters report, pt begins to have oral weakness w/ "her tongue sticking out like that every time she has an infection", and that it eventually resolves; they also report pt has very long episodes (days at a time) of sleeping when she has an infection.    Pt eyes open and pt alert for approximately 30 minutes, however, subsequently deeply asleep after P.O. trials. Pt was administered puree and moderately thick liquids via tsp. Oral Phase: Reduced labial seal and stripping. Min-moderate anterior loss of material. Lingual protrusion noted during oral manipulation and swallow. Reduced oral transport and A-P transit. Pharyngeal Phase: delayed initiation of swallow with intermittent cough; cough suggestive of overt s/sx of aspiration/penetration. Discussion held with daughters regarding recommendations for NPO, with non-oral nutrition/hydration/medications, however, recommendations do not align with their GOC at this time. Daughters also decline NGT. Daughters requesting for pt to remain in most conservative diet in the interim and that they would like to feed patient when she is fully awake and alert, verbally accepting aspiration risk.    IMPRESSION: Pt w/ oropharyngeal dysphagia and does not appear to be a safe candidate for oral diet at this time given swallow profile w/ reduced secretion management and overt s/sx of aspiration/penetration on puree and moderately thick liquids as well as waxing/waning mentation. However, NPO, with non-oral nutrition/hydration/medications is not within family GOC at this time. Family would like to opt for ongoing conservative oral diet for pt to be fed when she is fully awake, with understanding of aspiration risk.     D/W RN WILLIAM AND ACP LIZETH  RECOMMENDATIONS  > NPO, with non-oral nutrition/hydration/medications , however, family opting to c/w conservative oral diet of puree and moderately thick liquids and for them to feed pt when she is awake and alert  > Would consider discussion with pt/family and team regarding nutrition/hydration GOC  >Maintain strict aspiration precautions  >Maintain oral hygiene  >D/C rehab    SPEECH PATHOLOGY  Mac Díaz CCC-SLP *Teams preferred* ([phone])

## 2024-05-28 NOTE — PROGRESS NOTE ADULT - SUBJECTIVE AND OBJECTIVE BOX
INTERVAL HPI/OVERNIGHT EVENTS:    events noted  appears comfortable    MEDICATIONS  (STANDING):  chlorhexidine 2% Cloths 1 Application(s) Topical daily  dextrose 5% + sodium chloride 0.45%. 1000 milliLiter(s) (100 mL/Hr) IV Continuous <Continuous>  dextrose 50% Injectable 12.5 Gram(s) IV Push once  dextrose 50% Injectable 25 Gram(s) IV Push once  dextrose 50% Injectable 25 Gram(s) IV Push once  dextrose Oral Gel 15 Gram(s) Oral once  donepezil 10 milliGRAM(s) Oral at bedtime  glucagon  Injectable 1 milliGRAM(s) IntraMuscular once  meropenem  IVPB 1000 milliGRAM(s) IV Intermittent every 12 hours  multivitamin 1 Tablet(s) Oral daily  senna 2 Tablet(s) Oral at bedtime    MEDICATIONS  (PRN):      Allergies    sulfamethoxazole (Vomiting; Nausea)    Intolerances        Review of Systems: *pt minimally verbal to nonverbal, unable to obtain ROS         Vital Signs Last 24 Hrs  T(C): 36.6 (28 May 2024 11:05), Max: 36.9 (28 May 2024 03:26)  T(F): 97.9 (28 May 2024 11:05), Max: 98.4 (28 May 2024 03:26)  HR: 84 (28 May 2024 11:05) (60 - 117)  BP: 147/86 (28 May 2024 11:05) (123/79 - 154/70)  BP(mean): --  RR: 18 (28 May 2024 11:05) (18 - 18)  SpO2: 95% (28 May 2024 11:05) (91% - 95%)    Parameters below as of 28 May 2024 11:05  Patient On (Oxygen Delivery Method): room air        PHYSICAL EXAM:    Constitutional: NAD  HEENT: EOMI, throat clear  Neck: No LAD, supple  Respiratory: CTA and P  Cardiovascular: S1 and S2, RRR, no M  Gastrointestinal: BS+, soft, NT/ND, neg HSM,  Extremities: No peripheral edema, neg clubbing, cyanosis  Vascular: 2+ peripheral pulses  Neurological: A/O   Psychiatric: Normal mood, normal affect  Skin: No rashes      LABS:                        9.6    5.29  )-----------( 56       ( 28 May 2024 07:02 )             29.0     05-28    139  |  107  |  10  ----------------------------<  97  3.7   |  22  |  0.93    Ca    8.7      28 May 2024 09:18    TPro  4.9<L>  /  Alb  2.4<L>  /  TBili  0.6  /  DBili  x   /  AST  36  /  ALT  41  /  AlkPhos  172<H>  05-28      Urinalysis Basic - ( 28 May 2024 09:18 )    Color: x / Appearance: x / SG: x / pH: x  Gluc: 97 mg/dL / Ketone: x  / Bili: x / Urobili: x   Blood: x / Protein: x / Nitrite: x   Leuk Esterase: x / RBC: x / WBC x   Sq Epi: x / Non Sq Epi: x / Bacteria: x        RADIOLOGY & ADDITIONAL TESTS:

## 2024-05-29 DIAGNOSIS — R53.2 FUNCTIONAL QUADRIPLEGIA: ICD-10-CM

## 2024-05-29 DIAGNOSIS — Z71.89 OTHER SPECIFIED COUNSELING: ICD-10-CM

## 2024-05-29 DIAGNOSIS — Z51.5 ENCOUNTER FOR PALLIATIVE CARE: ICD-10-CM

## 2024-05-29 LAB
24R-OH-CALCIDIOL SERPL-MCNC: 32.4 NG/ML — SIGNIFICANT CHANGE UP (ref 30–80)
ANION GAP SERPL CALC-SCNC: 9 MMOL/L — SIGNIFICANT CHANGE UP (ref 5–17)
B BURGDOR C6 AB SER-ACNC: NEGATIVE — SIGNIFICANT CHANGE UP
B BURGDOR IGG+IGM SER-ACNC: 0.14 INDEX — SIGNIFICANT CHANGE UP (ref 0.01–0.89)
BUN SERPL-MCNC: 10 MG/DL — SIGNIFICANT CHANGE UP (ref 7–23)
CALCIUM SERPL-MCNC: 8.7 MG/DL — SIGNIFICANT CHANGE UP (ref 8.4–10.5)
CHLORIDE SERPL-SCNC: 110 MMOL/L — HIGH (ref 96–108)
CO2 SERPL-SCNC: 22 MMOL/L — SIGNIFICANT CHANGE UP (ref 22–31)
CREAT SERPL-MCNC: 0.81 MG/DL — SIGNIFICANT CHANGE UP (ref 0.5–1.3)
EGFR: 69 ML/MIN/1.73M2 — SIGNIFICANT CHANGE UP
FOLATE SERPL-MCNC: 7.8 NG/ML — SIGNIFICANT CHANGE UP
GLUCOSE BLDC GLUCOMTR-MCNC: 104 MG/DL — HIGH (ref 70–99)
GLUCOSE BLDC GLUCOMTR-MCNC: 121 MG/DL — HIGH (ref 70–99)
GLUCOSE BLDC GLUCOMTR-MCNC: 87 MG/DL — SIGNIFICANT CHANGE UP (ref 70–99)
GLUCOSE SERPL-MCNC: 89 MG/DL — SIGNIFICANT CHANGE UP (ref 70–99)
HCT VFR BLD CALC: 31 % — LOW (ref 34.5–45)
HGB BLD-MCNC: 10.8 G/DL — LOW (ref 11.5–15.5)
MAGNESIUM SERPL-MCNC: 1.8 MG/DL — SIGNIFICANT CHANGE UP (ref 1.6–2.6)
MCHC RBC-ENTMCNC: 32 PG — SIGNIFICANT CHANGE UP (ref 27–34)
MCHC RBC-ENTMCNC: 34.8 GM/DL — SIGNIFICANT CHANGE UP (ref 32–36)
MCV RBC AUTO: 92 FL — SIGNIFICANT CHANGE UP (ref 80–100)
NRBC # BLD: 0 /100 WBCS — SIGNIFICANT CHANGE UP (ref 0–0)
PHOSPHATE SERPL-MCNC: 2.6 MG/DL — SIGNIFICANT CHANGE UP (ref 2.5–4.5)
PLATELET # BLD AUTO: 72 K/UL — LOW (ref 150–400)
POTASSIUM SERPL-MCNC: 3.5 MMOL/L — SIGNIFICANT CHANGE UP (ref 3.5–5.3)
POTASSIUM SERPL-SCNC: 3.5 MMOL/L — SIGNIFICANT CHANGE UP (ref 3.5–5.3)
RBC # BLD: 3.37 M/UL — LOW (ref 3.8–5.2)
RBC # FLD: 15.4 % — HIGH (ref 10.3–14.5)
SODIUM SERPL-SCNC: 141 MMOL/L — SIGNIFICANT CHANGE UP (ref 135–145)
VIT B12 SERPL-MCNC: 1108 PG/ML — SIGNIFICANT CHANGE UP (ref 232–1245)
WBC # BLD: 7.42 K/UL — SIGNIFICANT CHANGE UP (ref 3.8–10.5)
WBC # FLD AUTO: 7.42 K/UL — SIGNIFICANT CHANGE UP (ref 3.8–10.5)

## 2024-05-29 PROCEDURE — 99223 1ST HOSP IP/OBS HIGH 75: CPT | Mod: GC

## 2024-05-29 PROCEDURE — 70553 MRI BRAIN STEM W/O & W/DYE: CPT | Mod: 26

## 2024-05-29 RX ADMIN — SENNA PLUS 2 TABLET(S): 8.6 TABLET ORAL at 21:16

## 2024-05-29 RX ADMIN — SODIUM CHLORIDE 100 MILLILITER(S): 9 INJECTION, SOLUTION INTRAVENOUS at 15:33

## 2024-05-29 RX ADMIN — MEROPENEM 100 MILLIGRAM(S): 1 INJECTION INTRAVENOUS at 17:18

## 2024-05-29 RX ADMIN — DONEPEZIL HYDROCHLORIDE 10 MILLIGRAM(S): 10 TABLET, FILM COATED ORAL at 21:16

## 2024-05-29 RX ADMIN — MEROPENEM 100 MILLIGRAM(S): 1 INJECTION INTRAVENOUS at 05:41

## 2024-05-29 RX ADMIN — CHLORHEXIDINE GLUCONATE 1 APPLICATION(S): 213 SOLUTION TOPICAL at 11:59

## 2024-05-29 NOTE — CONSULT NOTE ADULT - SUBJECTIVE AND OBJECTIVE BOX
Date of Service: 05-29-24 @ 14:47    HPI:  89 YO F with PMHx of Myasthenia Gravis (not on medication for past 9 years) and MHx of dementia and chronic low back pain. Patient recently hospitalized after being found with slurred speech and very confused. Most likely due to UTI which not responded to Cipro. Patient transferred to hospital due to seizure activity and was found with UTI. (Taken from H&P). Palliative consulted for assistance with GOC.        (22 May 2024 14:16)    PERTINENT PM/SXH:   Myasthenia gravis    Dementia    Myasthenia gravis      No significant past surgical history    No significant past surgical history      FAMILY HISTORY:  No pertinent family history in first degree relatives    No pertinent family history in first degree relatives        ITEMS NOT CHECKED ARE NOT PRESENT    SOCIAL HISTORY:   Significant other/partner[ ]  Children[c ]  Episcopal/Spirituality:  Substance hx:  [ ]   Tobacco hx:  [ ]   Alcohol hx: [ ]   Home Opioid hx:  [ ] I-Stop Reference No:  Living Situation: [ ]Home  [c ]Long term care  [ ]Rehab [ ]Other    ADVANCE DIRECTIVES:    DNR/MOLST  [ ]  Living Will  [ ]   DECISION MAKER(s):  [ ] Health Care Proxy(s)  [ c] Surrogate(s)  [ ] Guardian           Name(s): Phone Number(s): Katrin Herrera     BASELINE (I)ADL(s) (prior to admission):  [place]: [ ]Total  [c ] Moderate [ ]Dependent    Allergies    sulfamethoxazole (Vomiting; Nausea)    Intolerances    MEDICATIONS  (STANDING):  chlorhexidine 2% Cloths 1 Application(s) Topical daily  dextrose 5% + sodium chloride 0.45%. 1000 milliLiter(s) (100 mL/Hr) IV Continuous <Continuous>  dextrose 50% Injectable 12.5 Gram(s) IV Push once  dextrose 50% Injectable 25 Gram(s) IV Push once  dextrose 50% Injectable 25 Gram(s) IV Push once  dextrose Oral Gel 15 Gram(s) Oral once  donepezil 10 milliGRAM(s) Oral at bedtime  glucagon  Injectable 1 milliGRAM(s) IntraMuscular once  meropenem  IVPB 1000 milliGRAM(s) IV Intermittent every 12 hours  multivitamin 1 Tablet(s) Oral daily  senna 2 Tablet(s) Oral at bedtime    MEDICATIONS  (PRN):    PRESENT SYMPTOMS: [x ]Unable to self-report see CPOT, PAINADs, RDOS  Source if other than patient:  [ ]Family   [x ]Team     Pain: [ ]yes [ ]no  QOL impact -   Location -                    Aggravating factors -  Quality -  Radiation -  Timing-  Severity (0-10 scale):  Minimal acceptable level (0-10 scale):       Dyspnea:                           [ ]Mild [ ]Moderate [ ]Severe  Anxiety:                             [ ]Mild [ ]Moderate [ ]Severe  Fatigue:                             [ ]Mild [ ]Moderate [ ]Severe  Nausea:                             [ ]Mild [ ]Moderate [ ]Severe  Loss of appetite:              [ ]Mild [ ]Moderate [ ]Severe  Constipation:                    [ ]Mild [ ]Moderate [ ]Severe    PCSSQ [Palliative Care Spiritual Screening Question]   Severity (0-10):  Score of 4 or > indicate consideration of Chaplaincy referral.  Chaplaincy Referral: [ ] yes [ ] refused [ ] following    Caregiver [place]? : [ ] yes [ ] no [ ] deferred:  Social work referral [ ] Patient & Family Centered Care Referral [ ]     Anticipatory Grief Present?: [ ] yes [ ] no  [ ] deferred: Palliative Social work referral [ ]  Patient & Family Centered Care Referral [ ]       Other Symptoms:  [ ]All other review of systems negative   [x ] Unable to obtain due to poor mentation    PHYSICAL EXAM:  Vital Signs Last 24 Hrs  T(C): 36.3 (29 May 2024 11:06), Max: 36.4 (29 May 2024 00:00)  T(F): 97.3 (29 May 2024 11:06), Max: 97.5 (29 May 2024 00:00)  HR: 69 (29 May 2024 11:06) (60 - 82)  BP: 130/66 (29 May 2024 11:06) (129/60 - 155/84)  BP(mean): --  RR: 18 (29 May 2024 11:06) (18 - 18)  SpO2: 93% (29 May 2024 11:06) (93% - 98%)    Parameters below as of 29 May 2024 11:06  Patient On (Oxygen Delivery Method): room air     I&O's Summary    28 May 2024 07:01  -  29 May 2024 07:00  --------------------------------------------------------  IN: 1200 mL / OUT: 2000 mL / NET: -800 mL    29 May 2024 07:01  -  29 May 2024 14:47  --------------------------------------------------------  IN: 0 mL / OUT: 400 mL / NET: -400 mL        GENERAL:  [ ]Alert  [ ]Oriented x  [ x]Lethargic  [ ]Cachexia  [ ]Unarousable  [x]Verbal  [ ]Non-Verbal  Behavioral:   [ ]Anxiety  [ ]Delirium [ ]Agitation [ ]Other  HEENT:  [ ]Normal   [ x]Dry mouth   [ ]ET Tube/Trach  [ ]Oral lesions  PULMONARY:   [ ]Clear [ ]Tachypnea  [ ]Audible excessive secretions   [ ]Rhonchi        [ ]Right [ ]Left [ ]Bilateral  [ ]Crackles        [ ]Right [ ]Left [ ]Bilateral  [ ]Wheezing     [ ]Right [ ]Left [ ]Bilateral  [ x]Diminished BS [ ] Right [ ]Left [ x]Bilateral  CARDIOVASCULAR:    [x]Regular [ ]Irregular [ ]Tachy  [ ]Jass [ ]Murmur [ ]Other  GASTROINTESTINAL:  [x]Soft  [ ]Distended   [x]+BS  [x]Non tender [ ]Tender  [ ]PEG [ ]OGT/ NGT   Last BM:    GENITOURINARY:  [ ]Normal [x ]Incontinent   [ ]Oliguria/Anuria   [ ][place]  MUSCULOSKELETAL:   [ ]Normal   [x]Weakness  [x ]Bed/Wheelchair bound [ ]Edema  NEUROLOGIC:   [ ]No focal deficits  [x ] Cognitive impairment  [ ] Dysphagia [ ]Dysarthria [ ] Paresis [ ]Other   SKIN:   [x]Normal  [ ]Rash   [ ]Pressure ulcer(s) [ ]y [ ]n present on admission    CRITICAL CARE:  [ ] Shock Present  [ ]Septic [ ]Cardiogenic [ ]Neurologic [ ]Hypovolemic  [ ]  Vasopressors [ ]  Inotropes   [ ]Respiratory failure present [ ]Mechanical ventilation [ ]Non-invasive ventilatory support [ ]High flow    [ ]Acute  [ ]Chronic [ ]Hypoxic  [ ]Hypercarbic [ ]Other  [ ]Other organ failure     LABS:                        10.8   7.42  )-----------( 72       ( 29 May 2024 06:48 )             31.0   05-29    141  |  110<H>  |  10  ----------------------------<  89  3.5   |  22  |  0.81    Ca    8.7      29 May 2024 06:48  Phos  2.6     05-29  Mg     1.8     05-29    TPro  4.9<L>  /  Alb  2.4<L>  /  TBili  0.6  /  DBili  x   /  AST  36  /  ALT  41  /  AlkPhos  172<H>  05-28      Urinalysis Basic - ( 29 May 2024 06:48 )    Color: x / Appearance: x / SG: x / pH: x  Gluc: 89 mg/dL / Ketone: x  / Bili: x / Urobili: x   Blood: x / Protein: x / Nitrite: x   Leuk Esterase: x / RBC: x / WBC x   Sq Epi: x / Non Sq Epi: x / Bacteria: x      RADIOLOGY & ADDITIONAL STUDIES:  < from: US Abdomen Complete (US Abdomen Complete .) (05.24.24 @ 10:27) >    ACC: [id number] EXAM:  US ABDOMEN COMPLETE   ORDERED BY: JOSIAH RIVERA     PROCEDURE DATE:  05/24/2024          INTERPRETATION:  CLINICAL INFORMATION: Transaminitis.    COMPARISON: Abdominal ultrasound 3/4/2024.    TECHNIQUE: Sonography of the abdomen.    FINDINGS:    Exam partially limited secondary to patient positioning.  Liver: Within normal limits.  Bile ducts: Normal caliber. Common bile duct measures 4 mm.  Gallbladder: Cholelithiasis. Layering sludge. Negative sonographic Diana   sign.  Pancreas: Visualized portions are within normal limits.  Spleen: 10.1 cm. Within normal limits.  Right kidney: 8.4 cm. No hydronephrosis.  Left kidney: 9.1 cm. No hydronephrosis.  Ascites: None.  Aorta and IVC: Visualized portions are within normal limits.    IMPRESSION:  Cholelithiasis without cholecystitis.    --- End of Report ---          JACKI GILMORE MD; Resident Radiologist  This document has been electronically signed.  DUKE FLYNN MD; Attending Radiologist  This document has been electronically signed. May 24 2024 10:48AM    < end of copied text >    PROTEIN CALORIE MALNUTRITION PRESENT: [ ]mild [ ]moderate [ ]severe [ ]underweight [ ]morbid obesity  https://www.andeal.org/vault/2440/web/files/ONC/Table_Clinical%20Characteristics%20to%20Document%20Malnutrition-White%20JV%20et%20al%202012.pdf    Height (cm): 154.9 (05-22-24 @ 10:19), 165.1 (02-19-24 @ 12:19)  Weight (kg): 45.4 (05-22-24 @ 10:19), 54.4 (02-19-24 @ 12:19)  BMI (kg/m2): 18.9 (05-22-24 @ 10:19), 20 (02-19-24 @ 12:19)    [x ]PPSV2 < or = to 30% [ ]significant weight loss  [ ]poor nutritional intake  [ ]anasarca[ ]Artificial Nutrition      Other REFERRALS:  [ ]Hospice  [ ]Child Life  [ ]Social Work  [ ]Case management [ ]Holistic Therapy     Care Coordination Assessment 201 [C. Provider] (05-23-24 @ 14:42)      Palliative Performance Scale:  http://npcrc.org/files/news/palliative_performance_scale_ppsv2.pdf  (Ctrl +  left click to view)  Respiratory Distress Observation Tool:  https://homecareinformation.net/handouts/hen/Respiratory_Distress_Observation_Scale.pdf (Ctrl +  left click to view)  PAINAD Score:  http://geriatrictoolkit.[place].[facility]/cog/painad.pdf (Ctrl +  left click to view)  Date of Service: 05-29-24 @ 14:48

## 2024-05-29 NOTE — CONSULT NOTE ADULT - ASSESSMENT
89 YO F with PMHx of Myasthenia Gravis (not on medication for past 9 years) and MHx of dementia and chronic low back pain. Patient recently hospitalized after being found with slurred speech and very confused. Most likely due to UTI which not responded to Cipro. Patient transferred to hospital due to seizure activity and was found with UTI. (Taken from H&P). Palliative consulted for assistance with GOC.

## 2024-05-29 NOTE — PROGRESS NOTE ADULT - SUBJECTIVE AND OBJECTIVE BOX
minimal responsive    VITAL SIGNS:    VSS    PHYSICAL EXAM:     GENERAL: no acute distress  HEENT: NC/AT, EOMI, neck supple, MMM  RESPIRATORY: LCTAB/L, no rhonchi, rales, or wheezing  CARDIOVASCULAR: RRR, no murmurs, gallops, rubs  ABDOMINAL: soft, non-tender, non-distended, positive bowel sounds   EXTREMITIES: no clubbing, cyanosis, or edema  NEUROLOGICAL: alert and oriented x 3, non-focal  LYMPHATIC: lymphatic: cervical, supraclavicular, axilla, inguinal  SKIN: no rashes or lesions   MUSCULOSKELETAL: no gross joint deformity                          10.8   7.42  )-----------( 72       ( 29 May 2024 06:48 )             31.0     05-29    141  |  110<H>  |  10  ----------------------------<  89  3.5   |  22  |  0.81    Ca    8.7      29 May 2024 06:48  Phos  2.6     05-29  Mg     1.8     05-29    TPro  4.9<L>  /  Alb  2.4<L>  /  TBili  0.6  /  DBili  x   /  AST  36  /  ALT  41  /  AlkPhos  172<H>  05-28      MEDICATIONS  (STANDING):  chlorhexidine 2% Cloths 1 Application(s) Topical daily  dextrose 5% + sodium chloride 0.45%. 1000 milliLiter(s) (100 mL/Hr) IV Continuous <Continuous>  dextrose 50% Injectable 25 Gram(s) IV Push once  dextrose 50% Injectable 12.5 Gram(s) IV Push once  dextrose 50% Injectable 25 Gram(s) IV Push once  dextrose Oral Gel 15 Gram(s) Oral once  donepezil 10 milliGRAM(s) Oral at bedtime  glucagon  Injectable 1 milliGRAM(s) IntraMuscular once  meropenem  IVPB 1000 milliGRAM(s) IV Intermittent every 12 hours  multivitamin 1 Tablet(s) Oral daily  senna 2 Tablet(s) Oral at bedtime

## 2024-05-29 NOTE — CONSULT NOTE ADULT - TIME BILLING
Chart review, exam, counseling, coordination of care
Symptom assessment and management, supportive counseling, coordination of care

## 2024-05-29 NOTE — PROGRESS NOTE ADULT - ASSESSMENT
89 YO F with PMHx of Myasthenia Gravis (not on medication for past 9 years) and MHx of dementia and chronic low back pain. Patient recently hospitalized after being found with slurred speech and very confused. Most likely due to UTI which not responded to Cipro. Patient transferred to hospital due to seizure activity and was found with UTI. She was given 1 mg IM.       Bacteremia/sepsis- resolved. Blood cx negative. Continue IV Meropenem. F/u ID.   AMS- most likely due to toxic encephalopathy. Not improved with treating bacteremia. F/u neurologist who is planning to do EEG.   UTI - as  above   Liver enzyme elevation- improving and trending down. RUQ ultrasound negative. Hepatitis panel negative.  weight loss- due to recent hospitalization and severe sickness on hospital. .Appetite improving Encourage PO intake and supplement diet. Monitor weight. F/u dietitian. Family is aware and refused peg tube.  Vitamin D deficiency- Vitamin D 50,000 weekly. Monitor Vitamin D.  Hypoglycemia- resolved. Appetite improved.  cholelithiasis - without cholecystitis neither pancreatitis. Normal amylase and lipase.  hypotension- continue IV steroid and IV fluid as needed  thrombocytopenia- PLT down, monitor PLT, f/u with Hematology.   NPH- - neurology does not believe acute mental status changes, could be due to NPH. F/u neurology as outpatient for NPH workup.  DVT ppx- Lovenox discontinued due to thrombocytopenia  leg arthritis- Acetaminophen PRN.  b/l cataract- F/u ophthalmology  vitamin D deficiency- continue Vitamin D supplement.  constipation- senna at bedtime.  LBP 2/2 severe spinal stenosis with out cord pressure - no surgical intervention recommended neither accepted by family, continue PT OT as needed.  Dementia - Stable. Continue Aricept .  Dysphagia - Mechanical soft diet, aspiration precautions.  Myasthenia Gravis - stable , off of medication. Monitor CBC and BMP every 2 months  VT ppx - on SCD, no blood thinner due to low PLT.

## 2024-05-29 NOTE — PROGRESS NOTE ADULT - SUBJECTIVE AND OBJECTIVE BOX
INTERVAL HPI/OVERNIGHT EVENTS:    events noted     MEDICATIONS  (STANDING):  chlorhexidine 2% Cloths 1 Application(s) Topical daily  dextrose 5% + sodium chloride 0.45%. 1000 milliLiter(s) (100 mL/Hr) IV Continuous <Continuous>  dextrose 50% Injectable 12.5 Gram(s) IV Push once  dextrose 50% Injectable 25 Gram(s) IV Push once  dextrose 50% Injectable 25 Gram(s) IV Push once  dextrose Oral Gel 15 Gram(s) Oral once  donepezil 10 milliGRAM(s) Oral at bedtime  glucagon  Injectable 1 milliGRAM(s) IntraMuscular once  meropenem  IVPB 1000 milliGRAM(s) IV Intermittent every 12 hours  multivitamin 1 Tablet(s) Oral daily  senna 2 Tablet(s) Oral at bedtime    MEDICATIONS  (PRN):      Allergies    sulfamethoxazole (Vomiting; Nausea)    Intolerances        Review of Systems: unobtainable           Vital Signs Last 24 Hrs  T(C): 36.3 (29 May 2024 11:06), Max: 36.4 (29 May 2024 00:00)  T(F): 97.3 (29 May 2024 11:06), Max: 97.5 (29 May 2024 00:00)  HR: 69 (29 May 2024 11:06) (60 - 82)  BP: 130/66 (29 May 2024 11:06) (129/60 - 155/84)  BP(mean): --  RR: 18 (29 May 2024 11:06) (18 - 18)  SpO2: 93% (29 May 2024 11:06) (93% - 98%)    Parameters below as of 29 May 2024 11:06  Patient On (Oxygen Delivery Method): room air        PHYSICAL EXAM:    Constitutional: NAD  HEENT: EOMI, throat clear  Neck: No LAD, supple  Respiratory: CTA and P  Cardiovascular: S1 and S2, RRR, no M  Gastrointestinal: BS+, soft, NT/ND, neg HSM,  Extremities: No peripheral edema, neg clubbing, cyanosis  Vascular: 2+ peripheral pulses  Neurological: A/O   Psychiatric: lethargic  Skin: No rashes      LABS:                        10.8   7.42  )-----------( 72       ( 29 May 2024 06:48 )             31.0     05-29    141  |  110<H>  |  10  ----------------------------<  89  3.5   |  22  |  0.81    Ca    8.7      29 May 2024 06:48  Phos  2.6     05-29  Mg     1.8     05-29    TPro  4.9<L>  /  Alb  2.4<L>  /  TBili  0.6  /  DBili  x   /  AST  36  /  ALT  41  /  AlkPhos  172<H>  05-28      Urinalysis Basic - ( 29 May 2024 06:48 )    Color: x / Appearance: x / SG: x / pH: x  Gluc: 89 mg/dL / Ketone: x  / Bili: x / Urobili: x   Blood: x / Protein: x / Nitrite: x   Leuk Esterase: x / RBC: x / WBC x   Sq Epi: x / Non Sq Epi: x / Bacteria: x        RADIOLOGY & ADDITIONAL TESTS:

## 2024-05-29 NOTE — PROGRESS NOTE ADULT - SUBJECTIVE AND OBJECTIVE BOX
Patient is a 89y old  Female who presents with a chief complaint of Seizure activity (29 May 2024 17:35)      INTERVAL HPI/OVERNIGHT EVENTS: Patient mildly better. More awake and responsive but still very confused. Continue IV abx. F/u neurology who ordered EEG.     Pain Location & Control:     MEDICATIONS  (STANDING):  chlorhexidine 2% Cloths 1 Application(s) Topical daily  dextrose 5% + sodium chloride 0.45%. 1000 milliLiter(s) (100 mL/Hr) IV Continuous <Continuous>  dextrose 50% Injectable 12.5 Gram(s) IV Push once  dextrose 50% Injectable 25 Gram(s) IV Push once  dextrose 50% Injectable 25 Gram(s) IV Push once  dextrose Oral Gel 15 Gram(s) Oral once  donepezil 10 milliGRAM(s) Oral at bedtime  glucagon  Injectable 1 milliGRAM(s) IntraMuscular once  meropenem  IVPB 1000 milliGRAM(s) IV Intermittent every 12 hours  multivitamin 1 Tablet(s) Oral daily  senna 2 Tablet(s) Oral at bedtime    MEDICATIONS  (PRN):      Allergies    sulfamethoxazole (Vomiting; Nausea)    Intolerances        REVIEW OF SYSTEMS:  UTO due to confusion    Vital Signs Last 24 Hrs  T(C): 36.2 (29 May 2024 20:59), Max: 36.4 (29 May 2024 00:00)  T(F): 97.2 (29 May 2024 20:59), Max: 97.6 (29 May 2024 16:54)  HR: 55 (29 May 2024 20:59) (55 - 82)  BP: 136/85 (29 May 2024 20:59) (129/60 - 153/69)  BP(mean): --  RR: 17 (29 May 2024 20:59) (17 - 18)  SpO2: 95% (29 May 2024 20:59) (93% - 98%)    Parameters below as of 29 May 2024 20:59  Patient On (Oxygen Delivery Method): room air        PHYSICAL EXAM:  GENERAL: NAD, well-groomed, well-developed  HEAD:  Atraumatic, Normocephalic  EYES: EOMI, PERRLA, conjunctiva and sclera clear  ENMT: No tonsillar erythema, exudates, or enlargement; Moist mucous membranes, Good dentition, No lesions  NECK: Supple, No JVD, Normal thyroid  NERVOUS SYSTEM:  Alert & Oriented X 0 confused but awake and responsive  CHEST/LUNG: Clear to auscultation bilaterally; No rales, rhonchi, wheezing, or rubs  HEART: Regular rate and rhythm; No murmurs, rubs, or gallops  ABDOMEN: Soft, Nontender, Nondistended; Bowel sounds present  EXTREMITIES:  2+ Peripheral Pulses, No clubbing or cyanosis  LYMPH: No lymphadenopathy noted  SKIN: No rashes or lesions      LABS:                        10.8   7.42  )-----------( 72       ( 29 May 2024 06:48 )             31.0     29 May 2024 06:48    141    |  110    |  10     ----------------------------<  89     3.5     |  22     |  0.81     Ca    8.7        29 May 2024 06:48  Phos  2.6       29 May 2024 06:48  Mg     1.8       29 May 2024 06:48        Urinalysis Basic - ( 29 May 2024 06:48 )    Color: x / Appearance: x / SG: x / pH: x  Gluc: 89 mg/dL / Ketone: x  / Bili: x / Urobili: x   Blood: x / Protein: x / Nitrite: x   Leuk Esterase: x / RBC: x / WBC x   Sq Epi: x / Non Sq Epi: x / Bacteria: x      CAPILLARY BLOOD GLUCOSE      POCT Blood Glucose.: 121 mg/dL (29 May 2024 21:17)  POCT Blood Glucose.: 104 mg/dL (29 May 2024 11:13)  POCT Blood Glucose.: 87 mg/dL (29 May 2024 06:34)  POCT Blood Glucose.: 104 mg/dL (28 May 2024 23:56)        Cultures  Culture Results:   No growth at 4 days (05-25-24 @ 05:33)  Culture Results:   No growth at 4 days (05-25-24 @ 05:15)      RADIOLOGY & ADDITIONAL TESTS:    Imaging Personally Reviewed:  [X ] YES  [ ] NO    Consultant(s) Notes Reviewed:  [ X] YES  [ ] NO    Care Discussed with Consultants/Other Providers [ X] YES  [ ] NO

## 2024-05-29 NOTE — PROGRESS NOTE ADULT - ASSESSMENT
89yo F h/o Myasthenia Gravis (not on medication for past 9 years), Dementia and Chronic low back pain, recently hospitalized for UTI and now p/w sepsis related to recurrent UTI. GI Consulted for increased LFTs.    1. Increased LFTs  now improved, r/t systemic process (uti)  US abdomen with cholelithiasis no further intervention needed  trend LFTs periodically     2. Recurrent UTI w/AMS  management per ID    3. Myasthenia Gravis  per primary     f/u palliative input         89yo F h/o Myasthenia Gravis (not on medication for past 9 years), Dementia and Chronic low back pain, recently hospitalized for UTI and now p/w sepsis related to recurrent UTI. GI Consulted for increased LFTs.    1. Increased LFTs  now improved, r/t systemic process (uti)  US abdomen with cholelithiasis no further intervention needed  trend LFTs periodically     2. Recurrent UTI w/AMS  management per ID    3. Myasthenia Gravis  per primary     4. Dysphagia  family does not wish for ngt/peg placements   aspiration precautions with po intake

## 2024-05-29 NOTE — CONSULT NOTE ADULT - PROBLEM SELECTOR RECOMMENDATION 5
will continue to follow for goc  case discussed with primary team  Can be reached by TEAMS M-F 9-5 Tammy Escalera Any other time please page [phone] if needed

## 2024-05-30 LAB
ANION GAP SERPL CALC-SCNC: 12 MMOL/L — SIGNIFICANT CHANGE UP (ref 5–17)
BUN SERPL-MCNC: 10 MG/DL — SIGNIFICANT CHANGE UP (ref 7–23)
CALCIUM SERPL-MCNC: 8.4 MG/DL — SIGNIFICANT CHANGE UP (ref 8.4–10.5)
CHLORIDE SERPL-SCNC: 107 MMOL/L — SIGNIFICANT CHANGE UP (ref 96–108)
CO2 SERPL-SCNC: 22 MMOL/L — SIGNIFICANT CHANGE UP (ref 22–31)
CREAT SERPL-MCNC: 0.61 MG/DL — SIGNIFICANT CHANGE UP (ref 0.5–1.3)
CULTURE RESULTS: SIGNIFICANT CHANGE UP
CULTURE RESULTS: SIGNIFICANT CHANGE UP
EGFR: 85 ML/MIN/1.73M2 — SIGNIFICANT CHANGE UP
GLUCOSE BLDC GLUCOMTR-MCNC: 106 MG/DL — HIGH (ref 70–99)
GLUCOSE BLDC GLUCOMTR-MCNC: 112 MG/DL — HIGH (ref 70–99)
GLUCOSE BLDC GLUCOMTR-MCNC: 125 MG/DL — HIGH (ref 70–99)
GLUCOSE BLDC GLUCOMTR-MCNC: 91 MG/DL — SIGNIFICANT CHANGE UP (ref 70–99)
GLUCOSE SERPL-MCNC: 135 MG/DL — HIGH (ref 70–99)
HCT VFR BLD CALC: 29 % — LOW (ref 34.5–45)
HGB BLD-MCNC: 9.8 G/DL — LOW (ref 11.5–15.5)
MCHC RBC-ENTMCNC: 31.9 PG — SIGNIFICANT CHANGE UP (ref 27–34)
MCHC RBC-ENTMCNC: 33.8 GM/DL — SIGNIFICANT CHANGE UP (ref 32–36)
MCV RBC AUTO: 94.5 FL — SIGNIFICANT CHANGE UP (ref 80–100)
NRBC # BLD: 0 /100 WBCS — SIGNIFICANT CHANGE UP (ref 0–0)
PLATELET # BLD AUTO: 75 K/UL — LOW (ref 150–400)
POTASSIUM SERPL-MCNC: 3.2 MMOL/L — LOW (ref 3.5–5.3)
POTASSIUM SERPL-SCNC: 3.2 MMOL/L — LOW (ref 3.5–5.3)
RBC # BLD: 3.07 M/UL — LOW (ref 3.8–5.2)
RBC # FLD: 15.4 % — HIGH (ref 10.3–14.5)
SODIUM SERPL-SCNC: 141 MMOL/L — SIGNIFICANT CHANGE UP (ref 135–145)
SPECIMEN SOURCE: SIGNIFICANT CHANGE UP
SPECIMEN SOURCE: SIGNIFICANT CHANGE UP
VIT B12 SERPL-MCNC: 999 PG/ML — SIGNIFICANT CHANGE UP (ref 232–1245)
WBC # BLD: 5.75 K/UL — SIGNIFICANT CHANGE UP (ref 3.8–10.5)
WBC # FLD AUTO: 5.75 K/UL — SIGNIFICANT CHANGE UP (ref 3.8–10.5)

## 2024-05-30 PROCEDURE — 93010 ELECTROCARDIOGRAM REPORT: CPT

## 2024-05-30 PROCEDURE — 99232 SBSQ HOSP IP/OBS MODERATE 35: CPT

## 2024-05-30 PROCEDURE — 99498 ADVNCD CARE PLAN ADDL 30 MIN: CPT | Mod: 25

## 2024-05-30 PROCEDURE — 99497 ADVNCD CARE PLAN 30 MIN: CPT | Mod: 25

## 2024-05-30 PROCEDURE — 99233 SBSQ HOSP IP/OBS HIGH 50: CPT

## 2024-05-30 PROCEDURE — 71045 X-RAY EXAM CHEST 1 VIEW: CPT | Mod: 26

## 2024-05-30 RX ORDER — POTASSIUM CHLORIDE 20 MEQ
10 PACKET (EA) ORAL
Refills: 0 | Status: COMPLETED | OUTPATIENT
Start: 2024-05-30 | End: 2024-05-30

## 2024-05-30 RX ORDER — POTASSIUM CHLORIDE 20 MEQ
40 PACKET (EA) ORAL ONCE
Refills: 0 | Status: DISCONTINUED | OUTPATIENT
Start: 2024-05-30 | End: 2024-05-30

## 2024-05-30 RX ADMIN — SODIUM CHLORIDE 100 MILLILITER(S): 9 INJECTION, SOLUTION INTRAVENOUS at 02:03

## 2024-05-30 RX ADMIN — SODIUM CHLORIDE 100 MILLILITER(S): 9 INJECTION, SOLUTION INTRAVENOUS at 17:13

## 2024-05-30 RX ADMIN — Medication 100 MILLIEQUIVALENT(S): at 13:01

## 2024-05-30 RX ADMIN — MEROPENEM 100 MILLIGRAM(S): 1 INJECTION INTRAVENOUS at 17:12

## 2024-05-30 RX ADMIN — CHLORHEXIDINE GLUCONATE 1 APPLICATION(S): 213 SOLUTION TOPICAL at 12:03

## 2024-05-30 RX ADMIN — MEROPENEM 100 MILLIGRAM(S): 1 INJECTION INTRAVENOUS at 05:14

## 2024-05-30 RX ADMIN — Medication 100 MILLIEQUIVALENT(S): at 14:16

## 2024-05-30 RX ADMIN — Medication 100 MILLIEQUIVALENT(S): at 12:03

## 2024-05-30 NOTE — PROVIDER CONTACT NOTE (CHANGE IN STATUS NOTIFICATION) - ACTION/TREATMENT ORDERED:
provider notified, warming blanket and hot packs placed on patient, will continue to reassess temperature until reaches optimal goal.

## 2024-05-30 NOTE — PROGRESS NOTE ADULT - SUBJECTIVE AND OBJECTIVE BOX
INTERVAL HPI/OVERNIGHT EVENTS:    events noted   no new gi issues   lethargic    MEDICATIONS  (STANDING):  chlorhexidine 2% Cloths 1 Application(s) Topical daily  dextrose 5% + sodium chloride 0.45%. 1000 milliLiter(s) (100 mL/Hr) IV Continuous <Continuous>  dextrose 50% Injectable 12.5 Gram(s) IV Push once  dextrose 50% Injectable 25 Gram(s) IV Push once  dextrose 50% Injectable 25 Gram(s) IV Push once  dextrose Oral Gel 15 Gram(s) Oral once  donepezil 10 milliGRAM(s) Oral at bedtime  glucagon  Injectable 1 milliGRAM(s) IntraMuscular once  meropenem  IVPB 1000 milliGRAM(s) IV Intermittent every 12 hours  multivitamin 1 Tablet(s) Oral daily  potassium chloride  10 mEq/100 mL IVPB 10 milliEquivalent(s) IV Intermittent every 1 hour  senna 2 Tablet(s) Oral at bedtime    MEDICATIONS  (PRN):      Allergies    sulfamethoxazole (Vomiting; Nausea)    Intolerances        Review of Systems: *pt minimally verbal to nonverbal, unable to obtain ROS           Vital Signs Last 24 Hrs  T(C): 33.4 (30 May 2024 07:08), Max: 36.4 (29 May 2024 16:54)  T(F): 92.1 (30 May 2024 07:08), Max: 97.6 (29 May 2024 16:54)  HR: 49 (30 May 2024 04:00) (49 - 69)  BP: 155/78 (30 May 2024 04:00) (130/66 - 155/78)  BP(mean): --  RR: 18 (30 May 2024 04:00) (17 - 18)  SpO2: 95% (30 May 2024 04:00) (93% - 95%)    Parameters below as of 30 May 2024 04:00  Patient On (Oxygen Delivery Method): room air        PHYSICAL EXAM:    Constitutional: NAD  HEENT: EOMI, throat clear  Neck: No LAD, supple  Respiratory: CTA and P  Cardiovascular: S1 and S2, RRR, no M  Gastrointestinal: BS+, soft, NT/ND, neg HSM,  Extremities: No peripheral edema, neg clubbing, cyanosis  Vascular: 2+ peripheral pulses  Neurological: A/O   Psychiatric: Normal mood, normal affect  Skin: No rashes      LABS:                        9.8    5.75  )-----------( 75       ( 30 May 2024 07:02 )             29.0     05-30    141  |  107  |  10  ----------------------------<  135<H>  3.2<L>   |  22  |  0.61    Ca    8.4      30 May 2024 07:02  Phos  2.6     05-29  Mg     1.8     05-29        Urinalysis Basic - ( 30 May 2024 07:02 )    Color: x / Appearance: x / SG: x / pH: x  Gluc: 135 mg/dL / Ketone: x  / Bili: x / Urobili: x   Blood: x / Protein: x / Nitrite: x   Leuk Esterase: x / RBC: x / WBC x   Sq Epi: x / Non Sq Epi: x / Bacteria: x        RADIOLOGY & ADDITIONAL TESTS:

## 2024-05-30 NOTE — PROGRESS NOTE ADULT - ASSESSMENT
89 YO F with PMHx of Myasthenia Gravis (not on medication for past 9 years) and MHx of dementia and chronic low back pain. Patient recently hospitalized after being found with slurred speech and very confused. Most likely due to UTI which not responded to Cipro. Patient transferred to hospital due to seizure activity and was found with UTI.     Pt was initially started on vancomycin and zosyn but switched to ertapenem when found to have an ESBL E Coli in her blood  ID is asked to evaluate     During the last admission in feb/March -PT presented from her nursing home with worsening lethargy and confusion. UA done as outpatient on 2/13 grossly positive and urine culture growing E. coli and Klebsiella, pansensitive. Found to be hypothermic in ED. Admitted for sepsis and metabolic encephalopathy in setting of UTI. Multiple RRTs called today. First RRT called for seizure-like activity. Patient loaded with Keppra and placed on EEG. Second RRT called for hypotension. Patient was given 4 L IVF with resolution of hypotension. pt changed to meropenem to cover for possible meningitis- ? Normal pressure hydrocephalous on CT- discussed with neuro- this wouldn't account for her acute change  on meropenem to cover for possible meningitis and /or to cover biliary sepsis   EEG not c/w HSV encephalitis but added  acyclovir until more definitive diagnosis available, Course significant for a distended GB but neg HIDA scan , renal insufficiency and thrombocytopenia ( at presentation) and hypernatremia     Antibiotics  Vanco 5/22  Zosyn %/22 --> 23  Ertapenem 5/23  Meropenem 5/24 -->     5/24  renal function improved    A/P  #ESBL E coli Bacteremia   5/22 +BC X2 +ESBL E coli;    5/22 Urine 10 - 49K E coli  agree with change to carbapenem  Increased meropenem 1000 mg IVSS Q 12 hours.   urinary source  day # 8 of carbapenem      #Thrombocytopenia  likely from sepsis  trend  still off  check HIT abs    #AMS  pt with baseline dementia, and additionally here likely metabolic encephalopathy  at some point there was also a question of Normal pressure hydrocephalous - neuro to address  ? seizure -   neuro to address MRI findings     #elevated LFTs  last admission there was concern for a possible passed biliary stone  u/s without acute choley    # Myasthenia Gravis  not on treatment for years   would avoid certain abs- fluoroquinolones , aminoglycosides and macrolides      Yeni Gabriel M.D. ,   please reach via teams   If no answer, or after 5PM/ weekends,  then please call  [phone]    Assessment and plan discussed with the primary team .

## 2024-05-30 NOTE — PROGRESS NOTE ADULT - ASSESSMENT
88yo F h/o Myasthenia Gravis (not on medication for past 9 years), Dementia and Chronic low back pain, recently hospitalized for UTI and now p/w sepsis related to recurrent UTI. GI Consulted for increased LFTs.    1. Increased LFTs  now improved, r/t systemic process (uti)  US abdomen with cholelithiasis no further intervention needed  trend LFTs periodically     2. Recurrent UTI w/AMS  management per ID    3. Myasthenia Gravis  per primary     4. Dysphagia  family does not wish for ngt/peg placements   aspiration precautions with po intake

## 2024-05-30 NOTE — CHART NOTE - NSCHARTNOTEFT_GEN_A_CORE
Medicine PA Note    Asked by Dr. Woo to place NG tube as it is within pt's daughters' goals of care to initiate tube feeds via NGT. Pt. was seen and evaluated at bedside, lethargic and not communicative. Risks/benefits of procedure discussed with 2 daughters Katrin and Pratima at bedside. NG tube was inserted into the right nostril and pt tolerated the procedure well. Portable CXR pending to confirm placement. Will sign out to night ACP to f/u on CXR results.    KAREN ChoudharyC  [id number]

## 2024-05-30 NOTE — PROGRESS NOTE ADULT - ASSESSMENT
89 YO F with PMHx of Myasthenia Gravis (not on medication for past 9 years) and MHx of dementia and chronic low back pain. Patient recently hospitalized after being found with slurred speech and very confused. Most likely due to UTI which not responded to Cipro. Patient transferred to hospital due to seizure activity and was found with UTI. She was given 1 mg IM.     anorexia/FTT- family ok with TF via  NG tube.   Bacteremia/sepsis- resolved. Blood cx negative. Continue IV Meropenem. F/u ID.   AMS- most likely due to toxic encephalopathy. Not improved with treating bacteremia. F/u neurologist who is planning to do EEG.   UTI - as  above   Liver enzyme elevation- improving and trending down. RUQ ultrasound negative. Hepatitis panel negative.  weight loss- due to recent hospitalization and severe sickness on hospital. .Appetite improving Encourage PO intake and supplement diet. Monitor weight. F/u dietitian. Family is aware and refused peg tube.  Vitamin D deficiency- Vitamin D 50,000 weekly. Monitor Vitamin D.  Hypoglycemia- resolved. Appetite improved.  cholelithiasis - without cholecystitis neither pancreatitis. Normal amylase and lipase.  hypotension- continue IV steroid and IV fluid as needed  thrombocytopenia- PLT down, monitor PLT, f/u with Hematology.   NPH- - neurology does not believe acute mental status changes, could be due to NPH. F/u neurology as outpatient for NPH workup.  DVT ppx- Lovenox discontinued due to thrombocytopenia  leg arthritis- Acetaminophen PRN.  b/l cataract- F/u ophthalmology  vitamin D deficiency- continue Vitamin D supplement.  constipation- senna at bedtime.  LBP 2/2 severe spinal stenosis with out cord pressure - no surgical intervention recommended neither accepted by family, continue PT OT as needed.  Dementia - Stable. Continue Aricept .  Dysphagia - Mechanical soft diet, aspiration precautions.  Myasthenia Gravis - stable , off of medication. Monitor CBC and BMP every 2 months  VT ppx - on SCD, no blood thinner due to low PLT.

## 2024-05-30 NOTE — PROGRESS NOTE ADULT - SUBJECTIVE AND OBJECTIVE BOX
SUBJECTIVE AND OBJECTIVE: pt seen and examined at bedside. pt lethargic on exam. limited ability to participate.   Indication for Geriatrics and Palliative Care Services/INTERVAL HPI: GOC     OVERNIGHT EVENTS: Pt hypothermic.     DNR on chart:  Allergies    sulfamethoxazole (Vomiting; Nausea)    Intolerances    MEDICATIONS  (STANDING):  chlorhexidine 2% Cloths 1 Application(s) Topical daily  dextrose 5% + sodium chloride 0.45%. 1000 milliLiter(s) (100 mL/Hr) IV Continuous <Continuous>  dextrose 50% Injectable 12.5 Gram(s) IV Push once  dextrose 50% Injectable 25 Gram(s) IV Push once  dextrose 50% Injectable 25 Gram(s) IV Push once  dextrose Oral Gel 15 Gram(s) Oral once  donepezil 10 milliGRAM(s) Oral at bedtime  glucagon  Injectable 1 milliGRAM(s) IntraMuscular once  meropenem  IVPB 1000 milliGRAM(s) IV Intermittent every 12 hours  multivitamin 1 Tablet(s) Oral daily  potassium chloride  10 mEq/100 mL IVPB 10 milliEquivalent(s) IV Intermittent every 1 hour  senna 2 Tablet(s) Oral at bedtime    MEDICATIONS  (PRN):      ITEMS UNCHECKED ARE NOT PRESENT    PRESENT SYMPTOMS: [x ]Unable to self-report - see [ ] CPOT [x ] PAINADS [ x] RDOS  Source if other than patient:  [ ]Family   [x ]Team     Pain:  [ ]yes [ ]no  QOL impact -   Location -                    Aggravating factors -  Quality -  Radiation -  Timing-  Severity (0-10 scale):  Minimal acceptable level (0-10 scale):     CPOT:    https://www.[email or web address]/getattachment/[email or web address]/Critical-Care-Pain-Observation-Tool-(CPOT)    PAINAD Score: See PAINAD tool and score below       Dyspnea:                           [ ]Mild [ ]Moderate [ ]Severe    RDOS: See RDOS tool and score below   0 to 2  minimal or no respiratory distress   3  mild distress  4 to 6 moderate distress  >7 severe distress      Anxiety:                             [ ]Mild [ ]Moderate [ ]Severe  Fatigue:                             [ ]Mild [ ]Moderate [ ]Severe  Nausea:                             [ ]Mild [ ]Moderate [ ]Severe  Loss of appetite:              [ ]Mild [ ]Moderate [ ]Severe  Constipation:                    [ ]Mild [ ]Moderate [ ]Severe    PCSSQ[Palliative Care Spiritual Screening Question]   Severity (0-10):  Score of 4 or > indicate consideration of Chaplaincy referral.  Chaplaincy Referral: [ ] yes [ ] refused [ ] following [ ] Deferred     Caregiver [place]? : [ ] yes [ ] no [ ] Deferred [ ] Declined             Social work referral [ ] Patient & Family Centered Care Referral [ ]     Anticipatory Grief present?:  [ ] yes [ ] no  [ ] Deferred                  Social work referral [ ] Chaplaincy Referral [ ]    		  Other Symptoms:  [ x]All other review of systems negative- pt unable to participate in exam    Palliative Performance Status Version 2:   See PPSv2 tool and score below         PHYSICAL EXAM:  Vital Signs Last 24 Hrs  T(C): 33.4 (30 May 2024 07:08), Max: 36.4 (29 May 2024 16:54)  T(F): 92.1 (30 May 2024 07:08), Max: 97.6 (29 May 2024 16:54)  HR: 49 (30 May 2024 04:00) (49 - 67)  BP: 155/78 (30 May 2024 04:00) (133/67 - 155/78)  BP(mean): --  RR: 18 (30 May 2024 04:00) (17 - 18)  SpO2: 95% (30 May 2024 04:00) (94% - 95%)    Parameters below as of 30 May 2024 04:00  Patient On (Oxygen Delivery Method): room air     I&O's Summary    29 May 2024 07:01  -  30 May 2024 07:00  --------------------------------------------------------  IN: 0 mL / OUT: 1200 mL / NET: -1200 mL    30 May 2024 07:01  -  30 May 2024 12:02  --------------------------------------------------------  IN: 0 mL / OUT: 0 mL / NET: 0 mL       GENERAL: [ ]Cachexia    [ ]Alert  [ ]Oriented x   [x ]Lethargic  [ ]Unarousable  [ ]Verbal  [ ]Non-Verbal  Behavioral:   [ ]Anxiety  [ ]Delirium [ ]Agitation [ ]Other  HEENT:  [ ]Normal   [ ]Dry mouth   [ ]ET Tube/Trach  [ ]Oral lesions  PULMONARY:   [ ]Clear [ ]Tachypnea  x ]Audible excessive secretions   [x ]Rhonchi        [ ]Right [ ]Left [ x]Bilateral  [ ]Crackles        [ ]Right [ ]Left [ ]Bilateral  [ ]Wheezing     [ ]Right [ ]Left [ ]Bilateral  [ ]Diminished BS [ ] Right [ ]Left [ ]Bilateral  CARDIOVASCULAR:    [x ]Regular [ ]Irregular [ ]Tachy  [ ]Jass [ ]Murmur [ ]Other  GASTROINTESTINAL:  [ x]Soft  [ ]Distended   [x ]+BS  [ x]Non tender [ ]Tender  [ ]Other [ ]PEG [ ]OGT/ NGT   Last BM:   GENITOURINARY:  [ ]Normal [x ]Incontinent   [ ]Oliguria/Anuria   [ ]Duarte  MUSCULOSKELETAL:   [ ]Normal   [x ]Weakness  [ x]Bed/Wheelchair bound [ ]Edema  NEUROLOGIC:   [ ]No focal deficits  [x] Cognitive impairment  [ ] Dysphagia [ ]Dysarthria [ ] Paresis [ ]Other   SKIN:   [ ]Normal  [ ]Rash  [ ]Other  [ ]Pressure ulcer(s) [ ]y [ ]n present on admission    CRITICAL CARE:  [ ]Shock Present  [ ]Septic [ ]Cardiogenic [ ]Neurologic [ ]Hypovolemic  [ ]Vasopressors [ ]Inotropes  [ ]Respiratory failure present [ ]Mechanical Ventilation [ ]Non-invasive ventilatory support [ ]High-Flow   [ ]Acute  [ ]Chronic [ ]Hypoxic  [ ]Hypercarbic [ ]Other  [ ]Other organ failure     LABS:                        9.8    5.75  )-----------( 75       ( 30 May 2024 07:02 )             29.0   05-30    141  |  107  |  10  ----------------------------<  135<H>  3.2<L>   |  22  |  0.61    Ca    8.4      30 May 2024 07:02  Phos  2.6     05-29  Mg     1.8     05-29        Urinalysis Basic - ( 30 May 2024 07:02 )    Color: x / Appearance: x / SG: x / pH: x  Gluc: 135 mg/dL / Ketone: x  / Bili: x / Urobili: x   Blood: x / Protein: x / Nitrite: x   Leuk Esterase: x / RBC: x / WBC x   Sq Epi: x / Non Sq Epi: x / Bacteria: x      RADIOLOGY & ADDITIONAL STUDIES:  < from: MR Head w/wo IV Cont (05.29.24 @ 22:37) >  ACC: [id number] EXAM:  MR BRAIN WAW IC   ORDERED BY: LIZETH LOPEZ     PROCEDURE DATE:  05/29/2024          INTERPRETATION:  CLINICAL INFORMATION: Lethargy    TECHNIQUE: Multiplanar multisequence MR brain with and without contrast.    CONTRAST: Gadavist 6 cc administered, 1.5 cc discarded    COMPARISON: MR brain 2/22/2024, CT head 5/22/2024    FINDINGS:  No acute infarct, acute intracranial hemorrhage, mass effect, or midline   shift.    There is similar ventriculomegaly out of proportion to the size of the   sulci and sulcal crowding at the vertex, suggestive of normal pressure   hydrocephalus.    No abnormal intracranial enhancement.    Mild subcortical and periventricular white matter T2/FLAIR   hyperintensity, nonspecific and likely sequelaof mild chronic   microvascular changes.    Round filling defects in the bilateral transverse venous sinuses likely   arachnoid granulations.     Indeterminate tubular filling defect noted in the right sigmoid sinus   and right jugular bulb.    Scattered paranasal sinus mucosal thickening without air-fluid level.        IMPRESSION:  No acute infarct, acute intracranial hemorrhage, or mass effect.    Indeterminate tubular filling defect in the right sigmoid venous sinus   and right jugular bulb. Partial venous thrombosis not definitely   excluded. Correlate clinically and follow-up exam recommended.    Similar findings suggestive of normal pressure hydrocephalus.    --- End of Report ---           DONNA LLANOS MD; Resident Radiologist  This document has been electronically signed.  ARNAV HOWE MD; Attending Radiologist  This document has been electronically signed. May 30 2024 10:39AM    < end of copied text >    Protein Calorie Malnutrition Present: [ ]mild [ ]moderate [ ]severe [ ]underweight [ ]morbid obesity  https://www.andeal.org/vault/2440/web/files/ONC/Table_Clinical%20Characteristics%20to%20Document%20Malnutrition-White%20JV%20et%20al%202012.pdf    Height (cm): 154.9 (05-22-24 @ 10:19), 165.1 (02-19-24 @ 12:19)  Weight (kg): 45.4 (05-22-24 @ 10:19), 54.4 (02-19-24 @ 12:19)  BMI (kg/m2): 18.9 (05-22-24 @ 10:19), 20 (02-19-24 @ 12:19)    [ x]PPSV2 < or = 30%  [ ]significant weight loss [ ]poor nutritional intake [ ]anasarca[ ]Artificial Nutrition    Other REFERRALS:  [ ]Hospice  [ ]Child Life  [ ]Social Work  [ ]Case management [ ]Holistic Therapy     Goals of Care Document:

## 2024-05-30 NOTE — PROVIDER CONTACT NOTE (CHANGE IN STATUS NOTIFICATION) - BACKGROUND
89-year-old female with past medical history of dementia, myasthenia gravis (not on medications for the past 9 years) who presents to the ED for evaluation of altered mental status from nursing home. Hypothermic on admission 92

## 2024-05-30 NOTE — PROGRESS NOTE ADULT - SUBJECTIVE AND OBJECTIVE BOX
Patient is a 89y old  Female who presents with a chief complaint of Seizure activity (30 May 2024 17:03)      INTERVAL HPI/OVERNIGHT EVENTS: Patient not eating or drinking. Very poor appetite. Most of the day lethargic.  Sometimes in afternoon she wakes up and talks with family. She is not getting enough nutrition decided for NG tube insertion and start TF. I spoke with daughter and explained and she agreed with this plan. NG tube to be inserted today.     Pain Location & Control:     MEDICATIONS  (STANDING):  chlorhexidine 2% Cloths 1 Application(s) Topical daily  dextrose 5% + sodium chloride 0.45%. 1000 milliLiter(s) (100 mL/Hr) IV Continuous <Continuous>  dextrose 50% Injectable 12.5 Gram(s) IV Push once  dextrose 50% Injectable 25 Gram(s) IV Push once  dextrose 50% Injectable 25 Gram(s) IV Push once  dextrose Oral Gel 15 Gram(s) Oral once  donepezil 10 milliGRAM(s) Oral at bedtime  glucagon  Injectable 1 milliGRAM(s) IntraMuscular once  meropenem  IVPB 1000 milliGRAM(s) IV Intermittent every 12 hours  multivitamin 1 Tablet(s) Oral daily  senna 2 Tablet(s) Oral at bedtime    MEDICATIONS  (PRN):      Allergies    sulfamethoxazole (Vomiting; Nausea)    Intolerances        REVIEW OF SYSTEMS:  UTO lethargic     Vital Signs Last 24 Hrs  T(C): 37.1 (30 May 2024 20:36), Max: 37.3 (30 May 2024 16:15)  T(F): 98.8 (30 May 2024 20:36), Max: 99.1 (30 May 2024 16:15)  HR: 73 (30 May 2024 20:36) (49 - 88)  BP: 121/83 (30 May 2024 20:36) (102/62 - 155/78)  BP(mean): --  RR: 18 (30 May 2024 20:36) (18 - 18)  SpO2: 96% (30 May 2024 20:36) (92% - 96%)    Parameters below as of 30 May 2024 20:36  Patient On (Oxygen Delivery Method): room air        PHYSICAL EXAM:  GENERAL: NAD, well-groomed, well-developed  HEAD:  Atraumatic, Normocephalic  EYES: EOMI, PERRLA, conjunctiva and sclera clear  ENMT: No tonsillar erythema, exudates, or enlargement; Moist mucous membranes, Good dentition, No lesions  NECK: Supple, No JVD, Normal thyroid  NERVOUS SYSTEM:  Alert & Oriented X 0 lethargic , not following commands   CHEST/LUNG: Clear to auscultation bilaterally; No rales, rhonchi, wheezing, or rubs  HEART: Regular rate and rhythm; No murmurs, rubs, or gallops  ABDOMEN: Soft, Nontender, Nondistended; Bowel sounds present  EXTREMITIES:  2+ Peripheral Pulses, No clubbing or cyanosis  LYMPH: No lymphadenopathy noted  SKIN: No rashes or lesions      LABS:                        9.8    5.75  )-----------( 75       ( 30 May 2024 07:02 )             29.0     30 May 2024 07:02    141    |  107    |  10     ----------------------------<  135    3.2     |  22     |  0.61     Ca    8.4        30 May 2024 07:02        Urinalysis Basic - ( 30 May 2024 07:02 )    Color: x / Appearance: x / SG: x / pH: x  Gluc: 135 mg/dL / Ketone: x  / Bili: x / Urobili: x   Blood: x / Protein: x / Nitrite: x   Leuk Esterase: x / RBC: x / WBC x   Sq Epi: x / Non Sq Epi: x / Bacteria: x      CAPILLARY BLOOD GLUCOSE      POCT Blood Glucose.: 91 mg/dL (30 May 2024 18:16)  POCT Blood Glucose.: 112 mg/dL (30 May 2024 12:18)  POCT Blood Glucose.: 125 mg/dL (30 May 2024 05:51)  POCT Blood Glucose.: 106 mg/dL (30 May 2024 00:35)        Cultures  Culture Results:   No growth at 5 days (05-25-24 @ 05:33)  Culture Results:   No growth at 5 days (05-25-24 @ 05:15)      RADIOLOGY & ADDITIONAL TESTS:    Imaging Personally Reviewed:  [X ] YES  [ ] NO    Consultant(s) Notes Reviewed:  [ X] YES  [ ] NO    Care Discussed with Consultants/Other Providers [ X] YES  [ ] NO

## 2024-05-30 NOTE — PROGRESS NOTE ADULT - SUBJECTIVE AND OBJECTIVE BOX
still refusing oral intake    VITAL SIGNS:    VSS    PHYSICAL EXAM:     GENERAL: no acute distress  HEENT: NC/AT, EOMI, neck supple, MMM  RESPIRATORY: LCTAB/L, no rhonchi, rales, or wheezing  CARDIOVASCULAR: RRR, no murmurs, gallops, rubs  ABDOMINAL: soft, non-tender, non-distended, positive bowel sounds                             9.8    5.75  )-----------( 75       ( 30 May 2024 07:02 )             29.0     05-30    141  |  107  |  10  ----------------------------<  135<H>  3.2<L>   |  22  |  0.61    Ca    8.4      30 May 2024 07:02  Phos  2.6     05-29  Mg     1.8     05-29        MEDICATIONS  (STANDING):  chlorhexidine 2% Cloths 1 Application(s) Topical daily  dextrose 5% + sodium chloride 0.45%. 1000 milliLiter(s) (100 mL/Hr) IV Continuous <Continuous>  dextrose 50% Injectable 12.5 Gram(s) IV Push once  dextrose 50% Injectable 25 Gram(s) IV Push once  dextrose 50% Injectable 25 Gram(s) IV Push once  dextrose Oral Gel 15 Gram(s) Oral once  donepezil 10 milliGRAM(s) Oral at bedtime  glucagon  Injectable 1 milliGRAM(s) IntraMuscular once  meropenem  IVPB 1000 milliGRAM(s) IV Intermittent every 12 hours  multivitamin 1 Tablet(s) Oral daily  senna 2 Tablet(s) Oral at bedtime

## 2024-05-30 NOTE — PROGRESS NOTE ADULT - SUBJECTIVE AND OBJECTIVE BOX
Patient is a 89y old  Female who presents with a chief complaint of Seizure activity (30 May 2024 12:01)    Being followed by ID for        Interval history:  No other acute events      ROS:  No cough,SOB,CP  No N/V/D  No abd pain  No urinary complaints  No HA  No joint or limb pain  No other complaints    PAST MEDICAL & SURGICAL HISTORY:  Myasthenia gravis      Dementia      Myasthenia gravis      No significant past surgical history      No significant past surgical history        Allergies    sulfamethoxazole (Vomiting; Nausea)    Intolerances      Antimicrobials:    meropenem  IVPB 1000 milliGRAM(s) IV Intermittent every 12 hours    MEDICATIONS  (STANDING):  chlorhexidine 2% Cloths 1 Application(s) Topical daily  dextrose 5% + sodium chloride 0.45%. 1000 milliLiter(s) (100 mL/Hr) IV Continuous <Continuous>  dextrose 50% Injectable 25 Gram(s) IV Push once  dextrose 50% Injectable 12.5 Gram(s) IV Push once  dextrose 50% Injectable 25 Gram(s) IV Push once  dextrose Oral Gel 15 Gram(s) Oral once  donepezil 10 milliGRAM(s) Oral at bedtime  glucagon  Injectable 1 milliGRAM(s) IntraMuscular once  meropenem  IVPB 1000 milliGRAM(s) IV Intermittent every 12 hours  multivitamin 1 Tablet(s) Oral daily  senna 2 Tablet(s) Oral at bedtime      Vital Signs Last 24 Hrs  T(C): 37.3 (05-30-24 @ 16:15), Max: 37.3 (05-30-24 @ 16:15)  T(F): 99.1 (05-30-24 @ 16:15), Max: 99.1 (05-30-24 @ 16:15)  HR: 88 (05-30-24 @ 16:15) (49 - 88)  BP: 102/62 (05-30-24 @ 16:15) (102/62 - 155/78)  BP(mean): --  RR: 18 (05-30-24 @ 16:15) (17 - 18)  SpO2: 93% (05-30-24 @ 16:15) (92% - 95%)    Physical Exam:    Constitutional well preserved,comfortable,pleasant    HEENT PERRLA EOMI,No pallor or icterus    No oral exudate or erythema    Neck supple no JVD or LN    Chest Good AE,CTA    CVS RRR S1 S2 WNl No murmur or rub or gallop    Abd soft BS normal No tenderness no masses    Ext No cyanosis clubbing or edema    IV site no erythema tenderness or discharge    Joints no swelling or LOM    CNS AAO X 3 no focal    Lab Data:                          9.8    5.75  )-----------( 75       ( 30 May 2024 07:02 )             29.0       05-30    141  |  107  |  10  ----------------------------<  135<H>  3.2<L>   |  22  |  0.61    Ca    8.4      30 May 2024 07:02  Phos  2.6     05-29  Mg     1.8     05-29      Urinalysis (05.22.24 @ 13:08)    Glucose Qualitative, Urine: Negative mg/dL   Blood, Urine: Moderate   pH Urine: 5.5   Color: Dark Yellow   Urine Appearance: Cloudy   Bilirubin: Negative   Ketone - Urine: Trace mg/dL   Specific Gravity: 1.022   Protein, Urine: 100 mg/dL   Urobilinogen: 1.0 mg/dL   Nitrite: Positive   Leukocyte Esterase Concentration: Moderate          .Blood Blood  05-25-24   No growth at 5 days  --  --      .Blood Blood-Peripheral  05-25-24   No growth at 5 days  --  --    Culture - Urine (05.22.24 @ 13:08)    -  Ampicillin: R >16 These ampicillin results predict results for amoxicillin   -  Meropenem: S <=1   -  Nitrofurantoin: S <=32 Should not be used to treat pyelonephritis   -  Piperacillin/Tazobactam: SDD 16   -  Tobramycin: R >8   -  Trimethoprim/Sulfamethoxazole: S <=0.5/9.5   -  Cefazolin: R >16 For uncomplicated UTI with K. pneumoniae, E. coli, or P. mirablis: MORRO <=16 is sensitive and MORRO >=32 is resistant. This also predicts results for oral agents cefaclor, cefdinir, cefpodoxime, cefprozil, cefuroxime axetil, cephalexin and locarbef for uncomplicated UTI. Note that some isolates may be susceptible to these agents while testing resistant to cefazolin.   -  Cefepime: R >16   -  Ceftriaxone: R >32   -  Cefuroxime: R >16   -  Ciprofloxacin: R >2   -  Ertapenem: S <=0.5   -  Gentamicin: S <=2   -  Imipenem: S <=1   -  Levofloxacin: R >4   -  Ampicillin/Sulbactam: R >16/8   -  Aztreonam: R >16   Specimen Source: Clean Catch Clean Catch (Midstream)   Culture Results:   10,000 - 49,000 CFU/mL Escherichia coli ESBL   Organism Identification: Escherichia coli ESBL   Organism: Escherichia coli ESBL   Method Type: MORRO    Culture - Blood (05.22.24 @ 10:40)    -  ESBL: Detec   -  Escherichia coli: Detec   -  CTX-M Resistance Marker: Detec   Gram Stain:   Growth in anaerobic bottle: Gram Negative Rods   -  Aztreonam: R >16   -  Cefazolin: R >16   -  Levofloxacin: R >4   -  Meropenem: S <=1   -  Gentamicin: S <=2   -  Imipenem: S <=1   -  Ciprofloxacin: R >2   -  Ertapenem: S <=0.5   -  Cefepime: R >16   -  Ceftriaxone: R >32   -  Trimethoprim/Sulfamethoxazole: S <=0.5/9.5   -  Piperacillin/Tazobactam: R <=8   -  Tobramycin: R >8   -  Ampicillin: R >16 These ampicillin results predict results for amoxicillin   -  Ampicillin/Sulbactam: R >16/8   Specimen Source: .Blood Blood-Peripheral   Organism: Blood Culture PCR   Organism: Escherichia coli ESBL   Culture Results:   Growth in anaerobic bottle: Escherichia coli ESBL  Direct identification is available within approximately 3-5  hours either by Blood Panel Multiplexed PCR or Direct  MALDI-TOF. Details: [email or web address]   Organism Identification: Blood Culture PCR  Escherichia coli ESBL   Method Type: MORRO   Method Type: PCR        WBC Count: 5.75 (05-30-24 @ 07:02)  WBC Count: 7.42 (05-29-24 @ 06:48)  WBC Count: 5.29 (05-28-24 @ 07:02)  WBC Count: 5.41 (05-27-24 @ 05:32)  WBC Count: 4.73 (05-26-24 @ 05:56)  WBC Count: 6.90 (05-24-24 @ 07:19)       Bilirubin Total: 0.6 mg/dL (05-28-24 @ 06:53)  Aspartate Aminotransferase (AST/SGOT): 36 U/L (05-28-24 @ 06:53)  Alanine Aminotransferase (ALT/SGPT): 41 U/L (05-28-24 @ 06:53)  Alkaline Phosphatase: 172 U/L (05-28-24 @ 06:53)  Bilirubin Total: 0.6 mg/dL (05-27-24 @ 05:32)  Aspartate Aminotransferase (AST/SGOT): 41 U/L (05-27-24 @ 05:32)  Alanine Aminotransferase (ALT/SGPT): 52 U/L (05-27-24 @ 05:32)  Alkaline Phosphatase: 195 U/L (05-27-24 @ 05:32)  Bilirubin Total: 0.9 mg/dL (05-26-24 @ 05:56)  Aspartate Aminotransferase (AST/SGOT): 49 U/L (05-26-24 @ 05:56)  Alanine Aminotransferase (ALT/SGPT): 64 U/L (05-26-24 @ 05:56)  Alkaline Phosphatase: 211 U/L (05-26-24 @ 05:56)         Patient is a 89y old  Female who presents with a chief complaint of Seizure activity (30 May 2024 12:01)    Being followed by ID for        Interval history:  pt unresponsive  according to team , at times more resposnsive  No other acute events          PAST MEDICAL & SURGICAL HISTORY:  Myasthenia gravis      Dementia      Myasthenia gravis      No significant past surgical history      No significant past surgical history        Allergies    sulfamethoxazole (Vomiting; Nausea)    Intolerances      Antimicrobials:    meropenem  IVPB 1000 milliGRAM(s) IV Intermittent every 12 hours    MEDICATIONS  (STANDING):  chlorhexidine 2% Cloths 1 Application(s) Topical daily  dextrose 5% + sodium chloride 0.45%. 1000 milliLiter(s) (100 mL/Hr) IV Continuous <Continuous>  dextrose 50% Injectable 25 Gram(s) IV Push once  dextrose 50% Injectable 12.5 Gram(s) IV Push once  dextrose 50% Injectable 25 Gram(s) IV Push once  dextrose Oral Gel 15 Gram(s) Oral once  donepezil 10 milliGRAM(s) Oral at bedtime  glucagon  Injectable 1 milliGRAM(s) IntraMuscular once  meropenem  IVPB 1000 milliGRAM(s) IV Intermittent every 12 hours  multivitamin 1 Tablet(s) Oral daily  senna 2 Tablet(s) Oral at bedtime      Vital Signs Last 24 Hrs  T(C): 37.3 (05-30-24 @ 16:15), Max: 37.3 (05-30-24 @ 16:15)  T(F): 99.1 (05-30-24 @ 16:15), Max: 99.1 (05-30-24 @ 16:15)  HR: 88 (05-30-24 @ 16:15) (49 - 88)  BP: 102/62 (05-30-24 @ 16:15) (102/62 - 155/78)  BP(mean): --  RR: 18 (05-30-24 @ 16:15) (17 - 18)  SpO2: 93% (05-30-24 @ 16:15) (92% - 95%)    Physical Exam:    pt curled up in bed    protruding tongue    Neck supple no JVD or LN    Chest Good AE,CTA    CVS  S1 S2     Abd soft BS normal No tenderness     Ext No cyanosis clubbing or edema    IV site no erythema tenderness or discharge    Joints no swelling or LOM    CNS AAO X 3 no focal    Lab Data:                          9.8    5.75  )-----------( 75       ( 30 May 2024 07:02 )             29.0       05-30    141  |  107  |  10  ----------------------------<  135<H>  3.2<L>   |  22  |  0.61    Ca    8.4      30 May 2024 07:02  Phos  2.6     05-29  Mg     1.8     05-29      Urinalysis (05.22.24 @ 13:08)    Glucose Qualitative, Urine: Negative mg/dL   Blood, Urine: Moderate   pH Urine: 5.5   Color: Dark Yellow   Urine Appearance: Cloudy   Bilirubin: Negative   Ketone - Urine: Trace mg/dL   Specific Gravity: 1.022   Protein, Urine: 100 mg/dL   Urobilinogen: 1.0 mg/dL   Nitrite: Positive   Leukocyte Esterase Concentration: Moderate          .Blood Blood  05-25-24   No growth at 5 days  --  --      .Blood Blood-Peripheral  05-25-24   No growth at 5 days  --  --    Culture - Urine (05.22.24 @ 13:08)    -  Ampicillin: R >16 These ampicillin results predict results for amoxicillin   -  Meropenem: S <=1   -  Nitrofurantoin: S <=32 Should not be used to treat pyelonephritis   -  Piperacillin/Tazobactam: SDD 16   -  Tobramycin: R >8   -  Trimethoprim/Sulfamethoxazole: S <=0.5/9.5   -  Cefazolin: R >16 For uncomplicated UTI with K. pneumoniae, E. coli, or P. mirablis: MORRO <=16 is sensitive and MORRO >=32 is resistant. This also predicts results for oral agents cefaclor, cefdinir, cefpodoxime, cefprozil, cefuroxime axetil, cephalexin and locarbef for uncomplicated UTI. Note that some isolates may be susceptible to these agents while testing resistant to cefazolin.   -  Cefepime: R >16   -  Ceftriaxone: R >32   -  Cefuroxime: R >16   -  Ciprofloxacin: R >2   -  Ertapenem: S <=0.5   -  Gentamicin: S <=2   -  Imipenem: S <=1   -  Levofloxacin: R >4   -  Ampicillin/Sulbactam: R >16/8   -  Aztreonam: R >16   Specimen Source: Clean Catch Clean Catch (Midstream)   Culture Results:   10,000 - 49,000 CFU/mL Escherichia coli ESBL   Organism Identification: Escherichia coli ESBL   Organism: Escherichia coli ESBL   Method Type: MORRO    Culture - Blood (05.22.24 @ 10:40)    -  ESBL: Detec   -  Escherichia coli: Detec   -  CTX-M Resistance Marker: Detec   Gram Stain:   Growth in anaerobic bottle: Gram Negative Rods   -  Aztreonam: R >16   -  Cefazolin: R >16   -  Levofloxacin: R >4   -  Meropenem: S <=1   -  Gentamicin: S <=2   -  Imipenem: S <=1   -  Ciprofloxacin: R >2   -  Ertapenem: S <=0.5   -  Cefepime: R >16   -  Ceftriaxone: R >32   -  Trimethoprim/Sulfamethoxazole: S <=0.5/9.5   -  Piperacillin/Tazobactam: R <=8   -  Tobramycin: R >8   -  Ampicillin: R >16 These ampicillin results predict results for amoxicillin   -  Ampicillin/Sulbactam: R >16/8   Specimen Source: .Blood Blood-Peripheral   Organism: Blood Culture PCR   Organism: Escherichia coli ESBL   Culture Results:   Growth in anaerobic bottle: Escherichia coli ESBL  Direct identification is available within approximately 3-5  hours either by Blood Panel Multiplexed PCR or Direct  MALDI-TOF. Details: [email or web address]   Organism Identification: Blood Culture PCR  Escherichia coli ESBL   Method Type: MORRO   Method Type: PCR        WBC Count: 5.75 (05-30-24 @ 07:02)  WBC Count: 7.42 (05-29-24 @ 06:48)  WBC Count: 5.29 (05-28-24 @ 07:02)  WBC Count: 5.41 (05-27-24 @ 05:32)  WBC Count: 4.73 (05-26-24 @ 05:56)  WBC Count: 6.90 (05-24-24 @ 07:19)       Bilirubin Total: 0.6 mg/dL (05-28-24 @ 06:53)  Aspartate Aminotransferase (AST/SGOT): 36 U/L (05-28-24 @ 06:53)  Alanine Aminotransferase (ALT/SGPT): 41 U/L (05-28-24 @ 06:53)  Alkaline Phosphatase: 172 U/L (05-28-24 @ 06:53)    Bilirubin Total: 0.6 mg/dL (05-27-24 @ 05:32)  Aspartate Aminotransferase (AST/SGOT): 41 U/L (05-27-24 @ 05:32)  Alanine Aminotransferase (ALT/SGPT): 52 U/L (05-27-24 @ 05:32)  Alkaline Phosphatase: 195 U/L (05-27-24 @ 05:32)    Bilirubin Total: 0.9 mg/dL (05-26-24 @ 05:56)  Aspartate Aminotransferase (AST/SGOT): 49 U/L (05-26-24 @ 05:56)  Alanine Aminotransferase (ALT/SGPT): 64 U/L (05-26-24 @ 05:56)  Alkaline Phosphatase: 211 U/L (05-26-24 @ 05:56)    < from: MR Head w/wo IV Cont (05.29.24 @ 22:37) >  IMPRESSION:  No acute infarct, acute intracranial hemorrhage, or mass effect.    Indeterminate tubular filling defect in the right sigmoid venous sinus   and right jugular bulb. Partial venous thrombosis not definitely   excluded. Correlate clinically and follow-up exam recommended.    Similar findings suggestive of normal pressure hydrocephalus.    < end of copied text >    < from: US Abdomen Complete (US Abdomen Complete .) (05.24.24 @ 10:27) >  IMPRESSION:  Cholelithiasis without cholecystitis.    --- End of Report ---    < end of copied text >

## 2024-05-30 NOTE — PROVIDER CONTACT NOTE (CHANGE IN STATUS NOTIFICATION) - ASSESSMENT
Pt A&Ox0-1, unable to make needs known. Pt asymptomatic. VSS. No s/s of bleeding. Pt denies cp, denies SOB, denies pain.

## 2024-05-31 LAB
ANION GAP SERPL CALC-SCNC: 11 MMOL/L — SIGNIFICANT CHANGE UP (ref 5–17)
BUN SERPL-MCNC: 9 MG/DL — SIGNIFICANT CHANGE UP (ref 7–23)
CALCIUM SERPL-MCNC: 8.9 MG/DL — SIGNIFICANT CHANGE UP (ref 8.4–10.5)
CHLORIDE SERPL-SCNC: 105 MMOL/L — SIGNIFICANT CHANGE UP (ref 96–108)
CO2 SERPL-SCNC: 22 MMOL/L — SIGNIFICANT CHANGE UP (ref 22–31)
CREAT SERPL-MCNC: 0.77 MG/DL — SIGNIFICANT CHANGE UP (ref 0.5–1.3)
EGFR: 74 ML/MIN/1.73M2 — SIGNIFICANT CHANGE UP
GLUCOSE BLDC GLUCOMTR-MCNC: 105 MG/DL — HIGH (ref 70–99)
GLUCOSE BLDC GLUCOMTR-MCNC: 107 MG/DL — HIGH (ref 70–99)
GLUCOSE BLDC GLUCOMTR-MCNC: 108 MG/DL — HIGH (ref 70–99)
GLUCOSE BLDC GLUCOMTR-MCNC: 118 MG/DL — HIGH (ref 70–99)
GLUCOSE SERPL-MCNC: 100 MG/DL — HIGH (ref 70–99)
HCT VFR BLD CALC: 30.7 % — LOW (ref 34.5–45)
HGB BLD-MCNC: 10.3 G/DL — LOW (ref 11.5–15.5)
MCHC RBC-ENTMCNC: 31.9 PG — SIGNIFICANT CHANGE UP (ref 27–34)
MCHC RBC-ENTMCNC: 33.6 GM/DL — SIGNIFICANT CHANGE UP (ref 32–36)
MCV RBC AUTO: 95 FL — SIGNIFICANT CHANGE UP (ref 80–100)
NRBC # BLD: 0 /100 WBCS — SIGNIFICANT CHANGE UP (ref 0–0)
PLATELET # BLD AUTO: 120 K/UL — LOW (ref 150–400)
POTASSIUM SERPL-MCNC: 3.6 MMOL/L — SIGNIFICANT CHANGE UP (ref 3.5–5.3)
POTASSIUM SERPL-SCNC: 3.6 MMOL/L — SIGNIFICANT CHANGE UP (ref 3.5–5.3)
RBC # BLD: 3.23 M/UL — LOW (ref 3.8–5.2)
RBC # FLD: 15.3 % — HIGH (ref 10.3–14.5)
SODIUM SERPL-SCNC: 138 MMOL/L — SIGNIFICANT CHANGE UP (ref 135–145)
WBC # BLD: 8.71 K/UL — SIGNIFICANT CHANGE UP (ref 3.8–10.5)
WBC # FLD AUTO: 8.71 K/UL — SIGNIFICANT CHANGE UP (ref 3.8–10.5)

## 2024-05-31 PROCEDURE — 71045 X-RAY EXAM CHEST 1 VIEW: CPT | Mod: 26

## 2024-05-31 PROCEDURE — 99233 SBSQ HOSP IP/OBS HIGH 50: CPT

## 2024-05-31 PROCEDURE — 99232 SBSQ HOSP IP/OBS MODERATE 35: CPT

## 2024-05-31 RX ORDER — ACETAMINOPHEN 500 MG
650 TABLET ORAL ONCE
Refills: 0 | Status: COMPLETED | OUTPATIENT
Start: 2024-05-31 | End: 2024-05-31

## 2024-05-31 RX ADMIN — CHLORHEXIDINE GLUCONATE 1 APPLICATION(S): 213 SOLUTION TOPICAL at 11:41

## 2024-05-31 RX ADMIN — Medication 1 TABLET(S): at 11:41

## 2024-05-31 RX ADMIN — DONEPEZIL HYDROCHLORIDE 10 MILLIGRAM(S): 10 TABLET, FILM COATED ORAL at 21:20

## 2024-05-31 RX ADMIN — Medication 650 MILLIGRAM(S): at 17:32

## 2024-05-31 RX ADMIN — MEROPENEM 100 MILLIGRAM(S): 1 INJECTION INTRAVENOUS at 05:05

## 2024-05-31 RX ADMIN — SODIUM CHLORIDE 100 MILLILITER(S): 9 INJECTION, SOLUTION INTRAVENOUS at 21:20

## 2024-05-31 RX ADMIN — MEROPENEM 100 MILLIGRAM(S): 1 INJECTION INTRAVENOUS at 17:12

## 2024-05-31 RX ADMIN — SODIUM CHLORIDE 100 MILLILITER(S): 9 INJECTION, SOLUTION INTRAVENOUS at 15:45

## 2024-05-31 RX ADMIN — Medication 650 MILLIGRAM(S): at 18:32

## 2024-05-31 RX ADMIN — SENNA PLUS 2 TABLET(S): 8.6 TABLET ORAL at 21:19

## 2024-05-31 NOTE — PROGRESS NOTE ADULT - SUBJECTIVE AND OBJECTIVE BOX
Patient is a 89y old  Female who presents with a chief complaint of Seizure activity (31 May 2024 12:27)      INTERVAL HPI/OVERNIGHT EVENTS: Medically stable but still lethargic again . NG tube inserted on TF. Vitals stable.  Labs stable. Continue  abx. F.u neurology for neurology evaluation, EEG and MRI.     Pain Location & Control:     MEDICATIONS  (STANDING):  chlorhexidine 2% Cloths 1 Application(s) Topical daily  dextrose 5% + sodium chloride 0.45%. 1000 milliLiter(s) (100 mL/Hr) IV Continuous <Continuous>  dextrose 50% Injectable 12.5 Gram(s) IV Push once  dextrose 50% Injectable 25 Gram(s) IV Push once  dextrose 50% Injectable 25 Gram(s) IV Push once  dextrose Oral Gel 15 Gram(s) Oral once  donepezil 10 milliGRAM(s) Oral at bedtime  glucagon  Injectable 1 milliGRAM(s) IntraMuscular once  meropenem  IVPB 1000 milliGRAM(s) IV Intermittent every 12 hours  multivitamin 1 Tablet(s) Oral daily  senna 2 Tablet(s) Oral at bedtime    MEDICATIONS  (PRN):      Allergies    sulfamethoxazole (Vomiting; Nausea)    Intolerances        REVIEW OF SYSTEMS:  UTO dementia and lethargic    Vital Signs Last 24 Hrs  T(C): 36.4 (31 May 2024 20:40), Max: 36.9 (31 May 2024 04:12)  T(F): 97.5 (31 May 2024 20:40), Max: 98.4 (31 May 2024 04:12)  HR: 68 (31 May 2024 20:40) (68 - 77)  BP: 129/76 (31 May 2024 20:40) (129/76 - 147/77)  BP(mean): --  RR: 18 (31 May 2024 20:40) (18 - 18)  SpO2: 96% (31 May 2024 20:40) (92% - 96%)    Parameters below as of 31 May 2024 20:40  Patient On (Oxygen Delivery Method): room air        PHYSICAL EXAM:  GENERAL: NAD, well-groomed, well-developed  HEAD:  Atraumatic, Normocephalic  EYES: EOMI, PERRLA, conjunctiva and sclera clear  ENMT: No tonsillar erythema, exudates, or enlargement; Moist mucous membranes, Good dentition, No lesions  NECK: Supple, No JVD, Normal thyroid  NERVOUS SYSTEM:  Alert & Oriented X 0 lethargic, not following commands   CHEST/LUNG: Clear to auscultation bilaterally; No rales, rhonchi, wheezing, or rubs  HEART: Regular rate and rhythm; No murmurs, rubs, or gallops  ABDOMEN: Soft, Nontender, Nondistended; Bowel sounds present  EXTREMITIES:  2+ Peripheral Pulses, No clubbing or cyanosis  LYMPH: No lymphadenopathy noted  SKIN: No rashes or lesions      LABS:                        10.3   8.71  )-----------( 120      ( 31 May 2024 06:53 )             30.7     31 May 2024 06:53    138    |  105    |  9      ----------------------------<  100    3.6     |  22     |  0.77     Ca    8.9        31 May 2024 06:53        Urinalysis Basic - ( 31 May 2024 06:53 )    Color: x / Appearance: x / SG: x / pH: x  Gluc: 100 mg/dL / Ketone: x  / Bili: x / Urobili: x   Blood: x / Protein: x / Nitrite: x   Leuk Esterase: x / RBC: x / WBC x   Sq Epi: x / Non Sq Epi: x / Bacteria: x      CAPILLARY BLOOD GLUCOSE      POCT Blood Glucose.: 107 mg/dL (31 May 2024 17:20)  POCT Blood Glucose.: 108 mg/dL (31 May 2024 11:59)  POCT Blood Glucose.: 105 mg/dL (31 May 2024 06:14)  POCT Blood Glucose.: 118 mg/dL (31 May 2024 00:35)        Cultures  Culture Results:   No growth at 5 days (05-25-24 @ 05:33)  Culture Results:   No growth at 5 days (05-25-24 @ 05:15)      RADIOLOGY & ADDITIONAL TESTS:    Imaging Personally Reviewed:  [X ] YES  [ ] NO    Consultant(s) Notes Reviewed:  [ X] YES  [ ] NO    Care Discussed with Consultants/Other Providers [X ] YES  [ ] NO

## 2024-05-31 NOTE — PROGRESS NOTE ADULT - ASSESSMENT
89 YO F with PMHx of Myasthenia Gravis (not on medication for past 9 years) and MHx of dementia and chronic low back pain. Patient recently hospitalized after being found with slurred speech and very confused. Most likely due to UTI which not responded to Cipro. Patient transferred to hospital due to seizure activity and was found with UTI.     Pt was initially started on vancomycin and zosyn but switched to ertapenem when found to have an ESBL E Coli in her blood  ID is asked to evaluate     During the last admission in feb/March -PT presented from her nursing home with worsening lethargy and confusion. UA done as outpatient on 2/13 grossly positive and urine culture growing E. coli and Klebsiella, pansensitive. Found to be hypothermic in ED. Admitted for sepsis and metabolic encephalopathy in setting of UTI. Multiple RRTs called today. First RRT called for seizure-like activity. Patient loaded with Keppra and placed on EEG. Second RRT called for hypotension. Patient was given 4 L IVF with resolution of hypotension. pt changed to meropenem to cover for possible meningitis- ? Normal pressure hydrocephalous on CT- discussed with neuro- this wouldn't account for her acute change  on meropenem to cover for possible meningitis and /or to cover biliary sepsis   EEG not c/w HSV encephalitis but added  acyclovir until more definitive diagnosis available, Course significant for a distended GB but neg HIDA scan , renal insufficiency and thrombocytopenia ( at presentation) and hypernatremia     Antibiotics  Vanco 5/22  Zosyn %/22 --> 23  Ertapenem 5/23  Meropenem 5/24 -->     5/24  renal function improved    A/P  #ESBL E coli Bacteremia   5/22 +BC X2 +ESBL E coli;    5/22 Urine 10 - 49K E coli  agree with change to carbapenem  Increased meropenem 1000 mg IVSS Q 12 hours.   urinary source  day # 9 of carbapenem  family asking about suppressive abs after completing therapy and or vitamin C/ methanamine...      #Thrombocytopenia  likely from sepsis  trend  still off  check HIT abs    #AMS  pt with baseline dementia, and additionally here likely metabolic encephalopathy  at some point there was also a question of Normal pressure hydrocephalous - neuro to address  ? seizure -   neuro to address MRI findings     #elevated LFTs  last admission there was concern for a possible passed biliary stone  u/s without acute choley    # Myasthenia Gravis  not on treatment for years   would avoid certain abs- fluoroquinolones , aminoglycosides and macrolides      Yeni Gabriel M.D. ,   please reach via teams   If no answer, or after 5PM/ weekends,  then please call  [phone]    Assessment and plan discussed with the primary team .    ID service will be covering over the weekend. Please call for acute issues or questions. [phone]

## 2024-05-31 NOTE — PROGRESS NOTE ADULT - ASSESSMENT
89yo F h/o Myasthenia Gravis (not on medication for past 9 years), Dementia and Chronic low back pain, recently hospitalized for UTI and now p/w sepsis related to recurrent UTI. GI Consulted for increased LFTs.    1. Increased LFTs  now improved, r/t systemic process (uti)  US abdomen with cholelithiasis no further intervention needed  trend LFTs periodically     2. Recurrent UTI w/AMS  management per ID    3. Myasthenia Gravis  per primary     4. Poor PO intake  palliative care appropriate

## 2024-05-31 NOTE — PROGRESS NOTE ADULT - SUBJECTIVE AND OBJECTIVE BOX
NEUROLOGY FOLLOW-UP CONSULT NOTE    RFC: Altered mental status (AMS)    Interval history: No acute neurologic events overnight. No focal neurologic deficits or abnormal movements noted. Patient is less responsive today.    Meds:  MEDICATIONS  (STANDING):  chlorhexidine 2% Cloths 1 Application(s) Topical daily  dextrose 5% + sodium chloride 0.45%. 1000 milliLiter(s) (100 mL/Hr) IV Continuous <Continuous>  dextrose 50% Injectable 12.5 Gram(s) IV Push once  dextrose 50% Injectable 25 Gram(s) IV Push once  dextrose 50% Injectable 25 Gram(s) IV Push once  dextrose Oral Gel 15 Gram(s) Oral once  donepezil 10 milliGRAM(s) Oral at bedtime  glucagon  Injectable 1 milliGRAM(s) IntraMuscular once  meropenem  IVPB 1000 milliGRAM(s) IV Intermittent every 12 hours  multivitamin 1 Tablet(s) Oral daily  senna 2 Tablet(s) Oral at bedtime    MEDICATIONS  (PRN):      PMHx/PSHx/FHx/SHx:  Urinary tract infection    No pertinent family history in first degree relatives    No pertinent family history in first degree relatives    Handoff    MEWS Score    Myasthenia gravis    Dementia    Myasthenia gravis    Metabolic encephalopathy    UTI (urinary tract infection)    Dementia    Acute UTI    Myasthenia gravis    Thrombocytopenia, unspecified    Functional quadriplegia    Advanced care planning/counseling discussion    Encounter for palliative care    No significant past surgical history    No significant past surgical history    AMS BASELINE/UTI/SEIZURES    Myasthenia gravis    74    Other encephalopathy    SysAdmin_[email or web address]        Allergies:  sulfamethoxazole (Vomiting; Nausea)      ROS: Due to clinical condition unable to assess (PRIYANKA)    O:  T(C): 36.7 (05-31-24 @ 11:40), Max: 37.3 (05-30-24 @ 16:15)  HR: 68 (05-31-24 @ 11:40) (68 - 88)  BP: 147/77 (05-31-24 @ 11:40) (102/62 - 147/77)  RR: 18 (05-31-24 @ 11:40) (18 - 18)  SpO2: 93% (05-31-24 @ 11:40) (92% - 96%)    Focused neurologic exam:  MS - Obtunded, attends to all stimuli b/l but less visual, no speech output, does not follow commands, tongue swollen and sticking out of mouth. PRIYANKA orientation, rep/naming, attn/conc/recent and remote memory/fund of knowledge  CN - PERRL but limited as she forcibly closes eyes, EOMI by OCR, VFF as BTT, face sens/str/hearing WNL b/l, SCM at least 4/5 b/l and symmetric. PRIYANKA tongue (edema)/palate  Motor - Normal bulk. Paratonic in BUE's > BLE's. Strength BUE's 3+-4/5, BLE's 2+/5 and symmetric  Sens - LT/temp intact all as she can grimace, withdraw, and localize to strong tactile stimuli in each extremity  DTR's - Downgoing b/l plantar response  Coord - Coordination grossly intact for level of strength, no tremors noted  Gait and station - PRIYANKA    Pertinent labs/studies:  CBC with low H/H 10/31 and MCV WNL, low plt 120  BMP essentially WNL  Albumin dec 2.4, LFT's with inc alk phos 172  B12 WNL, folate WNL, A1C 4.4%, TSH WNL, Vitamin D WNL, Lyme (-)    < from: MR Head w/wo IV Cont (05.29.24 @ 22:37) >  No acute infarct, acute intracranial hemorrhage, or mass effect.    Indeterminate tubular filling defect in the right sigmoid venous sinus   and right jugular bulb. Partial venous thrombosis not definitely   excluded. Correlate clinically and follow-up exam recommended.    Similar findings suggestive of normal pressure hydrocephalus.    < end of copied text >      < from: CT Head No Cont (05.22.24 @ 11:37) >    1.  No acute intracranial hemorrhage or mass effect.  2.  Redemonstration of diffuse ventricular dilatation out of proportion   to the sulci, suggesting normal pressure hydrocephalus in the appropriate   clinical setting.    < end of copied text >

## 2024-05-31 NOTE — PROGRESS NOTE ADULT - ASSESSMENT
88 YO F with PMHx of Myasthenia Gravis (not on medication for past 9 years) and MHx of dementia and chronic low back pain. Patient recently hospitalized after being found with slurred speech and very confused. Most likely due to UTI which not responded to Cipro. Patient transferred to hospital due to seizure activity and was found with UTI. She was given 1 mg IM.     anorexia/FTT- TF via  NG tube.   Bacteremia/sepsis- resolved. Blood cx negative. Continue IV Meropenem. F/u ID.   AMS- most likely due to metabolic and toxic  encephalopathy. Not improved with treating bacteremia. F/u neurologist who is planning to do EEG.   UTI - as  above   Liver enzyme elevation- improving and trending down. RUQ ultrasound negative. Hepatitis panel negative.  weight loss- due to recent hospitalization and severe sickness on hospital. .Appetite improving Encourage PO intake and supplement diet. Monitor weight. F/u dietitian. Family is aware and refused peg tube.  Vitamin D deficiency- Vitamin D 50,000 weekly. Monitor Vitamin D.  Hypoglycemia- resolved. Appetite improved.  cholelithiasis - without cholecystitis neither pancreatitis. Normal amylase and lipase.  hypotension- continue IV steroid and IV fluid as needed  thrombocytopenia- PLT down, monitor PLT, f/u with Hematology.   NPH- - neurology does not believe acute mental status changes, could be due to NPH. F/u neurology as outpatient for NPH workup.  DVT ppx- Lovenox discontinued due to thrombocytopenia  leg arthritis- Acetaminophen PRN.  b/l cataract- F/u ophthalmology  vitamin D deficiency- continue Vitamin D supplement.  constipation- senna at bedtime.  LBP 2/2 severe spinal stenosis with out cord pressure - no surgical intervention recommended neither accepted by family, continue PT OT as needed.  Dementia - Stable. Continue Aricept .  Dysphagia - Mechanical soft diet, aspiration precautions.  Myasthenia Gravis - stable , off of medication. Monitor CBC and BMP every 2 months  VT ppx - on SCD, no blood thinner due to low PLT.

## 2024-05-31 NOTE — PROGRESS NOTE ADULT - ASSESSMENT
Encephalopathy  Dementia  Myasthenia gravis ?  Bicytopenia  Transaminitis  UTI    - Etiology for patient's worsened mentation is likely due to toxic/metabolic from acute medical issues (UTI) versus other infectious, inflammatory, or even seizure (less likely). No focal neurologic deficits or abnormal movements noted but patient is a limited historian. CT head, personally reviewed by me, with known communicating hydrocephalus (stable) and small vessel ischemic disease but no other acute intracranial findings. 5/31 - Mental status somewhat worse and tongue more edematous. MRI brain, personally reviewed by me, with findings possibly indicative of normal pressure hydrocephalus (stable with prior and WOULD NOT be causing acute change in mental status) and possibly venous system filling defect but no other acute intracranial findings  - vEEG to evaluate for focal slowing, epileptiform discharge, and seizures  - MRI brain w/ and w/o with results as above  - To further evaluate vascular filling defect on MRI brain would check CTA head/neck w/ and CTV head w/  - Labs as per prior note  - Continue to address above medical problems, as you are doing  - Will continue to follow patient with you

## 2024-05-31 NOTE — PROGRESS NOTE ADULT - SUBJECTIVE AND OBJECTIVE BOX
Patient is a 89y old  Female who presents with a chief complaint of Seizure activity (31 May 2024 11:38)    Being followed by ID for        Interval history:  No other acute events      ROS:  No cough,SOB,CP  No N/V/D  No abd pain  No urinary complaints  No HA  No joint or limb pain  No other complaints    PAST MEDICAL & SURGICAL HISTORY:  Myasthenia gravis      Dementia      Myasthenia gravis      No significant past surgical history      No significant past surgical history        Allergies    sulfamethoxazole (Vomiting; Nausea)    Intolerances      Antimicrobials:    meropenem  IVPB 1000 milliGRAM(s) IV Intermittent every 12 hours    MEDICATIONS  (STANDING):  chlorhexidine 2% Cloths 1 Application(s) Topical daily  dextrose 5% + sodium chloride 0.45%. 1000 milliLiter(s) (100 mL/Hr) IV Continuous <Continuous>  dextrose 50% Injectable 12.5 Gram(s) IV Push once  dextrose 50% Injectable 25 Gram(s) IV Push once  dextrose 50% Injectable 25 Gram(s) IV Push once  dextrose Oral Gel 15 Gram(s) Oral once  donepezil 10 milliGRAM(s) Oral at bedtime  glucagon  Injectable 1 milliGRAM(s) IntraMuscular once  meropenem  IVPB 1000 milliGRAM(s) IV Intermittent every 12 hours  multivitamin 1 Tablet(s) Oral daily  senna 2 Tablet(s) Oral at bedtime      Vital Signs Last 24 Hrs  T(C): 36.7 (05-31-24 @ 11:40), Max: 37.3 (05-30-24 @ 16:15)  T(F): 98.1 (05-31-24 @ 11:40), Max: 99.1 (05-30-24 @ 16:15)  HR: 68 (05-31-24 @ 11:40) (68 - 88)  BP: 147/77 (05-31-24 @ 11:40) (102/62 - 147/77)  BP(mean): --  RR: 18 (05-31-24 @ 11:40) (18 - 18)  SpO2: 93% (05-31-24 @ 11:40) (92% - 96%)    Physical Exam:    Constitutional well preserved,comfortable,pleasant    HEENT PERRLA EOMI,No pallor or icterus    No oral exudate or erythema    Neck supple no JVD or LN    Chest Good AE,CTA    CVS RRR S1 S2 WNl No murmur or rub or gallop    Abd soft BS normal No tenderness no masses    Ext No cyanosis clubbing or edema    IV site no erythema tenderness or discharge    Joints no swelling or LOM    CNS AAO X 3 no focal    Lab Data:                          10.3   8.71  )-----------( 120      ( 31 May 2024 06:53 )             30.7       05-31    138  |  105  |  9   ----------------------------<  100<H>  3.6   |  22  |  0.77    Ca    8.9      31 May 2024 06:53        Urinalysis Basic - ( 31 May 2024 06:53 )    Color: x / Appearance: x / SG: x / pH: x  Gluc: 100 mg/dL / Ketone: x  / Bili: x / Urobili: x   Blood: x / Protein: x / Nitrite: x   Leuk Esterase: x / RBC: x / WBC x   Sq Epi: x / Non Sq Epi: x / Bacteria: x        .Blood Blood  05-25-24   No growth at 5 days  --  --      .Blood Blood-Peripheral  05-25-24   No growth at 5 days  --  --                    WBC Count: 8.71 (05-31-24 @ 06:53)  WBC Count: 5.75 (05-30-24 @ 07:02)  WBC Count: 7.42 (05-29-24 @ 06:48)  WBC Count: 5.29 (05-28-24 @ 07:02)  WBC Count: 5.41 (05-27-24 @ 05:32)  WBC Count: 4.73 (05-26-24 @ 05:56)       Bilirubin Total: 0.6 mg/dL (05-28-24 @ 06:53)  Aspartate Aminotransferase (AST/SGOT): 36 U/L (05-28-24 @ 06:53)  Alanine Aminotransferase (ALT/SGPT): 41 U/L (05-28-24 @ 06:53)  Alkaline Phosphatase: 172 U/L (05-28-24 @ 06:53)  Bilirubin Total: 0.6 mg/dL (05-27-24 @ 05:32)  Aspartate Aminotransferase (AST/SGOT): 41 U/L (05-27-24 @ 05:32)  Alanine Aminotransferase (ALT/SGPT): 52 U/L (05-27-24 @ 05:32)  Alkaline Phosphatase: 195 U/L (05-27-24 @ 05:32)         Patient is a 89y old  Female who presents with a chief complaint of Seizure activity (31 May 2024 11:38)    Being followed by ID for bacteremia        Interval history:  pt was unresponsive when seen this morning but later when seen again with daughters present, pt attempted to talk   No other acute events        PAST MEDICAL & SURGICAL HISTORY:  Myasthenia gravis      Dementia      Myasthenia gravis      No significant past surgical history      No significant past surgical history        Allergies    sulfamethoxazole (Vomiting; Nausea)    Intolerances      Antimicrobials:    meropenem  IVPB 1000 milliGRAM(s) IV Intermittent every 12 hours    MEDICATIONS  (STANDING):  chlorhexidine 2% Cloths 1 Application(s) Topical daily  dextrose 5% + sodium chloride 0.45%. 1000 milliLiter(s) (100 mL/Hr) IV Continuous <Continuous>  dextrose 50% Injectable 12.5 Gram(s) IV Push once  dextrose 50% Injectable 25 Gram(s) IV Push once  dextrose 50% Injectable 25 Gram(s) IV Push once  dextrose Oral Gel 15 Gram(s) Oral once  donepezil 10 milliGRAM(s) Oral at bedtime  glucagon  Injectable 1 milliGRAM(s) IntraMuscular once  meropenem  IVPB 1000 milliGRAM(s) IV Intermittent every 12 hours  multivitamin 1 Tablet(s) Oral daily  senna 2 Tablet(s) Oral at bedtime      Vital Signs Last 24 Hrs  T(C): 36.7 (05-31-24 @ 11:40), Max: 37.3 (05-30-24 @ 16:15)  T(F): 98.1 (05-31-24 @ 11:40), Max: 99.1 (05-30-24 @ 16:15)  HR: 68 (05-31-24 @ 11:40) (68 - 88)  BP: 147/77 (05-31-24 @ 11:40) (102/62 - 147/77)  BP(mean): --  RR: 18 (05-31-24 @ 11:40) (18 - 18)  SpO2: 93% (05-31-24 @ 11:40) (92% - 96%)    Physical Exam:    Constitutional  resting quietly mostly  curled in bed    HEENT PERRLA EOMI,No pallor or icterus    protruding tonguw    Neck supple no JVD or LN    Chest Good AE,CTA    CVS  S1 S2     Abd soft BS normal No tenderness     Ext No cyanosis clubbing or edema    IV site no erythema tenderness or discharge      Lab Data:                          10.3   8.71  )-----------( 120      ( 31 May 2024 06:53 )             30.7       05-31    138  |  105  |  9   ----------------------------<  100<H>  3.6   |  22  |  0.77    Ca    8.9      31 May 2024 06:53        .Blood Blood  05-25-24   No growth at 5 days  --  --      .Blood Blood-Peripheral  05-25-24   No growth at 5 days  --  -      WBC Count: 8.71 (05-31-24 @ 06:53)  WBC Count: 5.75 (05-30-24 @ 07:02)  WBC Count: 7.42 (05-29-24 @ 06:48)  WBC Count: 5.29 (05-28-24 @ 07:02)  WBC Count: 5.41 (05-27-24 @ 05:32)  WBC Count: 4.73 (05-26-24 @ 05:56)       Bilirubin Total: 0.6 mg/dL (05-28-24 @ 06:53)  Aspartate Aminotransferase (AST/SGOT): 36 U/L (05-28-24 @ 06:53)  Alanine Aminotransferase (ALT/SGPT): 41 U/L (05-28-24 @ 06:53)  Alkaline Phosphatase: 172 U/L (05-28-24 @ 06:53)  Bilirubin Total: 0.6 mg/dL (05-27-24 @ 05:32)  Aspartate Aminotransferase (AST/SGOT): 41 U/L (05-27-24 @ 05:32)  Alanine Aminotransferase (ALT/SGPT): 52 U/L (05-27-24 @ 05:32)  Alkaline Phosphatase: 195 U/L (05-27-24 @ 05:32)      < from: US Abdomen Complete (US Abdomen Complete .) (05.24.24 @ 10:27) >  INTERPRETATION:  CLINICAL INFORMATION: Transaminitis.    COMPARISON: Abdominal ultrasound 3/4/2024.    TECHNIQUE: Sonography of the abdomen.    FINDINGS:    Exam partially limited secondary to patient positioning.  Liver: Within normal limits.  Bile ducts: Normal caliber. Common bile duct measures 4 mm.  Gallbladder: Cholelithiasis. Layering sludge. Negative sonographic Diana   sign.  Pancreas: Visualized portions are within normal limits.  Spleen: 10.1 cm. Within normal limits.  Right kidney: 8.4 cm. No hydronephrosis.  Left kidney: 9.1 cm. No hydronephrosis.  Ascites: None.  Aorta and IVC: Visualized portions are within normal limits.    IMPRESSION:  Cholelithiasis without cholecystitis.    --- End of Report ---    < end of copied text >

## 2024-05-31 NOTE — CHART NOTE - NSCHARTNOTEFT_GEN_A_CORE
NUTRITION FOLLOW UP NOTE    PATIENT SEEN FOR: follow up, consult for 'TF recommendations'    SOURCE: [] Patient  [x] Current Medical Record  [] RN  [] Family/support person at bedside  [] Patient unavailable/inappropriate  [] Other:    CHART REVIEWED/EVENTS NOTED.  [] No changes to nutrition care plan to note  [] Nutrition Status:    DIET ORDER:   Diet, Pureed:   Moderately Thick Liquids (MODTHICKLIQS) (05-25-24)    CURRENT DIET ORDER IS:  [] Appropriate:  [] Inadequate:  [] Other:    NUTRITION INTAKE/PROVISION:  [] PO:  [] Enteral Nutrition:  [] Parenteral Nutrition:    ANTHROPOMETRICS:  Drug Dosing Weight  Height (cm): 154.9 (22 May 2024 10:19)  Weight (kg): 45.4 (22 May 2024 10:19)  BMI (kg/m2): 18.9 (22 May 2024 10:19)  Weights:   Daily      MEDICATIONS:  MEDICATIONS  (STANDING):  chlorhexidine 2% Cloths 1 Application(s) Topical daily  dextrose 5% + sodium chloride 0.45%. 1000 milliLiter(s) (100 mL/Hr) IV Continuous <Continuous>  dextrose 50% Injectable 12.5 Gram(s) IV Push once  dextrose 50% Injectable 25 Gram(s) IV Push once  dextrose 50% Injectable 25 Gram(s) IV Push once  dextrose Oral Gel 15 Gram(s) Oral once  donepezil 10 milliGRAM(s) Oral at bedtime  glucagon  Injectable 1 milliGRAM(s) IntraMuscular once  meropenem  IVPB 1000 milliGRAM(s) IV Intermittent every 12 hours  multivitamin 1 Tablet(s) Oral daily  senna 2 Tablet(s) Oral at bedtime    NUTRITIONALLY PERTINENT LABS:  05-31 Na138 mmol/L Glu 100 mg/dL<H> K+ 3.6 mmol/L Cr  0.77 mg/dL BUN 9 mg/dL 05-29 Phos 2.6 mg/dL 05-28 Alb 2.4 g/dL<L>05-28 ALT 41 U/L AST 36 U/L Alkaline Phosphatase 172 U/L<H>  05-23-24 @ 06:38 a1c 4.4  05-22-24 @ 10:57 a1c 4.2    A1C with Estimated Average Glucose Result: 4.4 % (05-23-24 @ 06:38)  A1C with Estimated Average Glucose Result: 4.2 % (05-22-24 @ 10:57)    Finger Sticks:  POCT Blood Glucose.: 105 mg/dL (05-31 @ 06:14)  POCT Blood Glucose.: 118 mg/dL (05-31 @ 00:35)  POCT Blood Glucose.: 91 mg/dL (05-30 @ 18:16)  POCT Blood Glucose.: 112 mg/dL (05-30 @ 12:18)    NUTRITIONALLY PERTINENT MEDICATIONS/LABS:  [x] Reviewed  [] Relevant notes on medications/labs:    EDEMA:  [x] Reviewed  [] Relevant notes:    GI/ I&O:  [x] Reviewed  [] Relevant notes:  [] Other:    SKIN:   [] No pressure injuries documented, per nursing flowsheet  [] Pressure injury previously noted  [] Change in pressure injury documentation:  [] Other:    ESTIMATED NEEDS:  [] No change:  [] Updated:  Energy:   kcal/day (xx-xx kcal/kg)  Protein:   g/day (xx-xx g/kg)  Fluid:   ml/day or [] defer to team  Based on:    NUTRITION DIAGNOSIS:  [] Prior Dx:  [] New Dx:    EDUCATION:  [] Yes:  [] Not appropriate/warranted    NUTRITION CARE PLAN:  1. Diet:  2. Supplements:  3. Multivitamin/mineral supplementation:    [] Achieved - Continue current nutrition intervention(s)  [] Current medical condition precludes nutrition intervention at this time.    MONITORING AND EVALUATION:   RD remains available upon request and will follow up per protocol.    Wilbert Daley RD, CDN - contact on TEAMS. NUTRITION FOLLOW UP NOTE    PATIENT SEEN FOR: follow up, consult for 'TF recommendations'    SOURCE: [] Patient  [x] Current Medical Record  [] RN  [] Family/support person at bedside  [] Patient unavailable/inappropriate  [] Other:    CHART REVIEWED/EVENTS NOTED.  [] No changes to nutrition care plan to note  [] Nutrition Status:    DIET ORDER:   Diet, Pureed:   Moderately Thick Liquids (MODTHICKLIQS) (05-25-24)    CURRENT DIET ORDER IS:  [] Appropriate:  [] Inadequate:  [] Other:    NUTRITION INTAKE/PROVISION:  [] PO:  [] Enteral Nutrition:  [] Parenteral Nutrition:    ANTHROPOMETRICS:  Drug Dosing Weight  Height (cm): 154.9 (22 May 2024 10:19)  Weight (kg): 45.4 (22 May 2024 10:19)  BMI (kg/m2): 18.9 (22 May 2024 10:19)  Weights:   5/22: 45.4kg    MEDICATIONS:  MEDICATIONS  (STANDING):  chlorhexidine 2% Cloths 1 Application(s) Topical daily  dextrose 5% + sodium chloride 0.45%. 1000 milliLiter(s) (100 mL/Hr) IV Continuous <Continuous>  dextrose 50% Injectable 12.5 Gram(s) IV Push once  dextrose 50% Injectable 25 Gram(s) IV Push once  dextrose 50% Injectable 25 Gram(s) IV Push once  dextrose Oral Gel 15 Gram(s) Oral once  donepezil 10 milliGRAM(s) Oral at bedtime  glucagon  Injectable 1 milliGRAM(s) IntraMuscular once  meropenem  IVPB 1000 milliGRAM(s) IV Intermittent every 12 hours  multivitamin 1 Tablet(s) Oral daily  senna 2 Tablet(s) Oral at bedtime    NUTRITIONALLY PERTINENT LABS:  05-31 Na138 mmol/L Glu 100 mg/dL<H> K+ 3.6 mmol/L Cr  0.77 mg/dL BUN 9 mg/dL 05-29 Phos 2.6 mg/dL 05-28 Alb 2.4 g/dL<L>05-28 ALT 41 U/L AST 36 U/L Alkaline Phosphatase 172 U/L<H>  05-23-24 @ 06:38 a1c 4.4  05-22-24 @ 10:57 a1c 4.2    A1C with Estimated Average Glucose Result: 4.4 % (05-23-24 @ 06:38)  A1C with Estimated Average Glucose Result: 4.2 % (05-22-24 @ 10:57)    Finger Sticks:  POCT Blood Glucose.: 105 mg/dL (05-31 @ 06:14)  POCT Blood Glucose.: 118 mg/dL (05-31 @ 00:35)  POCT Blood Glucose.: 91 mg/dL (05-30 @ 18:16)  POCT Blood Glucose.: 112 mg/dL (05-30 @ 12:18)    NUTRITIONALLY PERTINENT MEDICATIONS/LABS:  [x] Reviewed  [] Relevant notes on medications/labs:    EDEMA:  [x] Reviewed    GI/ I&O:  [x] Reviewed  [x] Relevant notes: last BM noted from 5/24 per chart, suspect influenced by ongoing poor PO intake, has senna in place for bowel regimen noted    SKIN:   [x] Pressure injury previously noted: suspected DTIs to b/l heels per documentation    ESTIMATED NEEDS:  [x] No change:  Energy: 1569-1830kcal/day (30-35kcal/kg)  Protein: 63-78g/day (1.2-1.5g/kg)  Fluid: [x] defer to team  Based on: IBW 52.3kg    NUTRITION DIAGNOSIS:  [] Prior Dx:  [] New Dx:    EDUCATION:  [] Yes:  [] Not appropriate/warranted    NUTRITION CARE PLAN:  1. Diet:  2. Supplements:  3. Multivitamin/mineral supplementation:    [] Achieved - Continue current nutrition intervention(s)  [] Current medical condition precludes nutrition intervention at this time.    MONITORING AND EVALUATION:   RD remains available upon request and will follow up per protocol.    Wilbert Daley RD, CDN - contact on TEAMS. NUTRITION FOLLOW UP NOTE    PATIENT SEEN FOR: follow up, consult for 'TF recommendations'    SOURCE: [] Patient  [x] Current Medical Record  [x] RN  [] Family/support person at bedside  [x] Patient unavailable/inappropriate  [] Other:    CHART REVIEWED/EVENTS NOTED.  [] No changes to nutrition care plan to note  [x] Nutrition Status:  - an NGT was placed yesterday 5/30 w/ noted plan to start TFs per patient family's GOC for the patient    DIET ORDER:   Diet, Pureed:   Moderately Thick Liquids (MODTHICKLIQS) (05-25-24)    CURRENT DIET ORDER IS:  [] Appropriate:  [] Inadequate:  [x] Other: see recommendations below    NUTRITION INTAKE/PROVISION:  [x] PO:  - patient continues w/ very poor PO intake per d/w RN, breakfast tray untouched at bedside when seen, patient very lethargic when seen  [x] Enteral Nutrition:  - per d/w RN placement of NGT was confirmed and functional, patient had tolerated medications via NGT so far reported    ANTHROPOMETRICS:  Drug Dosing Weight  Height (cm): 154.9 (22 May 2024 10:19)  Weight (kg): 45.4 (22 May 2024 10:19)  BMI (kg/m2): 18.9 (22 May 2024 10:19)  Weights:   5/31: 74kg (bed scale taken at bedside)  5/22: 45.4kg (stated weight and dosing weight)    MEDICATIONS:  MEDICATIONS  (STANDING):  chlorhexidine 2% Cloths 1 Application(s) Topical daily  dextrose 5% + sodium chloride 0.45%. 1000 milliLiter(s) (100 mL/Hr) IV Continuous <Continuous>  dextrose 50% Injectable 12.5 Gram(s) IV Push once  dextrose 50% Injectable 25 Gram(s) IV Push once  dextrose 50% Injectable 25 Gram(s) IV Push once  dextrose Oral Gel 15 Gram(s) Oral once  donepezil 10 milliGRAM(s) Oral at bedtime  glucagon  Injectable 1 milliGRAM(s) IntraMuscular once  meropenem  IVPB 1000 milliGRAM(s) IV Intermittent every 12 hours  multivitamin 1 Tablet(s) Oral daily  senna 2 Tablet(s) Oral at bedtime    NUTRITIONALLY PERTINENT LABS:  05-31 Na138 mmol/L Glu 100 mg/dL<H> K+ 3.6 mmol/L Cr  0.77 mg/dL BUN 9 mg/dL 05-29 Phos 2.6 mg/dL 05-28 Alb 2.4 g/dL<L>05-28 ALT 41 U/L AST 36 U/L Alkaline Phosphatase 172 U/L<H>  05-23-24 @ 06:38 a1c 4.4  05-22-24 @ 10:57 a1c 4.2    A1C with Estimated Average Glucose Result: 4.4 % (05-23-24 @ 06:38)  A1C with Estimated Average Glucose Result: 4.2 % (05-22-24 @ 10:57)    Finger Sticks:  POCT Blood Glucose.: 105 mg/dL (05-31 @ 06:14)  POCT Blood Glucose.: 118 mg/dL (05-31 @ 00:35)  POCT Blood Glucose.: 91 mg/dL (05-30 @ 18:16)  POCT Blood Glucose.: 112 mg/dL (05-30 @ 12:18)    NUTRITIONALLY PERTINENT MEDICATIONS/LABS:  [x] Reviewed  [x] Relevant notes on medications/labs:  - prior hypokalemia yesterday 5/30, now WNL today, at lower end of normal range  - remains ordered for D5 + 1/2 NS IVF, multivitamin    EDEMA:  [x] Reviewed    GI/ I&O:  [x] Reviewed  [x] Relevant notes: last BM noted from 5/24 per chart, suspect influenced by ongoing poor PO intake, has senna in place for bowel regimen noted    SKIN:   [x] Pressure injury previously noted: suspected DTIs to b/l heels per documentation    ESTIMATED NEEDS:  [x] No change:  Energy: 1569-1830kcal/day (30-35kcal/kg)  Protein: 63-78g/day (1.2-1.5g/kg)  Fluid: [x] defer to team  Based on: IBW 52.3kg    NUTRITION DIAGNOSIS:  [x] Prior Dx: inadequate protein energy intake, increased nutrient needs  [] New Dx:    EDUCATION:  [x] Not appropriate/warranted    NUTRITION CARE PLAN:  1. Tube Feeding:  - noted plans to start TFs via NGT, recommend regimen of:  - continuous TFs of Jevity 1.5 @ 10cc/hr, increasing slowly by 10cc/hr Q12H (^risk refeeding syndrome) w/ goal rate of 45cc/hr x24 hours (1080cc total volume)  - when TFs begin, recommend addition of thiamine and continue multivitamin as ordered due to ^risk refeeding syndrome  - when TFs begin, request daily K/PO4/Mg levels and replete levels as appropriate due to ^risk refeeding syndrome  2. Oral Diet:  - continue pureed, moderately thick liquid consistency diet as patient status accepts (if patient awake/alert/willing to accept PO) for comfort    [] Achieved - Continue current nutrition intervention(s)  [] Current medical condition precludes nutrition intervention at this time.    MONITORING AND EVALUATION:   RD remains available upon request and will follow up per protocol.    Wilbert Daley, FARAZ, CDN - contact on TEAMS. NUTRITION FOLLOW UP NOTE    PATIENT SEEN FOR: follow up, consult for 'TF recommendations'    SOURCE: [] Patient  [x] Current Medical Record  [x] RN  [x] PA [] Family/support person at bedside  [x] Patient unavailable/inappropriate  [] Other:    CHART REVIEWED/EVENTS NOTED.  [] No changes to nutrition care plan to note  [x] Nutrition Status:  - an NGT was placed yesterday 5/30 w/ noted plan to start TFs per patient family's GOC for the patient    DIET ORDER:   Diet, Pureed:   Moderately Thick Liquids (MODTHICKLIQS) (05-25-24)    CURRENT DIET ORDER IS:  [] Appropriate:  [] Inadequate:  [x] Other: see recommendations below    NUTRITION INTAKE/PROVISION:  [x] PO:  - patient continues w/ very poor PO intake per d/w RN, breakfast tray untouched at bedside when seen, patient very lethargic when seen  [x] Enteral Nutrition:  - per d/w RN placement of NGT was confirmed and functional, patient had tolerated medications via NGT so far reported    ANTHROPOMETRICS:  Drug Dosing Weight  Height (cm): 154.9 (22 May 2024 10:19)  Weight (kg): 45.4 (22 May 2024 10:19)  BMI (kg/m2): 18.9 (22 May 2024 10:19)  Weights:   5/31: 74kg (bed scale taken at bedside)  5/22: 45.4kg (stated weight and dosing weight)    MEDICATIONS:  MEDICATIONS  (STANDING):  chlorhexidine 2% Cloths 1 Application(s) Topical daily  dextrose 5% + sodium chloride 0.45%. 1000 milliLiter(s) (100 mL/Hr) IV Continuous <Continuous>  dextrose 50% Injectable 12.5 Gram(s) IV Push once  dextrose 50% Injectable 25 Gram(s) IV Push once  dextrose 50% Injectable 25 Gram(s) IV Push once  dextrose Oral Gel 15 Gram(s) Oral once  donepezil 10 milliGRAM(s) Oral at bedtime  glucagon  Injectable 1 milliGRAM(s) IntraMuscular once  meropenem  IVPB 1000 milliGRAM(s) IV Intermittent every 12 hours  multivitamin 1 Tablet(s) Oral daily  senna 2 Tablet(s) Oral at bedtime    NUTRITIONALLY PERTINENT LABS:  05-31 Na138 mmol/L Glu 100 mg/dL<H> K+ 3.6 mmol/L Cr  0.77 mg/dL BUN 9 mg/dL 05-29 Phos 2.6 mg/dL 05-28 Alb 2.4 g/dL<L>05-28 ALT 41 U/L AST 36 U/L Alkaline Phosphatase 172 U/L<H>  05-23-24 @ 06:38 a1c 4.4  05-22-24 @ 10:57 a1c 4.2    A1C with Estimated Average Glucose Result: 4.4 % (05-23-24 @ 06:38)  A1C with Estimated Average Glucose Result: 4.2 % (05-22-24 @ 10:57)    Finger Sticks:  POCT Blood Glucose.: 105 mg/dL (05-31 @ 06:14)  POCT Blood Glucose.: 118 mg/dL (05-31 @ 00:35)  POCT Blood Glucose.: 91 mg/dL (05-30 @ 18:16)  POCT Blood Glucose.: 112 mg/dL (05-30 @ 12:18)    NUTRITIONALLY PERTINENT MEDICATIONS/LABS:  [x] Reviewed  [x] Relevant notes on medications/labs:  - prior hypokalemia yesterday 5/30, now WNL today, at lower end of normal range  - remains ordered for D5 + 1/2 NS IVF, multivitamin    EDEMA:  [x] Reviewed    GI/ I&O:  [x] Reviewed  [x] Relevant notes: last BM noted from 5/24 per chart, suspect influenced by ongoing poor PO intake, has senna in place for bowel regimen noted    SKIN:   [x] Pressure injury previously noted: suspected DTIs to b/l heels per documentation    ESTIMATED NEEDS:  [x] No change:  Energy: 1569-1830kcal/day (30-35kcal/kg)  Protein: 63-78g/day (1.2-1.5g/kg)  Fluid: [x] defer to team  Based on: IBW 52.3kg    NUTRITION DIAGNOSIS:  [x] Prior Dx: inadequate protein energy intake, increased nutrient needs  [] New Dx:    EDUCATION:  [x] Not appropriate/warranted    NUTRITION CARE PLAN:  1. Tube Feeding:  - noted plans to start TFs via NGT, recommend regimen of:  - continuous TFs of Jevity 1.5 @ 10cc/hr, increasing slowly by 10cc/hr Q12H (^risk refeeding syndrome) w/ goal rate of 45cc/hr x24 hours (1080cc total volume)  - when TFs begin, recommend addition of thiamine and continue multivitamin as ordered due to ^risk refeeding syndrome  - when TFs begin, request daily K/PO4/Mg levels and replete levels as appropriate due to ^risk refeeding syndrome  2. Oral Diet:  - continue pureed, moderately thick liquid consistency diet as patient status accepts (if patient awake/alert/willing to accept PO) for comfort    [] Achieved - Continue current nutrition intervention(s)  [] Current medical condition precludes nutrition intervention at this time.    MONITORING AND EVALUATION:   RD remains available upon request and will follow up per protocol.    Wilbert Daley, FARAZ, CDN - contact on TEAMS.

## 2024-06-01 LAB
ANION GAP SERPL CALC-SCNC: 10 MMOL/L — SIGNIFICANT CHANGE UP (ref 5–17)
BUN SERPL-MCNC: 12 MG/DL — SIGNIFICANT CHANGE UP (ref 7–23)
CALCIUM SERPL-MCNC: 9.3 MG/DL — SIGNIFICANT CHANGE UP (ref 8.4–10.5)
CHLORIDE SERPL-SCNC: 107 MMOL/L — SIGNIFICANT CHANGE UP (ref 96–108)
CO2 SERPL-SCNC: 22 MMOL/L — SIGNIFICANT CHANGE UP (ref 22–31)
CREAT SERPL-MCNC: 0.64 MG/DL — SIGNIFICANT CHANGE UP (ref 0.5–1.3)
EGFR: 84 ML/MIN/1.73M2 — SIGNIFICANT CHANGE UP
GLUCOSE BLDC GLUCOMTR-MCNC: 118 MG/DL — HIGH (ref 70–99)
GLUCOSE BLDC GLUCOMTR-MCNC: 119 MG/DL — HIGH (ref 70–99)
GLUCOSE BLDC GLUCOMTR-MCNC: 133 MG/DL — HIGH (ref 70–99)
GLUCOSE BLDC GLUCOMTR-MCNC: 86 MG/DL — SIGNIFICANT CHANGE UP (ref 70–99)
GLUCOSE BLDC GLUCOMTR-MCNC: 91 MG/DL — SIGNIFICANT CHANGE UP (ref 70–99)
GLUCOSE SERPL-MCNC: 90 MG/DL — SIGNIFICANT CHANGE UP (ref 70–99)
POTASSIUM SERPL-MCNC: 3.4 MMOL/L — LOW (ref 3.5–5.3)
POTASSIUM SERPL-SCNC: 3.4 MMOL/L — LOW (ref 3.5–5.3)
SODIUM SERPL-SCNC: 139 MMOL/L — SIGNIFICANT CHANGE UP (ref 135–145)

## 2024-06-01 PROCEDURE — 71045 X-RAY EXAM CHEST 1 VIEW: CPT | Mod: 26

## 2024-06-01 RX ORDER — ACETAMINOPHEN 500 MG
650 TABLET ORAL ONCE
Refills: 0 | Status: COMPLETED | OUTPATIENT
Start: 2024-06-01 | End: 2024-06-01

## 2024-06-01 RX ORDER — MEROPENEM 1 G/30ML
1000 INJECTION INTRAVENOUS EVERY 12 HOURS
Refills: 0 | Status: DISCONTINUED | OUTPATIENT
Start: 2024-06-01 | End: 2024-06-05

## 2024-06-01 RX ORDER — POTASSIUM CHLORIDE 20 MEQ
40 PACKET (EA) ORAL ONCE
Refills: 0 | Status: COMPLETED | OUTPATIENT
Start: 2024-06-01 | End: 2024-06-01

## 2024-06-01 RX ADMIN — MEROPENEM 100 MILLIGRAM(S): 1 INJECTION INTRAVENOUS at 17:03

## 2024-06-01 RX ADMIN — Medication 650 MILLIGRAM(S): at 17:03

## 2024-06-01 RX ADMIN — Medication 40 MILLIEQUIVALENT(S): at 14:09

## 2024-06-01 RX ADMIN — MEROPENEM 100 MILLIGRAM(S): 1 INJECTION INTRAVENOUS at 05:53

## 2024-06-01 RX ADMIN — CHLORHEXIDINE GLUCONATE 1 APPLICATION(S): 213 SOLUTION TOPICAL at 11:27

## 2024-06-01 RX ADMIN — SENNA PLUS 2 TABLET(S): 8.6 TABLET ORAL at 21:17

## 2024-06-01 RX ADMIN — DONEPEZIL HYDROCHLORIDE 10 MILLIGRAM(S): 10 TABLET, FILM COATED ORAL at 21:17

## 2024-06-01 RX ADMIN — Medication 1 TABLET(S): at 14:09

## 2024-06-01 NOTE — PROGRESS NOTE ADULT - SUBJECTIVE AND OBJECTIVE BOX
INTERVAL HPI/OVERNIGHT EVENTS:    events noted   no new gi issues    p     chlorhexidine 2% Cloths 1 Application(s) Topical daily  dextrose 50% Injectable 25 Gram(s) IV Push once  dextrose 50% Injectable 12.5 Gram(s) IV Push once  dextrose 50% Injectable 25 Gram(s) IV Push once  dextrose Oral Gel 15 Gram(s) Oral once  donepezil 10 milliGRAM(s) Oral at bedtime  glucagon  Injectable 1 milliGRAM(s) IntraMuscular once  multivitamin 1 Tablet(s) Oral daily  potassium chloride   Powder 40 milliEquivalent(s) Oral once  senna 2 Tablet(s) Oral at bedtime                            10.3   8.71  )-----------( 120      ( 31 May 2024 06:53 )             30.7       Hemoglobin: 10.3 g/dL (05-31 @ 06:53)  Hemoglobin: 9.8 g/dL (05-30 @ 07:02)  Hemoglobin: 10.8 g/dL (05-29 @ 06:48)  Hemoglobin: 9.6 g/dL (05-28 @ 07:02)      06-01    139  |  107  |  12  ----------------------------<  90  3.4<L>   |  22  |  0.64    Ca    9.3      01 Jun 2024 07:12      Creatinine Trend: 0.64<--, 0.77<--, 0.61<--, 0.81<--, 0.93<--, 0.83<--    COAGS:           T(C): 36.6 (06-01-24 @ 04:00), Max: 36.6 (06-01-24 @ 04:00)  HR: 58 (06-01-24 @ 11:40) (58 - 68)  BP: 137/65 (06-01-24 @ 11:40) (121/73 - 137/65)  RR: 18 (06-01-24 @ 11:40) (18 - 18)  SpO2: 96% (06-01-24 @ 11:40) (96% - 99%)  Wt(kg): --    I&O's Summary    31 May 2024 07:01  -  01 Jun 2024 07:00  --------------------------------------------------------  IN: 2010 mL / OUT: 1400 mL / NET: 610 mL          Allergies    sulfamethoxazole (Vomiting; Nausea)    Intolerances        Review of Systems: *pt minimally verbal to nonverbal, unable to obtain ROS              PHYSICAL EXAM:    Constitutional: NAD  HEENT: EOMI, throat clear  Neck: No LAD, supple  Respiratory: CTA and P  Cardiovascular: S1 and S2, RRR, no M  Gastrointestinal: BS+, soft, NT/ND, neg HSM,  Extremities: No peripheral edema, neg clubbing, cyanosis  Vascular: 2+ peripheral pulses  Neurological: A/O   Psychiatric: Normal mood, normal affect  Skin: No rashes

## 2024-06-01 NOTE — PROGRESS NOTE ADULT - SUBJECTIVE AND OBJECTIVE BOX
Date of Service: 06-01-24 @ 12:54           CARDIOLOGY     PROGRESS  NOTE   ________________________________________________    CHIEF COMPLAINT:Patient is a 89y old  Female who presents with a chief complaint of Seizure activity (01 Jun 2024 12:43)  no complain  	  REVIEW OF SYSTEMS:  CONSTITUTIONAL: No fever, weight loss, or fatigue  EYES: No eye pain, visual disturbances, or discharge  ENT:  No difficulty hearing, tinnitus, vertigo; No sinus or throat pain  NECK: No pain or stiffness  RESPIRATORY: No cough, wheezing, chills or hemoptysis; No Shortness of Breath  CARDIOVASCULAR: No chest pain, palpitations, passing out, dizziness, or leg swelling  GASTROINTESTINAL: No abdominal or epigastric pain. No nausea, vomiting, or hematemesis; No diarrhea or constipation. No melena or hematochezia.  GENITOURINARY: No dysuria, frequency, hematuria, or incontinence  NEUROLOGICAL: No headaches, memory loss, loss of strength, numbness, or tremors  SKIN: No itching, burning, rashes, or lesions   LYMPH Nodes: No enlarged glands  ENDOCRINE: No heat or cold intolerance; No hair loss  MUSCULOSKELETAL: No joint pain or swelling; No muscle, back, or extremity pain  PSYCHIATRIC: No depression, anxiety, mood swings, or difficulty sleeping  HEME/LYMPH: No easy bruising, or bleeding gums  ALLERGY AND IMMUNOLOGIC: No hives or eczema	    [ ] All others negative	  [x ] Unable to obtain    PHYSICAL EXAM:  T(C): 36.6 (06-01-24 @ 04:00), Max: 36.6 (06-01-24 @ 04:00)  HR: 58 (06-01-24 @ 11:40) (58 - 68)  BP: 137/65 (06-01-24 @ 11:40) (121/73 - 137/65)  RR: 18 (06-01-24 @ 11:40) (18 - 18)  SpO2: 96% (06-01-24 @ 11:40) (96% - 99%)  Wt(kg): --  I&O's Summary    31 May 2024 07:01  -  01 Jun 2024 07:00  --------------------------------------------------------  IN: 2010 mL / OUT: 1400 mL / NET: 610 mL        Appearance: Normal	  HEENT:   Normal oral mucosa, PERRL, EOMI	  Lymphatic: No lymphadenopathy  Cardiovascular: Normal S1 S2, No JVD, + murmurs, No edema  Respiratory: rhonchi  Psychiatry: A & O x 3, Mood & affect appropriate  Gastrointestinal:  Soft, Non-tender, + BS	  Skin: No rashes, No ecchymoses, No cyanosis	  Extremities: Normal range of motion, No clubbing, cyanosis or edema  Vascular: Peripheral pulses palpable 2+ bilaterally    MEDICATIONS  (STANDING):  chlorhexidine 2% Cloths 1 Application(s) Topical daily  dextrose 50% Injectable 25 Gram(s) IV Push once  dextrose 50% Injectable 12.5 Gram(s) IV Push once  dextrose 50% Injectable 25 Gram(s) IV Push once  dextrose Oral Gel 15 Gram(s) Oral once  donepezil 10 milliGRAM(s) Oral at bedtime  glucagon  Injectable 1 milliGRAM(s) IntraMuscular once  multivitamin 1 Tablet(s) Oral daily  potassium chloride   Powder 40 milliEquivalent(s) Oral once  senna 2 Tablet(s) Oral at bedtime      TELEMETRY: 	    ECG:  	  RADIOLOGY:  OTHER: 	  	  LABS:	 	    CARDIAC MARKERS:                        10.3   8.71  )-----------( 120      ( 31 May 2024 06:53 )             30.7     06-01    139  |  107  |  12  ----------------------------<  90  3.4<L>   |  22  |  0.64    Ca    9.3      01 Jun 2024 07:12      proBNP:   Lipid Profile:   HgA1c:   TSH: Thyroid Stimulating Hormone, Serum: 2.21 uIU/mL (05-23 @ 06:38)    < from: 12 Lead ECG (05.30.24 @ 17:31) >  Diagnosis Line SINUS RHYTHM with PACs   LEFT AXIS DEVIATION  LEFT VENTRICULAR HYPERTROPHY WITH QRS WIDENING AND REPOLARIZATION ABNORMALITY ( R in aVL , Eulogio product )  ABNORMAL ECG  WHEN COMPARED WITH ECG OF 22-MAY-2024 10:37,  SIGNIFICANT CHANGES HAVE OCCURRED  PACs     Assessment and plan  ---------------------------  88 YO F with PMHx of Myasthenia Gravis (not on medication for past 9 years) and MHx of dementia and chronic low back pain. Patient recently hospitalized after being found with slurred speech and very confused. Most likely due to UTI which not responded to Cipro. Patient transferred to hospital due to seizure activity and was found with UTI. She was given 1 mg IM.   Bacteremia/sepsis- resolved. Blood cx negative. Continue IV Meropenem. F/u ID.   AMS- most likely due to metabolic and toxic  encephalopathy. Not improved with treating bacteremia. F/u neurologist who is planning to do EEG.   UTI - as  above   Liver enzyme elevation- improving and trending down. RUQ ultrasound negative. Hepatitis panel negative.  weight loss- due to recent hospitalization and severe sickness on hospital. .Appetite improving Encourage PO intake and supplement diet. Monitor weight. F/u dietitian. Family is aware and refused peg tube.  Vitamin D deficiency- Vitamin D 50,000 weekly. Monitor Vitamin D.  Hypoglycemia- resolved. Appetite improved.  cholelithiasis - without cholecystitis neither pancreatitis. Normal amylase and lipase.  hypotension- continue IV steroid and IV fluid as needed  thrombocytopenia- PLT down, monitor PLT, f/u with Hematology.   NPH- - neurology does not believe acute mental status changes, could be due to NPH. F/u neurology as outpatient for NPH workup.  DVT ppx- Lovenox discontinued due to thrombocytopenia  leg arthritis- Acetaminophen PRN.  b/l cataract- F/u ophthalmology  vitamin D deficiency- continue Vitamin D supplement.  constipation- senna at bedtime.  LBP 2/2 severe spinal stenosis with out cord pressure - no surgical intervention recommended neither accepted by family, continue PT OT as needed.  Dementia - Stable. Continue Aricept .  Dysphagia - Mechanical soft diet, aspiration precautions.  Myasthenia Gravis - stable , off of medication. Monitor CBC and BMP every 2 months  VT ppx - on SCD, no blood thinner due to low PLT.   dc planning

## 2024-06-01 NOTE — PROGRESS NOTE ADULT - ASSESSMENT
88yo F h/o Myasthenia Gravis (not on medication for past 9 years), Dementia and Chronic low back pain, recently hospitalized for UTI and now p/w sepsis related to recurrent UTI. GI Consulted for increased LFTs.    1. Increased LFTs  now improved, r/t systemic process (uti)  US abdomen with cholelithiasis no further intervention needed  trend LFTs periodically     2. Recurrent UTI w/AMS  management per ID    3. Myasthenia Gravis  per primary     4. Poor PO intake  palliative care appropriate

## 2024-06-02 LAB
ANION GAP SERPL CALC-SCNC: 10 MMOL/L — SIGNIFICANT CHANGE UP (ref 5–17)
ANION GAP SERPL CALC-SCNC: 10 MMOL/L — SIGNIFICANT CHANGE UP (ref 5–17)
BUN SERPL-MCNC: 16 MG/DL — SIGNIFICANT CHANGE UP (ref 7–23)
BUN SERPL-MCNC: 17 MG/DL — SIGNIFICANT CHANGE UP (ref 7–23)
CALCIUM SERPL-MCNC: 9 MG/DL — SIGNIFICANT CHANGE UP (ref 8.4–10.5)
CALCIUM SERPL-MCNC: 9.4 MG/DL — SIGNIFICANT CHANGE UP (ref 8.4–10.5)
CHLORIDE SERPL-SCNC: 107 MMOL/L — SIGNIFICANT CHANGE UP (ref 96–108)
CHLORIDE SERPL-SCNC: 108 MMOL/L — SIGNIFICANT CHANGE UP (ref 96–108)
CO2 SERPL-SCNC: 24 MMOL/L — SIGNIFICANT CHANGE UP (ref 22–31)
CO2 SERPL-SCNC: 26 MMOL/L — SIGNIFICANT CHANGE UP (ref 22–31)
CREAT SERPL-MCNC: 0.67 MG/DL — SIGNIFICANT CHANGE UP (ref 0.5–1.3)
CREAT SERPL-MCNC: 0.69 MG/DL — SIGNIFICANT CHANGE UP (ref 0.5–1.3)
EGFR: 83 ML/MIN/1.73M2 — SIGNIFICANT CHANGE UP
EGFR: 84 ML/MIN/1.73M2 — SIGNIFICANT CHANGE UP
GLUCOSE BLDC GLUCOMTR-MCNC: 100 MG/DL — HIGH (ref 70–99)
GLUCOSE BLDC GLUCOMTR-MCNC: 101 MG/DL — HIGH (ref 70–99)
GLUCOSE BLDC GLUCOMTR-MCNC: 113 MG/DL — HIGH (ref 70–99)
GLUCOSE BLDC GLUCOMTR-MCNC: 114 MG/DL — HIGH (ref 70–99)
GLUCOSE BLDC GLUCOMTR-MCNC: 122 MG/DL — HIGH (ref 70–99)
GLUCOSE SERPL-MCNC: 116 MG/DL — HIGH (ref 70–99)
GLUCOSE SERPL-MCNC: 97 MG/DL — SIGNIFICANT CHANGE UP (ref 70–99)
HCT VFR BLD CALC: 29.3 % — LOW (ref 34.5–45)
HGB BLD-MCNC: 9.8 G/DL — LOW (ref 11.5–15.5)
MAGNESIUM SERPL-MCNC: 1.7 MG/DL — SIGNIFICANT CHANGE UP (ref 1.6–2.6)
MCHC RBC-ENTMCNC: 31.8 PG — SIGNIFICANT CHANGE UP (ref 27–34)
MCHC RBC-ENTMCNC: 33.4 GM/DL — SIGNIFICANT CHANGE UP (ref 32–36)
MCV RBC AUTO: 95.1 FL — SIGNIFICANT CHANGE UP (ref 80–100)
NRBC # BLD: 0 /100 WBCS — SIGNIFICANT CHANGE UP (ref 0–0)
PHOSPHATE SERPL-MCNC: 2.8 MG/DL — SIGNIFICANT CHANGE UP (ref 2.5–4.5)
PLATELET # BLD AUTO: 144 K/UL — LOW (ref 150–400)
POTASSIUM SERPL-MCNC: 4 MMOL/L — SIGNIFICANT CHANGE UP (ref 3.5–5.3)
POTASSIUM SERPL-MCNC: 4.2 MMOL/L — SIGNIFICANT CHANGE UP (ref 3.5–5.3)
POTASSIUM SERPL-SCNC: 4 MMOL/L — SIGNIFICANT CHANGE UP (ref 3.5–5.3)
POTASSIUM SERPL-SCNC: 4.2 MMOL/L — SIGNIFICANT CHANGE UP (ref 3.5–5.3)
PYRIDOXAL PHOS SERPL-MCNC: 2.7 UG/L — LOW (ref 3.4–65.2)
RBC # BLD: 3.08 M/UL — LOW (ref 3.8–5.2)
RBC # FLD: 15.5 % — HIGH (ref 10.3–14.5)
SODIUM SERPL-SCNC: 142 MMOL/L — SIGNIFICANT CHANGE UP (ref 135–145)
SODIUM SERPL-SCNC: 143 MMOL/L — SIGNIFICANT CHANGE UP (ref 135–145)
WBC # BLD: 10.18 K/UL — SIGNIFICANT CHANGE UP (ref 3.8–10.5)
WBC # FLD AUTO: 10.18 K/UL — SIGNIFICANT CHANGE UP (ref 3.8–10.5)

## 2024-06-02 PROCEDURE — 70498 CT ANGIOGRAPHY NECK: CPT | Mod: 26

## 2024-06-02 PROCEDURE — 93010 ELECTROCARDIOGRAM REPORT: CPT

## 2024-06-02 PROCEDURE — 70496 CT ANGIOGRAPHY HEAD: CPT | Mod: 26

## 2024-06-02 PROCEDURE — 99221 1ST HOSP IP/OBS SF/LOW 40: CPT

## 2024-06-02 RX ADMIN — Medication 1 TABLET(S): at 11:50

## 2024-06-02 RX ADMIN — MEROPENEM 100 MILLIGRAM(S): 1 INJECTION INTRAVENOUS at 17:15

## 2024-06-02 RX ADMIN — MEROPENEM 100 MILLIGRAM(S): 1 INJECTION INTRAVENOUS at 05:25

## 2024-06-02 RX ADMIN — CHLORHEXIDINE GLUCONATE 1 APPLICATION(S): 213 SOLUTION TOPICAL at 11:50

## 2024-06-02 RX ADMIN — DONEPEZIL HYDROCHLORIDE 10 MILLIGRAM(S): 10 TABLET, FILM COATED ORAL at 21:28

## 2024-06-02 RX ADMIN — SENNA PLUS 2 TABLET(S): 8.6 TABLET ORAL at 21:28

## 2024-06-02 NOTE — CONSULT NOTE ADULT - REASON FOR ADMISSION
Seizure activity

## 2024-06-02 NOTE — CONSULT NOTE ADULT - ASSESSMENT
89F h/o dementia, myasthenia gravis adm 5/22 for seizure activity, found w/ bacteremia 2/2 UTI. 5/29 MR brain w/wo w/ no infarcts, non-occlusive filling defect in Rt transverse/sigmoid sinuses/jugular bulb. CTV confirms non-occlusive thrombus in Rt transverse/sigmoid/IJV. Coags wnl. Plts 144. Exam: EOV, regards but uncooperative w/ exam, minimally verbal, Ox1, BUE brisk wd AG, BLE wd    -No acute nsgy intervention   -Stroke neurology eval  -Pt should be started on therapeutic AC for non-occlusive thrombus. Risk-benefit consideration per primary team   -Therapeutic AC mgmt per primary/stroke neurology

## 2024-06-02 NOTE — PROGRESS NOTE ADULT - SUBJECTIVE AND OBJECTIVE BOX
Date of Service: 06-02-24 @ 10:51           CARDIOLOGY     PROGRESS  NOTE   ________________________________________________    CHIEF COMPLAINT:Patient is a 89y old  Female who presents with a chief complaint of Seizure activity (01 Jun 2024 12:54)  comfortable  	  REVIEW OF SYSTEMS:  CONSTITUTIONAL: No fever, weight loss, or fatigue  EYES: No eye pain, visual disturbances, or discharge  ENT:  No difficulty hearing, tinnitus, vertigo; No sinus or throat pain  NECK: No pain or stiffness  RESPIRATORY: No cough, wheezing, chills or hemoptysis; No Shortness of Breath  CARDIOVASCULAR: No chest pain, palpitations, passing out, dizziness, or leg swelling  GASTROINTESTINAL: No abdominal or epigastric pain. No nausea, vomiting, or hematemesis; No diarrhea or constipation. No melena or hematochezia.  GENITOURINARY: No dysuria, frequency, hematuria, or incontinence  NEUROLOGICAL: No headaches, memory loss, loss of strength, numbness, or tremors  SKIN: No itching, burning, rashes, or lesions   LYMPH Nodes: No enlarged glands  ENDOCRINE: No heat or cold intolerance; No hair loss  MUSCULOSKELETAL: No joint pain or swelling; No muscle, back, or extremity pain  PSYCHIATRIC: No depression, anxiety, mood swings, or difficulty sleeping  HEME/LYMPH: No easy bruising, or bleeding gums  ALLERGY AND IMMUNOLOGIC: No hives or eczema	    [x ] All others negative	  [ ] Unable to obtain    PHYSICAL EXAM:  T(C): 36.3 (06-02-24 @ 04:27), Max: 36.6 (06-01-24 @ 18:19)  HR: 69 (06-02-24 @ 04:27) (58 - 69)  BP: 136/77 (06-02-24 @ 04:27) (99/62 - 140/63)  RR: 18 (06-02-24 @ 04:27) (18 - 18)  SpO2: 97% (06-02-24 @ 04:27) (96% - 98%)  Wt(kg): --  I&O's Summary    01 Jun 2024 07:01  -  02 Jun 2024 07:00  --------------------------------------------------------  IN: 0 mL / OUT: 450 mL / NET: -450 mL        Appearance: Normal/ ngt	  HEENT:   Normal oral mucosa, PERRL, EOMI	  Lymphatic: No lymphadenopathy  Cardiovascular: Normal S1 S2, No JVD, + murmurs, No edema  Respiratory: rhonchi  Psychiatry:  dementia  Gastrointestinal:  Soft, Non-tender, + BS	  Skin: No rashes, No ecchymoses, No cyanosis	  Extremities: Normal range of motion, No clubbing, cyanosis or edema  Vascular: Peripheral pulses palpable 2+ bilaterally    MEDICATIONS  (STANDING):  chlorhexidine 2% Cloths 1 Application(s) Topical daily  dextrose 50% Injectable 25 Gram(s) IV Push once  dextrose 50% Injectable 12.5 Gram(s) IV Push once  dextrose 50% Injectable 25 Gram(s) IV Push once  dextrose Oral Gel 15 Gram(s) Oral once  donepezil 10 milliGRAM(s) Oral at bedtime  glucagon  Injectable 1 milliGRAM(s) IntraMuscular once  meropenem  IVPB 1000 milliGRAM(s) IV Intermittent every 12 hours  multivitamin 1 Tablet(s) Oral daily  senna 2 Tablet(s) Oral at bedtime      TELEMETRY: 	    ECG:  	  RADIOLOGY:  OTHER: 	  	  LABS:	 	    CARDIAC MARKERS:                                9.8    10.18 )-----------( 144      ( 02 Jun 2024 06:22 )             29.3     06-02    142  |  108  |  16  ----------------------------<  116<H>  4.0   |  24  |  0.69    Ca    9.4      02 Jun 2024 06:22      proBNP:   Lipid Profile:   HgA1c:   TSH: Thyroid Stimulating Hormone, Serum: 2.21 uIU/mL (05-23 @ 06:38)          Assessment and plan  ---------------------------  88 YO F with PMHx of Myasthenia Gravis (not on medication for past 9 years) and MHx of dementia and chronic low back pain. Patient recently hospitalized after being found with slurred speech and very confused. Most likely due to UTI which not responded to Cipro. Patient transferred to hospital due to seizure activity and was found with UTI. She was given 1 mg IM.   Bacteremia/sepsis- resolved. Blood cx negative. Continue IV Meropenem. F/u ID.   AMS- most likely due to metabolic and toxic  encephalopathy. Not improved with treating bacteremia. F/u neurologist who is planning to do EEG.   UTI - as  above   Liver enzyme elevation- improving and trending down. RUQ ultrasound negative. Hepatitis panel negative.  weight loss- due to recent hospitalization and severe sickness on hospital. .Appetite improving Encourage PO intake and supplement diet. Monitor weight. F/u dietitian. Family is aware and refused peg tube.  Vitamin D deficiency- Vitamin D 50,000 weekly. Monitor Vitamin D.  Hypoglycemia- resolved. Appetite improved.  cholelithiasis - without cholecystitis neither pancreatitis. Normal amylase and lipase.  hypotension- continue IV steroid and IV fluid as needed  thrombocytopenia- PLT down, monitor PLT, f/u with Hematology.   NPH- - neurology does not believe acute mental status changes, could be due to NPH. F/u neurology as outpatient for NPH workup.  DVT ppx- Lovenox discontinued due to thrombocytopenia  leg arthritis- Acetaminophen PRN.  b/l cataract- F/u ophthalmology  vitamin D deficiency- continue Vitamin D supplement.  constipation- senna at bedtime.  LBP 2/2 severe spinal stenosis with out cord pressure - no surgical intervention recommended neither accepted by family, continue PT OT as needed.  Dementia - Stable. Continue Aricept .  Dysphagia - Mechanical soft diet, aspiration precautions.  Myasthenia Gravis - stable , off of medication. Monitor CBC and BMP every 2 months  VT ppx - on SCD, no blood thinner due to low PLT.   continue NGT feeding  discussed with the daughter at the bedside

## 2024-06-02 NOTE — CONSULT NOTE ADULT - CONSULT REQUESTED DATE/TIME
02-Jun-2024 16:52
22-May-2024
23-May-2024 11:07
23-May-2024 15:41
28-May-2024 15:19
24-May-2024 09:01
29-May-2024

## 2024-06-02 NOTE — PROVIDER CONTACT NOTE (OTHER) - ACTION/TREATMENT ORDERED:
CORRIE Garcia notified. Start bear hugger until temp is at least 98F. Take temp Q1h until temp reached.

## 2024-06-02 NOTE — PROGRESS NOTE ADULT - SUBJECTIVE AND OBJECTIVE BOX
INTERVAL HPI/OVERNIGHT EVENTS:    events noted   started on NG feeding    chlorhexidine 2% Cloths 1 Application(s) Topical daily  dextrose 50% Injectable 25 Gram(s) IV Push once  dextrose 50% Injectable 12.5 Gram(s) IV Push once  dextrose 50% Injectable 25 Gram(s) IV Push once  dextrose Oral Gel 15 Gram(s) Oral once  donepezil 10 milliGRAM(s) Oral at bedtime  glucagon  Injectable 1 milliGRAM(s) IntraMuscular once  multivitamin 1 Tablet(s) Oral daily  potassium chloride   Powder 40 milliEquivalent(s) Oral once  senna 2 Tablet(s) Oral at bedtime                            10.3   8.71  )-----------( 120      ( 31 May 2024 06:53 )             30.7       Hemoglobin: 10.3 g/dL (05-31 @ 06:53)  Hemoglobin: 9.8 g/dL (05-30 @ 07:02)  Hemoglobin: 10.8 g/dL (05-29 @ 06:48)  Hemoglobin: 9.6 g/dL (05-28 @ 07:02)      06-01    139  |  107  |  12  ----------------------------<  90  3.4<L>   |  22  |  0.64    Ca    9.3      01 Jun 2024 07:12      Creatinine Trend: 0.64<--, 0.77<--, 0.61<--, 0.81<--, 0.93<--, 0.83<--    COAGS:           T(C): 36.6 (06-01-24 @ 04:00), Max: 36.6 (06-01-24 @ 04:00)  HR: 58 (06-01-24 @ 11:40) (58 - 68)  BP: 137/65 (06-01-24 @ 11:40) (121/73 - 137/65)  RR: 18 (06-01-24 @ 11:40) (18 - 18)  SpO2: 96% (06-01-24 @ 11:40) (96% - 99%)  Wt(kg): --    I&O's Summary    31 May 2024 07:01  -  01 Jun 2024 07:00  --------------------------------------------------------  IN: 2010 mL / OUT: 1400 mL / NET: 610 mL          Allergies    sulfamethoxazole (Vomiting; Nausea)    Intolerances        Review of Systems: *pt minimally verbal to nonverbal, unable to obtain ROS              PHYSICAL EXAM:    Constitutional: NAD  HEENT: EOMI, throat clear  Neck: No LAD, supple  Respiratory: CTA and P  Cardiovascular: S1 and S2, RRR, no M  Gastrointestinal: BS+, soft, NT/ND, neg HSM,  Extremities: No peripheral edema, neg clubbing, cyanosis  Vascular: 2+ peripheral pulses  Neurological: A/O   Psychiatric: Normal mood, normal affect  Skin: No rashes

## 2024-06-02 NOTE — CONSULT NOTE ADULT - ATTENDING COMMENTS
Non-occlusive thrombus in R lateral sinus. Rec medical management with a/c, follow up imaging to ensure no progression of thrombus.
HPI as per resident note, personally verified by me. Patient with previous diagnoses of dementia and noted to have worsened mentation and slurred speech and found to have UTI. Team reached out for persistent encephalopathy despite treatment. No focal neurologic deficits or abnormal movements noted. Patient is confused but without complaints.    Neurologic exam as per resident note with additions as below:  Lethargic, AO x3, speech with moderate dysarthria but otherwise fluent, usually follows simple commands  CN's II-XII intact but limited as she would often forcibly close her eyes  Strength BUE's 3+-4/5, BLE's 2+/5 and symmetric  Sens intact all  Coordination grossly intact for level of strength, no tremors noted  Downgoing b/l plantar response    CBC with low H/H 11/31 and MCV WNL, low plt 72  Albumin dec 2.4, LFT's with inc alk phos 172  B12 WNL, A1C 4.4%, TSH WNL    < from: CT Head No Cont (05.22.24 @ 11:37) >    1.  No acute intracranial hemorrhage or mass effect.  2.  Redemonstration of diffuse ventricular dilatation out of proportion   to the sulci, suggesting normal pressure hydrocephalus in the appropriate   clinical setting.    < end of copied text >      A/P:  Encephalopathy  Dementia  Myasthenia gravis ?  Bicytopenia  Transaminitis  UTI    - Etiology for patient's worsened mentation is likely due to toxic/metabolic from acute medical issues (UTI) versus other infectious, inflammatory, or even seizure (less likely). No focal neurologic deficits or abnormal movements noted but patient is a limited historian. CT head, personally reviewed by me, with known communicating hydrocephalus (stable) and small vessel ischemic disease but no other acute intracranial findings  - vEEG to evaluate for focal slowing, epileptiform discharge, and seizures  - MRI brain w/o  - Labs as per resident note, would also add on Vitamin D 25 OH, NH3  - Continue to address above medical problems, as you are doing  - Will continue to follow patient with you

## 2024-06-02 NOTE — CONSULT NOTE ADULT - SUBJECTIVE AND OBJECTIVE BOX
p (1480)     HPI: 89F h/o dementia, myasthenia gravis adm 5/22 for seizure activity, found w/ bacteremia 2/2 UTI. 5/29 MR brain w/wo w/ no infarcts, non-occlusive filling defect in Rt transverse/sigmoid sinuses/jugular bulb. CTV confirms non-occlusive thrombus in Rt transverse/sigmoid/IJV. Coags wnl. Plts 144.     Exam: EOV, regards but uncooperative w/ exam, minimally verbal, Ox1, BUE brisk wd AG, BLE wd        --Anticoagulation:    =====================  PAST MEDICAL HISTORY   Myasthenia gravis    Dementia    Myasthenia gravis      PAST SURGICAL HISTORY   No significant past surgical history    No significant past surgical history      sulfamethoxazole (Vomiting; Nausea)      MEDICATIONS:  Antibiotics:  meropenem  IVPB 1000 milliGRAM(s) IV Intermittent every 12 hours    Neuro:  donepezil 10 milliGRAM(s) Oral at bedtime    Other:  dextrose 50% Injectable 25 Gram(s) IV Push once  dextrose 50% Injectable 12.5 Gram(s) IV Push once  dextrose 50% Injectable 25 Gram(s) IV Push once  dextrose Oral Gel 15 Gram(s) Oral once  glucagon  Injectable 1 milliGRAM(s) IntraMuscular once  multivitamin 1 Tablet(s) Oral daily  senna 2 Tablet(s) Oral at bedtime      SOCIAL HISTORY:   Occupation:   Marital Status:     FAMILY HISTORY:  No pertinent family history in first degree relatives    No pertinent family history in first degree relatives        ROS: Negative except per HPI    LABS:                          9.8    10.18 )-----------( 144      ( 02 Jun 2024 06:22 )             29.3     06-02    142  |  108  |  16  ----------------------------<  116<H>  4.0   |  24  |  0.69    Ca    9.4      02 Jun 2024 06:22

## 2024-06-02 NOTE — PROGRESS NOTE ADULT - ASSESSMENT
88yo F h/o Myasthenia Gravis (not on medication for past 9 years), Dementia and Chronic low back pain, recently hospitalized for UTI and now p/w sepsis related to recurrent UTI. GI Consulted for increased LFTs.    1. Increased LFTs  now improved, r/t systemic process (uti)  US abdomen with cholelithiasis no further intervention needed  trend LFTs periodically     2. Recurrent UTI w/AMS  management per ID    3. Myasthenia Gravis  per primary     4. Poor PO intake  palliative care appropriate, trial of tube feeding reasonable to see if mental status improves, but do not favor PEG placement, she is at high risk of surgical complications

## 2024-06-03 LAB
ANION GAP SERPL CALC-SCNC: 12 MMOL/L — SIGNIFICANT CHANGE UP (ref 5–17)
BUN SERPL-MCNC: 21 MG/DL — SIGNIFICANT CHANGE UP (ref 7–23)
CALCIUM SERPL-MCNC: 9 MG/DL — SIGNIFICANT CHANGE UP (ref 8.4–10.5)
CHLORIDE SERPL-SCNC: 104 MMOL/L — SIGNIFICANT CHANGE UP (ref 96–108)
CO2 SERPL-SCNC: 26 MMOL/L — SIGNIFICANT CHANGE UP (ref 22–31)
CREAT SERPL-MCNC: 0.71 MG/DL — SIGNIFICANT CHANGE UP (ref 0.5–1.3)
EGFR: 81 ML/MIN/1.73M2 — SIGNIFICANT CHANGE UP
GLUCOSE BLDC GLUCOMTR-MCNC: 111 MG/DL — HIGH (ref 70–99)
GLUCOSE BLDC GLUCOMTR-MCNC: 125 MG/DL — HIGH (ref 70–99)
GLUCOSE BLDC GLUCOMTR-MCNC: 130 MG/DL — HIGH (ref 70–99)
GLUCOSE BLDC GLUCOMTR-MCNC: 134 MG/DL — HIGH (ref 70–99)
GLUCOSE SERPL-MCNC: 111 MG/DL — HIGH (ref 70–99)
HCT VFR BLD CALC: 28.7 % — LOW (ref 34.5–45)
HGB BLD-MCNC: 9.9 G/DL — LOW (ref 11.5–15.5)
MCHC RBC-ENTMCNC: 33.2 PG — SIGNIFICANT CHANGE UP (ref 27–34)
MCHC RBC-ENTMCNC: 34.5 GM/DL — SIGNIFICANT CHANGE UP (ref 32–36)
MCV RBC AUTO: 96.3 FL — SIGNIFICANT CHANGE UP (ref 80–100)
NRBC # BLD: 0 /100 WBCS — SIGNIFICANT CHANGE UP (ref 0–0)
PLATELET # BLD AUTO: 195 K/UL — SIGNIFICANT CHANGE UP (ref 150–400)
POTASSIUM SERPL-MCNC: 4.1 MMOL/L — SIGNIFICANT CHANGE UP (ref 3.5–5.3)
POTASSIUM SERPL-SCNC: 4.1 MMOL/L — SIGNIFICANT CHANGE UP (ref 3.5–5.3)
RBC # BLD: 2.98 M/UL — LOW (ref 3.8–5.2)
RBC # FLD: 15.6 % — HIGH (ref 10.3–14.5)
SODIUM SERPL-SCNC: 142 MMOL/L — SIGNIFICANT CHANGE UP (ref 135–145)
WBC # BLD: 9.91 K/UL — SIGNIFICANT CHANGE UP (ref 3.8–10.5)
WBC # FLD AUTO: 9.91 K/UL — SIGNIFICANT CHANGE UP (ref 3.8–10.5)

## 2024-06-03 PROCEDURE — 99232 SBSQ HOSP IP/OBS MODERATE 35: CPT

## 2024-06-03 RX ORDER — POLYETHYLENE GLYCOL 3350 17 G/17G
17 POWDER, FOR SOLUTION ORAL DAILY
Refills: 0 | Status: DISCONTINUED | OUTPATIENT
Start: 2024-06-03 | End: 2024-06-06

## 2024-06-03 RX ADMIN — MEROPENEM 100 MILLIGRAM(S): 1 INJECTION INTRAVENOUS at 17:37

## 2024-06-03 RX ADMIN — POLYETHYLENE GLYCOL 3350 17 GRAM(S): 17 POWDER, FOR SOLUTION ORAL at 17:37

## 2024-06-03 RX ADMIN — DONEPEZIL HYDROCHLORIDE 10 MILLIGRAM(S): 10 TABLET, FILM COATED ORAL at 21:15

## 2024-06-03 RX ADMIN — Medication 1 TABLET(S): at 11:24

## 2024-06-03 RX ADMIN — CHLORHEXIDINE GLUCONATE 1 APPLICATION(S): 213 SOLUTION TOPICAL at 11:24

## 2024-06-03 RX ADMIN — MEROPENEM 100 MILLIGRAM(S): 1 INJECTION INTRAVENOUS at 05:35

## 2024-06-03 RX ADMIN — SENNA PLUS 2 TABLET(S): 8.6 TABLET ORAL at 21:15

## 2024-06-03 NOTE — PROGRESS NOTE ADULT - ASSESSMENT
89 YO F with PMHx of Myasthenia Gravis (not on medication for past 9 years) and MHx of dementia and chronic low back pain. Patient recently hospitalized after being found with slurred speech and very confused. Most likely due to UTI which not responded to Cipro. Patient transferred to hospital due to seizure activity and was found with UTI. She was given 1 mg IM.     anorexia/FTT- TF via  NG tube.   Bacteremia/sepsis- resolved. Blood cx negative. Continue IV Meropenem. F/u ID.   AMS- most likely due to metabolic and toxic  encephalopathy. Not improved with treating bacteremia. F/u neurologist who is planning to do EEG.   UTI - as  above   Liver enzyme elevation- improving and trending down. RUQ ultrasound negative. Hepatitis panel negative.  weight loss- due to recent hospitalization and severe sickness on hospital. .Appetite improving Encourage PO intake and supplement diet. Monitor weight. F/u dietitian. Family is aware and refused peg tube.  Vitamin D deficiency- Vitamin D 50,000 weekly. Monitor Vitamin D.  Hypoglycemia- resolved. Appetite improved.  cholelithiasis - without cholecystitis neither pancreatitis. Normal amylase and lipase.  hypotension- continue IV steroid and IV fluid as needed  thrombocytopenia- PLT down, monitor PLT, f/u with Hematology.   NPH- - neurology does not believe acute mental status changes, could be due to NPH. F/u neurology as outpatient for NPH workup.  DVT ppx- Lovenox discontinued due to thrombocytopenia  leg arthritis- Acetaminophen PRN.  b/l cataract- F/u ophthalmology  vitamin D deficiency- continue Vitamin D supplement.  constipation- senna at bedtime.  LBP 2/2 severe spinal stenosis with out cord pressure - no surgical intervention recommended neither accepted by family, continue PT OT as needed.  Dementia - Stable. Continue Aricept .  Dysphagia - Mechanical soft diet, aspiration precautions.  Myasthenia Gravis - stable , off of medication. Monitor CBC and BMP every 2 months  VT ppx - on SCD, no blood thinner due to low PLT.

## 2024-06-03 NOTE — CHART NOTE - NSCHARTNOTEFT_GEN_A_CORE
NEUROLOGY FOLLOW-UP:    Reviewed CTV findings with stroke neurology service. Pertinent results as below:    < from: CT Venogram Brain w/ IV Cont (06.02.24 @ 11:18) >  CT VENOGRAM:  Nonocclusive thrombosis of the right distal transverse sinus, sigmoid   sinus, and internal jugular vein.  < end of copied text >    Pt w/o focal neurological deficits and currently at baseline which is A&Ox0-1, bedbound, and non-verbal. These findings are likely incidental and asymptomatic. Pt can be started on Eliquis and have CT Venogram repeated in 2-3 months w/ close follow-up with stroke neurology service as long as these recommendations align with patient's goals of care. If pt's family does not wish to initiate anticoagulation with Eliquis due to bleeding risk, pt can also be monitored with repeat imaging in the future. Please call [phone] with any questions. Thank you.       Henry Garcia   PGY-3  Department of Neurology  [facility] [employer] [facility]

## 2024-06-03 NOTE — PROGRESS NOTE ADULT - SUBJECTIVE AND OBJECTIVE BOX
Patient is a 89y old  Female who presents with a chief complaint of Seizure activity (02 Jun 2024 16:52)    Being followed by ID for        Interval history:  No other acute events      ROS:  No cough,SOB,CP  No N/V/D  No abd pain  No urinary complaints  No HA  No joint or limb pain  No other complaints    PAST MEDICAL & SURGICAL HISTORY:  Myasthenia gravis      Dementia      Myasthenia gravis      No significant past surgical history      No significant past surgical history        Allergies    sulfamethoxazole (Vomiting; Nausea)    Intolerances      Antimicrobials:    meropenem  IVPB 1000 milliGRAM(s) IV Intermittent every 12 hours    MEDICATIONS  (STANDING):  chlorhexidine 2% Cloths 1 Application(s) Topical daily  dextrose 50% Injectable 25 Gram(s) IV Push once  dextrose 50% Injectable 12.5 Gram(s) IV Push once  dextrose 50% Injectable 25 Gram(s) IV Push once  dextrose Oral Gel 15 Gram(s) Oral once  donepezil 10 milliGRAM(s) Oral at bedtime  glucagon  Injectable 1 milliGRAM(s) IntraMuscular once  meropenem  IVPB 1000 milliGRAM(s) IV Intermittent every 12 hours  multivitamin 1 Tablet(s) Oral daily  senna 2 Tablet(s) Oral at bedtime      Vital Signs Last 24 Hrs  T(C): 36.6 (06-03-24 @ 11:02), Max: 37.4 (06-02-24 @ 19:58)  T(F): 97.9 (06-03-24 @ 11:02), Max: 99.4 (06-02-24 @ 19:58)  HR: 74 (06-03-24 @ 11:02) (61 - 84)  BP: 117/74 (06-03-24 @ 11:02) (113/74 - 121/68)  BP(mean): --  RR: 18 (06-03-24 @ 11:02) (18 - 18)  SpO2: 94% (06-03-24 @ 11:02) (94% - 96%)    Physical Exam:    Constitutional well preserved,comfortable,pleasant    HEENT PERRLA EOMI,No pallor or icterus    No oral exudate or erythema    Neck supple no JVD or LN    Chest Good AE,CTA    CVS RRR S1 S2 WNl No murmur or rub or gallop    Abd soft BS normal No tenderness no masses    Ext No cyanosis clubbing or edema    IV site no erythema tenderness or discharge    Joints no swelling or LOM    CNS AAO X 3 no focal    Lab Data:                          9.9    9.91  )-----------( 195      ( 03 Jun 2024 07:00 )             28.7       06-03    142  |  104  |  21  ----------------------------<  111<H>  4.1   |  26  |  0.71    Ca    9.0      03 Jun 2024 07:00  Phos  2.8     06-02  Mg     1.7     06-02                        WBC Count: 9.91 (06-03-24 @ 07:00)  WBC Count: 10.18 (06-02-24 @ 06:22)  WBC Count: 8.71 (05-31-24 @ 06:53)  WBC Count: 5.75 (05-30-24 @ 07:02)  WBC Count: 7.42 (05-29-24 @ 06:48)  WBC Count: 5.29 (05-28-24 @ 07:02)              Patient is a 89y old  Female who presents with a chief complaint of Seizure activity (02 Jun 2024 16:52)    Being followed by ID for        Interval history:  pt more responsive at times  team addressing abnormal Ct- seen by neuro and neurosurgery for Nonocclusive thrombosis of the right distal transverse sinus, sigmoid   sinus, and internal jugular vein  No other acute events          PAST MEDICAL & SURGICAL HISTORY:  Myasthenia gravis      Dementia      Myasthenia gravis      No significant past surgical history      No significant past surgical history        Allergies    sulfamethoxazole (Vomiting; Nausea)    Intolerances      Antimicrobials:    meropenem  IVPB 1000 milliGRAM(s) IV Intermittent every 12 hours    MEDICATIONS  (STANDING):  chlorhexidine 2% Cloths 1 Application(s) Topical daily  dextrose 50% Injectable 25 Gram(s) IV Push once  dextrose 50% Injectable 12.5 Gram(s) IV Push once  dextrose 50% Injectable 25 Gram(s) IV Push once  dextrose Oral Gel 15 Gram(s) Oral once  donepezil 10 milliGRAM(s) Oral at bedtime  glucagon  Injectable 1 milliGRAM(s) IntraMuscular once  meropenem  IVPB 1000 milliGRAM(s) IV Intermittent every 12 hours  multivitamin 1 Tablet(s) Oral daily  senna 2 Tablet(s) Oral at bedtime      Vital Signs Last 24 Hrs  T(C): 36.6 (06-03-24 @ 11:02), Max: 37.4 (06-02-24 @ 19:58)  T(F): 97.9 (06-03-24 @ 11:02), Max: 99.4 (06-02-24 @ 19:58)  HR: 74 (06-03-24 @ 11:02) (61 - 84)  BP: 117/74 (06-03-24 @ 11:02) (113/74 - 121/68)  BP(mean): --  RR: 18 (06-03-24 @ 11:02) (18 - 18)  SpO2: 94% (06-03-24 @ 11:02) (94% - 96%)    Physical Exam:    Constitutional mostly sleeping    HEENT PERRLA EOMI,No pallor or icterus    tongue protrudes    Neck supple no JVD or LN    Chest Good AE,CTA    CVS  S1 S2     Abd soft BS normal No tenderness    Ext No cyanosis clubbing or edema    IV site no erythema tenderness or discharge    Joints no swelling or LOM      Lab Data:                          9.9    9.91  )-----------( 195      ( 03 Jun 2024 07:00 )             28.7       06-03    142  |  104  |  21  ----------------------------<  111<H>  4.1   |  26  |  0.71    Ca    9.0      03 Jun 2024 07:00  Phos  2.8     06-02  Mg     1.7     06-02      < from: CT Angio Neck w/ IV Cont (06.02.24 @ 11:37) >    IMPRESSION:    CT HEAD:  No acute intracranial hemorrhage, mass effect, or midline shift.    CTA NECK:  Limited by motion artifact. Otherwise no evidence of significant stenosis   or occlusion.    CTA HEAD:  Patent intracranial circulation without flow limiting stenosis.  No evidence of aneurysm. Tiny aneurysms can be beyond the resolution of   CTA technique.    CT VENOGRAM:  Nonocclusive thrombosis of the right distal transverse sinus, sigmoid   sinus, and internal jugular vein.    Findings were discussed with Dr. Floyd by Dr. Knutson on 6/2/2024 1:28 PM   with readback confirmation.    < end of copied text >      WBC Count: 9.91 (06-03-24 @ 07:00)  WBC Count: 10.18 (06-02-24 @ 06:22)  WBC Count: 8.71 (05-31-24 @ 06:53)  WBC Count: 5.75 (05-30-24 @ 07:02)  WBC Count: 7.42 (05-29-24 @ 06:48)  WBC Count: 5.29 (05-28-24 @ 07:02)

## 2024-06-03 NOTE — PROGRESS NOTE ADULT - SUBJECTIVE AND OBJECTIVE BOX
Chief Complaint:  Patient is a 89y old  Female who presents with a chief complaint of Seizure activity (03 Jun 2024 16:09)      Date of service 06-03-24 @ 16:49      Interval Events:   tolerating tube feeds  constipated     Hospital Medications:  chlorhexidine 2% Cloths 1 Application(s) Topical daily  dextrose 50% Injectable 25 Gram(s) IV Push once  dextrose 50% Injectable 12.5 Gram(s) IV Push once  dextrose 50% Injectable 25 Gram(s) IV Push once  dextrose Oral Gel 15 Gram(s) Oral once  donepezil 10 milliGRAM(s) Oral at bedtime  glucagon  Injectable 1 milliGRAM(s) IntraMuscular once  meropenem  IVPB 1000 milliGRAM(s) IV Intermittent every 12 hours  multivitamin 1 Tablet(s) Oral daily  senna 2 Tablet(s) Oral at bedtime        Review of Systems:  unable to obtain     PHYSICAL EXAM:   Vital Signs:  Vital Signs Last 24 Hrs  T(C): 36.6 (03 Jun 2024 11:02), Max: 37.4 (02 Jun 2024 19:58)  T(F): 97.9 (03 Jun 2024 11:02), Max: 99.4 (02 Jun 2024 19:58)  HR: 74 (03 Jun 2024 11:02) (61 - 84)  BP: 117/74 (03 Jun 2024 11:02) (113/74 - 121/68)  BP(mean): --  RR: 18 (03 Jun 2024 11:02) (18 - 18)  SpO2: 94% (03 Jun 2024 11:02) (94% - 96%)    Parameters below as of 03 Jun 2024 11:02  Patient On (Oxygen Delivery Method): room air      Daily     Daily       PHYSICAL EXAM:     GENERAL:  Appears stated age, well-groomed, well-nourished, no distress  HEENT:  NC/AT,  conjunctivae anicteric, clear and pink,   NECK: supple, trachea midline  CHEST:  Full & symmetric excursion, no increased effort, breath sounds clear  HEART:  Regular rhythm, no JVD  ABDOMEN:  Soft, non-tender, non-distended, normoactive bowel sounds,  no masses , no hepatosplenomegaly  EXTREMITIES:  no cyanosis,clubbing or edema  SKIN:  No rash, erythema, or, ecchymoses, no jaundice  NEURO:  Alert, non-focal, no asterixis  PSYCH: Appropriate affect, oriented to place and time  RECTAL: Deferred      LABS Personally reviewed by me:                        9.9    9.91  )-----------( 195      ( 03 Jun 2024 07:00 )             28.7     Mean Cell Volume: 96.3 fl (06-03-24 @ 07:00)    06-03    142  |  104  |  21  ----------------------------<  111<H>  4.1   |  26  |  0.71    Ca    9.0      03 Jun 2024 07:00  Phos  2.8     06-02  Mg     1.7     06-02          Urinalysis Basic - ( 03 Jun 2024 07:00 )    Color: x / Appearance: x / SG: x / pH: x  Gluc: 111 mg/dL / Ketone: x  / Bili: x / Urobili: x   Blood: x / Protein: x / Nitrite: x   Leuk Esterase: x / RBC: x / WBC x   Sq Epi: x / Non Sq Epi: x / Bacteria: x                              9.9    9.91  )-----------( 195      ( 03 Jun 2024 07:00 )             28.7                         9.8    10.18 )-----------( 144      ( 02 Jun 2024 06:22 )             29.3       Imaging personally reviewed by me:

## 2024-06-03 NOTE — PROGRESS NOTE ADULT - ASSESSMENT
89yo F h/o Myasthenia Gravis (not on medication for past 9 years), Dementia and Chronic low back pain, recently hospitalized for UTI and now p/w sepsis related to recurrent UTI. GI Consulted for increased LFTs.    1. Increased LFTs, improved   US abdomen with cholelithiasis no further intervention needed  trend LFTs periodically     2. Recurrent UTI w/AMS  management per ID    3. Myasthenia Gravis  per primary     4. Poor PO intake  palliative care appropriate, trial of tube feeding reasonable to see if mental status improves, but do not favor PEG placement, she is at high risk of surgical complications    5. Constipation   added miralax   cont senna

## 2024-06-03 NOTE — PROGRESS NOTE ADULT - ASSESSMENT
89 YO F with PMHx of Myasthenia Gravis (not on medication for past 9 years) and MHx of dementia and chronic low back pain. Patient recently hospitalized after being found with slurred speech and very confused. Most likely due to UTI which not responded to Cipro. Patient transferred to hospital due to seizure activity and was found with UTI.     Pt was initially started on vancomycin and zosyn but switched to ertapenem when found to have an ESBL E Coli in her blood  ID is asked to evaluate     During the last admission in feb/March -PT presented from her nursing home with worsening lethargy and confusion. UA done as outpatient on 2/13 grossly positive and urine culture growing E. coli and Klebsiella, pansensitive. Found to be hypothermic in ED. Admitted for sepsis and metabolic encephalopathy in setting of UTI. Multiple RRTs called today. First RRT called for seizure-like activity. Patient loaded with Keppra and placed on EEG. Second RRT called for hypotension. Patient was given 4 L IVF with resolution of hypotension. pt changed to meropenem to cover for possible meningitis- ? Normal pressure hydrocephalous on CT- discussed with neuro- this wouldn't account for her acute change  on meropenem to cover for possible meningitis and /or to cover biliary sepsis   EEG not c/w HSV encephalitis but added  acyclovir until more definitive diagnosis available, Course significant for a distended GB but neg HIDA scan , renal insufficiency and thrombocytopenia ( at presentation) and hypernatremia     Antibiotics  Vanco 5/22  Zosyn %/22 --> 23  Ertapenem 5/23  Meropenem 5/24 -->     5/24  renal function improved    A/P  #ESBL E coli Bacteremia   5/22 +BC X2 +ESBL E coli;    5/22 Urine 10 - 49K E coli  agree with change to carbapenem  Increased meropenem 1000 mg IVSS Q 12 hours.   urinary source  day # 12 of carbapenem  family asking about suppressive abs after completing therapy and or vitamin C/ methanamine...  can d/c the IV abs  Consdier Bactrim suppression , if not allergic  follow post void residual  consider vitamin c and methanamine    #Thrombocytopenia  likely from sepsis  resolvveds    #AMS  pt with baseline dementia, and additionally here likely metabolic encephalopathy  at some point there was also a question of Normal pressure hydrocephalous - neuro to address  ? seizure -   neuro to address MRI findings   team deciding on ? anticoagulation for nonocclusive thrombosis of the right distal transverse sinus, sigmoid   sinus, and internal jugular vein      #elevated LFTs  last admission there was concern for a possible passed biliary stone  u/s without acute choley    # Myasthenia Gravis  not on treatment for years   would avoid certain abs- fluoroquinolones , aminoglycosides and macrolides      Yeni Gabriel M.D. ,   please reach via teams   If no answer, or after 5PM/ weekends,  then please call  [phone]    Assessment and plan discussed with the primary team .    I

## 2024-06-03 NOTE — PROGRESS NOTE ADULT - SUBJECTIVE AND OBJECTIVE BOX
Patient is a 89y old  Female who presents with a chief complaint of Seizure activity (03 Jun 2024 16:48)      INTERVAL HPI/OVERNIGHT EVENTS: Patient is more awake. Still has NG tube. Refusing to eat. Continue IV abx. F/u ID for duration of IV Ertapenem.     Pain Location & Control:     MEDICATIONS  (STANDING):  chlorhexidine 2% Cloths 1 Application(s) Topical daily  dextrose 50% Injectable 25 Gram(s) IV Push once  dextrose 50% Injectable 12.5 Gram(s) IV Push once  dextrose 50% Injectable 25 Gram(s) IV Push once  dextrose Oral Gel 15 Gram(s) Oral once  donepezil 10 milliGRAM(s) Oral at bedtime  glucagon  Injectable 1 milliGRAM(s) IntraMuscular once  meropenem  IVPB 1000 milliGRAM(s) IV Intermittent every 12 hours  multivitamin 1 Tablet(s) Oral daily  polyethylene glycol 3350 17 Gram(s) Oral daily  senna 2 Tablet(s) Oral at bedtime    MEDICATIONS  (PRN):      Allergies    sulfamethoxazole (Vomiting; Nausea)    Intolerances        REVIEW OF SYSTEMS:  UTO due to dementia    Vital Signs Last 24 Hrs  T(C): 36.6 (03 Jun 2024 11:02), Max: 37.4 (02 Jun 2024 19:58)  T(F): 97.9 (03 Jun 2024 11:02), Max: 99.4 (02 Jun 2024 19:58)  HR: 74 (03 Jun 2024 11:02) (72 - 84)  BP: 117/74 (03 Jun 2024 11:02) (113/74 - 121/68)  BP(mean): --  RR: 18 (03 Jun 2024 11:02) (18 - 18)  SpO2: 94% (03 Jun 2024 11:02) (94% - 96%)    Parameters below as of 03 Jun 2024 11:02  Patient On (Oxygen Delivery Method): room air        PHYSICAL EXAM:  GENERAL: NAD, well-groomed, well-developed  HEAD:  Atraumatic, Normocephalic  EYES: EOMI, PERRLA, conjunctiva and sclera clear  ENMT: No tonsillar erythema, exudates, or enlargement; Moist mucous membranes, Good dentition, No lesions  NECK: Supple, No JVD, Normal thyroid  NERVOUS SYSTEM:  Alert & Oriented X 1  CHEST/LUNG: Clear to auscultation bilaterally; No rales, rhonchi, wheezing, or rubs  HEART: Regular rate and rhythm; No murmurs, rubs, or gallops  ABDOMEN: Soft, Nontender, Nondistended; Bowel sounds present  EXTREMITIES:  2+ Peripheral Pulses, No clubbing or cyanosis  LYMPH: No lymphadenopathy noted  SKIN: No rashes or lesions      LABS:                        9.9    9.91  )-----------( 195      ( 03 Jun 2024 07:00 )             28.7     03 Jun 2024 07:00    142    |  104    |  21     ----------------------------<  111    4.1     |  26     |  0.71     Ca    9.0        03 Jun 2024 07:00        Urinalysis Basic - ( 03 Jun 2024 07:00 )    Color: x / Appearance: x / SG: x / pH: x  Gluc: 111 mg/dL / Ketone: x  / Bili: x / Urobili: x   Blood: x / Protein: x / Nitrite: x   Leuk Esterase: x / RBC: x / WBC x   Sq Epi: x / Non Sq Epi: x / Bacteria: x      CAPILLARY BLOOD GLUCOSE      POCT Blood Glucose.: 134 mg/dL (03 Jun 2024 11:38)  POCT Blood Glucose.: 111 mg/dL (03 Jun 2024 05:59)  POCT Blood Glucose.: 125 mg/dL (03 Jun 2024 00:25)  POCT Blood Glucose.: 114 mg/dL (02 Jun 2024 21:15)        Cultures      RADIOLOGY & ADDITIONAL TESTS:    Imaging Personally Reviewed:  [ X] YES  [ ] NO    Consultant(s) Notes Reviewed:  [ X] YES  [ ] NO    Care Discussed with Consultants/Other Providers [X ] YES  [ ] NO

## 2024-06-04 LAB
GLUCOSE BLDC GLUCOMTR-MCNC: 107 MG/DL — HIGH (ref 70–99)
GLUCOSE BLDC GLUCOMTR-MCNC: 93 MG/DL — SIGNIFICANT CHANGE UP (ref 70–99)
GLUCOSE BLDC GLUCOMTR-MCNC: 95 MG/DL — SIGNIFICANT CHANGE UP (ref 70–99)
WNV IGG TITR FLD: NEGATIVE — SIGNIFICANT CHANGE UP
WNV IGM SPEC QL: NEGATIVE — SIGNIFICANT CHANGE UP

## 2024-06-04 PROCEDURE — 95720 EEG PHY/QHP EA INCR W/VEEG: CPT

## 2024-06-04 PROCEDURE — 99232 SBSQ HOSP IP/OBS MODERATE 35: CPT

## 2024-06-04 RX ORDER — THIAMINE MONONITRATE (VIT B1) 100 MG
200 TABLET ORAL ONCE
Refills: 0 | Status: COMPLETED | OUTPATIENT
Start: 2024-06-04 | End: 2024-06-04

## 2024-06-04 RX ADMIN — CHLORHEXIDINE GLUCONATE 1 APPLICATION(S): 213 SOLUTION TOPICAL at 11:55

## 2024-06-04 RX ADMIN — Medication 1 TABLET(S): at 11:55

## 2024-06-04 RX ADMIN — Medication 200 MILLIGRAM(S): at 17:56

## 2024-06-04 RX ADMIN — MEROPENEM 100 MILLIGRAM(S): 1 INJECTION INTRAVENOUS at 05:32

## 2024-06-04 RX ADMIN — POLYETHYLENE GLYCOL 3350 17 GRAM(S): 17 POWDER, FOR SOLUTION ORAL at 11:55

## 2024-06-04 RX ADMIN — SENNA PLUS 2 TABLET(S): 8.6 TABLET ORAL at 22:12

## 2024-06-04 RX ADMIN — DONEPEZIL HYDROCHLORIDE 10 MILLIGRAM(S): 10 TABLET, FILM COATED ORAL at 22:12

## 2024-06-04 RX ADMIN — MEROPENEM 100 MILLIGRAM(S): 1 INJECTION INTRAVENOUS at 17:57

## 2024-06-04 NOTE — PROVIDER CONTACT NOTE (OTHER) - ACTION/TREATMENT ORDERED:
Sarah Coles ACP came bedside to attempt NGT placement. Pt very uncooperative with NGT placement after 2 attempts. Plan of care ongoing. Pt still on pureed diet.

## 2024-06-04 NOTE — PROGRESS NOTE ADULT - SUBJECTIVE AND OBJECTIVE BOX
expressive aphasic  minimal po intake    VITAL SIGNS:    VSS    PHYSICAL EXAM:     GENERAL: no acute distress  HEENT: NC/AT, EOMI, neck supple, MMM  RESPIRATORY: LCTAB/L, no rhonchi, rales, or wheezing  CARDIOVASCULAR: RRR, no murmurs, gallops, rubs  ABDOMINAL: soft, non-tender, non-distended, positive bowel sounds   EXTREMITIES: no clubbing, cyanosis, or edema                        9.9    9.91  )-----------( 195      ( 03 Jun 2024 07:00 )             28.7     06-03    142  |  104  |  21  ----------------------------<  111<H>  4.1   |  26  |  0.71    Ca    9.0      03 Jun 2024 07:00  Phos  2.8     06-02  Mg     1.7     06-02        MEDICATIONS  (STANDING):  chlorhexidine 2% Cloths 1 Application(s) Topical daily  dextrose 50% Injectable 25 Gram(s) IV Push once  dextrose 50% Injectable 12.5 Gram(s) IV Push once  dextrose 50% Injectable 25 Gram(s) IV Push once  dextrose Oral Gel 15 Gram(s) Oral once  donepezil 10 milliGRAM(s) Oral at bedtime  glucagon  Injectable 1 milliGRAM(s) IntraMuscular once  meropenem  IVPB 1000 milliGRAM(s) IV Intermittent every 12 hours  multivitamin 1 Tablet(s) Oral daily  polyethylene glycol 3350 17 Gram(s) Oral daily  senna 2 Tablet(s) Oral at bedtime  thiamine Injectable 200 milliGRAM(s) IV Push once

## 2024-06-04 NOTE — PROGRESS NOTE ADULT - SUBJECTIVE AND OBJECTIVE BOX
Chief Complaint:  Patient is a 89y old  Female who presents with a chief complaint of Seizure activity (03 Jun 2024 17:31)      Date of service 06-04-24 @ 13:02      Interval Events:   pulled out her [facility]    Hospital Medications:  chlorhexidine 2% Cloths 1 Application(s) Topical daily  dextrose 50% Injectable 25 Gram(s) IV Push once  dextrose 50% Injectable 12.5 Gram(s) IV Push once  dextrose 50% Injectable 25 Gram(s) IV Push once  dextrose Oral Gel 15 Gram(s) Oral once  donepezil 10 milliGRAM(s) Oral at bedtime  glucagon  Injectable 1 milliGRAM(s) IntraMuscular once  meropenem  IVPB 1000 milliGRAM(s) IV Intermittent every 12 hours  multivitamin 1 Tablet(s) Oral daily  polyethylene glycol 3350 17 Gram(s) Oral daily  senna 2 Tablet(s) Oral at bedtime        Review of Systems:  General:  No wt loss, fevers, chills, night sweats, fatigue,   Eyes:  Good vision, no reported pain  ENT:  No sore throat, pain, runny nose, dysphagia  CV:  No pain, palpitations, hypo/hypertension  Resp:  No dyspnea, cough, tachypnea, wheezing  GI:  See HPI  :  No pain, bleeding, incontinence, nocturia  Muscle:  No pain, weakness  Neuro:  No weakness, tingling, memory problems  Psych:  No fatigue, insomnia, mood problems, depression  Endocrine:  No polyuria, polydipsia, cold/heat intolerance  Heme:  No petechiae, ecchymosis, easy bruisability  Integumentary:  No rash, edema    PHYSICAL EXAM:   Vital Signs:  Vital Signs Last 24 Hrs  T(C): 36.5 (04 Jun 2024 11:17), Max: 36.8 (03 Jun 2024 20:57)  T(F): 97.7 (04 Jun 2024 11:17), Max: 98.2 (03 Jun 2024 20:57)  HR: 55 (04 Jun 2024 11:17) (55 - 64)  BP: 113/66 (04 Jun 2024 11:17) (100/64 - 123/74)  BP(mean): --  RR: 18 (04 Jun 2024 11:17) (18 - 18)  SpO2: 95% (04 Jun 2024 11:17) (95% - 97%)    Parameters below as of 04 Jun 2024 11:17  Patient On (Oxygen Delivery Method): room air      Daily     Daily       PHYSICAL EXAM:     GENERAL:  Appears stated age, well-groomed, well-nourished, no distress  HEENT:  NC/AT,  conjunctivae anicteric, clear and pink,   NECK: supple, trachea midline  CHEST:  Full & symmetric excursion, no increased effort, breath sounds clear  HEART:  Regular rhythm, no JVD  ABDOMEN:  Soft, non-tender, non-distended, normoactive bowel sounds,  no masses , no hepatosplenomegaly  EXTREMITIES:  no cyanosis,clubbing or edema  SKIN:  No rash, erythema, or, ecchymoses, no jaundice  NEURO:  Alert, non-focal, no asterixis  PSYCH: Appropriate affect, oriented to place and time  RECTAL: Deferred      LABS Personally reviewed by me:                        9.9    9.91  )-----------( 195      ( 03 Jun 2024 07:00 )             28.7       06-03    142  |  104  |  21  ----------------------------<  111<H>  4.1   |  26  |  0.71    Ca    9.0      03 Jun 2024 07:00  Phos  2.8     06-02  Mg     1.7     06-02          Urinalysis Basic - ( 03 Jun 2024 07:00 )    Color: x / Appearance: x / SG: x / pH: x  Gluc: 111 mg/dL / Ketone: x  / Bili: x / Urobili: x   Blood: x / Protein: x / Nitrite: x   Leuk Esterase: x / RBC: x / WBC x   Sq Epi: x / Non Sq Epi: x / Bacteria: x                              9.9    9.91  )-----------( 195      ( 03 Jun 2024 07:00 )             28.7                         9.8    10.18 )-----------( 144      ( 02 Jun 2024 06:22 )             29.3       Imaging personally reviewed by me:

## 2024-06-04 NOTE — CHART NOTE - NSCHARTNOTEFT_GEN_A_CORE
Initial 3 hours of record reviewed.  There are no epileptiform abnormalities noted. Background is continuous and diffusely slow.   Full report to follow after review of completed study tomorrow.     SINTIA Zuniga.  Attending Physician, [facility]      [facility] EEG Reading Room Ph#: [phone]  Epilepsy Answering Service after 5PM and before 8:30AM: Ph#: [phone]

## 2024-06-04 NOTE — PROGRESS NOTE ADULT - ASSESSMENT
88 YO F with PMHx of Myasthenia Gravis (not on medication for past 9 years) and MHx of dementia and chronic low back pain. Patient recently hospitalized after being found with slurred speech and very confused. Most likely due to UTI which not responded to Cipro. Patient transferred to hospital due to seizure activity and was found with UTI.     Pt was initially started on vancomycin and zosyn but switched to ertapenem when found to have an ESBL E Coli in her blood  ID is asked to evaluate     During the last admission in feb/March -PT presented from her nursing home with worsening lethargy and confusion. UA done as outpatient on 2/13 grossly positive and urine culture growing E. coli and Klebsiella, pansensitive. Found to be hypothermic in ED. Admitted for sepsis and metabolic encephalopathy in setting of UTI. Multiple RRTs called today. First RRT called for seizure-like activity. Patient loaded with Keppra and placed on EEG. Second RRT called for hypotension. Patient was given 4 L IVF with resolution of hypotension. pt changed to meropenem to cover for possible meningitis- ? Normal pressure hydrocephalous on CT- discussed with neuro- this wouldn't account for her acute change  on meropenem to cover for possible meningitis and /or to cover biliary sepsis   EEG not c/w HSV encephalitis but added  acyclovir until more definitive diagnosis available, Course significant for a distended GB but neg HIDA scan , renal insufficiency and thrombocytopenia ( at presentation) and hypernatremia     Antibiotics  Vanco 5/22  Zosyn %/22 --> 23  Ertapenem 5/23  Meropenem 5/24 -->     5/24  renal function improved    A/P  #ESBL E coli Bacteremia   5/22 +BC X2 +ESBL E coli;    5/22 Urine 10 - 49K E coli  agree with change to carbapenem  Increased meropenem 1000 mg IVSS Q 12 hours.   urinary source  day # 13 of carbapenem  family asking about suppressive abs after completing therapy and or vitamin C/ methanamine...  can d/c the IV abs  Bactrim is not an option as pt gets nauseated , no other oral options   follow post void residual  consider vitamin c and methanamine- check with urology     #Thrombocytopenia  likely from sepsis  resolved    #anemia  team to address, chronic     #AMS  pt with baseline dementia, and additionally here likely metabolic encephalopathy  at some point there was also a question of Normal pressure hydrocephalous - neuro to address  ? seizure -   neuro to address MRI findings   team deciding on ? anticoagulation for nonocclusive thrombosis of the right distal transverse sinus, sigmoid   sinus, and internal jugular vein      #elevated LFTs  last admission there was concern for a possible passed biliary stone  u/s without acute choley    # Myasthenia Gravis  not on treatment for years   would avoid certain abs- fluoroquinolones , aminoglycosides and macrolides      Yeni Gabriel M.D. ,   please reach via teams   If no answer, or after 5PM/ weekends,  then please call  [phone]    Assessment and plan discussed with the primary team .    I

## 2024-06-04 NOTE — PROGRESS NOTE ADULT - SUBJECTIVE AND OBJECTIVE BOX
Patient is a 89y old  Female who presents with a chief complaint of Seizure activity (04 Jun 2024 16:28)      INTERVAL HPI/OVERNIGHT EVENTS: Medically stable. Started on 24 hours video EEG monitoring. Stable. Still poor appetite.  She pulled  out NG tube last night. Encourage PO intake. Complete course of IV Zosyn.     Pain Location & Control:     MEDICATIONS  (STANDING):  chlorhexidine 2% Cloths 1 Application(s) Topical daily  dextrose 50% Injectable 25 Gram(s) IV Push once  dextrose 50% Injectable 12.5 Gram(s) IV Push once  dextrose 50% Injectable 25 Gram(s) IV Push once  dextrose Oral Gel 15 Gram(s) Oral once  donepezil 10 milliGRAM(s) Oral at bedtime  glucagon  Injectable 1 milliGRAM(s) IntraMuscular once  meropenem  IVPB 1000 milliGRAM(s) IV Intermittent every 12 hours  multivitamin 1 Tablet(s) Oral daily  polyethylene glycol 3350 17 Gram(s) Oral daily  senna 2 Tablet(s) Oral at bedtime    MEDICATIONS  (PRN):      Allergies    sulfamethoxazole (Vomiting; Nausea)    Intolerances        REVIEW OF SYSTEMS:  CONSTITUTIONAL: No fever, weight loss, or fatigue  EYES: No eye pain, visual disturbances, or discharge  ENMT:  No difficulty hearing, tinnitus, vertigo; No sinus or throat pain  NECK: No pain or stiffness  BREASTS: No pain, masses, or nipple discharge  RESPIRATORY: No cough, wheezing, chills or hemoptysis; No shortness of breath  CARDIOVASCULAR: No chest pain, palpitations, dizziness, or leg swelling  GASTROINTESTINAL: No abdominal or epigastric pain. No nausea, vomiting, or hematemesis; No diarrhea or constipation. No melena or hematochezia.  GENITOURINARY: No dysuria, frequency, hematuria, or incontinence  NEUROLOGICAL: No headaches, memory loss, loss of strength, numbness, or tremors  SKIN: No itching, burning, rashes, or lesions   LYMPH NODES: No enlarged glands  ENDOCRINE: No heat or cold intolerance; No hair loss; No polydipsia or polyuria  MUSCULOSKELETAL: No back pain  PSYCHIATRIC: No depression, anxiety, mood swings, or difficulty sleeping  HEME/LYMPH: No easy bruising, or bleeding gums  ALLERGY AND IMMUNOLOGIC: No hives or eczema    Vital Signs Last 24 Hrs  T(C): 36.5 (04 Jun 2024 11:17), Max: 36.5 (04 Jun 2024 11:17)  T(F): 97.7 (04 Jun 2024 11:17), Max: 97.7 (04 Jun 2024 11:17)  HR: 55 (04 Jun 2024 11:17) (55 - 60)  BP: 113/66 (04 Jun 2024 11:17) (100/64 - 120/73)  BP(mean): --  RR: 18 (04 Jun 2024 11:17) (18 - 18)  SpO2: 95% (04 Jun 2024 11:17) (95% - 97%)    Parameters below as of 04 Jun 2024 11:17  Patient On (Oxygen Delivery Method): room air        PHYSICAL EXAM:  GENERAL: NAD, well-groomed, well-developed  HEAD:  Atraumatic, Normocephalic  EYES: EOMI, PERRLA, conjunctiva and sclera clear  ENMT: No tonsillar erythema, exudates, or enlargement; Moist mucous membranes, Good dentition, No lesions  NECK: Supple, No JVD, Normal thyroid  NERVOUS SYSTEM:  Alert & Oriented X 1 awake and responsive   CHEST/LUNG: Clear to auscultation bilaterally; No rales, rhonchi, wheezing, or rubs  HEART: Regular rate and rhythm; No murmurs, rubs, or gallops  ABDOMEN: Soft, Nontender, Nondistended; Bowel sounds present  EXTREMITIES:  2+ Peripheral Pulses, No clubbing or cyanosis  LYMPH: No lymphadenopathy noted  SKIN: No rashes or lesions      LABS:      Ca    9.0        03 Jun 2024 07:00        Urinalysis Basic - ( 03 Jun 2024 07:00 )    Color: x / Appearance: x / SG: x / pH: x  Gluc: 111 mg/dL / Ketone: x  / Bili: x / Urobili: x   Blood: x / Protein: x / Nitrite: x   Leuk Esterase: x / RBC: x / WBC x   Sq Epi: x / Non Sq Epi: x / Bacteria: x      CAPILLARY BLOOD GLUCOSE      POCT Blood Glucose.: 93 mg/dL (04 Jun 2024 11:28)  POCT Blood Glucose.: 95 mg/dL (04 Jun 2024 07:13)  POCT Blood Glucose.: 107 mg/dL (04 Jun 2024 00:22)        Cultures      RADIOLOGY & ADDITIONAL TESTS:    Imaging Personally Reviewed:  [X ] YES  [ ] NO    Consultant(s) Notes Reviewed:  [ X] YES  [ ] NO    Care Discussed with Consultants/Other Providers [ X] YES  [ ] NO

## 2024-06-04 NOTE — PROGRESS NOTE ADULT - ASSESSMENT
88yo F h/o Myasthenia Gravis (not on medication for past 9 years), Dementia and Chronic low back pain, recently hospitalized for UTI and now p/w sepsis related to recurrent UTI. GI Consulted for increased LFTs.    1. Increased LFTs, improved     2. Recurrent UTI w/AMS  management per ID    3. Myasthenia Gravis  per primary     4. Poor PO intake  palliative care appropriate, trial of tube feeding reasonable to see if mental status improves, but do not favor PEG placement, she is at high risk of surgical complications    5. Constipation   cont miralax and senna

## 2024-06-04 NOTE — PROGRESS NOTE ADULT - ASSESSMENT
89 YO F with PMHx of Myasthenia Gravis (not on medication for past 9 years) and MHx of dementia and chronic low back pain. Patient recently hospitalized after being found with slurred speech and very confused. Most likely due to UTI which not responded to Cipro. Patient transferred to hospital due to seizure activity and was found with UTI. She was given 1 mg IM.     anorexia/FTT- pulled out TF  Bacteremia/sepsis- resolved. Blood cx negative. Continue IV  abx. F/u ID.   AMS- most likely due to metabolic and toxic  encephalopathy. Not improved with treating bacteremia. Neurologist recommended 24 hour  EEG, started today completed by tomorrow.   UTI - as  above   Liver enzyme elevation- improving and trending down. RUQ ultrasound negative. Hepatitis panel negative.  weight loss- due to recent hospitalization and severe sickness on hospital. .Appetite improving Encourage PO intake and supplement diet. Monitor weight. F/u dietitian. Family is aware and refused peg tube.  Vitamin D deficiency- Vitamin D 50,000 weekly. Monitor Vitamin D.  Hypoglycemia- resolved. Appetite improved.  cholelithiasis - without cholecystitis neither pancreatitis. Normal amylase and lipase.  hypotension- continue IV steroid and IV fluid as needed  thrombocytopenia- PLT down, monitor PLT, f/u with Hematology.   NPH- - neurology does not believe acute mental status changes, could be due to NPH. F/u neurology as outpatient for NPH workup.  DVT ppx- Lovenox discontinued due to thrombocytopenia  leg arthritis- Acetaminophen PRN.  b/l cataract- F/u ophthalmology  vitamin D deficiency- continue Vitamin D supplement.  constipation- senna at bedtime.  LBP 2/2 severe spinal stenosis with out cord pressure - no surgical intervention recommended neither accepted by family, continue PT OT as needed.  Dementia - Stable. Continue Aricept .  Dysphagia - Mechanical soft diet, aspiration precautions.  Myasthenia Gravis - stable , off of medication. Monitor CBC and BMP every 2 months  VT ppx - on SCD, no blood thinner due to low PLT.

## 2024-06-04 NOTE — PROGRESS NOTE ADULT - SUBJECTIVE AND OBJECTIVE BOX
Patient is a 89y old  Female who presents with a chief complaint of Seizure activity (04 Jun 2024 14:53)    Being followed by ID for        Interval history:  No other acute events      ROS:  No cough,SOB,CP  No N/V/D  No abd pain  No urinary complaints  No HA  No joint or limb pain  No other complaints    PAST MEDICAL & SURGICAL HISTORY:  Myasthenia gravis      Dementia      Myasthenia gravis      No significant past surgical history      No significant past surgical history        Allergies    sulfamethoxazole (Vomiting; Nausea)    Intolerances      Antimicrobials:    meropenem  IVPB 1000 milliGRAM(s) IV Intermittent every 12 hours    MEDICATIONS  (STANDING):  chlorhexidine 2% Cloths 1 Application(s) Topical daily  dextrose 50% Injectable 25 Gram(s) IV Push once  dextrose 50% Injectable 12.5 Gram(s) IV Push once  dextrose 50% Injectable 25 Gram(s) IV Push once  dextrose Oral Gel 15 Gram(s) Oral once  donepezil 10 milliGRAM(s) Oral at bedtime  glucagon  Injectable 1 milliGRAM(s) IntraMuscular once  meropenem  IVPB 1000 milliGRAM(s) IV Intermittent every 12 hours  multivitamin 1 Tablet(s) Oral daily  polyethylene glycol 3350 17 Gram(s) Oral daily  senna 2 Tablet(s) Oral at bedtime  thiamine Injectable 200 milliGRAM(s) IV Push once      Vital Signs Last 24 Hrs  T(C): 36.5 (06-04-24 @ 11:17), Max: 36.8 (06-03-24 @ 20:57)  T(F): 97.7 (06-04-24 @ 11:17), Max: 98.2 (06-03-24 @ 20:57)  HR: 55 (06-04-24 @ 11:17) (55 - 64)  BP: 113/66 (06-04-24 @ 11:17) (100/64 - 123/74)  BP(mean): --  RR: 18 (06-04-24 @ 11:17) (18 - 18)  SpO2: 95% (06-04-24 @ 11:17) (95% - 97%)    Physical Exam:    Constitutional well preserved,comfortable,pleasant    HEENT PERRLA EOMI,No pallor or icterus    No oral exudate or erythema    Neck supple no JVD or LN    Chest Good AE,CTA    CVS RRR S1 S2 WNl No murmur or rub or gallop    Abd soft BS normal No tenderness no masses    Ext No cyanosis clubbing or edema    IV site no erythema tenderness or discharge    Joints no swelling or LOM    CNS AAO X 3 no focal    Lab Data:                          9.9    9.91  )-----------( 195      ( 03 Jun 2024 07:00 )             28.7       06-03    142  |  104  |  21  ----------------------------<  111<H>  4.1   |  26  |  0.71    Ca    9.0      03 Jun 2024 07:00  Phos  2.8     06-02  Mg     1.7     06-02              WBC Count: 9.91 (06-03-24 @ 07:00)  WBC Count: 10.18 (06-02-24 @ 06:22)  WBC Count: 8.71 (05-31-24 @ 06:53)  WBC Count: 5.75 (05-30-24 @ 07:02)  WBC Count: 7.42 (05-29-24 @ 06:48)              Patient is a 89y old  Female who presents with a chief complaint of Seizure activity (04 Jun 2024 14:53)    Being followed by ID for        Interval history:  pt much more alert today  for possible d/c tomorrow   No other acute events        PAST MEDICAL & SURGICAL HISTORY:  Myasthenia gravis      Dementia      Myasthenia gravis      No significant past surgical history      No significant past surgical history        Allergies    sulfamethoxazole (Vomiting; Nausea)    Intolerances      Antimicrobials:    meropenem  IVPB 1000 milliGRAM(s) IV Intermittent every 12 hours    MEDICATIONS  (STANDING):  chlorhexidine 2% Cloths 1 Application(s) Topical daily  dextrose 50% Injectable 25 Gram(s) IV Push once  dextrose 50% Injectable 12.5 Gram(s) IV Push once  dextrose 50% Injectable 25 Gram(s) IV Push once  dextrose Oral Gel 15 Gram(s) Oral once  donepezil 10 milliGRAM(s) Oral at bedtime  glucagon  Injectable 1 milliGRAM(s) IntraMuscular once  meropenem  IVPB 1000 milliGRAM(s) IV Intermittent every 12 hours  multivitamin 1 Tablet(s) Oral daily  polyethylene glycol 3350 17 Gram(s) Oral daily  senna 2 Tablet(s) Oral at bedtime  thiamine Injectable 200 milliGRAM(s) IV Push once      Vital Signs Last 24 Hrs  T(C): 36.5 (06-04-24 @ 11:17), Max: 36.8 (06-03-24 @ 20:57)  T(F): 97.7 (06-04-24 @ 11:17), Max: 98.2 (06-03-24 @ 20:57)  HR: 55 (06-04-24 @ 11:17) (55 - 64)  BP: 113/66 (06-04-24 @ 11:17) (100/64 - 123/74)  BP(mean): --  RR: 18 (06-04-24 @ 11:17) (18 - 18)  SpO2: 95% (06-04-24 @ 11:17) (95% - 97%)    Physical Exam:    Constitutional curled in bed    HEENT PERRLA EOMI,No pallor or icterus    No oral exudate or erythema    Neck supple no JVD or LN    Chest Good AE,CTA    CVS  S1 S2     Abd soft BS normal No tenderness    Ext No cyanosis clubbing or edema    IV site no erythema tenderness or discharge    Joints no swelling or LOM        Lab Data:                          9.9    9.91  )-----------( 195      ( 03 Jun 2024 07:00 )             28.7       06-03    142  |  104  |  21  ----------------------------<  111<H>  4.1   |  26  |  0.71    Ca    9.0      03 Jun 2024 07:00  Phos  2.8     06-02  Mg     1.7     06-02              WBC Count: 9.91 (06-03-24 @ 07:00)  WBC Count: 10.18 (06-02-24 @ 06:22)  WBC Count: 8.71 (05-31-24 @ 06:53)  WBC Count: 5.75 (05-30-24 @ 07:02)  WBC Count: 7.42 (05-29-24 @ 06:48)

## 2024-06-04 NOTE — CHART NOTE - NSCHARTNOTEFT_GEN_A_CORE
Pt is a 80yoF with pmhx of Myasthenia Gravis (not on meds for past 9 years), dementia, presenting w/ sepsis with c/f seizure like activity i/s/o UTI. Neuro following for AMS; pt on EEG. 6/3 note "Pt w/o focal neurological deficits and currently at baseline which is A&Ox0-1, bedbound, and non-verbal. These findings are likely incidental and asymptomatic. Pt can be started on Eliquis and have CT Venogram repeated in 2-3 months w/ close follow-up with stroke neurology service as long as these recommendations align with patient's goals of care." For thrombus, no neurosurgical intervention. Palliative GOC> FULL CODE. Course c/b episodes of hypothermia. ID following for abx (last day 6/5).    DYSPHAGIA HX: pt w/ hx of dysphagia, see prior reports for details.  This admission 5/25 initial bedside swallow exam completed; pt declined all p.o. and official oral diet unable to be made.   5/27 pt began to exhibit poor mentation   5/28 pt with multiple days of deep sleeping with slightly improved alertness today per flowsheet. Re-evaluation of swallow completed with noted new dysarthria, edematous tongue, reduced secretion management, and overt s/sx of aspiration/penetration with conservative trials. Daughters at bedside reporting presentation similar to all other episodes of UTI. Daughters declining SLP recommendation of NPO, with non-oral nutrition/hydration/medications and opting to feed pt conservative oral diet when awake.     5/30 NGT placed with daughters consent  6/1 NGT and mittens removed by pt ; replaced  6/4 NGT and mittens removed by pt; not replaced    TODAY, re-evaluation of swallow completed. RN Cat reporting pt fully awake today and tolerating puree and moderately thick liquids w/o overt s/sx of aspiration/penetration throughout the day. Pt noted w/ head flexion with significantly improved mentation, smiling, joking, eyes open and awake, w/ improved secretion management w/ only very slight drool; tongue no longer edematous, speech intelligibility at same level as initial exam. Both daughter present at bedside.   Pt administered puree and thin liquids via tsp and self-fed cup (w/ some hand-over-hand assist). Pt noted with mildly prolonged oral transport, mildly delayed initiation of swallow, with no overt s/sx of aspiration/penetration on multiple trials. Given pt history of dysphagia and risk factors, objective swallow testing was recommended to daughters, but daughters adamantly declined.    IMPRESSION: Pt p/w improving oropharyngeal swallow profile, mentation, secretion management, and speech intelligibility on this date as compared to most recent exam. Noted no overt s/sx of aspiration with puree and thin liquids and diet upgrade recommended. Objective swallow testing recommended, but daughters declined.    D/W RN CAT; CORRIE REYNOLDS  RECOMMENDATIONS  > Puree and thin liquids (tsp or self-held single cup sip) with 100% assist. ONLY FEED WITH FULLY AWAKE AND ALERT AND PARTICIPATIVE IN MEAL  > SLP following for dysphagia  >Maintain strict aspiration precautions  >Maintain oral hygiene  >Monitor for s/sx of aspiration/laryngeal penetration. If noted, d/c P.O. intake, provide non-oral nutrition/hydration/meds and consult this service [phone]  >D/C rehab    SPEECH PATHOLOGY   Mac Díaz CCC-SLP *Teams preferred* ([phone])

## 2024-06-04 NOTE — PROVIDER CONTACT NOTE (OTHER) - ASSESSMENT
Pt pulled most of her NGT out with b/l unsecured mittens in place. Pt A/Ox1, alert compared to previous days.

## 2024-06-05 ENCOUNTER — TRANSCRIPTION ENCOUNTER (OUTPATIENT)
Age: 89
End: 2024-06-05

## 2024-06-05 LAB
GLUCOSE BLDC GLUCOMTR-MCNC: 75 MG/DL — SIGNIFICANT CHANGE UP (ref 70–99)
GLUCOSE BLDC GLUCOMTR-MCNC: 81 MG/DL — SIGNIFICANT CHANGE UP (ref 70–99)
GLUCOSE BLDC GLUCOMTR-MCNC: 83 MG/DL — SIGNIFICANT CHANGE UP (ref 70–99)
GLUCOSE BLDC GLUCOMTR-MCNC: 84 MG/DL — SIGNIFICANT CHANGE UP (ref 70–99)
GLUCOSE BLDC GLUCOMTR-MCNC: 89 MG/DL — SIGNIFICANT CHANGE UP (ref 70–99)

## 2024-06-05 PROCEDURE — 99232 SBSQ HOSP IP/OBS MODERATE 35: CPT

## 2024-06-05 PROCEDURE — 95720 EEG PHY/QHP EA INCR W/VEEG: CPT

## 2024-06-05 RX ADMIN — Medication 1 TABLET(S): at 11:09

## 2024-06-05 RX ADMIN — POLYETHYLENE GLYCOL 3350 17 GRAM(S): 17 POWDER, FOR SOLUTION ORAL at 11:09

## 2024-06-05 RX ADMIN — CHLORHEXIDINE GLUCONATE 1 APPLICATION(S): 213 SOLUTION TOPICAL at 11:09

## 2024-06-05 RX ADMIN — MEROPENEM 100 MILLIGRAM(S): 1 INJECTION INTRAVENOUS at 05:21

## 2024-06-05 NOTE — PROGRESS NOTE ADULT - ASSESSMENT
88 YO F with PMHx of Myasthenia Gravis (not on medication for past 9 years) and MHx of dementia and chronic low back pain. Patient recently hospitalized after being found with slurred speech and very confused. Most likely due to UTI which not responded to Cipro. Patient transferred to hospital due to seizure activity and was found with UTI. She was given 1 mg IM.     Ri- neurosurgical team recommended full anticoagulation but daughter denied due to risk of bleeding in the future  with any falls. Started her on Aspirin daily.   anorexia/FTT- pulled out TF. Off of TF. Continue encourage PO intake and supplement. F/u dietitian.  Bacteremia/sepsis- resolved. Blood cx negative. Completed Meropenem   AMS- most likely due to metabolic and toxic  encephalopathy. Not improved with treating bacteremia. Neurologist recommended 24 hour  EEG, started today completed by tomorrow.   UTI - completed Meropenem   Liver enzyme elevation- improving and trending down. RUQ ultrasound negative. Hepatitis panel negative.  weight loss- due to recent hospitalization and severe sickness on hospital. .Appetite improving Encourage PO intake and supplement diet. Monitor weight. F/u dietitian. Family is aware and refused peg tube.  Vitamin D deficiency- Vitamin D 50,000 weekly. Monitor Vitamin D.  Hypoglycemia- resolved. Appetite improved.  cholelithiasis - without cholecystitis neither pancreatitis. Normal amylase and lipase.  hypotension- continue IV steroid and IV fluid as needed  thrombocytopenia- PLT down, monitor PLT, f/u with Hematology.   NPH- - neurology does not believe acute mental status changes, could be due to NPH. F/u neurology as outpatient for NPH workup.  DVT ppx- Lovenox discontinued due to thrombocytopenia  leg arthritis- Acetaminophen PRN.  b/l cataract- F/u ophthalmology  vitamin D deficiency- continue Vitamin D supplement.  constipation- senna at bedtime.  LBP 2/2 severe spinal stenosis with out cord pressure - no surgical intervention recommended neither accepted by family, continue PT OT as needed.  Dementia - Stable. Continue Aricept .  Dysphagia - Mechanical soft diet, aspiration precautions.  Myasthenia Gravis - stable , off of medication. Monitor CBC and BMP every 2 months  VT ppx - on SCD, no blood thinner due to low PLT.

## 2024-06-05 NOTE — DISCHARGE NOTE PROVIDER - CARE PROVIDER_API CALL
Drake Woo  Internal Medicine  [address] [place] [address]  Phone: [phone]  Fax: [phone]  Follow Up Time:     Ronald Zaman  Hematology/Oncology  [address] [place] [address]  Phone: [phone]  Fax: [phone]  Follow Up Time:     Jj Contreras  Gastroenterology  [address] [place] [id number]  Phone: [phone]  Fax: [phone]  Follow Up Time:

## 2024-06-05 NOTE — PROGRESS NOTE ADULT - SUBJECTIVE AND OBJECTIVE BOX
Patient is a 89y old  Female who presents with a chief complaint of Seizure activity (05 Jun 2024 14:21)      INTERVAL HPI/OVERNIGHT EVENTS:  Medically clear. Completed course of Meropenem. Off of NG tube and TF. Continue IV hydration as needed. Medically stable. EEG negative for seizure. Patient found with  right non-occlusive thrombosis of right transfers/sigmoid/ IJV recommended by neurosurgical team to start full anticoagulation I spoke with Dr. Zaman hematology  and recommended 5 mg Eliquis 5 mg BID.  She refused after I explained risks and benefits especially risk of bleeding with any fall. Patient is high risk for falls. She rather not  to start her on Eliquis  on only start her on Aspirin.     Pain Location & Control:     MEDICATIONS  (STANDING):  chlorhexidine 2% Cloths 1 Application(s) Topical daily  dextrose 50% Injectable 12.5 Gram(s) IV Push once  dextrose 50% Injectable 25 Gram(s) IV Push once  dextrose 50% Injectable 25 Gram(s) IV Push once  dextrose Oral Gel 15 Gram(s) Oral once  donepezil 10 milliGRAM(s) Oral at bedtime  glucagon  Injectable 1 milliGRAM(s) IntraMuscular once  multivitamin 1 Tablet(s) Oral daily  polyethylene glycol 3350 17 Gram(s) Oral daily  senna 2 Tablet(s) Oral at bedtime    MEDICATIONS  (PRN):      Allergies    sulfamethoxazole (Vomiting; Nausea)    Intolerances        REVIEW OF SYSTEMS:  UTO dementia    Vital Signs Last 24 Hrs  T(C): 37.2 (05 Jun 2024 16:00), Max: 37.2 (05 Jun 2024 16:00)  T(F): 99 (05 Jun 2024 16:00), Max: 99 (05 Jun 2024 16:00)  HR: 77 (05 Jun 2024 16:30) (51 - 77)  BP: 93/56 (05 Jun 2024 16:30) (93/56 - 132/77)  BP(mean): --  RR: 18 (05 Jun 2024 14:22) (17 - 18)  SpO2: 97% (05 Jun 2024 14:22) (96% - 98%)    Parameters below as of 05 Jun 2024 14:22  Patient On (Oxygen Delivery Method): room air        PHYSICAL EXAM:  GENERAL: NAD, well-groomed, well-developed  HEAD:  Atraumatic, Normocephalic  EYES: EOMI, PERRLA, conjunctiva and sclera clear  ENMT: No tonsillar erythema, exudates, or enlargement; Moist mucous membranes, Good dentition, No lesions  NECK: Supple, No JVD, Normal thyroid  NERVOUS SYSTEM:  Alert & Oriented X 1 responsive and confused   CHEST/LUNG: Clear to auscultation bilaterally; No rales, rhonchi, wheezing, or rubs  HEART: Regular rate and rhythm; No murmurs, rubs, or gallops  ABDOMEN: Soft, Nontender, Nondistended; Bowel sounds present  EXTREMITIES:  2+ Peripheral Pulses, No clubbing or cyanosis  LYMPH: No lymphadenopathy noted  SKIN: No rashes or lesions      LABS:              CAPILLARY BLOOD GLUCOSE      POCT Blood Glucose.: 89 mg/dL (05 Jun 2024 11:42)  POCT Blood Glucose.: 83 mg/dL (05 Jun 2024 06:29)  POCT Blood Glucose.: 84 mg/dL (05 Jun 2024 00:23)        Cultures      RADIOLOGY & ADDITIONAL TESTS:    Imaging Personally Reviewed:  [ X] YES  [ ] NO    Consultant(s) Notes Reviewed:  [ X] YES  [ ] NO    Care Discussed with Consultants/Other Providers [X ] YES  [ ] NO

## 2024-06-05 NOTE — DISCHARGE NOTE PROVIDER - PROVIDER TOKENS
PROVIDER:[TOKEN:[[id number]]],PROVIDER:[TOKEN:[[id number]]],PROVIDER:[TOKEN:[[id number]]]

## 2024-06-05 NOTE — PROGRESS NOTE ADULT - ASSESSMENT
88yo F h/o Myasthenia Gravis (not on medication for past 9 years), Dementia and Chronic low back pain, recently hospitalized for UTI and now p/w sepsis related to recurrent UTI. GI Consulted for increased LFTs.    1. Recurrent UTI w/AMS  management per ID    2. Dysphagia, poor PO intake   S&S recs appreciated, cleared for pureed diet w/ assistance   pt would be a poor candidate for PEG     3. Constipation   cont miralax and senna    4. Myasthenia gravis

## 2024-06-05 NOTE — DISCHARGE NOTE PROVIDER - NSDCCPCAREPLAN_GEN_ALL_CORE_FT
PRINCIPAL DISCHARGE DIAGNOSIS  Diagnosis: UTI (urinary tract infection)  Assessment and Plan of Treatment: with sepsis, s/p iv ABs, transfer to NH      SECONDARY DISCHARGE DIAGNOSES  Diagnosis: Myasthenia gravis  Assessment and Plan of Treatment: stable, off meds    Diagnosis: Thrombocytopenia, unspecified  Assessment and Plan of Treatment: stable, f/up hm/onc    Diagnosis: Other encephalopathy  Assessment and Plan of Treatment: resolved    Diagnosis: Sepsis  Assessment and Plan of Treatment: 2/2 UTI, resolved    Diagnosis: Hypoglycemia  Assessment and Plan of Treatment: resolved    Diagnosis: Dysphagia  Assessment and Plan of Treatment: stable, on pureed diet     PRINCIPAL DISCHARGE DIAGNOSIS  Diagnosis: UTI (urinary tract infection)  Assessment and Plan of Treatment: Completed antibiotics  HOME CARE INSTRUCTIONS  Drink enough water and fluids to keep your urine clear or pale yellow.  Avoid caffeine, tea, and carbonated beverages. They tend to irritate your bladder.  Empty your bladder often. Avoid holding urine for long periods of time.  Empty your bladder before and after sexual intercourse.  After a bowel movement, women should cleanse from front to back. Use each tissue only once.  SEEK MEDICAL CARE IF:  You have back pain.  You develop a fever.  Your symptoms do not begin to resolve within 3 days.  SEEK IMMEDIATE MEDICAL CARE IF:  You have severe back pain or lower abdominal pain.  You develop chills.  You have nausea or vomiting.  You have continued burning or discomfort with urination.        SECONDARY DISCHARGE DIAGNOSES  Diagnosis: Other encephalopathy  Assessment and Plan of Treatment: resolved  EEG with no seizure    Diagnosis: Sepsis  Assessment and Plan of Treatment: 2/2 UTI, resolved    Diagnosis: Thrombocytopenia, unspecified  Assessment and Plan of Treatment: Stable  Follow up with hematology    Diagnosis: Hypoglycemia  Assessment and Plan of Treatment: resolved  Strongly encourage oral intake    Diagnosis: Myasthenia gravis  Assessment and Plan of Treatment: stable, off meds    Diagnosis: Dysphagia  Assessment and Plan of Treatment: stable, on pureed diet

## 2024-06-05 NOTE — PROGRESS NOTE ADULT - SUBJECTIVE AND OBJECTIVE BOX
Patient is a 89y old  Female who presents with a chief complaint of Seizure activity (05 Jun 2024 10:32)    Being followed by ID for        Interval history:  No other acute events      ROS:  No cough,SOB,CP  No N/V/D  No abd pain  No urinary complaints  No HA  No joint or limb pain  No other complaints    PAST MEDICAL & SURGICAL HISTORY:  Myasthenia gravis      Dementia      Myasthenia gravis      No significant past surgical history      No significant past surgical history        Allergies    sulfamethoxazole (Vomiting; Nausea)    Intolerances      Antimicrobials:    meropenem  IVPB 1000 milliGRAM(s) IV Intermittent every 12 hours    MEDICATIONS  (STANDING):  chlorhexidine 2% Cloths 1 Application(s) Topical daily  dextrose 50% Injectable 12.5 Gram(s) IV Push once  dextrose 50% Injectable 25 Gram(s) IV Push once  dextrose 50% Injectable 25 Gram(s) IV Push once  dextrose Oral Gel 15 Gram(s) Oral once  donepezil 10 milliGRAM(s) Oral at bedtime  glucagon  Injectable 1 milliGRAM(s) IntraMuscular once  meropenem  IVPB 1000 milliGRAM(s) IV Intermittent every 12 hours  multivitamin 1 Tablet(s) Oral daily  polyethylene glycol 3350 17 Gram(s) Oral daily  senna 2 Tablet(s) Oral at bedtime      Vital Signs Last 24 Hrs  T(C): 35.3 (06-05-24 @ 13:02), Max: 36.3 (06-05-24 @ 00:11)  T(F): 95.5 (06-05-24 @ 13:02), Max: 97.4 (06-05-24 @ 00:11)  HR: 51 (06-05-24 @ 04:53) (51 - 60)  BP: 130/56 (06-05-24 @ 11:30) (119/62 - 132/77)  BP(mean): --  RR: 18 (06-05-24 @ 04:53) (17 - 18)  SpO2: 98% (06-05-24 @ 04:53) (96% - 98%)    Physical Exam:    Constitutional well preserved,comfortable,pleasant    HEENT PERRLA EOMI,No pallor or icterus    No oral exudate or erythema    Neck supple no JVD or LN    Chest Good AE,CTA    CVS RRR S1 S2 WNl No murmur or rub or gallop    Abd soft BS normal No tenderness no masses    Ext No cyanosis clubbing or edema    IV site no erythema tenderness or discharge    Joints no swelling or LOM    CNS AAO X 3 no focal    Lab Data:                                  WBC Count: 9.91 (06-03-24 @ 07:00)  WBC Count: 10.18 (06-02-24 @ 06:22)  WBC Count: 8.71 (05-31-24 @ 06:53)  WBC Count: 5.75 (05-30-24 @ 07:02)              Patient is a 89y old  Female who presents with a chief complaint of Seizure activity (05 Jun 2024 10:32)    Being followed by ID for bacteremia secondary UTI        Interval history:  pt sleeping  feeding tube is out   No other acute events        PAST MEDICAL & SURGICAL HISTORY:  Myasthenia gravis      Dementia      Myasthenia gravis      No significant past surgical history      No significant past surgical history        Allergies    sulfamethoxazole (Vomiting; Nausea)    Intolerances      Antimicrobials:    meropenem  IVPB 1000 milliGRAM(s) IV Intermittent every 12 hours    MEDICATIONS  (STANDING):  chlorhexidine 2% Cloths 1 Application(s) Topical daily  dextrose 50% Injectable 12.5 Gram(s) IV Push once  dextrose 50% Injectable 25 Gram(s) IV Push once  dextrose 50% Injectable 25 Gram(s) IV Push once  dextrose Oral Gel 15 Gram(s) Oral once  donepezil 10 milliGRAM(s) Oral at bedtime  glucagon  Injectable 1 milliGRAM(s) IntraMuscular once  meropenem  IVPB 1000 milliGRAM(s) IV Intermittent every 12 hours  multivitamin 1 Tablet(s) Oral daily  polyethylene glycol 3350 17 Gram(s) Oral daily  senna 2 Tablet(s) Oral at bedtime      Vital Signs Last 24 Hrs  T(C): 35.3 (06-05-24 @ 13:02), Max: 36.3 (06-05-24 @ 00:11)  T(F): 95.5 (06-05-24 @ 13:02), Max: 97.4 (06-05-24 @ 00:11)  HR: 51 (06-05-24 @ 04:53) (51 - 60)  BP: 130/56 (06-05-24 @ 11:30) (119/62 - 132/77)  BP(mean): --  RR: 18 (06-05-24 @ 04:53) (17 - 18)  SpO2: 98% (06-05-24 @ 04:53) (96% - 98%)    Physical Exam:    Constitutional well preserved,comfortable,pleasant    HEENT PERRLA EOMI,No pallor or icterus    No oral exudate or erythema    Neck supple no JVD or LN    Chest Good AE,CTA    CVS  S1 S2     Abd soft BS normal No tenderness     Ext No cyanosis clubbing or edema    IV site no erythema tenderness or discharge    curled in bed    Lab Data:    Complete Blood Count in AM (06.03.24 @ 07:00)    Nucleated RBC: 0 /100 WBCs   WBC Count: 9.91 K/uL   RBC Count: 2.98 M/uL   Hemoglobin: 9.9 g/dL   Hematocrit: 28.7 %   Mean Cell Volume: 96.3 fl   Mean Cell Hemoglobin: 33.2 pg   Mean Cell Hemoglobin Conc: 34.5 gm/dL   Red Cell Distrib Width: 15.6 %   Platelet Count - Automated: 195 K/uL      Comprehensive Metabolic Panel in AM (05.28.24 @ 06:53)    Sodium: 135 mmol/L   Potassium: 3.3 mmol/L   Chloride: 104 mmol/L   Carbon Dioxide: 20 mmol/L   Anion Gap: 11 mmol/L   Blood Urea Nitrogen: 10 mg/dL   Creatinine: 0.83 mg/dL   Glucose: 479 mg/dL   Calcium: 7.7 mg/dL   Protein Total: 4.9 g/dL   Albumin: 2.4 g/dL   Bilirubin Total: 0.6 mg/dL   Alkaline Phosphatase: 172 U/L   Aspartate Aminotransferase (AST/SGOT): 36 U/L   Alanine Aminotransferase (ALT/SGPT): 41 U/L   eGFR: 67: The estimated glomerular filtration rate (eGFR) is calculated using the  2021 CKD-EPI creatinine equation, which does not have a coefficient for  race and is validated in individuals 18 years of age and older (N Engl J  Med 2021; 385:5715-0550). Creatinine-based eGFR may be inaccurate in  various situations including but not limited to extremes of muscle mass,  altered dietary protein intake, or medications that affect renal tubular  creatinine secretion. mL/min/1.73m2            WBC Count: 9.91 (06-03-24 @ 07:00)  WBC Count: 10.18 (06-02-24 @ 06:22)  WBC Count: 8.71 (05-31-24 @ 06:53)  WBC Count: 5.75 (05-30-24 @ 07:02)

## 2024-06-05 NOTE — PROGRESS NOTE ADULT - TIME BILLING
Discussed with PA
Discussed with PA  and daughter
Discussed with daughter over the phone, hematology and PA
d/w pa about her swallowing test and resuming po diet if passes the test.
Discussed with PA
Discussed with PA
Discussed with PA about AMS.
Discussed with PA and daughter over the phone.
Discussed with PA  and daughter
Chart review, exam, counseling, coordination of care
d/w pa about her bacteremia
Symptom assessment and management, supportive counseling, coordination of care

## 2024-06-05 NOTE — PROGRESS NOTE ADULT - SUBJECTIVE AND OBJECTIVE BOX
withdrawn  expressive aphasic  family refused AC    VITAL SIGNS:    95.5 P51 /56 RR18    PHYSICAL EXAM:     GENERAL: no acute distress  HEENT: NC/AT, EOMI, neck supple, MMM  RESPIRATORY: LCTAB/L, no rhonchi, rales, or wheezing  CARDIOVASCULAR: RRR, no murmurs, gallops, rubs  ABDOMINAL: soft, non-tender, non-distended, positive bowel sounds   EXTREMITIES: no clubbing, cyanosis, or edema  NEUROLOGICAL: alert and oriented x 3, non-focal  LYMPHATIC: lymphatic: cervical, supraclavicular, axilla, inguinal  SKIN: no rashes or lesions   MUSCULOSKELETAL: no gross joint deformity              MEDICATIONS  (STANDING):  chlorhexidine 2% Cloths 1 Application(s) Topical daily  dextrose 50% Injectable 12.5 Gram(s) IV Push once  dextrose 50% Injectable 25 Gram(s) IV Push once  dextrose 50% Injectable 25 Gram(s) IV Push once  dextrose Oral Gel 15 Gram(s) Oral once  donepezil 10 milliGRAM(s) Oral at bedtime  glucagon  Injectable 1 milliGRAM(s) IntraMuscular once  meropenem  IVPB 1000 milliGRAM(s) IV Intermittent every 12 hours  multivitamin 1 Tablet(s) Oral daily  polyethylene glycol 3350 17 Gram(s) Oral daily  senna 2 Tablet(s) Oral at bedtime

## 2024-06-05 NOTE — PROGRESS NOTE ADULT - ASSESSMENT
88 YO F with PMHx of Myasthenia Gravis (not on medication for past 9 years) and MHx of dementia and chronic low back pain. Patient recently hospitalized after being found with slurred speech and very confused. Most likely due to UTI which not responded to Cipro. Patient transferred to hospital due to seizure activity and was found with UTI.     Pt was initially started on vancomycin and zosyn but switched to ertapenem when found to have an ESBL E Coli in her blood  ID is asked to evaluate     During the last admission in feb/March -PT presented from her nursing home with worsening lethargy and confusion. UA done as outpatient on 2/13 grossly positive and urine culture growing E. coli and Klebsiella, pansensitive. Found to be hypothermic in ED. Admitted for sepsis and metabolic encephalopathy in setting of UTI. Multiple RRTs called today. First RRT called for seizure-like activity. Patient loaded with Keppra and placed on EEG. Second RRT called for hypotension. Patient was given 4 L IVF with resolution of hypotension. pt changed to meropenem to cover for possible meningitis- ? Normal pressure hydrocephalous on CT- discussed with neuro- this wouldn't account for her acute change  on meropenem to cover for possible meningitis and /or to cover biliary sepsis   EEG not c/w HSV encephalitis but added  acyclovir until more definitive diagnosis available, Course significant for a distended GB but neg HIDA scan , renal insufficiency and thrombocytopenia ( at presentation) and hypernatremia     Antibiotics  Vanco 5/22  Zosyn %/22 --> 23  Ertapenem 5/23  Meropenem 5/24 -->     5/24  renal function improved    A/P  #ESBL E coli Bacteremia   5/22 +BC X2 +ESBL E coli;    5/22 Urine 10 - 49K E coli  agree with change to carbapenem  Increased meropenem 1000 mg IVSS Q 12 hours.   urinary source  day # 14 of carbapenem  family asking about suppressive abs after completing therapy and or vitamin C/ methanamine...  can d/c the IV abs  Bactrim is not an option as pt gets nauseated , no other oral options   follow post void residual  consider vitamin c and methanamine- check with urology     #Thrombocytopenia  likely from sepsis  resolved    #anemia  team to address, chronic     #AMS  pt with baseline dementia, and additionally here likely metabolic encephalopathy  at some point there was also a question of Normal pressure hydrocephalous - neuro to address  ? seizure -   neuro to address MRI findings   team deciding on ? anticoagulation for nonocclusive thrombosis of the right distal transverse sinus, sigmoid   sinus, and internal jugular vein      #elevated LFTs  last admission there was concern for a possible passed biliary stone  u/s without acute choley    # Myasthenia Gravis  not on treatment for years   would avoid certain abs- fluoroquinolones , aminoglycosides and macrolides      Yeni Gabriel M.D. ,   please reach via teams   If no answer, or after 5PM/ weekends,  then please call  [phone]    Assessment and plan discussed with the primary team .      ID will follow the patient PRN. Please recontact ID if we can be of further assistance . [phone]    I

## 2024-06-05 NOTE — DISCHARGE NOTE PROVIDER - NSDCFUSCHEDAPPT_GEN_ALL_CORE_FT
Junaid Modi  [facility] Physician [facility]  NEUROLOGY [address]  Scheduled Appointment: 07/22/2024

## 2024-06-05 NOTE — DISCHARGE NOTE PROVIDER - HOSPITAL COURSE
HPI:  89 YO F with PMHx of Myasthenia Gravis (not on medication for past 9 years) and MHx of dementia and chronic low back pain. Patient recently hospitalized after being found with slurred speech and very confused. Most likely due to UTI which not responded to Cipro. Patient transferred to hospital due to seizure activity and was found with UTI.        (22 May 2024 14:16)    Hospital Course: She was treated with multiple  iv ABs, now she is on Meropenem and last dose is today. She was seen by ID, GI, Hm/Onc, Pod, Neurol, S/S and Palliative care during her Hospital stay. She is medically stable and cleared for disc to NH by Attending after getting last dose of Meropenem      Important Medication Changes and Reason: none    Active or Pending Issues Requiring Follow-up: no     Advanced Directives:   [ x] Full code  [ ] DNR  [ ] Hospice    Discharge Diagnoses: sepsis/UTI, elevated LFTs, hypoglycemia, cholelithiasis, thrombocytopenia, dysphagia         HPI:  89 YO F with PMHx of Myasthenia Gravis (not on medication for past 9 years) and MHx of dementia and chronic low back pain. Patient recently hospitalized after being found with slurred speech and very confused. Most likely due to UTI which not responded to Cipro. Patient transferred to hospital due to seizure activity and was found with UTI.        (22 May 2024 14:16)    Hospital Course: She was treated with multiple  iv ABs, now completed Meropenem. She was seen by ID, GI, Hm/Onc, Pod, Neuro, S/S and Palliative care during her Hospital stay. She is medically stable and cleared for disc to NH by Attending after getting last dose of Meropenem. Pt seen by S&S for dysphagia, poor PO intake cleared for pureed diet w/ assistance, s/p NGT (removed by pt) pt would be a poor candidate for PEG per GI. MS wax and wanes, EEG w/o Sz activity.        Important Medication Changes and Reason: none    Active or Pending Issues Requiring Follow-up: no     Advanced Directives:   [ x] Full code  [ ] DNR  [ ] Hospice    Discharge Diagnoses: sepsis/UTI, elevated LFTs, hypoglycemia, cholelithiasis, thrombocytopenia, dysphagia         HPI:  89 YO F with PMHx of Myasthenia Gravis (not on medication for past 9 years) and MHx of dementia and chronic low back pain. Patient recently hospitalized after being found with slurred speech and very confused. Most likely due to UTI which not responded to Cipro. Patient transferred to hospital due to seizure activity and was found with UTI.        (22 May 2024 14:16)    Ri- neurosurgical team recommended full anticoagulation but daughter denied due to risk of bleeding in the future  with any falls. Started her on Aspirin daily.   anorexia/FTT- pulled out TF. Off of TF. Continue encourage PO intake and supplement. F/u dietitian.  Bacteremia/sepsis- resolved. Blood cx negative. Completed Meropenem   AMS- most likely due to metabolic and toxic  encephalopathy. Not improved with treating bacteremia. Neurologist recommended 24 hour  EEG, started today completed by tomorrow.   UTI - completed Meropenem   Liver enzyme elevation- improving and trending down. RUQ ultrasound negative. Hepatitis panel negative.  weight loss- due to recent hospitalization and severe sickness on hospital. .Appetite improving Encourage PO intake and supplement diet. Monitor weight. F/u dietitian. Family is aware and refused peg tube.  Vitamin D deficiency- Vitamin D 50,000 weekly. Monitor Vitamin D.  Hypoglycemia- resolved. Appetite improved.  cholelithiasis - without cholecystitis neither pancreatitis. Normal amylase and lipase.  hypotension- continue IV steroid and IV fluid as needed  thrombocytopenia- PLT down, monitor PLT, f/u with Hematology.   NPH- - neurology does not believe acute mental status changes, could be due to NPH. F/u neurology as outpatient for NPH workup.  DVT ppx- Lovenox discontinued due to thrombocytopenia  leg arthritis- Acetaminophen PRN.  b/l cataract- F/u ophthalmology  vitamin D deficiency- continue Vitamin D supplement.  constipation- senna at bedtime.  LBP 2/2 severe spinal stenosis with out cord pressure - no surgical intervention recommended neither accepted by family, continue PT OT as needed.  Dementia - Stable. Continue Aricept .  Dysphagia - Mechanical soft diet, aspiration precautions.  Myasthenia Gravis - stable , off of medication. Monitor CBC and BMP every 2 months  VT ppx - on SCD, no blood thinner due to low PLT.     Important Medication Changes and Reason: none    Active or Pending Issues Requiring Follow-up: no     Advanced Directives:   [ x] Full code  [ ] DNR  [ ] Hospice    Discharge Diagnoses: sepsis/UTI, elevated LFTs, hypoglycemia, cholelithiasis, thrombocytopenia, dysphagia

## 2024-06-05 NOTE — DISCHARGE NOTE PROVIDER - NSDCMRMEDTOKEN_GEN_ALL_CORE_FT
Aricept 10 mg oral tablet: 1 tab(s) orally once a day (at bedtime)  Artificial Tears ophthalmic solution: 1 drop(s) in each eye 2 times a day as needed for  dry eyes  Multiple Vitamins oral tablet: 1 tab(s) orally once a day  PreserVision AREDS 2 oral capsule: 1 cap(s) orally once a day  senna oral tablet: 2 tab(s) orally once a day (at bedtime)  Vantin 100 mg oral tablet: 1 tab(s) orally 2 times a day for 7 days   Aricept 10 mg oral tablet: 1 tab(s) orally once a day (at bedtime)  Artificial Tears ophthalmic solution: 1 drop(s) in each eye 2 times a day as needed for  dry eyes  aspirin 81 mg oral tablet, chewable: 1 tab(s) orally once a day  Multiple Vitamins oral tablet: 1 tab(s) orally once a day  polyethylene glycol 3350 oral powder for reconstitution: 17 gram(s) orally once a day  PreserVision AREDS 2 oral capsule: 1 cap(s) orally once a day  senna oral tablet: 2 tab(s) orally once a day (at bedtime)

## 2024-06-05 NOTE — EEG REPORT - NS EEG TEXT BOX
REPORT OF CONTINUOUS VIDEO EEG      [facility]: [address], [place] [address], Phone: [phone]  [facility]: [id number], [place] [address], Phone: [phone]  [facility]: [address] [place] [address], Phone: [phone]    Patient Name: Slime Da Silva    Age: 89 year, : 1935  Farmer: [facility] [address]  EEG #: [id number]    Study Date: 2024   Start Time: 11:00:52 AM      End Date: 2024         End Time: 08:00:03 AM     Study Duration: 20 h 55 m    Study Information:    EEG Recording Technique:  The patient underwent continuous Video-EEG monitoring, using Telemetry System hardware on the XLTek Digital System. EEG and video data were stored on a computer hard drive with important events saved in digital archive files. The material was reviewed by a physician (electroencephalographer / epileptologist) on a daily basis. Gilson and seizure detection algorithms were utilized and reviewed. An EEG Technician attended to the patient, and was available throughout daytime work hours.  The epilepsy center neurologist was available in person or on call 24-hours per day.    EEG Placement and Labeling of Electrodes:  The EEG was performed utilizing 20 channel referential EEG connections (coronal over temporal over parasagittal montage) using all standard 10-20 electrode placements with EKG, with additional electrodes placed in the inferior temporal region using the modified 10-10 montage electrode placements for elective admissions, or if deemed necessary. Recording was at a sampling rate of 256 samples per second per channel. Time synchronized digital video recording was done simultaneously with EEG recording. A low light infrared camera was used for low light recording.     History: -  89 year old Female is here to rule out seizures    Medication  Aricept      Interpretation:    [[[Abbreviation Key:  PDR=alpha rhythm/posterior dominant rhythm. A-P=anterior posterior.  Amplitude: ‘very low’:<20; ‘low’:20-49; ‘medium’:; ‘high’:>150uV.  Persistence for periodic/rhythmic patterns (% of epoch) ‘rare’:<1%; ‘occasional’:1-10%; ‘frequent’:10-50%; ‘abundant’:50-90%; ‘continuous’:>90%.  Persistence for sporadic discharges: ‘rare’:<1/hr; ‘occasional’:1/min-1/hr; ‘frequent’:>1/min; ‘abundant’:>1/10 sec.  RPP=rhythmic and periodic patterns; GRDA=generalized rhythmic delta activity; FIRDA=frontal intermittent GRDA; LRDA=lateralized rhythmic delta activity; TIRDA=temporal intermittent rhythmic delta activity;  LPD=PLED=lateralized periodic discharges; GPD=generalized periodic discharges; BIPDs =bilateral independent periodic discharges; Mf=multifocal; SIRPDs=stimulus induced rhythmic, periodic, or ictal appearing discharges; BIRDs=brief potentially ictal rhythmic discharges >4 Hz, lasting .5-10s; PFA (paroxysmal bursts >13 Hz or =8 Hz <10s).  Modifiers: +F=with fast component; +S=with spike component; +R=with rhythmic component.  S-B=burst suppression pattern.  Max=maximal. N1-drowsy; N2-stage II sleep; N3-slow wave sleep. SSS/BETS=small sharp spikes/benign epileptiform transients of sleep. HV=hyperventilation; PS=photic stimulation]]]      Daily EEG Visual Analysis    FINDINGS:      Background:  Symmetry: symmetric  Continuous: continuous  PDR: symmetric, poorly-modulated 6-7 Hz activity, with amplitude to 40 uV, that attenuated to eye opening.  Low amplitude frontal beta noted in wakefulness.  Reactivity: present  Voltage: normal, [defined typically between 20-150uV]  Anterior Posterior Gradient: absent  Breach: absent    Background Slowing:  Generalized slowing: present. Background is diffusely slow consisting of polymorphic delta theta activity up to 6-7 Hz with superimposed beta activity.    Focal slowing: none was present.    State Changes:   -Drowsiness was characterized by fragmentation, attenuation, and slowing of the background activity.    -N2 sleep transients with symmetric spindles and K-complexes.      Sporadic Epileptiform Discharges:   None    Rhythmic and Periodic Patterns (RPPs):  None     Electrographic and Electroclinical seizures:  None    Other Clinical Events:  None    Activation Procedures:   -Hyperventilation was not performed.    -Photic stimulation was not performed.      Artifacts:  Intermittent myogenic and movement artifacts were noted.    ECG:  No significant abnormality      EEG Summary / Classification:  Abnormal EEG in the awake / drowsy / asleep states.  •	Background slowing including PDR < 8 Hz      EEG Impression / Clinical Correlate:  abnormal prolonged EEG study due to mild to moderate diffuse cerebral dysfunction that is not specific in etiology    No epileptic discharges recorded.  No seizures recorded.  •	    ________________________________________    SINTIA Zuniga  Attending Physician, [facility]    ------------------------------------  EEG Reading Room: [phone]  On Call Service After Hours: [phone]

## 2024-06-05 NOTE — PROGRESS NOTE ADULT - SUBJECTIVE AND OBJECTIVE BOX
Chief Complaint:  Patient is a 89y old  Female who presents with a chief complaint of Seizure activity (04 Jun 2024 20:58)      Date of service 06-05-24 @ 10:33      Interval Events:   no acute events     Hospital Medications:  chlorhexidine 2% Cloths 1 Application(s) Topical daily  dextrose 50% Injectable 12.5 Gram(s) IV Push once  dextrose 50% Injectable 25 Gram(s) IV Push once  dextrose 50% Injectable 25 Gram(s) IV Push once  dextrose Oral Gel 15 Gram(s) Oral once  donepezil 10 milliGRAM(s) Oral at bedtime  glucagon  Injectable 1 milliGRAM(s) IntraMuscular once  meropenem  IVPB 1000 milliGRAM(s) IV Intermittent every 12 hours  multivitamin 1 Tablet(s) Oral daily  polyethylene glycol 3350 17 Gram(s) Oral daily  senna 2 Tablet(s) Oral at bedtime        Review of Systems:  unable to obtain     PHYSICAL EXAM:   Vital Signs:  Vital Signs Last 24 Hrs  T(C): 36.3 (05 Jun 2024 04:53), Max: 36.5 (04 Jun 2024 11:17)  T(F): 97.3 (05 Jun 2024 04:53), Max: 97.7 (04 Jun 2024 11:17)  HR: 51 (05 Jun 2024 04:53) (51 - 60)  BP: 119/62 (05 Jun 2024 04:53) (113/66 - 132/77)  BP(mean): --  RR: 18 (05 Jun 2024 04:53) (17 - 18)  SpO2: 98% (05 Jun 2024 04:53) (95% - 98%)    Parameters below as of 05 Jun 2024 04:53  Patient On (Oxygen Delivery Method): room air      Daily     Daily       PHYSICAL EXAM:     GENERAL:  Appears stated age, well-groomed, well-nourished, no distress  HEENT:  NC/AT,  conjunctivae anicteric, clear and pink,   NECK: supple, trachea midline  CHEST:  Full & symmetric excursion, no increased effort, breath sounds clear  HEART:  Regular rhythm, no JVD  ABDOMEN:  Soft, non-tender, non-distended, normoactive bowel sounds,  no masses , no hepatosplenomegaly  EXTREMITIES:  no cyanosis,clubbing or edema  SKIN:  No rash, erythema, or, ecchymoses, no jaundice  NEURO:  Alert, non-focal, no asterixis  PSYCH: Appropriate affect, oriented to place and time  RECTAL: Deferred      LABS Personally reviewed by me:                                          9.9    9.91  )-----------( 195      ( 03 Jun 2024 07:00 )             28.7       Imaging personally reviewed by me:

## 2024-06-05 NOTE — PROGRESS NOTE ADULT - CONVERSATION DETAILS
Discussed with daughter about anticoagulation for at least 30 minutes  explained risks and benefits. Daughter refused this treatment and patient started on Aspirin daily.
Discussed with  family regarding advanced care planning  for at least 30 minutes. Reviewed MOLST form and decided to keep it as DNR/DNI. Daughter wants full treatment, no peg tube.
Spoke with Katrin via phone. She shared her sister defers conversations to her as she gets nervous regarding medical updates. Katrin shared her mom has been living at [facility], has had recent recurrent UTIs, and has a decreased appetite. She shared she had a very fruitful visit with mom yesterday where she was able to speak with mom and assist with eating. We explored if goals of care or advanced directives had ever been discussed. Katrin shared no advanced directives have ever been completed, her and her sister make decisions on mom's behalf. The goals of care currently are to continue all aggressive medical interventions in order for mom to return to [facility]. Katrin confirmed mom is full code, stating she would want any and all aggressive interventions to be performed on behalf of mom. Discussed options available if quality of life is so greatly impacted that goals transition to a comfort approach. Katrin shared they are not at that stage.  Katrin expressed a clear understanding of the plan of care moving forward and would be appreciative of ongoing follow up from the primary team.

## 2024-06-06 ENCOUNTER — TRANSCRIPTION ENCOUNTER (OUTPATIENT)
Age: 89
End: 2024-06-06

## 2024-06-06 VITALS
DIASTOLIC BLOOD PRESSURE: 78 MMHG | OXYGEN SATURATION: 96 % | TEMPERATURE: 98 F | SYSTOLIC BLOOD PRESSURE: 119 MMHG | HEART RATE: 75 BPM | RESPIRATION RATE: 18 BRPM

## 2024-06-06 LAB
CORTIS AM PEAK SERPL-MCNC: 10.3 UG/DL — SIGNIFICANT CHANGE UP (ref 6–18.4)
GLUCOSE BLDC GLUCOMTR-MCNC: 130 MG/DL — HIGH (ref 70–99)
GLUCOSE BLDC GLUCOMTR-MCNC: 88 MG/DL — SIGNIFICANT CHANGE UP (ref 70–99)
HCT VFR BLD CALC: 32.2 % — LOW (ref 34.5–45)
HGB BLD-MCNC: 11.3 G/DL — LOW (ref 11.5–15.5)
MCHC RBC-ENTMCNC: 34 PG — SIGNIFICANT CHANGE UP (ref 27–34)
MCHC RBC-ENTMCNC: 35.1 GM/DL — SIGNIFICANT CHANGE UP (ref 32–36)
MCV RBC AUTO: 97 FL — SIGNIFICANT CHANGE UP (ref 80–100)
NRBC # BLD: 0 /100 WBCS — SIGNIFICANT CHANGE UP (ref 0–0)
PLATELET # BLD AUTO: 284 K/UL — SIGNIFICANT CHANGE UP (ref 150–400)
RBC # BLD: 3.32 M/UL — LOW (ref 3.8–5.2)
RBC # FLD: 14.5 % — SIGNIFICANT CHANGE UP (ref 10.3–14.5)
WBC # BLD: 6.51 K/UL — SIGNIFICANT CHANGE UP (ref 3.8–10.5)
WBC # FLD AUTO: 6.51 K/UL — SIGNIFICANT CHANGE UP (ref 3.8–10.5)

## 2024-06-06 PROCEDURE — 83735 ASSAY OF MAGNESIUM: CPT

## 2024-06-06 PROCEDURE — 80048 BASIC METABOLIC PNL TOTAL CA: CPT

## 2024-06-06 PROCEDURE — 70498 CT ANGIOGRAPHY NECK: CPT | Mod: MC

## 2024-06-06 PROCEDURE — 95700 EEG CONT REC W/VID EEG TECH: CPT

## 2024-06-06 PROCEDURE — 87186 SC STD MICRODIL/AGAR DIL: CPT

## 2024-06-06 PROCEDURE — 96375 TX/PRO/DX INJ NEW DRUG ADDON: CPT

## 2024-06-06 PROCEDURE — 83036 HEMOGLOBIN GLYCOSYLATED A1C: CPT

## 2024-06-06 PROCEDURE — 84295 ASSAY OF SERUM SODIUM: CPT

## 2024-06-06 PROCEDURE — A9585: CPT

## 2024-06-06 PROCEDURE — 82746 ASSAY OF FOLIC ACID SERUM: CPT

## 2024-06-06 PROCEDURE — 82306 VITAMIN D 25 HYDROXY: CPT

## 2024-06-06 PROCEDURE — 83605 ASSAY OF LACTIC ACID: CPT

## 2024-06-06 PROCEDURE — 71045 X-RAY EXAM CHEST 1 VIEW: CPT

## 2024-06-06 PROCEDURE — 85384 FIBRINOGEN ACTIVITY: CPT

## 2024-06-06 PROCEDURE — 99285 EMERGENCY DEPT VISIT HI MDM: CPT | Mod: 25

## 2024-06-06 PROCEDURE — 87641 MR-STAPH DNA AMP PROBE: CPT

## 2024-06-06 PROCEDURE — 85018 HEMOGLOBIN: CPT

## 2024-06-06 PROCEDURE — 84132 ASSAY OF SERUM POTASSIUM: CPT

## 2024-06-06 PROCEDURE — 93005 ELECTROCARDIOGRAM TRACING: CPT

## 2024-06-06 PROCEDURE — 84207 ASSAY OF VITAMIN B-6: CPT

## 2024-06-06 PROCEDURE — 86789 WEST NILE VIRUS ANTIBODY: CPT

## 2024-06-06 PROCEDURE — 92526 ORAL FUNCTION THERAPY: CPT

## 2024-06-06 PROCEDURE — 87150 DNA/RNA AMPLIFIED PROBE: CPT

## 2024-06-06 PROCEDURE — 82803 BLOOD GASES ANY COMBINATION: CPT

## 2024-06-06 PROCEDURE — 85730 THROMBOPLASTIN TIME PARTIAL: CPT

## 2024-06-06 PROCEDURE — 95711 VEEG 2-12 HR UNMONITORED: CPT

## 2024-06-06 PROCEDURE — 86618 LYME DISEASE ANTIBODY: CPT

## 2024-06-06 PROCEDURE — 86022 PLATELET ANTIBODIES: CPT

## 2024-06-06 PROCEDURE — 82607 VITAMIN B-12: CPT

## 2024-06-06 PROCEDURE — 87086 URINE CULTURE/COLONY COUNT: CPT

## 2024-06-06 PROCEDURE — 87077 CULTURE AEROBIC IDENTIFY: CPT

## 2024-06-06 PROCEDURE — 85025 COMPLETE CBC W/AUTO DIFF WBC: CPT

## 2024-06-06 PROCEDURE — 36415 COLL VENOUS BLD VENIPUNCTURE: CPT

## 2024-06-06 PROCEDURE — 96374 THER/PROPH/DIAG INJ IV PUSH: CPT

## 2024-06-06 PROCEDURE — 87637 SARSCOV2&INF A&B&RSV AMP PRB: CPT

## 2024-06-06 PROCEDURE — 95714 VEEG EA 12-26 HR UNMNTR: CPT

## 2024-06-06 PROCEDURE — 84443 ASSAY THYROID STIM HORMONE: CPT

## 2024-06-06 PROCEDURE — 85014 HEMATOCRIT: CPT

## 2024-06-06 PROCEDURE — 95718 EEG PHYS/QHP 2-12 HR W/VEEG: CPT

## 2024-06-06 PROCEDURE — 82330 ASSAY OF CALCIUM: CPT

## 2024-06-06 PROCEDURE — 86788 WEST NILE VIRUS AB IGM: CPT

## 2024-06-06 PROCEDURE — 82435 ASSAY OF BLOOD CHLORIDE: CPT

## 2024-06-06 PROCEDURE — 82533 TOTAL CORTISOL: CPT

## 2024-06-06 PROCEDURE — 81001 URINALYSIS AUTO W/SCOPE: CPT

## 2024-06-06 PROCEDURE — 87040 BLOOD CULTURE FOR BACTERIA: CPT

## 2024-06-06 PROCEDURE — 76700 US EXAM ABDOM COMPLETE: CPT

## 2024-06-06 PROCEDURE — 70553 MRI BRAIN STEM W/O & W/DYE: CPT | Mod: MC

## 2024-06-06 PROCEDURE — 84100 ASSAY OF PHOSPHORUS: CPT

## 2024-06-06 PROCEDURE — 80053 COMPREHEN METABOLIC PANEL: CPT

## 2024-06-06 PROCEDURE — 82947 ASSAY GLUCOSE BLOOD QUANT: CPT

## 2024-06-06 PROCEDURE — 85379 FIBRIN DEGRADATION QUANT: CPT

## 2024-06-06 PROCEDURE — 82962 GLUCOSE BLOOD TEST: CPT

## 2024-06-06 PROCEDURE — 85610 PROTHROMBIN TIME: CPT

## 2024-06-06 PROCEDURE — 85027 COMPLETE CBC AUTOMATED: CPT

## 2024-06-06 PROCEDURE — 92610 EVALUATE SWALLOWING FUNCTION: CPT

## 2024-06-06 PROCEDURE — 70450 CT HEAD/BRAIN W/O DYE: CPT | Mod: MC

## 2024-06-06 PROCEDURE — 87640 STAPH A DNA AMP PROBE: CPT

## 2024-06-06 PROCEDURE — 70496 CT ANGIOGRAPHY HEAD: CPT | Mod: MC

## 2024-06-06 RX ORDER — ASPIRIN/CALCIUM CARB/MAGNESIUM 324 MG
81 TABLET ORAL DAILY
Refills: 0 | Status: DISCONTINUED | OUTPATIENT
Start: 2024-06-06 | End: 2024-06-06

## 2024-06-06 RX ORDER — ASPIRIN/CALCIUM CARB/MAGNESIUM 324 MG
1 TABLET ORAL
Qty: 0 | Refills: 0 | DISCHARGE
Start: 2024-06-06

## 2024-06-06 RX ORDER — POLYETHYLENE GLYCOL 3350 17 G/17G
17 POWDER, FOR SOLUTION ORAL
Qty: 0 | Refills: 0 | DISCHARGE
Start: 2024-06-06

## 2024-06-06 RX ORDER — SODIUM CHLORIDE 9 MG/ML
1000 INJECTION, SOLUTION INTRAVENOUS
Refills: 0 | Status: COMPLETED | OUTPATIENT
Start: 2024-06-06 | End: 2024-06-06

## 2024-06-06 RX ADMIN — Medication 1 TABLET(S): at 11:28

## 2024-06-06 RX ADMIN — Medication 81 MILLIGRAM(S): at 13:54

## 2024-06-06 RX ADMIN — CHLORHEXIDINE GLUCONATE 1 APPLICATION(S): 213 SOLUTION TOPICAL at 11:28

## 2024-06-06 RX ADMIN — POLYETHYLENE GLYCOL 3350 17 GRAM(S): 17 POWDER, FOR SOLUTION ORAL at 11:28

## 2024-06-06 RX ADMIN — SODIUM CHLORIDE 50 MILLILITER(S): 9 INJECTION, SOLUTION INTRAVENOUS at 00:59

## 2024-06-06 NOTE — PROGRESS NOTE ADULT - PROVIDER SPECIALTY LIST ADULT
Gastroenterology
Heme/Onc
Infectious Disease
Internal Medicine
Internal Medicine
Neurology
Gastroenterology
Infectious Disease
Infectious Disease
Internal Medicine
Internal Medicine
Gastroenterology
Gastroenterology
Infectious Disease
Internal Medicine
Internal Medicine
Gastroenterology
Heme/Onc
Heme/Onc
Internal Medicine
Heme/Onc
Internal Medicine
Palliative Care

## 2024-06-06 NOTE — PROGRESS NOTE ADULT - PROBLEM SELECTOR PLAN 3
continue to monitor
continue to monitor  pending HIT & d-dimer
continue to monitor
pending repeat CBCD
continue to monitor
defer to ID for recurrent UTI  ABX as per primary team

## 2024-06-06 NOTE — PROVIDER CONTACT NOTE (OTHER) - DATE AND TIME:
01-Jun-2024 09:30
22-May-2024 18:30
26-May-2024 09:55
30-May-2024 17:10
28-May-2024 23:56
02-Jun-2024 11:44
04-Jun-2024 03:00
05-Jun-2024 13:10
24-May-2024 02:04
25-May-2024 09:00
02-Jun-2024 20:20
06-Jun-2024 00:30
24-May-2024 21:28
26-May-2024 13:01
02-Jun-2024 16:10
22-May-2024 17:32
22-May-2024 22:16
26-May-2024 06:12
06-Jun-2024 01:00
23-May-2024 00:30
26-May-2024 23:21
27-May-2024 00:39

## 2024-06-06 NOTE — EEG REPORT - NS EEG TEXT BOX
REPORT OF CONTINUOUS VIDEO EEG      [facility]: [address], [place] [address], Phone: [phone]  [facility]: [address] [facility], [place] [address], Phone: [phone]  [facility]: [address] [place] [address], Phone: [phone]    Patient Name: Slime Da Silva    Age: 89 year, : 1935  Farmer: [facility] 61  EEG #: [id number]    Study Date: 2024   Start Time: 8:01:05 AM      End Date: 2024         End Time: 08:00:00 AM     Study Duration: 24 h    Study Information:    EEG Recording Technique:  The patient underwent continuous Video-EEG monitoring, using Telemetry System hardware on the XLTek Digital System. EEG and video data were stored on a computer hard drive with important events saved in digital archive files. The material was reviewed by a physician (electroencephalographer / epileptologist) on a daily basis. Gilson and seizure detection algorithms were utilized and reviewed. An EEG Technician attended to the patient, and was available throughout daytime work hours.  The epilepsy center neurologist was available in person or on call 24-hours per day.    EEG Placement and Labeling of Electrodes:  The EEG was performed utilizing 20 channel referential EEG connections (coronal over temporal over parasagittal montage) using all standard 10-20 electrode placements with EKG, with additional electrodes placed in the inferior temporal region using the modified 10-10 montage electrode placements for elective admissions, or if deemed necessary. Recording was at a sampling rate of 256 samples per second per channel. Time synchronized digital video recording was done simultaneously with EEG recording. A low light infrared camera was used for low light recording.     History: -  89 year old Female is here to rule out seizures    Medication  MEDICATIONS  (STANDING):  chlorhexidine 2% Cloths 1 Application(s) Topical daily  dextrose 50% Injectable 12.5 Gram(s) IV Push once  dextrose 50% Injectable 25 Gram(s) IV Push once  dextrose 50% Injectable 25 Gram(s) IV Push once  dextrose Oral Gel 15 Gram(s) Oral once  donepezil 10 milliGRAM(s) Oral at bedtime  glucagon  Injectable 1 milliGRAM(s) IntraMuscular once  multivitamin 1 Tablet(s) Oral daily  polyethylene glycol 3350 17 Gram(s) Oral daily  senna 2 Tablet(s) Oral at bedtime      Interpretation:    [[[Abbreviation Key:  PDR=alpha rhythm/posterior dominant rhythm. A-P=anterior posterior.  Amplitude: ‘very low’:<20; ‘low’:20-49; ‘medium’:; ‘high’:>150uV.  Persistence for periodic/rhythmic patterns (% of epoch) ‘rare’:<1%; ‘occasional’:1-10%; ‘frequent’:10-50%; ‘abundant’:50-90%; ‘continuous’:>90%.  Persistence for sporadic discharges: ‘rare’:<1/hr; ‘occasional’:1/min-1/hr; ‘frequent’:>1/min; ‘abundant’:>1/10 sec.  RPP=rhythmic and periodic patterns; GRDA=generalized rhythmic delta activity; FIRDA=frontal intermittent GRDA; LRDA=lateralized rhythmic delta activity; TIRDA=temporal intermittent rhythmic delta activity;  LPD=PLED=lateralized periodic discharges; GPD=generalized periodic discharges; BIPDs =bilateral independent periodic discharges; Mf=multifocal; SIRPDs=stimulus induced rhythmic, periodic, or ictal appearing discharges; BIRDs=brief potentially ictal rhythmic discharges >4 Hz, lasting .5-10s; PFA (paroxysmal bursts >13 Hz or =8 Hz <10s).  Modifiers: +F=with fast component; +S=with spike component; +R=with rhythmic component.  S-B=burst suppression pattern.  Max=maximal. N1-drowsy; N2-stage II sleep; N3-slow wave sleep. SSS/BETS=small sharp spikes/benign epileptiform transients of sleep. HV=hyperventilation; PS=photic stimulation]]]      Daily EEG Visual Analysis    FINDINGS:      Background:  Symmetry: symmetric  Continuous: continuous  PDR: absent  Reactivity: present  Voltage: normal, [defined typically between 20-150uV]  Anterior Posterior Gradient: absent  Breach: absent    Background Slowing:  Generalized slowing: present. Background is diffusely slow consisting of polymorphic delta theta activity up to 6-7 Hz    Focal slowing: none was present.    State Changes:   -Stage II sleep transients were not recorded.    Sporadic Epileptiform Discharges:   None    Rhythmic and Periodic Patterns (RPPs):  None     Electrographic and Electroclinical seizures:  None    Other Clinical Events:  None    Activation Procedures:   -Hyperventilation was not performed.    -Photic stimulation was not performed.    Artifacts:  Intermittent myogenic and movement artifacts were noted.    ECG:  No significant abnormality      EEG Summary / Classification:  Abnormal EEG in the encephalopathic.  •	Background slowing- Mild to moderate    EEG Impression / Clinical Correlate:  Abnormal prolonged EEG study due to Mild to moderate diffuse cerebral dysfunction that is not specific in etiology    No epileptic discharges recorded.  No seizures recorded.  •	    ________________________________________    SINTIA Zuniga  Attending Physician, [facility]    ------------------------------------  EEG Reading Room: [phone]  On Call Service After Hours: [phone]      REPORT OF CONTINUOUS VIDEO EEG      [facility]: [address], [place] [address], Phone: [phone]  [facility]: [address], [place] [address], Phone: [phone]  [facility]: [address] [place] [address], Phone: [phone]    Patient Name: Slime Da Silva    Age: 89 year, : 1935  Farmer: [facility] 61  EEG #: [id number]    Study Date: 2024   Start Time: 8:01:05 AM      End Date: 2024         End Time: 10:42     Study Duration: 26 h 42 m    Study Information:    EEG Recording Technique:  The patient underwent continuous Video-EEG monitoring, using Telemetry System hardware on the XLTek Digital System. EEG and video data were stored on a computer hard drive with important events saved in digital archive files. The material was reviewed by a physician (electroencephalographer / epileptologist) on a daily basis. Gilson and seizure detection algorithms were utilized and reviewed. An EEG Technician attended to the patient, and was available throughout daytime work hours.  The epilepsy center neurologist was available in person or on call 24-hours per day.    EEG Placement and Labeling of Electrodes:  The EEG was performed utilizing 20 channel referential EEG connections (coronal over temporal over parasagittal montage) using all standard 10-20 electrode placements with EKG, with additional electrodes placed in the inferior temporal region using the modified 10-10 montage electrode placements for elective admissions, or if deemed necessary. Recording was at a sampling rate of 256 samples per second per channel. Time synchronized digital video recording was done simultaneously with EEG recording. A low light infrared camera was used for low light recording.     History: -  89 year old Female is here to rule out seizures    Medication  MEDICATIONS  (STANDING):  chlorhexidine 2% Cloths 1 Application(s) Topical daily  dextrose 50% Injectable 12.5 Gram(s) IV Push once  dextrose 50% Injectable 25 Gram(s) IV Push once  dextrose 50% Injectable 25 Gram(s) IV Push once  dextrose Oral Gel 15 Gram(s) Oral once  donepezil 10 milliGRAM(s) Oral at bedtime  glucagon  Injectable 1 milliGRAM(s) IntraMuscular once  multivitamin 1 Tablet(s) Oral daily  polyethylene glycol 3350 17 Gram(s) Oral daily  senna 2 Tablet(s) Oral at bedtime      Interpretation:    [[[Abbreviation Key:  PDR=alpha rhythm/posterior dominant rhythm. A-P=anterior posterior.  Amplitude: ‘very low’:<20; ‘low’:20-49; ‘medium’:; ‘high’:>150uV.  Persistence for periodic/rhythmic patterns (% of epoch) ‘rare’:<1%; ‘occasional’:1-10%; ‘frequent’:10-50%; ‘abundant’:50-90%; ‘continuous’:>90%.  Persistence for sporadic discharges: ‘rare’:<1/hr; ‘occasional’:1/min-1/hr; ‘frequent’:>1/min; ‘abundant’:>1/10 sec.  RPP=rhythmic and periodic patterns; GRDA=generalized rhythmic delta activity; FIRDA=frontal intermittent GRDA; LRDA=lateralized rhythmic delta activity; TIRDA=temporal intermittent rhythmic delta activity;  LPD=PLED=lateralized periodic discharges; GPD=generalized periodic discharges; BIPDs =bilateral independent periodic discharges; Mf=multifocal; SIRPDs=stimulus induced rhythmic, periodic, or ictal appearing discharges; BIRDs=brief potentially ictal rhythmic discharges >4 Hz, lasting .5-10s; PFA (paroxysmal bursts >13 Hz or =8 Hz <10s).  Modifiers: +F=with fast component; +S=with spike component; +R=with rhythmic component.  S-B=burst suppression pattern.  Max=maximal. N1-drowsy; N2-stage II sleep; N3-slow wave sleep. SSS/BETS=small sharp spikes/benign epileptiform transients of sleep. HV=hyperventilation; PS=photic stimulation]]]      Daily EEG Visual Analysis    FINDINGS:      Background:  Symmetry: symmetric  Continuous: continuous  PDR: absent  Reactivity: present  Voltage: normal, [defined typically between 20-150uV]  Anterior Posterior Gradient: absent  Breach: absent    Background Slowing:  Generalized slowing: present. Background is diffusely slow consisting of polymorphic delta theta activity up to 6-7 Hz    Focal slowing: none was present.    State Changes:   -Stage II sleep transients were not recorded.    Sporadic Epileptiform Discharges:   None    Rhythmic and Periodic Patterns (RPPs):  None     Electrographic and Electroclinical seizures:  None    Other Clinical Events:  None    Activation Procedures:   -Hyperventilation was not performed.    -Photic stimulation was not performed.    Artifacts:  Intermittent myogenic and movement artifacts were noted.    ECG:  No significant abnormality      EEG Summary / Classification:  Abnormal EEG in the encephalopathic.  •	Background slowing- Mild to moderate    EEG Impression / Clinical Correlate:  Abnormal prolonged EEG study due to Mild to moderate diffuse cerebral dysfunction that is not specific in etiology    No epileptic discharges recorded.  No seizures recorded.  •	    ________________________________________    SINTIA Zuniga  Attending Physician, [facility]    ------------------------------------  EEG Reading Room: [phone]  On Call Service After Hours: [phone]

## 2024-06-06 NOTE — PROVIDER CONTACT NOTE (OTHER) - REASON
Pt remains Hypothermic with poor PO intake
Pt sinus lorena 45-50s on tele
B/L heel DTI
Pt axillary temp 88.7
Pt pulled most of her NGT out with b/l unsecured mittens in place
Tele Event: PAT 9 sec 
Pt axillary temp 97.5, bear hugger placed back on patient
Pt had PAT 2.3 seconds HR up to 131 (first time)
pt unable to tolerate PO meds (10mg donepezil & 8.6 mg senna)
Pt blood glucose 72
Pt sinus lorena 45-50s on tele
PAF with HR to 130s on telemetry
Pt had PAT 1.8 seconds HR up to 135
Pt with HR 45-47 while asleep.
axillary temp 96.9F
PAT 9.7 sec, 
Pt sinus lorena from 41-49, asymptomatic. Sleep during rounds.
Pt with Intermittent PATs (longest PAT was 16 seconds, HR of 130, the remaining PAT were 8 seconds, HR up to 130)
BP 96/57
Hypothermia
Pt with decreased PO intake
Pt removed NGT

## 2024-06-06 NOTE — CHART NOTE - NSCHARTNOTEFT_GEN_A_CORE
Interim events:  24 hr EEG:  Abnormal prolonged EEG study due to Mild to moderate diffuse cerebral dysfunction that is not specific in etiology  No epileptic discharges recorded.  No seizures recorded.    Impression:  Elderly female patient with altered mental status, encephalopathy, differentials include possibly toxic metabolic infectious in setting of UTI and e coli ESBL bacteria; vs subclinical seizures, vs recrudescence/diminished physiologic reserve in the setting of acute illness in patient with dementia/potential NPH cognitive deficits, vs delirium due to prolonged hospital stay. EEG negative.    Assessment:  [] can d/c EEG  [] neurovascular recommendations per neurology team 6/3/24  Patient should follow up with Stroke NP, Kimberlyn Maloney or April Guillen, in clinic at [address] [place] [address]. Please email [email or web address] w/ basic PHI.    Neurology team to sign off at this time. For any questions please call [phone]. Thank you.

## 2024-06-06 NOTE — PROGRESS NOTE ADULT - PROBLEM SELECTOR PLAN 2
supportive
continue to be supportive
supportive
pt A&OX1  lethargic on exam  defer to primary team for ongoing care

## 2024-06-06 NOTE — PROGRESS NOTE ADULT - PROBLEM SELECTOR PLAN 4
supportive
see GOC note above  surrogates are patient's two kids: Katrin and Pratima  Full code  disposition: jesu coley  presented palliative options: not interested at this time
continue supportive

## 2024-06-06 NOTE — PROGRESS NOTE ADULT - SUBJECTIVE AND OBJECTIVE BOX
INTERVAL HPI/OVERNIGHT EVENTS:    resting comfortably  without new gi events     MEDICATIONS  (STANDING):  chlorhexidine 2% Cloths 1 Application(s) Topical daily  dextrose 50% Injectable 12.5 Gram(s) IV Push once  dextrose 50% Injectable 25 Gram(s) IV Push once  dextrose 50% Injectable 25 Gram(s) IV Push once  dextrose Oral Gel 15 Gram(s) Oral once  donepezil 10 milliGRAM(s) Oral at bedtime  glucagon  Injectable 1 milliGRAM(s) IntraMuscular once  multivitamin 1 Tablet(s) Oral daily  polyethylene glycol 3350 17 Gram(s) Oral daily  senna 2 Tablet(s) Oral at bedtime    MEDICATIONS  (PRN):      Allergies    sulfamethoxazole (Vomiting; Nausea)    Intolerances            Vital Signs Last 24 Hrs  T(C): 36.5 (06 Jun 2024 11:05), Max: 37.2 (05 Jun 2024 16:00)  T(F): 97.7 (06 Jun 2024 11:05), Max: 99 (05 Jun 2024 16:00)  HR: 73 (06 Jun 2024 11:05) (63 - 77)  BP: 125/73 (06 Jun 2024 11:05) (93/56 - 134/79)  BP(mean): --  RR: 18 (06 Jun 2024 11:05) (18 - 18)  SpO2: 96% (06 Jun 2024 11:05) (96% - 98%)    Parameters below as of 06 Jun 2024 11:05  Patient On (Oxygen Delivery Method): room air        PHYSICAL EXAM:    Constitutional: NAD  HEENT: EOMI, throat clear  Neck: No LAD, supple  Respiratory: CTA and P  Cardiovascular: S1 and S2, RRR, no M  Gastrointestinal: BS+, soft, NT/ND, neg HSM,  Extremities: No peripheral edema, neg clubbing, cyanosis  Vascular: 2+ peripheral pulses  Neurological: A/O x0  Psychiatric: Normal mood, normal affect  Skin: No rashes      LABS:                        11.3   6.51  )-----------( 284      ( 06 Jun 2024 05:59 )             32.2                 RADIOLOGY & ADDITIONAL TESTS:

## 2024-06-06 NOTE — PROGRESS NOTE ADULT - PROBLEM SELECTOR PROBLEM 1
Acute UTI
Functional quadriplegia

## 2024-06-06 NOTE — PROVIDER CONTACT NOTE (OTHER) - BACKGROUND
(Admit Diagnosis) Urinary tract infection. UTI (urinary tract infection). Problem/DX: Thrombocytopenia, Dementia, Myasthenia gravis; Acute UTI Secondary Other encephalopathy. Hx of poor PO intake.
Pt admitted with AMS 2/2 UTI/ Bacteremia, lethargy/seizure like activity, thrombocytopenia, Liver enzyme elevation, Poor PO intake, LBP, hypothermia. Pt has PMH of dementia, myasthenia gravis.
Patient admitted for UTI,AMS.PMH of dementia, myasthenia gravis.
Pt admitted for AMS 2/2 UTI. PMH of dementia, myasthenia gravis.
Pt admitted AMS,
Pt admitted for AMS 2/2 UTI. PMH of dementia, myasthenia gravis.
Pt admitted for AMS 2/2 UTI. PMH of dementia, myasthenia gravis.
Pt admitted with AMS 2/2 UTI/ Bacteremia ( ESBL Ecoli) 5/23 Meropenem started. Lethargic/seizure like activity, F/u neurology as outpatient for NPH w/u. -Poor PO intake- Family refused PEG.
Pt was admitted for uti, ams, hx of thrombocytopenia. Multiple PATs throughout the day.
(Admit Diagnosis) Urinary tract infection. UTI (urinary tract infection)  (Problem/DX) Thrombocytopenia, unspecified; Myasthenia gravis; Acute UTI
(Admit Diagnosis) Urinary tract infection. UTI (urinary tract infection). Problem/DX: Thrombocytopenia, Dementia, Myasthenia gravis; Acute UTI Secondary Other encephalopathy. Hx of poor PO intake.
AMS 2/2 UTI/ Bacteremia ( ESBL Ecoli) - s/p vanco/zosyn in the ED - on meropenem. Thrombocytopenia; heme/onc consulted,  -Poor PO intake- Family is aware
Admit Diagnosis: Urinary tract infection. UTI (urinary tract infection). Problem/DX: Thrombocytopenia, Dementia, Myasthenia gravis; Acute UTI Secondary Other encephalopathy. Hx of poor PO intake.
Admitted for UTI.
Pt admitted with AMS 2/2 UTI/ Bacteremia, lethargy/seizure like activity, thrombocytopenia, Liver enzyme elevation, Poor PO intake, LBP, hypothermia. Pt has PMH of dementia, myasthenia gravis.
DX: UTI
Dx UTI/AMS  Hx Dementia, Myasthenia Gravis
Pt admitted with AMS 2/2 UTI/ Bacteremia ( ESBL Ecoli)  ( +nitr, mod leuks, WBC 42) 5/23 Meropenem, Lethargic/seizure like activity/ CTH w/ re demonstration of ventricular dilation c/f NPH
Pt admitted with AMS 2/2 UTI/ Bacteremia, lethargy/seizure like activity, thrombocytopenia, Liver enzyme elevation, Poor PO intake, LBP, hypothermia. Pt has PMH of dementia, myasthenia gravis.
DX: UTI
Patient admitted for UTI, AMS, PMH of dementia
Pt admitted for AMS 2/2 UTI. PMH of dementia, myasthenia gravis.

## 2024-06-06 NOTE — DISCHARGE NOTE NURSING/CASE MANAGEMENT/SOCIAL WORK - PATIENT PORTAL LINK FT
You can access the FollowMyHealth Patient Portal offered by [facility] by registering at the following website: [email or web address]. By joining [facility]’s FollowMyHealth portal, you will also be able to view your health information using other applications (apps) compatible with our system.

## 2024-06-06 NOTE — CHART NOTE - NSCHARTNOTEFT_GEN_A_CORE
Medicine NP Episodic Note    Called by RN with midnight BG 75 mg/dl and trending down throughout the day.  Patient with poor appetite and poor oral intake despite nursing efforts. IVF initiated, will monitor for hypoglycemia and electrolyte imbalance.         Follow up with Attending in AM.    Estephanie Adamson, [facility]   Department of Medicine

## 2024-06-06 NOTE — PROGRESS NOTE ADULT - PROBLEM SELECTOR PLAN 5
agree with supportive/palliative care planning
agree with supportive care evaluation
will sign off as goals established  case discussed with primary team  Can be reached by TEAMS M-F 9-5 Tammy Escalera Any other time please page [phone] if needed

## 2024-06-06 NOTE — PROGRESS NOTE ADULT - PROBLEM SELECTOR PROBLEM 5
Advanced care planning/counseling discussion
Advanced care planning/counseling discussion
Encounter for palliative care

## 2024-06-06 NOTE — PROGRESS NOTE ADULT - ASSESSMENT
88yo F h/o Myasthenia Gravis (not on medication for past 9 years), Dementia and Chronic low back pain, recently hospitalized for UTI and now p/w sepsis related to recurrent UTI. GI Consulted for increased LFTs.    1. Recurrent UTI w/AMS  management per ID    2. Dysphagia, poor PO intake   S&S recs appreciated, cleared for pureed diet w/ assistance   pt would be a poor candidate for PEG   ongoing GOC    3. Constipation   cont miralax and senna    4. Myasthenia gravis

## 2024-06-06 NOTE — PROVIDER CONTACT NOTE (OTHER) - NAME OF MD/NP/PA/DO NOTIFIED:
CORRIE Garcia
CORRIE Quiroga
Estephanie Adamson NP
Luann REID
Sarah Coles, ACP
ALMITA Garcia
CORRIE Quiroga
CORRIE Stallings
JEANETTE Choudhary
JEANETTE Stallings
Sarah Coles ACP
CORRIE Last
CORRIE Quiroga
CORRIE Stallings
CORRIE Stallings
Estephanie Adamson NP
Luann REID
ALMITA- Day Eastman
Estephanie Adamson NP
Juana Morfin, NP
Luann REID
Sha Morfin, NP

## 2024-06-06 NOTE — PROGRESS NOTE ADULT - SUBJECTIVE AND OBJECTIVE BOX
expressive aphasic  minimal oral intake    VITAL SIGNS:    VSS    PHYSICAL EXAM:     GENERAL: no acute distress  HEENT: NC/AT, EOMI, neck supple, MMM  RESPIRATORY: LCTAB/L, no rhonchi, rales, or wheezing  CARDIOVASCULAR: RRR, no murmurs, gallops, rubs  ABDOMINAL: soft, non-tender, non-distended, positive bowel sounds   EXTREMITIES: no clubbing, cyanosis, or edema  NEUROLOGICAL: alert and oriented x 3, non-focal  LYMPHATIC: lymphatic: cervical, supraclavicular, axilla, inguinal  SKIN: no rashes or lesions   MUSCULOSKELETAL: no gross joint deformity                          11.3   6.51  )-----------( 284      ( 06 Jun 2024 05:59 )             32.2             MEDICATIONS  (STANDING):  aspirin  chewable 81 milliGRAM(s) Oral daily  chlorhexidine 2% Cloths 1 Application(s) Topical daily  dextrose 50% Injectable 12.5 Gram(s) IV Push once  dextrose 50% Injectable 25 Gram(s) IV Push once  dextrose 50% Injectable 25 Gram(s) IV Push once  dextrose Oral Gel 15 Gram(s) Oral once  donepezil 10 milliGRAM(s) Oral at bedtime  glucagon  Injectable 1 milliGRAM(s) IntraMuscular once  multivitamin 1 Tablet(s) Oral daily  polyethylene glycol 3350 17 Gram(s) Oral daily  senna 2 Tablet(s) Oral at bedtime

## 2024-06-06 NOTE — PROGRESS NOTE ADULT - REASON FOR ADMISSION
Seizure activity

## 2024-06-06 NOTE — PROVIDER CONTACT NOTE (OTHER) - RECOMMENDATIONS
Luann REID made aware
Notify ALMITA Garcia.
PA notified
CORRIE Quiroga notified and aware.
Hypothermia blanket applied as per order. Will continue to monitor the patient.
NP updated on pt's status.
CORRIE Quiroga notified and aware.
Estephanie Adamson NP aware
Notify CORRIE Garcia
Estephanie Adamson NP aware
Sarah Coles ACP aware
Luann REID
NP notified
Notify ACP Marisela Stallings
CORRIE Last notified and aware.
CORRIE Quiroga notified and aware.
Notify ACP Marisela Stallings
Provider notified.
Estephanie Adamson NP aware
Notify ACP Marisela Stallings
Luann REID made aware

## 2024-06-06 NOTE — PROGRESS NOTE ADULT - PROBLEM SELECTOR PLAN 1
continue antibiotic
continue antibiotic
completing antibiotic
continue to monitor
antibiotic per ID
completing/completed antibiotic
continue antibiotic
continue antibiotics
ppsv 30%: pt requires assistance with all adl, care, turn and positioning

## 2024-06-06 NOTE — PROVIDER CONTACT NOTE (OTHER) - SITUATION
Hypothermia. 94.4 axillary and 95.5 rectal
Pt axillary temp 97.5, bear hugger placed back on patient
PAF with HR to 130s on telemetry
Pt sinus lorena 45-50s on tele
Pt with decreased PO intake
Pt had PAT 1.8 seconds HR up to 135
On admission assessment, patient noted to have suspected DTI to B/L heels
Pt had PAT 2.3 seconds HR up to 131 (first time)
Pt removed B/L soft mitten and removed NGT.
Pt sinus lorena 45-50s on tele
Pt with Intermittent PATs (longest PAT was 16 seconds, HR of 130, the remaining PATs were 8 seconds, HR up to 130)
BP 96/57
Pt blood glucose 72
Pt remains Hypothermic with poor PO intake
axillary temp 96.9F
pt unable to tolerate PO meds
Pt with HR 45-47 while asleep.
PAT 9.7 sec, 
Pt axillary temp 88.7
Pt pulled most of her NGT out with b/l unsecured mittens in place
Pt sinus lorena from 41-49, asymptomatic. Sleep during rounds.
Tele Event: PAT 9 sec

## 2024-06-06 NOTE — CHART NOTE - NSCHARTNOTESELECT_GEN_ALL_CORE
EEG Prelim
Event Note
Neurology/Event Note
Speech and Swallow
Wound Care Team Note:/Event Note
Event Note
Event Note
Neurology
Nutrition Services
Speech and Swallow

## 2024-06-06 NOTE — PROGRESS NOTE ADULT - PROBLEM SELECTOR PROBLEM 3
Thrombocytopenia, unspecified
Acute UTI

## 2024-07-22 ENCOUNTER — APPOINTMENT (OUTPATIENT)
Dept: NEUROLOGY | Facility: CLINIC | Age: 89
End: 2024-07-22
